# Patient Record
Sex: MALE | Race: WHITE | NOT HISPANIC OR LATINO | Employment: FULL TIME | URBAN - METROPOLITAN AREA
[De-identification: names, ages, dates, MRNs, and addresses within clinical notes are randomized per-mention and may not be internally consistent; named-entity substitution may affect disease eponyms.]

---

## 2017-04-02 ENCOUNTER — TRANSFERRED RECORDS (OUTPATIENT)
Dept: HEALTH INFORMATION MANAGEMENT | Facility: CLINIC | Age: 47
End: 2017-04-02

## 2020-11-18 ENCOUNTER — TELEPHONE (OUTPATIENT)
Dept: BEHAVIORAL HEALTH | Facility: CLINIC | Age: 50
End: 2020-11-18

## 2020-11-18 ENCOUNTER — HOSPITAL ENCOUNTER (INPATIENT)
Facility: CLINIC | Age: 50
LOS: 1 days | Discharge: HOME | End: 2020-11-19
Attending: EMERGENCY MEDICINE | Admitting: PSYCHIATRY & NEUROLOGY
Payer: COMMERCIAL

## 2020-11-18 DIAGNOSIS — R45.851 SUICIDAL IDEATION: ICD-10-CM

## 2020-11-18 DIAGNOSIS — Z20.828 EXPOSURE TO SARS-ASSOCIATED CORONAVIRUS: ICD-10-CM

## 2020-11-18 DIAGNOSIS — F32.A DEPRESSION, UNSPECIFIED DEPRESSION TYPE: ICD-10-CM

## 2020-11-18 DIAGNOSIS — G47.00 INSOMNIA, UNSPECIFIED TYPE: Primary | ICD-10-CM

## 2020-11-18 LAB
AMPHETAMINES UR QL SCN: NEGATIVE
BARBITURATES UR QL: NEGATIVE
BENZODIAZ UR QL: NEGATIVE
CANNABINOIDS UR QL SCN: NEGATIVE
COCAINE UR QL: NEGATIVE
ETHANOL UR QL SCN: NEGATIVE
OPIATES UR QL SCN: NEGATIVE
SARS-COV-2 RNA SPEC QL NAA+PROBE: NORMAL
SPECIMEN SOURCE: NORMAL

## 2020-11-18 PROCEDURE — 99284 EMERGENCY DEPT VISIT MOD MDM: CPT | Performed by: EMERGENCY MEDICINE

## 2020-11-18 PROCEDURE — 124N000002 HC R&B MH UMMC

## 2020-11-18 PROCEDURE — 99285 EMERGENCY DEPT VISIT HI MDM: CPT | Mod: 25 | Performed by: EMERGENCY MEDICINE

## 2020-11-18 PROCEDURE — U0003 INFECTIOUS AGENT DETECTION BY NUCLEIC ACID (DNA OR RNA); SEVERE ACUTE RESPIRATORY SYNDROME CORONAVIRUS 2 (SARS-COV-2) (CORONAVIRUS DISEASE [COVID-19]), AMPLIFIED PROBE TECHNIQUE, MAKING USE OF HIGH THROUGHPUT TECHNOLOGIES AS DESCRIBED BY CMS-2020-01-R: HCPCS | Performed by: EMERGENCY MEDICINE

## 2020-11-18 PROCEDURE — 80307 DRUG TEST PRSMV CHEM ANLYZR: CPT | Performed by: EMERGENCY MEDICINE

## 2020-11-18 PROCEDURE — 90791 PSYCH DIAGNOSTIC EVALUATION: CPT

## 2020-11-18 PROCEDURE — C9803 HOPD COVID-19 SPEC COLLECT: HCPCS | Performed by: EMERGENCY MEDICINE

## 2020-11-18 PROCEDURE — 250N000013 HC RX MED GY IP 250 OP 250 PS 637: Performed by: EMERGENCY MEDICINE

## 2020-11-18 PROCEDURE — 80320 DRUG SCREEN QUANTALCOHOLS: CPT | Performed by: EMERGENCY MEDICINE

## 2020-11-18 RX ORDER — ZOLPIDEM TARTRATE 12.5 MG/1
12.5 TABLET, FILM COATED, EXTENDED RELEASE ORAL
Status: DISCONTINUED | OUTPATIENT
Start: 2020-11-18 | End: 2020-11-18

## 2020-11-18 RX ORDER — ACETAMINOPHEN 325 MG/1
650 TABLET ORAL EVERY 4 HOURS PRN
Status: DISCONTINUED | OUTPATIENT
Start: 2020-11-18 | End: 2020-11-19 | Stop reason: HOSPADM

## 2020-11-18 RX ORDER — OLANZAPINE 10 MG/2ML
10 INJECTION, POWDER, FOR SOLUTION INTRAMUSCULAR 3 TIMES DAILY PRN
Status: DISCONTINUED | OUTPATIENT
Start: 2020-11-18 | End: 2020-11-19 | Stop reason: HOSPADM

## 2020-11-18 RX ORDER — VENLAFAXINE HYDROCHLORIDE 75 MG/1
75 CAPSULE, EXTENDED RELEASE ORAL DAILY
Status: DISCONTINUED | OUTPATIENT
Start: 2020-11-19 | End: 2020-11-19 | Stop reason: HOSPADM

## 2020-11-18 RX ORDER — QUETIAPINE FUMARATE 100 MG/1
100 TABLET, FILM COATED ORAL AT BEDTIME
Status: DISCONTINUED | OUTPATIENT
Start: 2020-11-18 | End: 2020-11-19

## 2020-11-18 RX ORDER — OLANZAPINE 10 MG/1
10 TABLET ORAL 3 TIMES DAILY PRN
Status: DISCONTINUED | OUTPATIENT
Start: 2020-11-18 | End: 2020-11-19 | Stop reason: HOSPADM

## 2020-11-18 RX ORDER — QUETIAPINE FUMARATE 100 MG/1
100 TABLET, FILM COATED ORAL ONCE
Status: COMPLETED | OUTPATIENT
Start: 2020-11-18 | End: 2020-11-18

## 2020-11-18 RX ORDER — ZOLPIDEM TARTRATE 12.5 MG/1
12.5 TABLET, FILM COATED, EXTENDED RELEASE ORAL
Status: DISCONTINUED | OUTPATIENT
Start: 2020-11-19 | End: 2020-11-19 | Stop reason: HOSPADM

## 2020-11-18 RX ORDER — MULTIPLE VITAMINS W/ MINERALS TAB 9MG-400MCG
1 TAB ORAL DAILY
Status: DISCONTINUED | OUTPATIENT
Start: 2020-11-19 | End: 2020-11-19 | Stop reason: HOSPADM

## 2020-11-18 RX ORDER — VENLAFAXINE HYDROCHLORIDE 75 MG/1
150 CAPSULE, EXTENDED RELEASE ORAL DAILY
COMMUNITY
End: 2022-03-11

## 2020-11-18 RX ORDER — HYDROXYZINE HYDROCHLORIDE 25 MG/1
25 TABLET, FILM COATED ORAL EVERY 4 HOURS PRN
Status: DISCONTINUED | OUTPATIENT
Start: 2020-11-18 | End: 2020-11-19 | Stop reason: HOSPADM

## 2020-11-18 RX ORDER — LANOLIN ALCOHOL/MO/W.PET/CERES
3 CREAM (GRAM) TOPICAL
Status: DISCONTINUED | OUTPATIENT
Start: 2020-11-18 | End: 2020-11-19 | Stop reason: HOSPADM

## 2020-11-18 RX ORDER — ZOLPIDEM TARTRATE 12.5 MG/1
TABLET, FILM COATED, EXTENDED RELEASE ORAL AT BEDTIME
Status: ON HOLD | COMMUNITY
End: 2020-11-19

## 2020-11-18 RX ORDER — VITAMIN B COMPLEX
50 TABLET ORAL DAILY
Status: DISCONTINUED | OUTPATIENT
Start: 2020-11-19 | End: 2020-11-19 | Stop reason: HOSPADM

## 2020-11-18 RX ADMIN — QUETIAPINE FUMARATE 100 MG: 100 TABLET ORAL at 21:57

## 2020-11-18 RX ADMIN — ZOLPIDEM TARTRATE 12.5 MG: 12.5 TABLET, EXTENDED RELEASE ORAL at 22:04

## 2020-11-18 ASSESSMENT — ENCOUNTER SYMPTOMS
DYSPHORIC MOOD: 1
SLEEP DISTURBANCE: 1

## 2020-11-18 ASSESSMENT — MIFFLIN-ST. JEOR: SCORE: 1613.8

## 2020-11-19 ENCOUNTER — APPOINTMENT (OUTPATIENT)
Dept: MRI IMAGING | Facility: CLINIC | Age: 50
End: 2020-11-19
Payer: COMMERCIAL

## 2020-11-19 VITALS
OXYGEN SATURATION: 95 % | RESPIRATION RATE: 16 BRPM | BODY MASS INDEX: 27.14 KG/M2 | TEMPERATURE: 98.1 F | DIASTOLIC BLOOD PRESSURE: 83 MMHG | SYSTOLIC BLOOD PRESSURE: 120 MMHG | HEIGHT: 67 IN | HEART RATE: 86 BPM | WEIGHT: 172.9 LBS

## 2020-11-19 LAB
ALBUMIN SERPL-MCNC: 3.8 G/DL (ref 3.4–5)
ALP SERPL-CCNC: 61 U/L (ref 40–150)
ALT SERPL W P-5'-P-CCNC: 22 U/L (ref 0–70)
ANION GAP SERPL CALCULATED.3IONS-SCNC: 2 MMOL/L (ref 3–14)
AST SERPL W P-5'-P-CCNC: 10 U/L (ref 0–45)
BILIRUB SERPL-MCNC: 1.1 MG/DL (ref 0.2–1.3)
BUN SERPL-MCNC: 18 MG/DL (ref 7–30)
CALCIUM SERPL-MCNC: 8.8 MG/DL (ref 8.5–10.1)
CHLORIDE SERPL-SCNC: 106 MMOL/L (ref 94–109)
CHOLEST SERPL-MCNC: 165 MG/DL
CO2 SERPL-SCNC: 32 MMOL/L (ref 20–32)
CREAT SERPL-MCNC: 0.91 MG/DL (ref 0.66–1.25)
DEPRECATED CALCIDIOL+CALCIFEROL SERPL-MC: 31 UG/L (ref 20–75)
ERYTHROCYTE [DISTWIDTH] IN BLOOD BY AUTOMATED COUNT: 11.9 % (ref 10–15)
GFR SERPL CREATININE-BSD FRML MDRD: >90 ML/MIN/{1.73_M2}
GLUCOSE SERPL-MCNC: 90 MG/DL (ref 70–99)
HCT VFR BLD AUTO: 45.1 % (ref 40–53)
HDLC SERPL-MCNC: 54 MG/DL
HGB BLD-MCNC: 15.5 G/DL (ref 13.3–17.7)
LABORATORY COMMENT REPORT: NORMAL
LDLC SERPL CALC-MCNC: 91 MG/DL
MCH RBC QN AUTO: 29.4 PG (ref 26.5–33)
MCHC RBC AUTO-ENTMCNC: 34.4 G/DL (ref 31.5–36.5)
MCV RBC AUTO: 85 FL (ref 78–100)
NONHDLC SERPL-MCNC: 111 MG/DL
PLATELET # BLD AUTO: 204 10E9/L (ref 150–450)
POTASSIUM SERPL-SCNC: 4.3 MMOL/L (ref 3.4–5.3)
PROT SERPL-MCNC: 7 G/DL (ref 6.8–8.8)
RBC # BLD AUTO: 5.28 10E12/L (ref 4.4–5.9)
SARS-COV-2 RNA SPEC QL NAA+PROBE: NEGATIVE
SODIUM SERPL-SCNC: 140 MMOL/L (ref 133–144)
SPECIMEN SOURCE: NORMAL
TRIGL SERPL-MCNC: 100 MG/DL
TSH SERPL DL<=0.005 MIU/L-ACNC: 1.82 MU/L (ref 0.4–4)
VIT B12 SERPL-MCNC: 549 PG/ML (ref 193–986)
WBC # BLD AUTO: 5.3 10E9/L (ref 4–11)

## 2020-11-19 PROCEDURE — 250N000013 HC RX MED GY IP 250 OP 250 PS 637: Performed by: STUDENT IN AN ORGANIZED HEALTH CARE EDUCATION/TRAINING PROGRAM

## 2020-11-19 PROCEDURE — 80061 LIPID PANEL: CPT | Performed by: STUDENT IN AN ORGANIZED HEALTH CARE EDUCATION/TRAINING PROGRAM

## 2020-11-19 PROCEDURE — 82306 VITAMIN D 25 HYDROXY: CPT | Performed by: STUDENT IN AN ORGANIZED HEALTH CARE EDUCATION/TRAINING PROGRAM

## 2020-11-19 PROCEDURE — 93010 ELECTROCARDIOGRAM REPORT: CPT | Performed by: INTERNAL MEDICINE

## 2020-11-19 PROCEDURE — 82607 VITAMIN B-12: CPT | Performed by: STUDENT IN AN ORGANIZED HEALTH CARE EDUCATION/TRAINING PROGRAM

## 2020-11-19 PROCEDURE — 80053 COMPREHEN METABOLIC PANEL: CPT | Performed by: STUDENT IN AN ORGANIZED HEALTH CARE EDUCATION/TRAINING PROGRAM

## 2020-11-19 PROCEDURE — 36415 COLL VENOUS BLD VENIPUNCTURE: CPT | Performed by: STUDENT IN AN ORGANIZED HEALTH CARE EDUCATION/TRAINING PROGRAM

## 2020-11-19 PROCEDURE — 70551 MRI BRAIN STEM W/O DYE: CPT

## 2020-11-19 PROCEDURE — 99238 HOSP IP/OBS DSCHRG MGMT 30/<: CPT | Mod: GC | Performed by: PSYCHIATRY & NEUROLOGY

## 2020-11-19 PROCEDURE — 84443 ASSAY THYROID STIM HORMONE: CPT | Performed by: STUDENT IN AN ORGANIZED HEALTH CARE EDUCATION/TRAINING PROGRAM

## 2020-11-19 PROCEDURE — 85027 COMPLETE CBC AUTOMATED: CPT | Performed by: STUDENT IN AN ORGANIZED HEALTH CARE EDUCATION/TRAINING PROGRAM

## 2020-11-19 PROCEDURE — 70551 MRI BRAIN STEM W/O DYE: CPT | Mod: 26 | Performed by: RADIOLOGY

## 2020-11-19 RX ORDER — DOXEPIN HYDROCHLORIDE 10 MG/1
10 CAPSULE ORAL AT BEDTIME
Qty: 30 CAPSULE | Refills: 0 | Status: SHIPPED | OUTPATIENT
Start: 2020-11-19 | End: 2020-12-07

## 2020-11-19 RX ORDER — QUETIAPINE FUMARATE 50 MG/1
75 TABLET, FILM COATED ORAL AT BEDTIME
Qty: 30 TABLET | Refills: 0 | Status: ON HOLD | OUTPATIENT
Start: 2020-11-19 | End: 2022-03-17

## 2020-11-19 RX ORDER — DOXEPIN HYDROCHLORIDE 10 MG/1
10 CAPSULE ORAL AT BEDTIME
Status: DISCONTINUED | OUTPATIENT
Start: 2020-11-19 | End: 2020-11-19 | Stop reason: HOSPADM

## 2020-11-19 RX ORDER — ZOLPIDEM TARTRATE 12.5 MG/1
12.5 TABLET, FILM COATED, EXTENDED RELEASE ORAL
Qty: 30 TABLET | Refills: 0 | Status: ON HOLD | OUTPATIENT
Start: 2020-11-19 | End: 2022-03-17

## 2020-11-19 RX ADMIN — MULTIPLE VITAMINS W/ MINERALS TAB 1 TABLET: TAB at 08:46

## 2020-11-19 RX ADMIN — Medication 50 MCG: at 08:46

## 2020-11-19 RX ADMIN — VENLAFAXINE HYDROCHLORIDE 75 MG: 75 CAPSULE, EXTENDED RELEASE ORAL at 08:46

## 2020-11-19 ASSESSMENT — MIFFLIN-ST. JEOR: SCORE: 1607.9

## 2020-11-19 NOTE — PLAN OF CARE
Initial Psychosocial Assessment    I have reviewed the chart, met with the patient, and developed Care Plan.  Information for assessment was obtained from: Patient and Medical chart    Presenting Problem:  Admitted voluntarily to [facility] on 11/18/20 due to worsening depression and SI in the context of Pandemic/stress.  His wife removed the guns from the home.      History of Mental Health and Chemical Dependency:  Dx hx includes depression+anxiety (for about 20 years).  Treated well until about 4 mos ago. Takes mental health medications.  One IP hospitalization 2 weeks ago at [place] in [facility].  Has an OP therapist he sees weekly.  No past SA     Family Description (Constellation, Family Psychiatric History):   with four children.  One of his daughters has depression.     Significant Life Events (Illness, Abuse, Trauma, Death):  Notable stressors include COVID.      Living Situation:  Lives in [facility]) with wife and children    Educational Background:  Graduated from high school.  College    Occupational History:  Employed as a [employer] manager    Financial Status:  Income : Employment   Insurance: [employer]/[place]    Legal Issues:  None-admitted voluntarily    Ethnic/Cultural Issues:  None reported    Spiritual Orientation:  Believes in God     Service History:  None reported    Social Functioning (organization, interests):  Loves his family, ling    Current Treatment Providers are:  PCP: Celine Appiah MD  [facility] [place] [address] (manages meds)  [phone] (Fax)    Therapy: Richi Chowdary [phone]    Social Service Assessment/Plan:  Patient will have psychiatric assessment and medication management by the psychiatrist. Medications will be reviewed and adjusted per MD as indicated. The treatment team will continue to assess and stabilize the patient's mental health symptoms with the use of medications and therapeutic programming. Hospital staff will provide a safe  environment and a therapeutic milieu. Staff will continue to assess patient as needed. Patient will participate in unit groups and activities. Patient will receive individual and group support on the unit.     CTC will do individual inpatient treatment planning and after care planning. CTC will discuss options for increasing community supports with the patient. CTC will coordinate with outpatient providers and will place referrals to ensure appropriate follow up care is in place.

## 2020-11-19 NOTE — H&P
"    ----------------------------------------------------------------------------------------------------------  [facility]  History and Physical  Kuldeep Sequeira MRN# [id number]   Age: 50 year old YOB: 1970   Date of Admission:  11/18/2020  (information obtained from patient interview and chart review)    Contacts:   Primary Outpatient Psychiatrist: None   Primary Physician: Celine Appiah  Therapist: Richi Chowdary  : [place]  Probation/: None  Family Members: Marialuisa [phone]     HPI:    Chief Complaint: \"I feel depressed because I don't feel like I sleep.\"     History of Present Illness:  Kuldeep Sequeira is a 50 year old male previously diagnosed with depression and anxiety who presented on 11/19/2020 with concerns of SI in the context of worsening depression and inability to sleep .     Per ED Notes:  Kuldeep was admitted after his wife dropped him off at the [place] ED for concerns of SI and worsening depression. He has a  20 year history of depression.  Approx 4 months ago he started having problems sleeping which increased his depression.  His PCP took him off his effexor and tried lexapro and now is back on Effexor this morning.  He still is having difficulty sleeping. Feels totally \"broken.\"  He has passive suicidal thoughts but has a wife and 4 kids.  He also believes in god.  His wife states he has had passive thoughts of suicide and is worried that the patient will reach desperation.  He takes 100 mg of seroquel for sleep and 12.5mg of ambien.  He was started on Zyprexa but stopped because it causes dry mouth.  He is currently on weekly therapy but his symptoms are not improving.  He is agreeable for admission.  No substance abuse.     Per DEC Report:   Pt states that his wife brought him to the ED because his depression has increased. He has been having trouble sleeping for 4 months. He states that he feels like a zombie. He feels he is " "always on high alert. \"I feel totally broken.\" He is having racing thoughts, trouble concentrating at work, low energy, poor motivation.     He states that he has had depression for about 20 years that has been managed well for years until 4 months ago. He reports increased depression and anxiety for the last 4 months. He does not identify any participators other than the stressors of the covid pandemic.     He has passive suicidal thoughts but denies plan or intentions. States that he feels like his is at the end of the rope. He has never attempted suicide. He states that he would never kill himself because he has a wife and 4 children. That he believes in God. He says it is a selfish act. He is worried no one will be able to help him and then he will not know what to do. His wife has removed all guns from the home. He later in the interview states that yesterday he did consider buying a lot of alcohol to consume to see if that would hurt him.     Phone call with pts wife Marialuisa [phone]. She states that he can't function, that his medications are not right. He has not made direct threats to end his life but has made comments suggestive of suicide. He is afraid no one can help him get better. He has been preparing for \"what if something happens. That he will be fine. Just talk to my bosses and you will be fine.\" He has told her that he does not know what else to do. She states that his current symptoms and depression are not like him, he is usually engaged with family and happy, not withdrawn and irritable.     He was hospitalized at [facility] in [place] via [facility] ED 2 weeks ago for 3 days. He believes he was started on Zyprexa for sleep but says this gave him bad side effects of dry mouth so he stopped taking it at discontinue. This was his first admission.     He is in weekly therapy.     Per Patient Interview:   Patient begins interview by saying he is \"confused\" because he thought he was " "admitted on a voluntary basis but now feels \"locked up.\" He notes feeling \"depressed\", he feels like he doesn't sleep and this is \"absolutely\" what influences the depression.  Patient notes feeling some stress about getting back to work promptly. He believe that if he gets sleep under control he \"would be so much better\".     Kuldeep notes this started 4 months ago when he experienced 2 consecutive nights of no sleep, never had anything  even close  to this prior. Does not remember the next few days after this event, but notes trying to make himself vomit the second night in an attempt to induce sleepiness. Notes some racing thoughts during this time, denies pressured speech, elated self-esteem and inspiration for new projects/tasks. Depression at that time was starting to get worse, but the anxiety was not as prominent. The depression and sleep quality gradually declined from that point on. Patient describes his depression as lack of enjoyment in spending time with his children and loss of interest in many things in everyday life. Normally patient says  I don t have ambition but I have energy , describes feeling \"like a zombie.\" During this time his self esteem has decreased he says  it was very high before and now I think it is very low . Kuldeep reports changes to appetite, saying  I didn t eat anything for about a month and probably lost about 20-25 pounds . He has not gained that weight back, food is only moderately enjoyable and even his favorites do not peak his interest. Denies thoughts of SI bringing up his children and wife when asked. Patient denies periods of increased energy levels, instead noting a desire to sleep without the ability to do so. He says  not sleeping all of the time controls a lot of my life  when asked about his attention but notes  I can pay attention when I have to,  though   I feel so exhausted I probably get bored really easy .     Currently can fall asleep \"fine\" with ambien and " "seroquel, though wakes up ~4 hours later. He has attempted to read, walking around, going to a different bed but  once my body gets up it s up. Almost like my body thinks it needs 4 hours of sleep a night which I know is not true . Kuldeep admits to being a  snorer  but thinks this has improved recently .Patient denies nightmares prior to sleep issues and currently, he notes he doesn t dream anymore. He denies sleep walking and acting out dreams.  Prior to sleep issues patient would go to bed at 9, fall asleep at 9:30 and wake up at 7  like clockwork . Associates anxiety to sleep problems impacting his job, notes having panic attacks since the onset of sleep problems. Notes feeling like his heart is beating too fast sometimes but does not feel surges of adrenaline. He has subjective restlessness in his legs, unclear if this started prior to or after medication adjustments, though \"doesn't have to, like, get up and walk around or anything.\"     Kuldeep was on Effexor for 8 years prior to trying a new medication recently for these concerns, he switched back to the Effexor today, 11/19. Has tried melatonin, diphenhydramine, trazadone, olanzapine, mirtazapine and lorazepam but these were ineffective for increasing sleep duration (he notes that most had some initial benefit that was short-lived). Patient notes Olanzapine  maybe helped a little bit with sleep  but stopped taking it due to xerostomia. Patient thinks Seroquel makes his legs stiff, numb and in pain. He tried taking more than 100mg but did not like the side effects, it made him feel  weird  and he felt like his mind was racing on it.  Took otc diphenhydramine for some time with limited benefit. Does state using marijuana was helpful though \"didn't want to be on that stuff.\"     Patient would like to try outpatient care. The team proposed treatment suggestions including getting further outpatient workup with sleep study and MRI, prescription for Doxepin 10mg, " continue Ambien PRN when doxepin ineffective, and go decrease quetiapine to 75mg and look into ordering a sleep study. Patient agrees with this plan. The risks, benefits, alternatives, and side effects of treatments including no treatment have been discussed and are understood by the patient and other caregivers.    Psychiatric ROS:  Depression: insomnia, appetite changes and weight changes  Sirisha/Hypomania:  racing thoughts  Anxiety: panic attacks [since onset of sleep issues ]  Panic Attack:  did not get  Psychosis: none  Trauma Related: none  Personality Symptoms: low self esteem  Obsessive Compulsive Disorder: negative    DMDD: None  Eating Disorders: negative  Oppositional Defiant Disorder/ conduct: none  ADHD: No symptoms  LD: No previously diagnosed or signs of symptoms of learning disorder reported   ASD: none  RAD: none  Suicidal Ideation: denies SI/SIB  Homicidal Ideation: None    Medical ROS: A complete medical review of systems was preformed and is negative other than noted in the HPI     Psychiatric History:   Prior diagnoses: Depression  Prior Hospitalizations: 2 weeks ago, 3-day admission at [facility] for same complaint   Suicide attempts: None reported  Self-injurious behavior: None reported  Violence: None reported  ECT/TMS: None reported  Past medications: Trintillix     Substance Use History:   Nicotine: Denies nicotine use  Alcohol: Denies alcohol use  Cannabis: Has tried in the past, does not use routinely.  Denies current or past addiction to stimulants, opiates, benzodiazepines, hallucinogens, or other elicit substances.     Other social history: Lives with wife and 4 children. Works remotely from home as a .     Prior CD treatments: none        Medical History:   History reviewed. No pertinent past medical history.   Surgical History:   No past surgical history on file.  No History of seizures or head trauma.   Family History:   States he has several family members that abuse  "alcohol.  No family members with Bipolar, Schizophrenia. No psychiatric hospitalization that he is aware of. No early or unexpected deaths in the family.      Allergies:   No Known Allergies   Medications:     Prior to Admission Medications   Prescriptions Last Dose Informant Patient Reported? Taking?   Multiple Vitamin (ONE-A-DAY MENS PO) 11/18/2020 at Unknown time  Yes Yes   Sig: Chew 1 gummy by mouth daily   QUEtiapine Fumarate (SEROQUEL PO) 11/17/2020 at Unknown time  Yes Yes   Sig: Take 100 mg by mouth At Bedtime   VITAMIN D PO 11/18/2020 at Unknown time  Yes Yes   Sig: Take 1 pill by mouth daily   venlafaxine (EFFEXOR-XR) 75 MG 24 hr capsule 11/18/2020 at Unknown time  Yes Yes   Sig: Take 75 mg by mouth daily   zolpidem ER (AMBIEN CR) 12.5 MG CR tablet 11/17/2020 at Unknown time  Yes Yes   Sig: Take by mouth At Bedtime      Facility-Administered Medications: None      No current outpatient medications on file.        Data (Labs/Imaging):   Data   Recent Results (from the past 24 hour(s))   Asymptomatic COVID-19 Virus (Coronavirus) by PCR    Collection Time: 11/18/20  7:16 PM    Specimen: Nasopharyngeal   Result Value Ref Range    COVID-19 Virus PCR to [facility] - Source Nasopharyngeal     COVID-19 Virus PCR to [facility] - Result       Test received-See reflex to [facility] test SARS CoV2 (COVID-19) Virus RT-PCR   Drug abuse screen 6 urine (tox)    Collection Time: 11/18/20  7:17 PM   Result Value Ref Range    Amphetamine Qual Urine Negative NEG^Negative    Barbiturates Qual Urine Negative NEG^Negative    Benzodiazepine Qual Urine Negative NEG^Negative    Cannabinoids Qual Urine Negative NEG^Negative    Cocaine Qual Urine Negative NEG^Negative    Ethanol Qual Urine Negative NEG^Negative    Opiates Qualitative Urine Negative NEG^Negative     Pending Results      Physical & Psychiatric Examinations:   /76   Pulse 92   Temp 98.7  F (37.1  C) (Oral)   Resp 16   Ht 1.702 m (5' 7\")   Wt 79 kg (174 lb 3.2 oz)   SpO2 " 97%   BMI 27.28 kg/m    See ED assessment note by ED physician on 11/19/2020  Appearance:  adequately groomed, dressed in hospital scrubs, appeared as age stated and cooperative  Muscle Strength and Tone: normal  Gait and Station: Normal  Behavior (Psychomotor):  no evidence of tardive dyskinesia, dystonia, or tics  Eye Contact:  good  Speech:  clear, coherent  Mood:  anxious and good  Affect:  appropriate and in normal range and mood congruent  Attitude:  cooperative  Thought Process:  logical, linear and goal oriented  Thought Content:  no evidence of suicidal ideation or homicidal ideation and no evidence of psychotic thought  Associations:  no loose associations  Insight:  good  Judgment:  intact  Oriented to:  time, person, and place  Attention Span and Concentration:  intact  Recent and Remote Memory:  intact  Language: Fluent in English with appropriate syntax and vocabulary.  Fund of Knowledge: appropriate     Assessment & Plan                 Kuldeep Sequeira is a 50 year old  White male previously diagnosed with depression who presents with concerns for SI in the context of worsening depresson. Substances are not a contributing factor to their presentation.  No biological contributions to mental health presentation. No psychosocial factors contributing to current presentation noted.                The patient's presentation is consistent with depression and comorbid insomnia.  The plan is to get an MRI w/o contrast,place  prescription for Doxepin 10mg, continue Ambien PRN, decrease quetiapine to 75mg, and place referral for a sleep study. Patient agrees with this plan.     Psychiatric diagnoses:   # Major Depression recurrent moderate without psychotic features  # insomnia  - Admit to [facility] with Attending Physician Sara Puga MD and will be treated in the therapeutic milieu with appropriate individual and group therapies.  - Medications:   -- Continue pta effexor, ambien  -- Hold pta none  -Prn  medications: acetaminophen, hydrOXYzine, melatonin, OLANZapine **OR** OLANZapine, zolpidem ER    Medical diagnoses:    # None  - Consult: None  - Labs: CMP, CBC, TSH, B12.     Legal Status: Voluntary  Disposition: Discharge to home 11/19/20  Safety Assessment:   Behavioral Orders   Procedures     Code 1 - Restrict to Unit     Routine Programming     As clinically indicated     Status 15     Every 15 minutes.     Suicide precautions     Patients on Suicide Precautions should have a Combination Diet ordered that includes a Diet selection(s) AND a Behavioral Tray selection for Safe Tray - with utensils, or Safe Tray - NO utensils        Brandy Mcclain, MS3    Patient seen and discussed with my attending physician, Dr. Sara Puga MD who is in agreement with my assessment and plan.    ---------------------------------------------------------------------------------------------------------------------  I was present with the medical student who participated in the service and in the documentation of the note. I have verified the history and personally performed the physical exam and medical decision making. I agree with the assessment and plan of care as documented in the note.    Jaycee Goins MD  PGY-1 Psychiatry Resident     Attestation:  I, Sara Puga MD, have personally performed an examination of this patient and I have reviewed the resident's documentation.  I have edited the note to reflect all relevant changes.  I have discussed this patient with the house staff on 11/19/2020.  I agree with resident findings and plan in today's  resident H&P.  I have reviewed all vitals and laboratory findings.      I certifiy that the inpatient services were ordered in accordance with the Medicare regulations governing the order. This includes certification that hospital inpatient services are reasonable and necessary and in the case of services not specified as inpatient-only under 42 .22(n), that they are  appropriately provided as inpatient services in accordance with the 2-midnight benchmark under 42 .3(e).     The reason for inpatient status is Major Depression.    Sara Puga M.D.,Ph.D.

## 2020-11-19 NOTE — PHARMACY-ADMISSION MEDICATION HISTORY
Admission Medication History Completed by Pharmacy    See [facility] Admission Navigator for allergy information, preferred outpatient pharmacy, prior to admission medications and immunization status.     Medication History Sources:     The patient and [facility] store [id number]    Changes made to [facility] medication list (reason):    Added:   o Multiple Vitamin (per patient)  o Vitamin D PO (per patient)    Deleted:   o None    Changed:   o None    Additional Information:    The patient could recall what medications he currently takes along with their strengths and last dose taken. However, [facility] fill history indicates that the patient is taking olanzapine 5 mg - one tab by mouth twice a day as needed, olanzapine 10 mg - one tab by mouth at bedtime as needed, Lexapro 20 mg - one tablet by mouth daily, trintellix 20 mg - one tab by mouth daily. The patient stated that he does not take any of these medications.    The patient stated that he takes Seroquel 50 mg - two tablet by mouth at bedtime. However, [facility] fill history indicates that he should be taking Seroquel 25 mg - one tab by mouth twice a day as needed and 1-2 tabs at bedtime.    [facility] fill history indicates that the patient's last fill of Effexor XR was in January 2020, but the patient stated that he is still taking it.    The patient's preferred pharmacy is [facility] store #[id number].    Prior to Admission medications    Medication Sig Last Dose Taking? Auth Provider   Multiple Vitamin (ONE-A-DAY MENS PO) Chew 1 gummy by mouth daily 11/18/2020 at Unknown time Yes Unknown, Entered By History   QUEtiapine Fumarate (SEROQUEL PO) Take 100 mg by mouth At Bedtime 11/17/2020 at Unknown time Yes Reported, Patient   venlafaxine (EFFEXOR-XR) 75 MG 24 hr capsule Take 75 mg by mouth daily  Yes Unknown, Entered By History   VITAMIN D PO Take 1 pill by mouth daily 11/18/2020 at Unknown time Yes Unknown, Entered By History   zolpidem ER (AMBIEN CR) 12.5 MG CR  tablet Take by mouth At Bedtime 11/17/2020 at Unknown time Yes Reported, Patient       Date completed: 11/18/20    Medication history completed by: Karen Wang

## 2020-11-19 NOTE — ED NOTES
"ED to Behavioral Floor Handoff    SITUATION  Kuldeep Sequeira is a 50 year old male who speaks Data Unavailable and lives in a home with a spouse The patient arrived in the ED by private car from home with a complaint of Depression (depression for a while; geeting worse; feeling SI; no plan; can't fxn; \"not safe at home\" [place] [phone])  .The patient's current symptoms started/worsened \"unsure\" ago and during this time the symptoms have increased.   In the ED, pt was diagnosed with   Final diagnoses:   Depression, unspecified depression type   Suicidal ideation        Initial vitals were: BP: 104/69  Pulse: 134  Temp: 95.7  F (35.4  C)  Resp: 16  SpO2: 96 %   --------  Is the patient diabetic? No   If yes, last blood glucose? --     If yes, was this treated in the ED? --  --------  Is the patient inebriated (ETOH) No or Impaired on other substances? No  MSSA done? N/A  Last MSSA score: --    Were withdrawal symptoms treated? N/A  Does the patient have a seizure history? No. If yes, date of most recent seizure--  --------  Is the patient patient experiencing suicidal ideation? denies current or recent suicidal ideation     Homicidal ideation? denies current or recent homicidal ideation or behaviors.    Self-injurious behavior/urges? denies current or recent self injurious behavior or ideation.  ------  Was pt aggressive in the ED No  Was a code called No  Is the pt now cooperative? Yes  -------  Meds given in ED: Medications - No data to display   Family present during ED course? No  Family currently present? No    BACKGROUND  Does the patient have a cognitive impairment or developmental disability? No  Allergies: No Known Allergies.   Social demographics are   Social History     Socioeconomic History     Marital status: None     Spouse name: None     Number of children: None     Years of education: None     Highest education level: None   Occupational History     None   Social Needs     Financial resource strain: " None     Food insecurity     Worry: None     Inability: None     Transportation needs     Medical: None     Non-medical: None   Tobacco Use     Smoking status: None   Substance and Sexual Activity     Alcohol use: None     Drug use: None     Sexual activity: None   Lifestyle     Physical activity     Days per week: None     Minutes per session: None     Stress: None   Relationships     Social connections     Talks on phone: None     Gets together: None     Attends Tenriism service: None     Active member of club or organization: None     Attends meetings of clubs or organizations: None     Relationship status: None     Intimate partner violence     Fear of current or ex partner: None     Emotionally abused: None     Physically abused: None     Forced sexual activity: None   Other Topics Concern     None   Social History Narrative     None        ASSESSMENT  Labs results Labs Ordered and Resulted from Time of ED Arrival Up to the Time of Departure from the ED - No data to display   Imaging Studies: No results found for this or any previous visit (from the past 24 hour(s)).   Most recent vital signs /69   Pulse 134   Temp 95.7  F (35.4  C) (Tympanic)   Resp 16   SpO2 96%    Abnormal labs/tests/findings requiring intervention:---   Pain control: pt had none  Nausea control: pt had none    RECOMMENDATION  Are any infection precautions needed (MRSA, VRE, etc.)? No If yes, what infection? --  ---  Does the patient have mobility issues? independently. If yes, what device does the pt use? ---  ---  Is patient on 72 hour hold or commitment? No If on 72 hour hold, have hold and rights been given to patient? N/A   Are admitting orders written if after 10 p.m. ?N/A  Tasks needing to be completed:---     Linda Boone, RN    [phone] [facility]

## 2020-11-19 NOTE — TELEPHONE ENCOUNTER
S: [facility] [facility]/49 yo M/ passive SI    B: Dropped off by wife  Pt reports having depression for the past 20 years been managed until 4mo ago. Pt reports increase of depression and anxiety in the last 4 months unable to be managed by meds. Pt reports he has had 4 med changes without success.  Pt reporting difficulty sleeping, been on high alert, overwhelmed and leaving early from work. Pt reports hopelessness and having SI, no plan or intent. Wife reports pt wanting to end all problems. Pt reports not going through with it because of his family and is Judaism.  Wife reports pt made comments about if something happens you all will be fine.  Last IP at [place] for 3 days.  No HI, aggression or psychosis.    Medically cleared. Eating, drinking, and ambulating indep.  No COVID concerns, Test processing    Patient cleared and ready for behavioral bed placement: Yes      A:Voluntary    R: Briana/Vivien  7:30pm paged on-call resident  7:37pm On-call resident accepted  7:54 pt placed in queue, unit charge notified, unit will call ED if able to take admit on this shift.  8:00pm notified ED charge

## 2020-11-19 NOTE — PLAN OF CARE
BEHAVIORAL TEAM DISCUSSION    Participants: Sara Puga MD, Zoraida PRUETT, Resident Vandana Goins  Progress: New admission  Anticipated length of stay: 7 days  Continued Stay Criteria/Rationale: Evaluation, assessment and stabilization  Medical/Physical: Will evaluate and assess  Precautions:   Behavioral Orders   Procedures    Code 1 - Restrict to Unit    Routine Programming     As clinically indicated    Status 15     Every 15 minutes.    Suicide precautions     Patients on Suicide Precautions should have a Combination Diet ordered that includes a Diet selection(s) AND a Behavioral Tray selection for Safe Tray - with utensils, or Safe Tray - NO utensils       Plan: Evaluate, assess, stabilize, develop safe discharge plan  Rationale for change in precautions or plan: N/A new admit

## 2020-11-19 NOTE — PLAN OF CARE
Admission plan of care   Problem: Feelings of Worthlessness, Hopelessness or Excessive Guilt (Depressive Signs/Symptoms)  Goal: Enhanced Self-Esteem and Confidence (Depressive Signs/Symptoms)  Outcome: No Change   Pt is a 50 yrs old male admitted to station 22 from [facility] ED at 2239. Pt was calm and cooperative with search. Requested to complete admission tomorrow C/O tiredness and need to sleep. Pt took HS medication (Seraquel and Ambien) in the ED before transfer to the unit. Pt admitted on voluntary and sign consent form for treatment. Alert and oriented X 4; denied wishing to be dead, SIB, AVH and HI. Contracted for safety on the unit.     Per chart review. Pt has hx of depression and anxiety. Pt has been dealing with depression for the past 20 year, and had 4 med changes without success. No HI, aggression or psychosis.   Reason for this admission,  increase of depression and anxiety in the last 4 months which is unable to be managed by med's; difficulty sleeping, been on high alert, overwhelmed and leaving early from work. Pt reports hopelessness and having SI, no plan or intent. Wife reports pt wanting to end all problems. Pt reports not going through with it because of his family and is Taoism. Wife reports pt made comments about if something happens you all will be fine. Last IP at [place] for 3 days.  Medically cleared. Eating, drinking, and ambulating indep. Pt is asymptomatic for COVID and COVID result is pending. Utox is negative for drugs.

## 2020-11-19 NOTE — SAFE
Kuldeep Sequeira  November 18, 2020    Pt presents with increased depression and anxiety.    Passive suicidal thoughts and hopelessness.   Failure to stabilize symptoms as an outpatient.       Current Suicidal Ideation/Self-Injurious Concerns/Methods: None - N/A    Inappropriate Sexual Behavior: No    Aggression/Homicidal Ideation: None - N/A      For additional details see full DEC assessment.       Pebbles Ríos, LICSW

## 2020-11-19 NOTE — ED PROVIDER NOTES
"ED Provider Note  [facility]      History     Chief Complaint   Patient presents with     Depression     depression for a while; geeting worse; feeling SI; no plan; can't fxn; \"not safe at home\" [place] [phone]     JAE Sequeira is a 50 year old male who brought in by wife due to concern for SI and worsening depression.  He has a  20 year history of depression.  Approx 4 months ago he started having problems sleeping which increased his depression.  His PCP took him off his effexor and tried lexapro and now is back on Effexor this morning.  He still is having difficulty sleeping. Feels totally \"broken.\"  He has passive suicidal thoughts but has a wife and 4 kids.  He also believes in god.  His wife states he has had passive thoughts of suicide and is worried that the patient will reach desperation.  He takes 100 mg of seroquel for sleep and 12.5mg of ambien.  He was started on Zyprexa but stopped because it causes dry mouth.  He is currently on weekly therapy but his symptoms are not improving.  He is agreeable for admission.  No substance abuse.     Past Medical History  History reviewed. No pertinent past medical history.  No past surgical history on file.       Multiple Vitamin (ONE-A-DAY MENS PO)       QUEtiapine Fumarate (SEROQUEL PO)       venlafaxine (EFFEXOR-XR) 75 MG 24 hr capsule       VITAMIN D PO       zolpidem ER (AMBIEN CR) 12.5 MG CR tablet      No Known Allergies  Family History  No family history on file.  Social History   Social History     Tobacco Use     Smoking status: None   Substance Use Topics     Alcohol use: None     Drug use: None      Past medical history, past surgical history, medications, allergies, family history, and social history were reviewed with the patient. No additional pertinent items.       Review of Systems   Psychiatric/Behavioral: Positive for dysphoric mood, sleep disturbance and suicidal ideas.   All other systems reviewed and are " negative.    A complete review of systems was performed with pertinent positives and negatives noted in the HPI, and all other systems negative.    Physical Exam   BP: 104/69  Pulse: 134  Temp: 95.7  F (35.4  C)  Resp: 16  SpO2: 96 %  Physical Exam  Constitutional:       Appearance: He is well-developed.   HENT:      Head: Normocephalic and atraumatic.   Neck:      Musculoskeletal: Normal range of motion and neck supple.   Cardiovascular:      Rate and Rhythm: Normal rate and regular rhythm.      Heart sounds: Normal heart sounds.   Pulmonary:      Effort: Pulmonary effort is normal. No respiratory distress.      Breath sounds: Normal breath sounds. No wheezing.   Skin:     General: Skin is warm.   Neurological:      General: No focal deficit present.      Mental Status: He is alert and oriented to person, place, and time.   Psychiatric:         Mood and Affect: Mood normal.         Behavior: Behavior normal.         Thought Content: Thought content normal.         ED Course      Procedures                       Results for orders placed or performed during the hospital encounter of 11/18/20   Drug abuse screen 6 urine (tox)     Status: None   Result Value Ref Range    Amphetamine Qual Urine Negative NEG^Negative    Barbiturates Qual Urine Negative NEG^Negative    Benzodiazepine Qual Urine Negative NEG^Negative    Cannabinoids Qual Urine Negative NEG^Negative    Cocaine Qual Urine Negative NEG^Negative    Ethanol Qual Urine Negative NEG^Negative    Opiates Qualitative Urine Negative NEG^Negative   Asymptomatic COVID-19 Virus (Coronavirus) by PCR     Status: None    Specimen: Nasopharyngeal   Result Value Ref Range    COVID-19 Virus PCR to [facility] - Source Nasopharyngeal     COVID-19 Virus PCR to [facility] - Result       Test received-See reflex to IDDL test SARS CoV2 (COVID-19) Virus RT-PCR     Medications   QUEtiapine (SEROquel) tablet 100 mg (100 mg Oral Incomplete 11/18/20 0198)   zolpidem ER (AMBIEN CR) CR tablet  12.5 mg (has no administration in time range)          No results found for any visits on 11/18/20.  Medications - No data to display     Assessments & Plan (with Medical Decision Making)   Is a very nice 50-year-old male who was brought to the ER by his wife due to concern for increased suicidal thoughts in the setting of worsening depression.  Patient has been trying to receive outpatient treatment and has been failing.  Please see the behavioral health  note for full details.  We will plan on admitting the patient for further care.  Patient is a voluntary admission.  Patient has no acute medical concerns at this time.    I have reviewed the nursing notes. I have reviewed the findings, diagnosis, plan and need for follow up with the patient.    New Prescriptions    No medications on file       Final diagnoses:   Depression, unspecified depression type   Suicidal ideation       --  Rachel Gaspar  [facility] EMERGENCY DEPARTMENT  11/18/2020     Rachel Gaspar MD  11/18/20 3169

## 2020-11-19 NOTE — DISCHARGE INSTRUCTIONS
Behavioral Discharge Planning and Instructions      Summary:  You were admitted on 11/18/2020  due to Depression and Suicidal Ideations.  You were treated by Dr. Sara Puga MD and discharged on 11/19/20 from Station 22 to Home      Principal Diagnosis:   Major Depressive Disorder    Health Care Follow-up Appointments:   Medication Management  Date/Time: Thursday December 3rd at 11:00am  Provider: Celine Appiah MD    [facility]  [address], [place] [address] [phone] (Fax)      Therapy:   Date/Time: Wedesnday December 2nd at 11:00am  Provider: Yenny Hannon  @ [facility]  Phone: [phone]   This is scheduled as a tele-health appointment.  If you wish to change this appointment please call the clinic directly.    Your former provider, Richi Chowdary is no longer with this clinic.  You have been seeing this provider weekly.  You have been scheduled with another provider at this clinic.  They may have information on your former provider.      Sleep Study Assessment  Date/Time:  Thursday December 17th at 2:30pm  Provider:  Dr Harp @ [address] [address]  [phone]    You will be receiving some intake paperwork in the mail.  Please fill out and bring to your appointment.  This is currently scheduled as an in person appointment.  If you would like to arrange for telehealth, please call the clinic directly.    Attend all scheduled appointments with your outpatient providers. Call at least 24 hours in advance if you need to reschedule an appointment to ensure continued access to your outpatient providers.   Lifestyle Adjustment:   1. Adjust your lifestyle to get enough sleep, relaxation, exercise and good nutrition.  Continue to develop healthy coping skills to decrease stress and promote a healthy  lifestyle.  2. Abstain from all substances of abuse.  3. Take medications as prescribed.  Please work  "with your doctor to discuss any concerns you have with your medications or side effects you may be experiencing.  4. Follow up with appointments as scheduled.      General Medication Instructions:   1. See your medication sheet(s) for instructions.   2. Take all medicines as directed.  Make no changes unless your doctor suggests them.   3. Go to all your doctor visits.  4. Be sure to have all your required lab tests. This way, your medicines can be refilled on time.  5. Do not use any drugs not prescribed by your doctor.  Major Treatments, Procedures and Findings:  You were provided with: a psychiatric assessment, assessed for medical stability, medication evaluation and/or management, group therapy and milieu management    Symptoms to Report: feeling more aggressive, increased confusion, losing more sleep, mood getting worse or thoughts of suicide    Early warning signs can include: increased depression or anxiety sleep disturbances increased thoughts or behaviors of suicide or self-harm  increased unusual thinking, such as paranoia or hearing voices    Safety and Wellness:  Take all medicines as directed.  Make no changes unless your doctor suggests them.      Follow treatment recommendations.  Refrain from alcohol and non-prescribed drugs.  Ask your support system to help you reduce your access to items that could harm yourself or others. If there is a concern for safety, call 911.    Resources:   Crisis Intervention: 160.755.4032 or 176-478-9712 (TTY: 636.781.6567).  Call anytime for help.  [employer] [place] on Mental Illness (www.[place].org): 163.432.8762 or 773-794-0536.  Mental Health Consumer/Survivor Network of [place] (www.mhcsn.net): 689.238.8704 or 952-624-7500  Mental Health Association of [place] (www.mentalhealth.org): 936.946.2719 or 844-552-0015  [facility] Crisis (COPE) Response - Adult 034 320-0120  Crisis text line: Text \"[place]\" to 105466. Free, confidential, 24/7.  Crisis Intervention: 682.367.3849 or " 114.779.4507. Call anytime for help.       The treatment team has appreciated the opportunity to work with you.     Kuldeep,  please take care and make your recovery a daily recovery. If you would like to obtain any specific documentation regarding your hospitalization after your discharge, contact [facility] of Information/Medical Records at:   [phone].

## 2020-11-19 NOTE — PROGRESS NOTES
Pt had a good shift; pt slept through the night. No sign of SI/SIB noted; pt appeared slept for 7.5 hrs this shift.

## 2020-11-19 NOTE — PROGRESS NOTES
11/18/20 2255   Valuables   Patient Belongings remains with patient;locker   Patient Belongings Remaining with Patient clothing   Patient Belongings Put in Hospital Secure Location (Security or Locker, etc.) cash/credit card;cell phone/electronics;clothing;money (see comment);shoes;wallet   Did you bring any home meds/supplements to the hospital?  No       Items brought in on admission (11/18/20):  With pt: Underwear  In locker: Coat, zip-up, socks, belt, shirt, pants, shoes w/laces, phone, wallet w/ID, and chewing tobacco.  Sent to security: $90.00 and 6 debit/credit cards sent in envelope #[id number]        A               Admission:  I am responsible for any personal items that are not sent to the safe or pharmacy.  [facility] is not responsible for loss, theft or damage of any property in my possession.    Signature:  _________________________________ Date: _______  Time: _____                                              Staff Signature:  ____________________________ Date: ________  Time: _____      2nd Staff person, if patient is unable/unwilling to sign:    Signature: ________________________________ Date: ________  Time: _____     Discharge:  [facility] has returned all of my personal belongings:    Signature: _________________________________ Date: ________  Time: _____                                          Staff Signature:  ____________________________ Date: ________  Time: _____

## 2020-11-19 NOTE — PROGRESS NOTES
SPIRITUAL HEALTH SERVICES  SPIRITUAL ASSESSMENT Progress Note  [facility] ([facility] Station 22     REFERRAL SOURCE: I did visit patient Kuldeep this morning twice per [facility] hospital  referral. However, pt was busy with the unit staff and with his own Doctor. I informed to the unit staff about my visit attempts and to let the pt know that I am willing to come back for another visit if he is interested the  spiritual support.    PLAN: I will remain open to provide spiritual care for the pt as needed.    Lucie Cuadra M.Div. ([place]), M.Th., D.Min., [facility]  Staff   Pager [phone]

## 2020-11-19 NOTE — PLAN OF CARE
Work Completed: Participated in Team meeting  Chart Review  Completed Initial psychosocial assessment  Completed Team note  AVS Completed  Made the following appointments for follow up:  Date/Time: Thursday December 3rd at 11:00am  Provider: Celine Appiah MD    [facility]  [address] [place] [address]  [phone] (Fax)       Therapy:   Date/Time: Wedesnday December 2nd at 11:00am  Provider: Yenny Hannon  @ [facility]  Phone: [phone]   This is scheduled as a tele-health appointment.  If you wish to change this appointment please call the clinic directly.    Your former provider, Richi Chowdary is no longer with this clinic.  You have been seeing this provider weekly.  You have been scheduled with another provider at this clinic.  They may have information on your former provider.       Sleep Study Assessment  Date/Time:  Thursday December 17th at 2:30pm  Provider:  Dr Harp @ [address] [address] [phone]     You will be receiving some intake paperwork in the mail.  Please fill out and bring to your appointment.  This is currently scheduled as an in person appointment.  If you would like to arrange for telehealth, please call the clinic directly.    Discharge plan or goal: Tonight, home with follow up with therapy, medication management and Sleep Study referral                Barriers to discharge: Sx stabilization, medication management, safe discharge planning

## 2020-11-20 LAB — INTERPRETATION ECG - MUSE: NORMAL

## 2020-11-20 NOTE — DISCHARGE SUMMARY
"    ----------------------------------------------------------------------------------------------------------  [facility], [place]   Discharge Summary  Hospital Day #1  Kuldeep Sequeira MRN# [id number]   Age: 50 year old YOB: 1970   Date of Admission:  11/18/2020  Date of Discharge:  11/19/2020  Admitting Physician:  Sara Puga MD  Discharge Physician:  Sara Puga MD     Event Leading to Hospitalization:   History of Present Illness:  Kuldeep Sequeira is a 50 year old male previously diagnosed with depression and anxiety who presented on 11/19/2020 with concerns of SI in the context of worsening depression and inability to sleep .      Per ED Notes:  Kuldeep was admitted after his wife dropped him off at the [place] ED for concerns of SI and worsening depression. He has a  20 year history of depression.  Approx 4 months ago he started having problems sleeping which increased his depression.  His PCP took him off his effexor and tried lexapro and now is back on Effexor this morning.  He still is having difficulty sleeping. Feels totally \"broken.\"  He has passive suicidal thoughts but has a wife and 4 kids.  He also believes in god.  His wife states he has had passive thoughts of suicide and is worried that the patient will reach desperation.  He takes 100 mg of seroquel for sleep and 12.5mg of ambien.  He was started on Zyprexa but stopped because it causes dry mouth.  He is currently on weekly therapy but his symptoms are not improving.  He is agreeable for admission.  No substance abuse.     Per Patient Interview:  On interview with  team, patient emphasizes that his main concern is his sleep, not his mood.     Patient begins interview by saying he is \"confused\" because he thought he was admitted on a voluntary basis but now feels \"locked up.\" He notes feeling \"depressed\", he feels like he doesn't sleep and this is \"absolutely\" what influences the depression.  Patient notes " "feeling some stress about getting back to work promptly. He believe that if he gets sleep under control he \"would be so much better\".      Kuldeep notes this started 4 months ago when he experienced 2 consecutive nights of no sleep, never had anything  even close  to this prior. Notes some racing thoughts during this time, denies pressured speech, elated self-esteem and inspiration for new projects/tasks. Depression at that time was starting to get worse, but the anxiety was not as prominent. The depression and sleep quality gradually declined from that point on. Patient describes his depression as lack of enjoyment in spending time with his children and loss of interest in many things in everyday life. Normally patient says  I don t have ambition but I have energy , describes feeling \"like a zombie.\" During this time his self esteem has decreased he says  it was very high before and now I think it is very low . Kuldeep reports changes to appetite, saying  I didn t eat anything for about a month and probably lost about 20-25 pounds . He has not gained that weight back, food is only moderately enjoyable and even his favorites do not peak his interest. Denies thoughts of SI bringing up his children and wife when asked. Patient denies periods of increased energy levels, instead noting a desire to sleep without the ability to do so. He says  not sleeping all of the time controls a lot of my life  when asked about his attention but notes  I can pay attention when I have to,  though   I feel so exhausted I probably get bored really easy .      Currently can fall asleep \"fine\" with ambien and seroquel, though wakes up ~4 hours later. He has attempted to read, walking around, going to a different bed but  once my body gets up it s up. Almost like my body thinks it needs 4 hours of sleep a night which I know is not true . Kuldeep admits to being a  snorer  but thinks this has improved recently .Patient denies nightmares prior to " "sleep issues and currently, he notes he doesn t dream anymore. He denies sleep walking and acting out dreams.  Prior to sleep issues patient would go to bed at 9, fall asleep at 9:30 and wake up at 7  like clockwork . Associates anxiety to sleep problems impacting his job, notes having panic attacks since the onset of sleep problems. Notes feeling like his heart is beating too fast sometimes but does not feel surges of adrenaline. He has subjective restlessness in his legs, unclear if this started prior to or after medication adjustments, though \"doesn't have to, like, get up and walk around or anything.\"      Kuldeep was on Effexor for 8 years prior to trying a new medication recently for these concerns, he switched back to the Effexor today, 11/19. Has tried melatonin, diphenhydramine, trazadone, olanzapine, mirtazapine and lorazepam but these were ineffective for increasing sleep duration (he notes that most had some initial benefit that was short-lived). Patient notes Olanzapine  maybe helped a little bit with sleep  but stopped taking it due to xerostomia. Patient thinks Seroquel makes his legs stiff, numb and in pain. He tried taking more than 100mg but did not like the side effects, it made him feel  weird  and he felt like his mind was racing on it.  Took otc diphenhydramine for some time with limited benefit. Does state using marijuana was helpful though \"didn't want to be on that stuff.\"     See Admission note by Sara Puga MD on 11/18/2020 for additional details.      Objective:   B/P: 120/83, T: 98.1, P: 86, R: 16  Psychiatric Examination:  Appearance:  adequately groomed, dressed in hospital scrubs, appeared as age stated and cooperative. In no acute distress.   Muscle Strength and Tone: normal  Gait and Station: not assessed  Behavior (Psychomotor):  no evidence of tardive dyskinesia, dystonia, or tics  Eye Contact:  good  Speech:  clear, coherent  Mood:  \"pretty good\"  Affect:  appropriate and in " normal range, reactive, mood congruent  Attitude:  cooperative  Thought Process:  logical, linear and goal oriented  Thought Content:  no evidence of suicidal ideation or homicidal ideation and no evidence of psychotic thought  Associations:  no loose associations  Insight:  good  Judgment:  intact  Oriented to:  time, person, and place  Attention Span and Concentration:  intact  Recent and Remote Memory:  intact  Language: Fluent in English with appropriate syntax and vocabulary.  Fund of Knowledge: appropriate    Neuro: No abnormal movements, tics, tremors observed. CN II-XII grossly intact without facial droop. Moves all extremities spontaneously.      Hospital Course:   Diagnostic Impression:                 Kuldeep Sequeira is a 50 year old  White male previously diagnosed with MDD who presents with worsening sleep and passive SI in the context of pandemic circumstances without other obvious precipitating factors. Substances are not a contributing factor to their presentation.  Biological contributions to mental health presentation include recent medication adjustments. Psychosocial factors contributing to current presentation include now working from home, history of mood disorder.                The patient's presentation is most consistent with primary insomnia, though other etiologies of his sleep disturbance are under active consideration. By history and initial workup, it is unlikely that the patient is experiencing a situational/acute insomnia, delayed sleep phase, restless leg syndrome, obstructive sleep apnea, lindsey, hypomania, hyperthyroidism, substance-induced sleep disorder, or early parkinsonism.     Psychiatric Course:  Kuldeep Sequeira was admitted to station 22  with attending Sara Puga MD as a voluntary patient. PTA medication were quetiapine 100 mg at bedtime, venlafaxine 75 mg qday, zolpidem 12.5mg at bedtime, mulitvitamin, vitamin D3. Venlafaxine was continued and sleep medications were  discussed with patient and adjusted as follows: doxepin 10mg at bedtime added, zolpidem changed to as-needed (second-line after doxepin), quetiapine decreased to 75 mg.     As the patient reported minimal benefit from quetipine, had subjective soreness/restlessness in his legs since beginning the medication, and was concerned about longterm metablic side effects, goal would be to eventually taper-off or discontinue this medication, though this can be done as an outpatient.     Further workup was offered including MRI brain and sleep study; MRI was able to be scheduled during brief admission and order placed for sleep study as an outpatient, plan to further classify his sleep disorder and better tailor its treatment.    Medical Course:  Patient was medically cleared for admission to the unit. No medical issues arose during this hospitalization.     Consults:   None    Risk Assessment:   Kuldeep Sequeira has notable risk factors for self-harm, including male gender, ongoing mood disorder. However, risk is mitigated by commitment to family, Mu-ism beliefs, absence of past attempts and history of seeking help when needed. Additional steps taken to minimize risk include: medication optimization, safe discharge destination, and follow-up arrangements. Therefore, based on all available evidence including the factors cited above, Kuldeep Sequeira does not appear to be an imminent danger to self or others and is appropriate for outpatient level of care. However, if patient uses substances or is non-adherent with medication, their risk of decompensation and SI/HI will be elevated. This was discussed with the patient.    This document serves as a transfer of care to Kuldeep Sequeira's outpatient providers.     Diagnoses:   # primary insomnia  # Major Depressive Disorder, recurrent episode (moderate, without psychotic features)     Discharge Plan:   Patient is being discharged to home with the following medications and appointments as  detailed below:    Medications:  Added/Changed:  - ADD doxepin 10 mg at bedtime  - CHANGE zolpidem 12.5 mg frequency to at bedtime PRN  - DECREASE quetiepine dose to 75 mg at bedtime    Continued:  - venlafaxine 75 mg qday    Discontinued:  - none    Psychiatric Appointments & Other Referrals:   Date/Time:  at 11:00am  Provider: Celine Appiah MD    [facility]  [address] [place] [address]  [phone] (Fax)       Therapy:   Date/Time:  at 11:00am  Provider: Yenny Hannon  @ [facility]  Phone: [phone]   This is scheduled as a tele-health appointment.  If you wish to change this appointment please call the clinic directly.    Your former provider, Richi Chowdary is no longer with this clinic.  You have been seeing this provider weekly.  You have been scheduled with another provider at this clinic.  They may have information on your former provider.       Sleep Study Assessment  Date/Time:   at 2:30pm  Provider:  Dr Harp @ [address] [address]  [phone]      Pt seen and discussed with my attending, MD Jaycee Guthrie MD  PGY-1 Psychiatry Resident    Attestation:   The patient has been seen and evaluated by me,  Sara Puga MD. I have examined the patient today and reviewed the discharge plan with the resident and medical student. I agree with the final assessment and plan, as noted in the discharge summary. I have reviewed today's vital signs, medications, labs and imaging.  Total time discharge plannin minutes    Although the presentation/diagnosis at the time of admission led to an expectation that hospitalization would span >2 midnights, discharge is reasonable at this time given the following factors: pt had concerns about being on a locked unit and did not require a prolonged stay.  After he received imaging and  medication he was discharged with a reasonable plan to continue outpatient workup.    Sara Puga MD ,Ph.D.         Appendix A: All Labs This Admission:     Results for orders placed or performed during the hospital encounter of 11/18/20   MRI Brain w/o contrast     Status: None    Narrative    EXAM: MR BRAIN W/O CONTRAST  11/19/2020 4:45 PM     HISTORY:  Sleep disorder       COMPARISON:  None    TECHNIQUE: Multiplanar, multisequence MRI images of the brain were  obtained without intravenous contrast.    CONTRAST: None    Findings:   There is no mass effect, midline shift, or evidence of intracranial  hemorrhage.  The ventricles are not enlarged out of proportion to the  cerebral sulci. The gray-white matter differentiation of the cerebral  hemispheres is preserved. Axial diffusion and susceptibility weighted  images demonstrate no abnormal signal.     The major vascular flow-voids appear patent. Bilateral maxillary sinus  mucosal thickening, otherwise the visualized portions of paranasal  sinuses, and mastoid air cells are clear. Skull marrow signal is  within normal limits. The globes and intraocular structures are within  normal limits.      Impression    Impression: No acute intracranial pathology.     I have personally reviewed the examination and initial interpretation  and I agree with the findings.    KIM WHITE MD   Drug abuse screen 6 urine (tox)     Status: None   Result Value Ref Range    Amphetamine Qual Urine Negative NEG^Negative    Barbiturates Qual Urine Negative NEG^Negative    Benzodiazepine Qual Urine Negative NEG^Negative    Cannabinoids Qual Urine Negative NEG^Negative    Cocaine Qual Urine Negative NEG^Negative    Ethanol Qual Urine Negative NEG^Negative    Opiates Qualitative Urine Negative NEG^Negative   Asymptomatic COVID-19 Virus (Coronavirus) by PCR     Status: None    Specimen: Nasopharyngeal   Result Value Ref Range    COVID-19 Virus PCR to [facility] - Source Nasopharyngeal      COVID-19 Virus PCR to [facility] - Result       Test received-See reflex to IDDL test SARS CoV2 (COVID-19) Virus RT-PCR   SARS-CoV-2 COVID-19 Virus (Coronavirus) RT-PCR Nasopharyngeal     Status: None    Specimen: Nasopharyngeal   Result Value Ref Range    SARS-CoV-2 Virus Specimen Source Nasopharyngeal     SARS-CoV-2 PCR Result NEGATIVE     SARS-CoV-2 PCR Comment       Testing was performed using the [employer]ptima SARS-CoV-2 Assay on the [employer] Instrument System.   Additional information about this Emergency Use Authorization (EUA) assay can be found via   the Lab Guide.     CBC with platelets     Status: None   Result Value Ref Range    WBC 5.3 4.0 - 11.0 10e9/L    RBC Count 5.28 4.4 - 5.9 10e12/L    Hemoglobin 15.5 13.3 - 17.7 g/dL    Hematocrit 45.1 40.0 - 53.0 %    MCV 85 78 - 100 fl    MCH 29.4 26.5 - 33.0 pg    MCHC 34.4 31.5 - 36.5 g/dL    RDW 11.9 10.0 - 15.0 %    Platelet Count 204 150 - 450 10e9/L   Comprehensive metabolic panel     Status: Abnormal   Result Value Ref Range    Sodium 140 133 - 144 mmol/L    Potassium 4.3 3.4 - 5.3 mmol/L    Chloride 106 94 - 109 mmol/L    Carbon Dioxide 32 20 - 32 mmol/L    Anion Gap 2 (L) 3 - 14 mmol/L    Glucose 90 70 - 99 mg/dL    Urea Nitrogen 18 7 - 30 mg/dL    Creatinine 0.91 0.66 - 1.25 mg/dL    GFR Estimate >90 >60 mL/min/[1.73_m2]    GFR Estimate If Black >90 >60 mL/min/[1.73_m2]    Calcium 8.8 8.5 - 10.1 mg/dL    Bilirubin Total 1.1 0.2 - 1.3 mg/dL    Albumin 3.8 3.4 - 5.0 g/dL    Protein Total 7.0 6.8 - 8.8 g/dL    Alkaline Phosphatase 61 40 - 150 U/L    ALT 22 0 - 70 U/L    AST 10 0 - 45 U/L   Lipid panel     Status: None   Result Value Ref Range    Cholesterol 165 <200 mg/dL    Triglycerides 100 <150 mg/dL    HDL Cholesterol 54 >39 mg/dL    LDL Cholesterol Calculated 91 <100 mg/dL    Non HDL Cholesterol 111 <130 mg/dL   TSH with free T4 reflex and/or T3 as indicated     Status: None   Result Value Ref Range    TSH 1.82 0.40 - 4.00 mU/L   Vitamin B12     Status: None    Result Value Ref Range    Vitamin B12 549 193 - 986 pg/mL   Vitamin D     Status: None   Result Value Ref Range    Vitamin D Deficiency screening 31 20 - 75 ug/L   EKG 12-lead, tracing only     Status: None (Preliminary result)   Result Value Ref Range    Interpretation ECG Click View Image link to view waveform and result

## 2020-11-20 NOTE — DISCHARGE SUMMARY
Pt discharged to home at 1827 via family transport and picked up by his wife. Education was provided on medication and follow up appointment and pt verbalized understanding. Pt discharge with no med from the unit, and was ordered to  form [employer], where the order was sent. Pt took his belongings on discharge. Pt denied SI/SIB and HI. Pt contracted for safety going home. Pt stated he has good support system for wife and his grown kids.was encouraged to seek help if he seen worsening symptoms of depression, anxiety and SI.

## 2020-12-02 ENCOUNTER — MEDICAL CORRESPONDENCE (OUTPATIENT)
Dept: HEALTH INFORMATION MANAGEMENT | Facility: CLINIC | Age: 50
End: 2020-12-02

## 2020-12-02 ENCOUNTER — TELEPHONE (OUTPATIENT)
Dept: PSYCHIATRY | Facility: CLINIC | Age: 50
End: 2020-12-02

## 2020-12-03 ENCOUNTER — TRANSFERRED RECORDS (OUTPATIENT)
Dept: HEALTH INFORMATION MANAGEMENT | Facility: CLINIC | Age: 50
End: 2020-12-03

## 2020-12-03 LAB
CREAT SERPL-MCNC: 0.8 MG/DL (ref 0.5–1.2)
GFR SERPL CREATININE-BSD FRML MDRD: 104 ML/MIN/1.73M2
GLUCOSE SERPL-MCNC: 97 MG/DL (ref 65–99)
POTASSIUM SERPL-SCNC: 4.6 MMOL/L (ref 3.5–5)

## 2020-12-04 DIAGNOSIS — Z11.59 ENCOUNTER FOR SCREENING FOR OTHER VIRAL DISEASES: Primary | ICD-10-CM

## 2020-12-04 DIAGNOSIS — Z53.9 ERRONEOUS ENCOUNTER--DISREGARD: Primary | ICD-10-CM
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
## 2020-12-07 ENCOUNTER — ANESTHESIA EVENT (OUTPATIENT)
Dept: BEHAVIORAL HEALTH | Facility: CLINIC | Age: 50
End: 2020-12-07

## 2020-12-07 ENCOUNTER — HOSPITAL ENCOUNTER (OUTPATIENT)
Dept: BEHAVIORAL HEALTH | Facility: CLINIC | Age: 50
Discharge: HOME | End: 2020-12-07
Attending: PSYCHIATRY & NEUROLOGY | Admitting: PSYCHIATRY & NEUROLOGY
Payer: COMMERCIAL

## 2020-12-07 ENCOUNTER — ANESTHESIA (OUTPATIENT)
Dept: BEHAVIORAL HEALTH | Facility: CLINIC | Age: 50
End: 2020-12-07

## 2020-12-07 VITALS
HEART RATE: 112 BPM | DIASTOLIC BLOOD PRESSURE: 81 MMHG | SYSTOLIC BLOOD PRESSURE: 131 MMHG | OXYGEN SATURATION: 95 % | BODY MASS INDEX: 27.08 KG/M2 | TEMPERATURE: 97.9 F | HEIGHT: 67 IN | RESPIRATION RATE: 16 BRPM

## 2020-12-07 DIAGNOSIS — F33.2 SEVERE RECURRENT MAJOR DEPRESSION WITHOUT PSYCHOTIC FEATURES (H): Primary | ICD-10-CM

## 2020-12-07 DIAGNOSIS — F32.A DEPRESSION, UNSPECIFIED DEPRESSION TYPE: ICD-10-CM

## 2020-12-07 LAB
SARS-COV-2 RNA SPEC QL NAA+PROBE: NOT DETECTED
SPECIMEN SOURCE: NORMAL

## 2020-12-07 PROCEDURE — 250N000009 HC RX 250: Performed by: ANESTHESIOLOGY

## 2020-12-07 PROCEDURE — 370N000001 HC ANESTHESIA TECHNICAL FEE, 1ST 30 MIN

## 2020-12-07 PROCEDURE — 90870 ELECTROCONVULSIVE THERAPY: CPT

## 2020-12-07 PROCEDURE — 96372 THER/PROPH/DIAG INJ SC/IM: CPT | Performed by: PSYCHIATRY & NEUROLOGY

## 2020-12-07 PROCEDURE — 250N000011 HC RX IP 250 OP 636: Performed by: PSYCHIATRY & NEUROLOGY

## 2020-12-07 PROCEDURE — 250N000011 HC RX IP 250 OP 636: Performed by: ANESTHESIOLOGY

## 2020-12-07 PROCEDURE — U0003 INFECTIOUS AGENT DETECTION BY NUCLEIC ACID (DNA OR RNA); SEVERE ACUTE RESPIRATORY SYNDROME CORONAVIRUS 2 (SARS-COV-2) (CORONAVIRUS DISEASE [COVID-19]), AMPLIFIED PROBE TECHNIQUE, MAKING USE OF HIGH THROUGHPUT TECHNOLOGIES AS DESCRIBED BY CMS-2020-01-R: HCPCS | Performed by: PSYCHIATRY & NEUROLOGY

## 2020-12-07 RX ORDER — ONDANSETRON 4 MG/1
4 TABLET, ORALLY DISINTEGRATING ORAL EVERY 30 MIN PRN
Status: DISCONTINUED | OUTPATIENT
Start: 2020-12-07 | End: 2020-12-08 | Stop reason: HOSPADM

## 2020-12-07 RX ORDER — HALOPERIDOL 5 MG/ML
5 INJECTION INTRAMUSCULAR
Status: COMPLETED | OUTPATIENT
Start: 2020-12-07 | End: 2020-12-07

## 2020-12-07 RX ORDER — NALOXONE HYDROCHLORIDE 0.4 MG/ML
0.4 INJECTION, SOLUTION INTRAMUSCULAR; INTRAVENOUS; SUBCUTANEOUS
Status: DISCONTINUED | OUTPATIENT
Start: 2020-12-07 | End: 2020-12-08 | Stop reason: HOSPADM

## 2020-12-07 RX ORDER — LORAZEPAM 0.5 MG/1
0.5 TABLET ORAL EVERY 6 HOURS PRN
COMMUNITY
End: 2021-01-22

## 2020-12-07 RX ORDER — NALOXONE HYDROCHLORIDE 0.4 MG/ML
0.2 INJECTION, SOLUTION INTRAMUSCULAR; INTRAVENOUS; SUBCUTANEOUS
Status: DISCONTINUED | OUTPATIENT
Start: 2020-12-07 | End: 2020-12-08 | Stop reason: HOSPADM

## 2020-12-07 RX ORDER — SODIUM CHLORIDE, SODIUM LACTATE, POTASSIUM CHLORIDE, CALCIUM CHLORIDE 600; 310; 30; 20 MG/100ML; MG/100ML; MG/100ML; MG/100ML
INJECTION, SOLUTION INTRAVENOUS CONTINUOUS
Status: DISCONTINUED | OUTPATIENT
Start: 2020-12-07 | End: 2020-12-08 | Stop reason: HOSPADM

## 2020-12-07 RX ORDER — OLANZAPINE 10 MG/1
TABLET ORAL
Qty: 30 TABLET | Refills: 0 | Status: SHIPPED | OUTPATIENT
Start: 2020-12-07 | End: 2022-03-11

## 2020-12-07 RX ORDER — FENTANYL CITRATE 50 UG/ML
25-50 INJECTION, SOLUTION INTRAMUSCULAR; INTRAVENOUS
Status: DISCONTINUED | OUTPATIENT
Start: 2020-12-07 | End: 2020-12-08 | Stop reason: HOSPADM

## 2020-12-07 RX ORDER — ONDANSETRON 2 MG/ML
4 INJECTION INTRAMUSCULAR; INTRAVENOUS EVERY 30 MIN PRN
Status: DISCONTINUED | OUTPATIENT
Start: 2020-12-07 | End: 2020-12-08 | Stop reason: HOSPADM

## 2020-12-07 RX ORDER — BENZTROPINE MESYLATE 1 MG/ML
2 INJECTION, SOLUTION INTRAMUSCULAR; INTRAVENOUS ONCE
Status: COMPLETED | OUTPATIENT
Start: 2020-12-07 | End: 2020-12-07

## 2020-12-07 RX ORDER — LABETALOL 20 MG/4 ML (5 MG/ML) INTRAVENOUS SYRINGE
10
Status: DISCONTINUED | OUTPATIENT
Start: 2020-12-07 | End: 2020-12-08 | Stop reason: HOSPADM

## 2020-12-07 RX ADMIN — MIDAZOLAM 2 MG: 1 INJECTION INTRAMUSCULAR; INTRAVENOUS at 10:59

## 2020-12-07 RX ADMIN — HALOPERIDOL LACTATE 5 MG: 5 INJECTION, SOLUTION INTRAMUSCULAR at 11:00

## 2020-12-07 RX ADMIN — METHOHEXITAL SODIUM 80 MG: 500 INJECTION, POWDER, LYOPHILIZED, FOR SOLUTION INTRAMUSCULAR; INTRAVENOUS; RECTAL at 10:21

## 2020-12-07 RX ADMIN — Medication 80 MG: at 10:24

## 2020-12-07 RX ADMIN — BENZTROPINE MESYLATE 2 MG: 1 INJECTION INTRAMUSCULAR; INTRAVENOUS at 11:01

## 2020-12-07 RX ADMIN — MIDAZOLAM 2 MG: 1 INJECTION INTRAMUSCULAR; INTRAVENOUS at 10:42

## 2020-12-07 NOTE — ANESTHESIA POSTPROCEDURE EVALUATION
Anesthesia POST Procedure Evaluation    Patient: Kuldepe Sequeira   MRN:     [id number] Gender:   male   Age:    50 year old :      1970        Preoperative Diagnosis: * No pre-op diagnosis entered *   * No procedures listed *   Postop Comments: No value filed.     Anesthesia Type: General          Postop Pain Control: Uneventful            Sign Out: Well controlled pain   PONV: No   Neuro/Psych: Uneventful            Sign Out: Acceptable/Baseline neuro status   Airway/Respiratory: Uneventful            Sign Out: Acceptable/Baseline resp. status   CV/Hemodynamics: Uneventful            Sign Out: Acceptable CV status   Other NRE: NONE   DID A NON-ROUTINE EVENT OCCUR? No         Last Anesthesia Record Vitals:  CRNA VITALS  2020 1004 - 2020 1039      2020             Resp Rate (observed):  (!) 1          Last PACU Vitals:  Vitals Value Taken Time   BP     Temp     Pulse     Resp     SpO2     Temp src     NIBP 146/99 20 1027   Pulse 85 20 1033   SpO2 100 % 20 1033   Resp     Temp     Ht Rate 83 20 1033   Temp 2           Electronically Signed By: Chauncey Hill DO, 2020, 10:39 AM

## 2020-12-07 NOTE — ANESTHESIA PREPROCEDURE EVALUATION
"Anesthesia Pre-Procedure Evaluation    Patient: Kuldeep Sequeira   MRN:     [id number] Gender:   male   Age:    50 year old :      1970        Preoperative Diagnosis: * No pre-op diagnosis entered *   * No procedures listed *     LABS:  CBC:   Lab Results   Component Value Date    WBC 5.3 2020    HGB 15.5 2020    HCT 45.1 2020     2020     BMP:   Lab Results   Component Value Date     2020    POTASSIUM 4.3 2020    CHLORIDE 106 2020    CO2 32 2020    BUN 18 2020    CR 0.91 2020    GLC 90 2020     COAGS: No results found for: PTT, INR, FIBR  POC: No results found for: BGM, HCG, HCGS  OTHER:   Lab Results   Component Value Date    RANJITH 8.8 2020    ALBUMIN 3.8 2020    PROTTOTAL 7.0 2020    ALT 22 2020    AST 10 2020    ALKPHOS 61 2020    BILITOTAL 1.1 2020    TSH 1.82 2020        Preop Vitals    BP Readings from Last 3 Encounters:   20 136/82   20 120/83    Pulse Readings from Last 3 Encounters:   20 83   20 86      Resp Readings from Last 3 Encounters:   20 16   20 16    SpO2 Readings from Last 3 Encounters:   20 98%   20 95%      Temp Readings from Last 1 Encounters:   20 37.2  C (98.9  F) (Temporal)    Ht Readings from Last 1 Encounters:   20 1.702 m (5' 7\")      Wt Readings from Last 1 Encounters:   20 78.4 kg (172 lb 14.4 oz)    Estimated body mass index is 27.08 kg/m  as calculated from the following:    Height as of this encounter: 1.702 m (5' 7\").    Weight as of 20: 78.4 kg (172 lb 14.4 oz).     LDA:  Peripheral IV 20 Left;Posterior Lower forearm (Active)   Site Assessment WDL 20 1014   Line Status Saline locked 20 1014   Dressing Intervention New dressing  20 1014   Number of days: 0        No past medical history on file.   No past surgical history on file.   No Known Allergies            "   PHYSICAL EXAM:   Mental Status/Neuro: A/A/O   Airway: Facies: Feasible  Mallampati: II  Mouth/Opening: Full  TM distance: > 6 cm  Neck ROM: Full   Respiratory: Auscultation: CTAB     Resp. Rate: Normal     Resp. Effort: Normal      CV: Rhythm: Regular  Rate: Age appropriate  Heart: Normal Sounds  Edema: None   Comments:      Dental: Normal Dentition                Assessment:   ASA SCORE: 2            Plan:   Anes. Type:  General   Pre-Medication: None   Induction:  IV (Standard)   Airway: Mask   Access/Monitoring: PIV   Maintenance: Balanced     Postop Plan:   Postop Pain: Opioids  Postop Sedation/Airway: Not planned     PONV Management:   Adult Risk Factors:, Postop Opioids                   Chauncey Hill DO

## 2020-12-07 NOTE — PROCEDURES
"Procedure/Surgery Information   [facility]     Bedside Procedure Note  Date of Service (when I performed the procedure): 12/07/2020    Kuldeep Sequeira is a 50 year old male patient.  1. Severe recurrent major depression without psychotic features (H)      No past medical history on file.  Temp: 98.9  F (37.2  C) Temp src: Temporal BP: 136/82 Pulse: 83   Resp: 16 SpO2: 98 % O2 Device: None (Room air)      Procedures     Wm Markham     [facility] [place]   ECT Procedure Note   12/07/2020    Kuldeep Sequeira [id number]   50 year old 1970     Patient Status: Outpatient    Is this the first in a series of 12 treatments?  Yes    History and Physical: Reviewed in medical record    Consent: Informed consent was signed by: patient    Date Consent Signed: 12/7/20      No Known Allergies    Weight:  0 lbs 0 oz    Patient Preparations: (none)    Wt Readings from Last 5 Encounters:   11/19/20 78.4 kg (172 lb 14.4 oz)       /82   Pulse 83   Temp 98.9  F (37.2  C) (Temporal)   Resp 16   Ht 1.702 m (5' 7\")   SpO2 98%   BMI 27.08 kg/m           Indications for ECT:   Medications ineffective         Clinical Narrative:   Patient has a long history of depression and is starting ECT for treatment-resistant depression.  NPO after 2400.  Alert and Oriented x 3.  Mood remains depressed.  He's extremely anxious today with his first treatment.           Diagnosis:   Major depression         Pause for the Cause:     Right patient Yes   Right procedure/laterality settings: Yes          Intra-Procedure Documentation:     ECT #: 1   Treatment number this series: 1   Total treatment number: 1     Type of ECT:  Right, unilateral standard    ECT Medications:    Brevital: 80mg  Etomidate: 80mg  Versed: 2mg + 2mg post-ECT for agitation  Haldol: 5mg IM  Cogentin: 2mg      ECT Strip Summary:   Energy Level: 40 percent  Motor Seizure Duration:  69 seconds  EEG " Seizure Duration:   187 seconds    Complications: No    Plan:   COVID test 12/7/20  Next ECT 12/9/20    Wm Markham MD

## 2020-12-07 NOTE — IP AVS SNAPSHOT
[facility]  [address] [address]  Phone: [phone]                                    After Visit Summary   12/7/2020    Kuldeep Sequeira    MRN: [id number]           After Visit Summary Signature Page    I have received my discharge instructions, and my questions have been answered. I have discussed any challenges I see with this plan with the nurse or doctor.    ..........................................................................................................................................  Patient/Patient Representative Signature      ..........................................................................................................................................  Patient Representative Print Name and Relationship to Patient    ..................................................               ................................................  Date                                   Time    ..........................................................................................................................................  Reviewed by Signature/Title    ...................................................              ..............................................  Date                                               Time          22EPIC Rev 08/18

## 2020-12-07 NOTE — DISCHARGE INSTRUCTIONS
ECT Discharge Instructions      During your ECT series:      Do not drive or work heavy equipment until 7 days after your last treatment.    Do not drink alcohol or use street drugs (illicit drugs) while you are having treatments.    Do not make important decisions, including legal decisions.    After your maintenance ECT treatment:      Do not drive for 24 hours after treatment.  If you will be having more than one treatment witihin a week, no driving until 7 days after your last ECT treatment.       After each treatment:      Get plenty of rest. A responsible adult must stay with your for at least 6 hours.    Avoid heavy or risky activities for 24 hours.    If you feel light-headed, sit for a few minutes before standing. Have someone help you get up.    If you have nausea (feel sick to your stomach): Drink only clear liquids such as apple juice, ginger ale, broth or 7UP, Be sure to drink plenty of liquids. Move to a normal diet as you feel able.     If you received Toradol, wait 6 hours before taking ibuprofen.    Call your doctor if:     You have a fever over 100F (37.7 C) (taken under the tongue), or a fever that last more than 24 hours.    Your IV site is red, swollen, very painful or is getting more tender.    You have nausea that gets very bad or does not improve.      If you have any symptoms after ECT, tell our staff before your next treatment.    The ECT Department can be reached at [phone].  The ECT Department is open Mondays, Wednesdays and Fridays from 7:00 AM to 2:00 PM.    To speak to a doctor, call: Dr. Markham's office # [phone] Fax # [phone]    Today we gave you IV Versed 4mg, IM Haldol 5mg, IM Cogentin in your left glut.      REMINDERS FOR YOUR NEXT TREATMENT:    The plan for your next treatment is to take Zyprexa 10mg at home before coming to your ECT treatment. Dr. Markham has sent this prescription to your local pharmacy.      Transported by: Evon, wife    Today we completed your  Covid test for your next appointment.  You do not need to do anything further. Your covid test results will be available by your next ECT treatment.    Instructions for your next appointment:    Please take your Ambien by 8p nights before your ECT treatment.    Please come to the [facility] and check-in with Security before your ECT appointment.  The [facility] is located right next to the Adult Emergency Room.  The address is [address] [place] [address].    After your appointment, you may be picked up at the [facility] entrance, which is located on the West side of our campus.  The address is [address] [place] [address].  [facility].

## 2020-12-09 ENCOUNTER — ANESTHESIA EVENT (OUTPATIENT)
Dept: BEHAVIORAL HEALTH | Facility: CLINIC | Age: 50
End: 2020-12-09

## 2020-12-09 ENCOUNTER — ANESTHESIA (OUTPATIENT)
Dept: BEHAVIORAL HEALTH | Facility: CLINIC | Age: 50
End: 2020-12-09

## 2020-12-09 ENCOUNTER — HOSPITAL ENCOUNTER (OUTPATIENT)
Dept: BEHAVIORAL HEALTH | Facility: CLINIC | Age: 50
Discharge: HOME | End: 2020-12-09
Attending: PSYCHIATRY & NEUROLOGY | Admitting: PSYCHIATRY & NEUROLOGY
Payer: COMMERCIAL

## 2020-12-09 VITALS
HEART RATE: 95 BPM | DIASTOLIC BLOOD PRESSURE: 84 MMHG | OXYGEN SATURATION: 96 % | TEMPERATURE: 98.4 F | SYSTOLIC BLOOD PRESSURE: 129 MMHG | RESPIRATION RATE: 16 BRPM

## 2020-12-09 DIAGNOSIS — F33.2 SEVERE RECURRENT MAJOR DEPRESSION WITHOUT PSYCHOTIC FEATURES (H): Primary | ICD-10-CM

## 2020-12-09 PROCEDURE — 250N000011 HC RX IP 250 OP 636: Performed by: PSYCHIATRY & NEUROLOGY

## 2020-12-09 PROCEDURE — 370N000001 HC ANESTHESIA TECHNICAL FEE, 1ST 30 MIN

## 2020-12-09 PROCEDURE — 250N000011 HC RX IP 250 OP 636: Performed by: ANESTHESIOLOGY

## 2020-12-09 PROCEDURE — 250N000009 HC RX 250: Performed by: ANESTHESIOLOGY

## 2020-12-09 PROCEDURE — 258N000003 HC RX IP 258 OP 636: Performed by: PSYCHIATRY & NEUROLOGY

## 2020-12-09 PROCEDURE — U0003 INFECTIOUS AGENT DETECTION BY NUCLEIC ACID (DNA OR RNA); SEVERE ACUTE RESPIRATORY SYNDROME CORONAVIRUS 2 (SARS-COV-2) (CORONAVIRUS DISEASE [COVID-19]), AMPLIFIED PROBE TECHNIQUE, MAKING USE OF HIGH THROUGHPUT TECHNOLOGIES AS DESCRIBED BY CMS-2020-01-R: HCPCS | Performed by: PSYCHIATRY & NEUROLOGY

## 2020-12-09 PROCEDURE — 90870 ELECTROCONVULSIVE THERAPY: CPT

## 2020-12-09 PROCEDURE — 250N000013 HC RX MED GY IP 250 OP 250 PS 637: Performed by: PSYCHIATRY & NEUROLOGY

## 2020-12-09 RX ORDER — KETOROLAC TROMETHAMINE 30 MG/ML
30 INJECTION, SOLUTION INTRAMUSCULAR; INTRAVENOUS
Status: CANCELLED
Start: 2020-12-09 | End: 2020-12-09

## 2020-12-09 RX ORDER — ACETAMINOPHEN 500 MG
1000 TABLET ORAL
Status: CANCELLED
Start: 2020-12-09 | End: 2020-12-09

## 2020-12-09 RX ORDER — ONDANSETRON 2 MG/ML
4 INJECTION INTRAMUSCULAR; INTRAVENOUS
Status: COMPLETED | OUTPATIENT
Start: 2020-12-09 | End: 2020-12-09

## 2020-12-09 RX ORDER — ONDANSETRON 2 MG/ML
4 INJECTION INTRAMUSCULAR; INTRAVENOUS
Status: CANCELLED
Start: 2020-12-09 | End: 2020-12-09

## 2020-12-09 RX ORDER — HALOPERIDOL 5 MG/ML
5 INJECTION INTRAMUSCULAR
Status: COMPLETED | OUTPATIENT
Start: 2020-12-09 | End: 2020-12-09

## 2020-12-09 RX ORDER — KETOROLAC TROMETHAMINE 30 MG/ML
30 INJECTION, SOLUTION INTRAMUSCULAR; INTRAVENOUS
Status: COMPLETED | OUTPATIENT
Start: 2020-12-09 | End: 2020-12-09

## 2020-12-09 RX ORDER — ETOMIDATE 2 MG/ML
INJECTION INTRAVENOUS PRN
Status: DISCONTINUED | OUTPATIENT
Start: 2020-12-09 | End: 2020-12-09

## 2020-12-09 RX ORDER — ACETAMINOPHEN 500 MG
1000 TABLET ORAL
Status: COMPLETED | OUTPATIENT
Start: 2020-12-09 | End: 2020-12-09

## 2020-12-09 RX ORDER — HALOPERIDOL 5 MG/ML
5 INJECTION INTRAMUSCULAR
Status: CANCELLED
Start: 2020-12-09 | End: 2020-12-09

## 2020-12-09 RX ORDER — ONDANSETRON 2 MG/ML
4 INJECTION INTRAMUSCULAR; INTRAVENOUS
Status: ACTIVE | OUTPATIENT
Start: 2020-12-09 | End: 2020-12-09

## 2020-12-09 RX ADMIN — KETOROLAC TROMETHAMINE 30 MG: 30 INJECTION, SOLUTION INTRAMUSCULAR at 09:28

## 2020-12-09 RX ADMIN — SODIUM CHLORIDE, POTASSIUM CHLORIDE, SODIUM LACTATE AND CALCIUM CHLORIDE 500 ML: 600; 310; 30; 20 INJECTION, SOLUTION INTRAVENOUS at 09:49

## 2020-12-09 RX ADMIN — MIDAZOLAM 2 MG: 1 INJECTION INTRAMUSCULAR; INTRAVENOUS at 09:42

## 2020-12-09 RX ADMIN — HALOPERIDOL LACTATE 5 MG: 5 INJECTION, SOLUTION INTRAMUSCULAR at 09:40

## 2020-12-09 RX ADMIN — ACETAMINOPHEN 1000 MG: 500 TABLET ORAL at 08:37

## 2020-12-09 RX ADMIN — ONDANSETRON 4 MG: 2 INJECTION INTRAMUSCULAR; INTRAVENOUS at 09:20

## 2020-12-09 RX ADMIN — ETOMIDATE 80 MG: 40 INJECTION, SOLUTION INTRAVENOUS at 09:36

## 2020-12-09 RX ADMIN — Medication 80 MG: at 09:35

## 2020-12-09 NOTE — ANESTHESIA PREPROCEDURE EVALUATION
"Anesthesia Pre-Procedure Evaluation    Patient: Kuldeep Sequeira   MRN:     [id number] Gender:   male   Age:    50 year old :      1970        Preoperative Diagnosis: * No pre-op diagnosis entered *   * No procedures listed *     LABS:  CBC:   Lab Results   Component Value Date    WBC 5.3 2020    HGB 15.5 2020    HCT 45.1 2020     2020     BMP:   Lab Results   Component Value Date     2020    POTASSIUM 4.3 2020    CHLORIDE 106 2020    CO2 32 2020    BUN 18 2020    CR 0.91 2020    GLC 90 2020     COAGS: No results found for: PTT, INR, FIBR  POC: No results found for: BGM, HCG, HCGS  OTHER:   Lab Results   Component Value Date    RANJITH 8.8 2020    ALBUMIN 3.8 2020    PROTTOTAL 7.0 2020    ALT 22 2020    AST 10 2020    ALKPHOS 61 2020    BILITOTAL 1.1 2020    TSH 1.82 2020        Preop Vitals    BP Readings from Last 3 Encounters:   20 121/83   20 131/81   20 120/83    Pulse Readings from Last 3 Encounters:   20 95   20 112   20 86      Resp Readings from Last 3 Encounters:   20 16   20 16   20 16    SpO2 Readings from Last 3 Encounters:   20 98%   20 95%   20 95%      Temp Readings from Last 1 Encounters:   20 37  C (98.6  F) (Temporal)    Ht Readings from Last 1 Encounters:   20 1.702 m (5' 7\")      Wt Readings from Last 1 Encounters:   20 78.4 kg (172 lb 14.4 oz)    Estimated body mass index is 27.08 kg/m  as calculated from the following:    Height as of 20: 1.702 m (5' 7\").    Weight as of 20: 78.4 kg (172 lb 14.4 oz).     LDA:        No past medical history on file.   No past surgical history on file.   No Known Allergies                PHYSICAL EXAM:   Mental Status/Neuro: A/A/O   Airway: Facies: Feasible  Mallampati: II  Mouth/Opening: Full  TM distance: > 6 cm  Neck ROM: Full "   Respiratory: Auscultation: CTAB     Resp. Rate: Normal     Resp. Effort: Normal      CV: Rhythm: Regular  Rate: Age appropriate  Heart: Normal Sounds  Edema: None   Comments:      Dental: Normal Dentition                Assessment:   ASA SCORE: 2            Plan:   Anes. Type:  General   Pre-Medication: None   Induction:  IV (Standard)   Airway: Mask   Access/Monitoring: PIV   Maintenance: Balanced     Postop Plan:   Postop Pain: Opioids  Postop Sedation/Airway: Not planned     PONV Management:   Adult Risk Factors:, Postop Opioids           Anesthesia Pre-Procedure Evaluation    Patient: Kuldeep Sequeira   MRN:     [id number] Gender:   male   Age:    50 year old :      1970        Preoperative Diagnosis: * No pre-op diagnosis entered *   * No procedures listed *     LABS:  CBC:   Lab Results   Component Value Date    WBC 5.3 2020    HGB 15.5 2020    HCT 45.1 2020     2020     BMP:   Lab Results   Component Value Date     2020    POTASSIUM 4.3 2020    CHLORIDE 106 2020    CO2 32 2020    BUN 18 2020    CR 0.91 2020    GLC 90 2020     COAGS: No results found for: PTT, INR, FIBR  POC: No results found for: BGM, HCG, HCGS  OTHER:   Lab Results   Component Value Date    RANJITH 8.8 2020    ALBUMIN 3.8 2020    PROTTOTAL 7.0 2020    ALT 22 2020    AST 10 2020    ALKPHOS 61 2020    BILITOTAL 1.1 2020    TSH 1.82 2020        Preop Vitals    BP Readings from Last 3 Encounters:   20 121/83   20 131/81   20 120/83    Pulse Readings from Last 3 Encounters:   20 95   20 112   20 86      Resp Readings from Last 3 Encounters:   20 16   20 16   20 16    SpO2 Readings from Last 3 Encounters:   20 98%   20 95%   20 95%      Temp Readings from Last 1 Encounters:   20 37  C (98.6  F) (Temporal)    Ht Readings from Last 1 Encounters:  "  12/07/20 1.702 m (5' 7\")      Wt Readings from Last 1 Encounters:   11/19/20 78.4 kg (172 lb 14.4 oz)    Estimated body mass index is 27.08 kg/m  as calculated from the following:    Height as of 12/7/20: 1.702 m (5' 7\").    Weight as of 11/19/20: 78.4 kg (172 lb 14.4 oz).     LDA:        No past medical history on file.   No past surgical history on file.   No Known Allergies              PHYSICAL EXAM:   Mental Status/Neuro: A/A/O   Airway: Facies: Feasible  Mallampati: II  Mouth/Opening: Full  TM distance: > 6 cm  Neck ROM: Full   Respiratory: Auscultation: CTAB     Resp. Rate: Normal     Resp. Effort: Normal      CV: Rhythm: Regular  Rate: Age appropriate  Heart: Normal Sounds  Edema: None   Comments:      Dental: Normal Dentition                Assessment:   ASA SCORE: 2            Plan:   Anes. Type:  General   Pre-Medication: None   Induction:  IV (Standard)   Airway: Mask   Access/Monitoring: PIV   Maintenance: Balanced     Postop Plan:   Postop Pain: Opioids  Postop Sedation/Airway: Not planned     PONV Management:   Adult Risk Factors:, Postop Opioids                   MD Angella Canales MD  "

## 2020-12-09 NOTE — IP AVS SNAPSHOT
[facility] & [facility]  [address] [address]  Phone: [phone]                                    After Visit Summary   12/9/2020    Kuldeep Sequeira    MRN: [id number]           After Visit Summary Signature Page    I have received my discharge instructions, and my questions have been answered. I have discussed any challenges I see with this plan with the nurse or doctor.    ..........................................................................................................................................  Patient/Patient Representative Signature      ..........................................................................................................................................  Patient Representative Print Name and Relationship to Patient    ..................................................               ................................................  Date                                   Time    ..........................................................................................................................................  Reviewed by Signature/Title    ...................................................              ..............................................  Date                                               Time          22EPIC Rev 08/18

## 2020-12-09 NOTE — PROCEDURES
Procedure/Surgery Information   [facility]     Bedside Procedure Note  Date of Service (when I performed the procedure): 12/09/2020    Kuldeep Sequeira is a 50 year old male patient.  1. Severe recurrent major depression without psychotic features (H)      No past medical history on file.  Temp: 98.6  F (37  C) Temp src: Temporal BP: 121/83 Pulse: 95   Resp: 16 SpO2: 98 % O2 Device: None (Room air)      Procedures     Wm Markham     [facility] [place]   ECT Procedure Note   12/09/2020    Kuldeep Sequeira [id number]   50 year old 1970     Patient Status: Outpatient    Is this the first in a series of 12 treatments?  No    History and Physical: Reviewed in medical record    Consent: Informed consent was signed by: patient    Date Consent Signed: 12/7/20      No Known Allergies    Weight:  0 lbs 0 oz    Patient Preparations: (none)    Wt Readings from Last 5 Encounters:   11/19/20 78.4 kg (172 lb 14.4 oz)       /83   Pulse 95   Temp 98.6  F (37  C) (Temporal)   Resp 16   SpO2 98%          Indications for ECT:   Medications ineffective         Clinical Narrative:   Patient has a long history of depression and is starting ECT for treatment-resistant depression.  NPO after 2400.  Alert and Oriented x 3.           Diagnosis:   Major depression         Pause for the Cause:     Right patient Yes   Right procedure/laterality settings: Yes          Intra-Procedure Documentation:     ECT #: 2   Treatment number this series: 2   Total treatment number: 2     Type of ECT:  Right, unilateral standard    ECT Medications:    Tylenol: 1000mg  Toradol: 30mg  Zofran: 4mg  Haldol: 5mg  Lactated Ringers: 1/2 liter  Brevital: 80mg  Etomidate: 80mg  Versed: 2mg  for agitation      ECT Strip Summary:   Energy Level: 35 percent  Motor Seizure Duration:   38 seconds  EEG Seizure Duration:    69 seconds    Complications: No    Plan:   COVID test 12/9/20  Next  ECT 12/11/20    Wm Markham MD

## 2020-12-09 NOTE — ANESTHESIA POSTPROCEDURE EVALUATION
Anesthesia POST Procedure Evaluation    Patient: Kuldeep Sequeira   MRN:     [id number] Gender:   male   Age:    50 year old :      1970        Preoperative Diagnosis: * No pre-op diagnosis entered *   * No procedures listed *   Postop Comments: No value filed.     Anesthesia Type: General          Postop Pain Control: Uneventful            Sign Out: Well controlled pain   PONV: No   Neuro/Psych: Uneventful            Sign Out: Acceptable/Baseline neuro status   Airway/Respiratory: Uneventful            Sign Out: Acceptable/Baseline resp. status   CV/Hemodynamics: Uneventful            Sign Out: Acceptable CV status   Other NRE: NONE   DID A NON-ROUTINE EVENT OCCUR? No         Last Anesthesia Record Vitals:  CRNA VITALS  2020 0914 - 2020 1014      2020             Pulse:  92    SpO2:  100 %    Resp Rate (observed):  (!) 3          Last PACU Vitals:  Vitals Value Taken Time   /84 20 1015   Temp 36.9  C (98.4  F) 20 1015   Pulse 95 20 1015   Resp 16 20 1015   SpO2 96 % 20 1015   Temp src     NIBP     Pulse     SpO2     Resp     Temp     Ht Rate     Temp 2           Electronically Signed By: Angella Howard MD, 2020, 10:25 AM

## 2020-12-09 NOTE — DISCHARGE INSTRUCTIONS
ECT Discharge Instructions      During your ECT series:      Do not drive or work heavy equipment until 7 days after your last treatment.    Do not drink alcohol or use street drugs (illicit drugs) while you are having treatments.    Do not make important decisions, including legal decisions.    After your maintenance ECT treatment:      Do not drive for 24 hours after treatment.  If you will be having more than one treatment witihin a week, no driving until 7 days after your last ECT treatment.       After each treatment:      Get plenty of rest. A responsible adult must stay with your for at least 6 hours.    Avoid heavy or risky activities for 24 hours.    If you feel light-headed, sit for a few minutes before standing. Have someone help you get up.    If you have nausea (feel sick to your stomach): Drink only clear liquids such as apple juice, ginger ale, broth or 7UP, Be sure to drink plenty of liquids. Move to a normal diet as you feel able.     If you received Toradol, wait 6 hours before taking ibuprofen.    Call your doctor if:     You have a fever over 100F (37.7 C) (taken under the tongue), or a fever that last more than 24 hours.    Your IV site is red, swollen, very painful or is getting more tender.    You have nausea that gets very bad or does not improve.      If you have any symptoms after ECT, tell our staff before your next treatment.    The ECT Department can be reached at [phone].  The ECT Department is open Mondays, Wednesdays and Fridays from 7:00 AM to 2:00 PM.    To speak to a doctor, call: Dr. Markham's office # [phone] Fax # [phone]    Today you received the following:    -Tylenol 1000mg at 830am. You can take an additional dose at 230p if you need for headache or body aches.  - Toradol 30mg today at 830am.  Toradol is an NSAID.  Do not take any NSAID's including: Ibuprofen, Naproxen, Aspirin, Advil, Motrin, Aleve, Rodrick, etc., until 6 hours after the above time (230p).  If you  have any questions, contact your physician or pharmacist.    -500mL of fluids (lactated ringers)  -IV Zofran 4mg at 920am for nausea    Transported by: Evon, wife    Today we completed your Covid test for your next appointment.  You do not need to do anything further. Your covid test results will be available by your next ECT treatment.    Instructions for your next appointment:    Please come to the [facility] entrance and check-in with Security before your ECT appointment.  The [facility] entrance is located right next to the Adult Emergency Room.  The address is [address] [place] [address].    After your appointment, you may be picked up at the [facility] entrance, which is located on the West side of our campus.  The address is [address] [place] [address].  [facility].

## 2020-12-10 LAB
SARS-COV-2 RNA SPEC QL NAA+PROBE: NOT DETECTED
SPECIMEN SOURCE: NORMAL

## 2020-12-11 ENCOUNTER — ANESTHESIA (OUTPATIENT)
Dept: BEHAVIORAL HEALTH | Facility: CLINIC | Age: 50
End: 2020-12-11

## 2020-12-11 ENCOUNTER — ANESTHESIA EVENT (OUTPATIENT)
Dept: BEHAVIORAL HEALTH | Facility: CLINIC | Age: 50
End: 2020-12-11

## 2020-12-11 ENCOUNTER — HOSPITAL ENCOUNTER (OUTPATIENT)
Dept: BEHAVIORAL HEALTH | Facility: CLINIC | Age: 50
Discharge: HOME | End: 2020-12-11
Attending: PSYCHIATRY & NEUROLOGY | Admitting: PSYCHIATRY & NEUROLOGY
Payer: COMMERCIAL

## 2020-12-11 VITALS
DIASTOLIC BLOOD PRESSURE: 87 MMHG | HEART RATE: 85 BPM | RESPIRATION RATE: 16 BRPM | SYSTOLIC BLOOD PRESSURE: 135 MMHG | OXYGEN SATURATION: 95 % | TEMPERATURE: 97.5 F

## 2020-12-11 DIAGNOSIS — F33.2 SEVERE RECURRENT MAJOR DEPRESSION WITHOUT PSYCHOTIC FEATURES (H): Primary | ICD-10-CM

## 2020-12-11 PROCEDURE — 90870 ELECTROCONVULSIVE THERAPY: CPT

## 2020-12-11 PROCEDURE — 250N000011 HC RX IP 250 OP 636: Performed by: ANESTHESIOLOGY

## 2020-12-11 PROCEDURE — U0003 INFECTIOUS AGENT DETECTION BY NUCLEIC ACID (DNA OR RNA); SEVERE ACUTE RESPIRATORY SYNDROME CORONAVIRUS 2 (SARS-COV-2) (CORONAVIRUS DISEASE [COVID-19]), AMPLIFIED PROBE TECHNIQUE, MAKING USE OF HIGH THROUGHPUT TECHNOLOGIES AS DESCRIBED BY CMS-2020-01-R: HCPCS | Performed by: PSYCHIATRY & NEUROLOGY

## 2020-12-11 PROCEDURE — 258N000003 HC RX IP 258 OP 636: Performed by: PSYCHIATRY & NEUROLOGY

## 2020-12-11 PROCEDURE — 250N000013 HC RX MED GY IP 250 OP 250 PS 637: Performed by: PSYCHIATRY & NEUROLOGY

## 2020-12-11 PROCEDURE — 370N000001 HC ANESTHESIA TECHNICAL FEE, 1ST 30 MIN

## 2020-12-11 PROCEDURE — 250N000011 HC RX IP 250 OP 636: Performed by: PSYCHIATRY & NEUROLOGY

## 2020-12-11 PROCEDURE — 250N000009 HC RX 250: Performed by: ANESTHESIOLOGY

## 2020-12-11 RX ORDER — KETOROLAC TROMETHAMINE 30 MG/ML
30 INJECTION, SOLUTION INTRAMUSCULAR; INTRAVENOUS
Status: COMPLETED | OUTPATIENT
Start: 2020-12-11 | End: 2020-12-11

## 2020-12-11 RX ORDER — ONDANSETRON 2 MG/ML
4 INJECTION INTRAMUSCULAR; INTRAVENOUS
Status: CANCELLED
Start: 2020-12-11 | End: 2020-12-11

## 2020-12-11 RX ORDER — ONDANSETRON 2 MG/ML
4 INJECTION INTRAMUSCULAR; INTRAVENOUS
Status: COMPLETED | OUTPATIENT
Start: 2020-12-11 | End: 2020-12-11

## 2020-12-11 RX ORDER — ONDANSETRON 4 MG/1
4 TABLET, ORALLY DISINTEGRATING ORAL EVERY 30 MIN PRN
Status: DISCONTINUED | OUTPATIENT
Start: 2020-12-11 | End: 2020-12-12 | Stop reason: HOSPADM

## 2020-12-11 RX ORDER — KETOROLAC TROMETHAMINE 30 MG/ML
30 INJECTION, SOLUTION INTRAMUSCULAR; INTRAVENOUS
Status: CANCELLED
Start: 2020-12-11 | End: 2020-12-11

## 2020-12-11 RX ORDER — NALOXONE HYDROCHLORIDE 0.4 MG/ML
0.2 INJECTION, SOLUTION INTRAMUSCULAR; INTRAVENOUS; SUBCUTANEOUS
Status: DISCONTINUED | OUTPATIENT
Start: 2020-12-11 | End: 2020-12-12 | Stop reason: HOSPADM

## 2020-12-11 RX ORDER — ACETAMINOPHEN 500 MG
1000 TABLET ORAL
Status: COMPLETED | OUTPATIENT
Start: 2020-12-11 | End: 2020-12-11

## 2020-12-11 RX ORDER — HALOPERIDOL 5 MG/ML
5 INJECTION INTRAMUSCULAR
Status: CANCELLED
Start: 2020-12-11 | End: 2020-12-11

## 2020-12-11 RX ORDER — ONDANSETRON 2 MG/ML
4 INJECTION INTRAMUSCULAR; INTRAVENOUS EVERY 30 MIN PRN
Status: DISCONTINUED | OUTPATIENT
Start: 2020-12-11 | End: 2020-12-12 | Stop reason: HOSPADM

## 2020-12-11 RX ORDER — LABETALOL 20 MG/4 ML (5 MG/ML) INTRAVENOUS SYRINGE
10
Status: DISCONTINUED | OUTPATIENT
Start: 2020-12-11 | End: 2020-12-12 | Stop reason: HOSPADM

## 2020-12-11 RX ORDER — ACETAMINOPHEN 500 MG
1000 TABLET ORAL
Status: CANCELLED
Start: 2020-12-11 | End: 2020-12-11

## 2020-12-11 RX ORDER — NALOXONE HYDROCHLORIDE 0.4 MG/ML
0.4 INJECTION, SOLUTION INTRAMUSCULAR; INTRAVENOUS; SUBCUTANEOUS
Status: DISCONTINUED | OUTPATIENT
Start: 2020-12-11 | End: 2020-12-12 | Stop reason: HOSPADM

## 2020-12-11 RX ORDER — SODIUM CHLORIDE, SODIUM LACTATE, POTASSIUM CHLORIDE, CALCIUM CHLORIDE 600; 310; 30; 20 MG/100ML; MG/100ML; MG/100ML; MG/100ML
INJECTION, SOLUTION INTRAVENOUS CONTINUOUS
Status: DISCONTINUED | OUTPATIENT
Start: 2020-12-11 | End: 2020-12-12 | Stop reason: HOSPADM

## 2020-12-11 RX ORDER — HALOPERIDOL 5 MG/ML
5 INJECTION INTRAMUSCULAR
Status: COMPLETED | OUTPATIENT
Start: 2020-12-11 | End: 2020-12-11

## 2020-12-11 RX ADMIN — METHOHEXITAL SODIUM 80 MG: 500 INJECTION, POWDER, LYOPHILIZED, FOR SOLUTION INTRAMUSCULAR; INTRAVENOUS; RECTAL at 10:05

## 2020-12-11 RX ADMIN — Medication 80 MG: at 10:07

## 2020-12-11 RX ADMIN — HALOPERIDOL LACTATE 5 MG: 5 INJECTION, SOLUTION INTRAMUSCULAR at 09:58

## 2020-12-11 RX ADMIN — ONDANSETRON 4 MG: 2 INJECTION INTRAMUSCULAR; INTRAVENOUS at 09:56

## 2020-12-11 RX ADMIN — ACETAMINOPHEN 1000 MG: 500 TABLET ORAL at 10:02

## 2020-12-11 RX ADMIN — KETOROLAC TROMETHAMINE 30 MG: 30 INJECTION, SOLUTION INTRAMUSCULAR at 09:58

## 2020-12-11 RX ADMIN — SODIUM CHLORIDE, POTASSIUM CHLORIDE, SODIUM LACTATE AND CALCIUM CHLORIDE 500 ML: 600; 310; 30; 20 INJECTION, SOLUTION INTRAVENOUS at 09:58

## 2020-12-11 RX ADMIN — MIDAZOLAM 2 MG: 1 INJECTION INTRAMUSCULAR; INTRAVENOUS at 10:13

## 2020-12-11 NOTE — PROCEDURES
Procedure/Surgery Information   [facility]     Bedside Procedure Note  Date of Service (when I performed the procedure): 12/11/2020    Kuldeep Sequeira is a 50 year old male patient.  No diagnosis found.  No past medical history on file.                       Procedures     Wm Markham     [facility], [place]   ECT Procedure Note   12/11/2020    Kuldeep Sequeira [id number]   50 year old 1970     Patient Status: Outpatient    Is this the first in a series of 12 treatments?  No    History and Physical: Reviewed in medical record    Consent: Informed consent was signed by: patient    Date Consent Signed: 12/7/20      No Known Allergies    Weight:  0 lbs 0 oz    Patient Preparations: (none)    Wt Readings from Last 5 Encounters:   11/19/20 78.4 kg (172 lb 14.4 oz)       There were no vitals taken for this visit.         Indications for ECT:   Medications ineffective         Clinical Narrative:   Patient has a long history of depression and is starting ECT for treatment-resistant depression.  NPO after 2400.  Alert and Oriented x 3.  No problems with the last ECT.  Mood is not better yet, although Wed. He did feel better after his ECT for awhile.            Diagnosis:   Major depression         Pause for the Cause:     Right patient Yes   Right procedure/laterality settings: Yes          Intra-Procedure Documentation:     ECT #: 3   Treatment number this series: 3   Total treatment number: 3     Type of ECT:  Right, unilateral standard    ECT Medications:    Zyprexa: 10mg  Tylenol: 1000mg  Toradol: 30mg  Zofran: 4mg  Haldol: 5mg  Lactated Ringers: 1/2 liter  Brevital: 80mg  Etomidate: 80mg  Versed: 2mg  for agitation      ECT Strip Summary:   Energy Level: 35 percent  Motor Seizure Duration:   35 seconds  EEG Seizure Duration:    74 seconds    Complications: No    Plan:   COVID test 12/11/20  Next ECT 12/14/20    Wm Markham MD

## 2020-12-11 NOTE — IP AVS SNAPSHOT
[facility] & Addiction Services  [address] [address]  Phone: [phone]                                    After Visit Summary   12/11/2020    Kuldeep Sequeira    MRN: [id number]           After Visit Summary Signature Page    I have received my discharge instructions, and my questions have been answered. I have discussed any challenges I see with this plan with the nurse or doctor.    ..........................................................................................................................................  Patient/Patient Representative Signature      ..........................................................................................................................................  Patient Representative Print Name and Relationship to Patient    ..................................................               ................................................  Date                                   Time    ..........................................................................................................................................  Reviewed by Signature/Title    ...................................................              ..............................................  Date                                               Time          22EPIC Rev 08/18

## 2020-12-11 NOTE — DISCHARGE INSTRUCTIONS
ECT Discharge Instructions      During your ECT series:      Do not drive or work heavy equipment until 7 days after your last treatment.    Do not drink alcohol or use street drugs (illicit drugs) while you are having treatments.    Do not make important decisions, including legal decisions.    After your maintenance ECT treatment:      Do not drive for 24 hours after treatment.  If you will be having more than one treatment witihin a week, no driving until 7 days after your last ECT treatment.       After each treatment:      Get plenty of rest. A responsible adult must stay with your for at least 6 hours.    Avoid heavy or risky activities for 24 hours.    If you feel light-headed, sit for a few minutes before standing. Have someone help you get up.    If you have nausea (feel sick to your stomach): Drink only clear liquids such as apple juice, ginger ale, broth or 7UP, Be sure to drink plenty of liquids. Move to a normal diet as you feel able.     If you received Toradol, wait 6 hours before taking ibuprofen.    Call your doctor if:     You have a fever over 100F (37.7 C) (taken under the tongue), or a fever that last more than 24 hours.    Your IV site is red, swollen, very painful or is getting more tender.    You have nausea that gets very bad or does not improve.      If you have any symptoms after ECT, tell our staff before your next treatment.    The ECT Department can be reached at [phone].  The ECT Department is open Mondays, Wednesdays and Fridays from 7:00 AM to 2:00 PM.    To speak to a doctor, call: Dr. Markham's office # [phone] Fax # [phone]    Today you received the following:   -Tylenol 1000mg at 10am. You may take Tylenol if needed again at 4p  - Toradol 30mg today at 10am.  Toradol is an NSAID.  Do not take any NSAID's including: Ibuprofen, Naproxen, Aspirin, Advil, Motrin, Aleve, Rodrick, etc., until 6 hours after the above time (4p).  If you have any questions, contact your  physician or pharmacist.    -500mL of fluids (lactated ringers)  -IV Zofran 4mg at 10am for nausea.     Transported by: Evon, wife    Today we completed your Covid test for your next appointment.  You do not need to do anything further. Your covid test results will be available by your next ECT treatment.    Instructions for your next appointment:    Please come to the [facility] entrance and check-in with Security before your ECT appointment.  The [facility] entrance is located right next to the Adult Emergency Room.  The address is [address] [place] [address].    After your appointment, you may be picked up at the [facility] entrance, which is located on the West side of our campus.  The address is [address] [place] [address].  [facility].

## 2020-12-11 NOTE — ANESTHESIA PREPROCEDURE EVALUATION
"Anesthesia Pre-Procedure Evaluation    Patient: Kuldeep Sequeira   MRN:     [id number] Gender:   male   Age:    50 year old :      1970        Preoperative Diagnosis: * No surgery found *        LABS:  CBC:   Lab Results   Component Value Date    WBC 5.3 2020    HGB 15.5 2020    HCT 45.1 2020     2020     BMP:   Lab Results   Component Value Date     2020    POTASSIUM 4.3 2020    CHLORIDE 106 2020    CO2 32 2020    BUN 18 2020    CR 0.91 2020    GLC 90 2020     COAGS: No results found for: PTT, INR, FIBR  POC: No results found for: BGM, HCG, HCGS  OTHER:   Lab Results   Component Value Date    RANJITH 8.8 2020    ALBUMIN 3.8 2020    PROTTOTAL 7.0 2020    ALT 22 2020    AST 10 2020    ALKPHOS 61 2020    BILITOTAL 1.1 2020    TSH 1.82 2020        Preop Vitals    BP Readings from Last 3 Encounters:   20 120/81   20 129/84   20 131/81    Pulse Readings from Last 3 Encounters:   20 74   20 95   20 112      Resp Readings from Last 3 Encounters:   20 18   20 16   20 16    SpO2 Readings from Last 3 Encounters:   20 98%   20 96%   20 95%      Temp Readings from Last 1 Encounters:   20 36.5  C (97.7  F) (Temporal)    Ht Readings from Last 1 Encounters:   20 1.702 m (5' 7\")      Wt Readings from Last 1 Encounters:   20 78.4 kg (172 lb 14.4 oz)    Estimated body mass index is 27.08 kg/m  as calculated from the following:    Height as of 20: 1.702 m (5' 7\").    Weight as of 20: 78.4 kg (172 lb 14.4 oz).     LDA:        No past medical history on file.   No past surgical history on file.   No Known Allergies              PHYSICAL EXAM:   Mental Status/Neuro: A/A/O   Airway: Facies: Feasible  Mallampati: II  Mouth/Opening: Full  TM distance: > 6 cm  Neck ROM: Full   Respiratory: Auscultation: CTAB     Resp. " Rate: Normal     Resp. Effort: Normal      CV: Rhythm: Regular  Rate: Age appropriate  Heart: Normal Sounds  Edema: None   Comments:      Dental: Normal Dentition                Assessment:   ASA SCORE: 2            Plan:   Anes. Type:  General   Pre-Medication: None   Induction:  IV (Standard)   Airway: Mask   Access/Monitoring: PIV   Maintenance: Balanced     Postop Plan:   Postop Pain: Opioids  Postop Sedation/Airway: Not planned     PONV Management:   Adult Risk Factors:, Postop Opioids                   Chauncey Hill DO

## 2020-12-11 NOTE — ANESTHESIA POSTPROCEDURE EVALUATION
Anesthesia POST Procedure Evaluation    Patient: Kuldeep Sequeira   MRN:     [id number] Gender:   male   Age:    50 year old :      1970        Preoperative Diagnosis: * No pre-op diagnosis entered *   * No procedures listed *   Postop Comments: No value filed.     Anesthesia Type: General          Postop Pain Control: Uneventful            Sign Out: Well controlled pain   PONV: No   Neuro/Psych: Uneventful            Sign Out: Acceptable/Baseline neuro status   Airway/Respiratory: Uneventful            Sign Out: Acceptable/Baseline resp. status   CV/Hemodynamics: Uneventful            Sign Out: Acceptable CV status   Other NRE: NONE   DID A NON-ROUTINE EVENT OCCUR? No         Last Anesthesia Record Vitals:  CRNA VITALS  2020 0946 - 2020 1017      2020             Pulse:  85    Ht Rate:  84    SpO2:  100 %    Resp Rate (observed):  (!) 1          Last PACU Vitals:  Vitals Value Taken Time   BP     Temp     Pulse     Resp     SpO2     Temp src     NIBP 130/87 20 1014   Pulse 85 20 1016   SpO2 100 % 20 1016   Resp     Temp     Ht Rate 84 20 1016   Temp 2           Electronically Signed By: Chauncey Hill DO, 2020, 10:17 AM

## 2020-12-12 LAB
SARS-COV-2 RNA SPEC QL NAA+PROBE: NOT DETECTED
SPECIMEN SOURCE: NORMAL

## 2020-12-14 ENCOUNTER — HOSPITAL ENCOUNTER (OUTPATIENT)
Dept: BEHAVIORAL HEALTH | Facility: CLINIC | Age: 50
Discharge: HOME | End: 2020-12-14
Attending: PSYCHIATRY & NEUROLOGY | Admitting: PSYCHIATRY & NEUROLOGY
Payer: COMMERCIAL

## 2020-12-14 ENCOUNTER — ANESTHESIA EVENT (OUTPATIENT)
Dept: BEHAVIORAL HEALTH | Facility: CLINIC | Age: 50
End: 2020-12-14

## 2020-12-14 ENCOUNTER — ANESTHESIA (OUTPATIENT)
Dept: BEHAVIORAL HEALTH | Facility: CLINIC | Age: 50
End: 2020-12-14

## 2020-12-14 VITALS
HEART RATE: 76 BPM | DIASTOLIC BLOOD PRESSURE: 79 MMHG | SYSTOLIC BLOOD PRESSURE: 116 MMHG | OXYGEN SATURATION: 96 % | TEMPERATURE: 98.7 F | RESPIRATION RATE: 20 BRPM

## 2020-12-14 DIAGNOSIS — F33.2 SEVERE RECURRENT MAJOR DEPRESSION WITHOUT PSYCHOTIC FEATURES (H): Primary | ICD-10-CM

## 2020-12-14 PROCEDURE — 250N000011 HC RX IP 250 OP 636: Performed by: ANESTHESIOLOGY

## 2020-12-14 PROCEDURE — 90870 ELECTROCONVULSIVE THERAPY: CPT

## 2020-12-14 PROCEDURE — 250N000011 HC RX IP 250 OP 636: Performed by: PSYCHIATRY & NEUROLOGY

## 2020-12-14 PROCEDURE — 258N000003 HC RX IP 258 OP 636: Performed by: PSYCHIATRY & NEUROLOGY

## 2020-12-14 PROCEDURE — 370N000001 HC ANESTHESIA TECHNICAL FEE, 1ST 30 MIN

## 2020-12-14 PROCEDURE — U0003 INFECTIOUS AGENT DETECTION BY NUCLEIC ACID (DNA OR RNA); SEVERE ACUTE RESPIRATORY SYNDROME CORONAVIRUS 2 (SARS-COV-2) (CORONAVIRUS DISEASE [COVID-19]), AMPLIFIED PROBE TECHNIQUE, MAKING USE OF HIGH THROUGHPUT TECHNOLOGIES AS DESCRIBED BY CMS-2020-01-R: HCPCS | Performed by: PSYCHIATRY & NEUROLOGY

## 2020-12-14 PROCEDURE — 250N000013 HC RX MED GY IP 250 OP 250 PS 637: Performed by: PSYCHIATRY & NEUROLOGY

## 2020-12-14 PROCEDURE — 250N000009 HC RX 250: Performed by: ANESTHESIOLOGY

## 2020-12-14 RX ORDER — ACETAMINOPHEN 500 MG
1000 TABLET ORAL
Status: CANCELLED
Start: 2020-12-14 | End: 2020-12-14

## 2020-12-14 RX ORDER — ONDANSETRON 2 MG/ML
4 INJECTION INTRAMUSCULAR; INTRAVENOUS
Status: COMPLETED | OUTPATIENT
Start: 2020-12-14 | End: 2020-12-14

## 2020-12-14 RX ORDER — KETOROLAC TROMETHAMINE 30 MG/ML
30 INJECTION, SOLUTION INTRAMUSCULAR; INTRAVENOUS
Status: CANCELLED
Start: 2020-12-14 | End: 2020-12-14

## 2020-12-14 RX ORDER — ONDANSETRON 2 MG/ML
4 INJECTION INTRAMUSCULAR; INTRAVENOUS
Status: CANCELLED
Start: 2020-12-14 | End: 2020-12-14

## 2020-12-14 RX ORDER — NALOXONE HYDROCHLORIDE 0.4 MG/ML
0.2 INJECTION, SOLUTION INTRAMUSCULAR; INTRAVENOUS; SUBCUTANEOUS
Status: DISCONTINUED | OUTPATIENT
Start: 2020-12-14 | End: 2020-12-15 | Stop reason: HOSPADM

## 2020-12-14 RX ORDER — HALOPERIDOL 5 MG/ML
5 INJECTION INTRAMUSCULAR
Status: CANCELLED
Start: 2020-12-14 | End: 2020-12-14

## 2020-12-14 RX ORDER — SODIUM CHLORIDE, SODIUM LACTATE, POTASSIUM CHLORIDE, CALCIUM CHLORIDE 600; 310; 30; 20 MG/100ML; MG/100ML; MG/100ML; MG/100ML
INJECTION, SOLUTION INTRAVENOUS CONTINUOUS
Status: DISCONTINUED | OUTPATIENT
Start: 2020-12-14 | End: 2020-12-15 | Stop reason: HOSPADM

## 2020-12-14 RX ORDER — MEPERIDINE HYDROCHLORIDE 25 MG/ML
12.5 INJECTION INTRAMUSCULAR; INTRAVENOUS; SUBCUTANEOUS
Status: DISCONTINUED | OUTPATIENT
Start: 2020-12-14 | End: 2020-12-15 | Stop reason: HOSPADM

## 2020-12-14 RX ORDER — ONDANSETRON 2 MG/ML
4 INJECTION INTRAMUSCULAR; INTRAVENOUS EVERY 30 MIN PRN
Status: DISCONTINUED | OUTPATIENT
Start: 2020-12-14 | End: 2020-12-15 | Stop reason: HOSPADM

## 2020-12-14 RX ORDER — NALOXONE HYDROCHLORIDE 0.4 MG/ML
0.4 INJECTION, SOLUTION INTRAMUSCULAR; INTRAVENOUS; SUBCUTANEOUS
Status: DISCONTINUED | OUTPATIENT
Start: 2020-12-14 | End: 2020-12-15 | Stop reason: HOSPADM

## 2020-12-14 RX ORDER — KETOROLAC TROMETHAMINE 30 MG/ML
30 INJECTION, SOLUTION INTRAMUSCULAR; INTRAVENOUS
Status: COMPLETED | OUTPATIENT
Start: 2020-12-14 | End: 2020-12-14

## 2020-12-14 RX ORDER — ACETAMINOPHEN 500 MG
1000 TABLET ORAL
Status: COMPLETED | OUTPATIENT
Start: 2020-12-14 | End: 2020-12-14

## 2020-12-14 RX ORDER — ONDANSETRON 4 MG/1
4 TABLET, ORALLY DISINTEGRATING ORAL EVERY 30 MIN PRN
Status: DISCONTINUED | OUTPATIENT
Start: 2020-12-14 | End: 2020-12-15 | Stop reason: HOSPADM

## 2020-12-14 RX ORDER — HALOPERIDOL 5 MG/ML
5 INJECTION INTRAMUSCULAR
Status: COMPLETED | OUTPATIENT
Start: 2020-12-14 | End: 2020-12-14

## 2020-12-14 RX ADMIN — MIDAZOLAM 2 MG: 1 INJECTION INTRAMUSCULAR; INTRAVENOUS at 11:16

## 2020-12-14 RX ADMIN — ONDANSETRON 4 MG: 2 INJECTION INTRAMUSCULAR; INTRAVENOUS at 11:05

## 2020-12-14 RX ADMIN — HALOPERIDOL LACTATE 5 MG: 5 INJECTION, SOLUTION INTRAMUSCULAR at 11:04

## 2020-12-14 RX ADMIN — METHOHEXITAL SODIUM 80 MG: 500 INJECTION, POWDER, LYOPHILIZED, FOR SOLUTION INTRAMUSCULAR; INTRAVENOUS; RECTAL at 11:12

## 2020-12-14 RX ADMIN — ACETAMINOPHEN 1000 MG: 500 TABLET ORAL at 10:54

## 2020-12-14 RX ADMIN — Medication 80 MG: at 11:12

## 2020-12-14 RX ADMIN — SODIUM CHLORIDE, POTASSIUM CHLORIDE, SODIUM LACTATE AND CALCIUM CHLORIDE 500 ML: 600; 310; 30; 20 INJECTION, SOLUTION INTRAVENOUS at 11:05

## 2020-12-14 RX ADMIN — KETOROLAC TROMETHAMINE 30 MG: 30 INJECTION, SOLUTION INTRAMUSCULAR at 11:05

## 2020-12-14 NOTE — ANESTHESIA PREPROCEDURE EVALUATION
"Anesthesia Pre-Procedure Evaluation    Patient: Kuldeep Sequeira   MRN:     [id number] Gender:   male   Age:    50 year old :      1970        Preoperative Diagnosis: * No surgery found *        LABS:  CBC:   Lab Results   Component Value Date    WBC 5.3 2020    HGB 15.5 2020    HCT 45.1 2020     2020     BMP:   Lab Results   Component Value Date     2020    POTASSIUM 4.3 2020    CHLORIDE 106 2020    CO2 32 2020    BUN 18 2020    CR 0.91 2020    GLC 90 2020     COAGS: No results found for: PTT, INR, FIBR  POC: No results found for: BGM, HCG, HCGS  OTHER:   Lab Results   Component Value Date    RANJITH 8.8 2020    ALBUMIN 3.8 2020    PROTTOTAL 7.0 2020    ALT 22 2020    AST 10 2020    ALKPHOS 61 2020    BILITOTAL 1.1 2020    TSH 1.82 2020        Preop Vitals    BP Readings from Last 3 Encounters:   20 135/87   20 129/84   20 131/81    Pulse Readings from Last 3 Encounters:   20 85   20 95   20 112      Resp Readings from Last 3 Encounters:   20 16   20 16   20 16    SpO2 Readings from Last 3 Encounters:   20 95%   20 96%   20 95%      Temp Readings from Last 1 Encounters:   20 36.4  C (97.5  F) (Temporal)    Ht Readings from Last 1 Encounters:   20 1.702 m (5' 7\")      Wt Readings from Last 1 Encounters:   20 78.4 kg (172 lb 14.4 oz)    Estimated body mass index is 27.08 kg/m  as calculated from the following:    Height as of 20: 1.702 m (5' 7\").    Weight as of 20: 78.4 kg (172 lb 14.4 oz).     LDA:        No past medical history on file.   No past surgical history on file.   No Known Allergies                PHYSICAL EXAM:   Mental Status/Neuro: A/A/O   Airway: Facies: Feasible  Mallampati: II  Mouth/Opening: Full  TM distance: > 6 cm  Neck ROM: Full   Respiratory: Auscultation: CTAB   "   Resp. Rate: Normal     Resp. Effort: Normal      CV: Rhythm: Regular  Rate: Age appropriate  Heart: Normal Sounds  Edema: None   Comments:      Dental: Normal Dentition                Assessment:   ASA SCORE: 2            Plan:   Anes. Type:  General   Pre-Medication: None   Induction:  IV (Standard)   Airway: Mask   Access/Monitoring: PIV   Maintenance: Balanced     Postop Plan:   Postop Pain: Opioids  Postop Sedation/Airway: Not planned     PONV Management:   Adult Risk Factors:, Postop Opioids                       Angella Howard MD

## 2020-12-14 NOTE — DISCHARGE INSTRUCTIONS
ECT Discharge Instructions      During your ECT series:      Do not drive for 24 hours after treatment.  If you will have more than one treatment within a week, no driving until 7 days after your last ECT treatment.    Do not drink alcohol or use street drugs (illicit drugs) while you are having treatments.    Do not make important decisions, including legal decisions.    After each treatment:      Get plenty of rest. A responsible adult must stay with your for at least 6 hours.    Avoid heavy or risky activities for 24 hours.    If you feel light-headed, sit for a few minutes before standing. Have someone help you get up.    If you have nausea (feel sick to your stomach): Drink only clear liquids such as apple juice, ginger ale, broth or 7UP, Be sure to drink plenty of liquids. Move to a normal diet as you feel able.     If you received Toradol, wait 6 hours before taking ibuprofen.    Call your doctor if:     You have a fever over 100F (37.7 C) (taken under the tongue), or a fever that last more than 24 hours.    Your IV site is red, swollen, very painful or is getting more tender.    You have nausea that gets very bad or does not improve.      If you have any symptoms after ECT, tell our staff before your next treatment.    The ECT Department can be reached at [phone].  The ECT Department is open Mondays, Wednesdays and Fridays from 7:00 AM to 2:00 PM.    Toradol:     You had Toradol today at 11am  which is a NSAID medication.  Do not take any Ibuprofen, Motrin, Aspirin, Advil, Aleve, Excedrin, or any other NSAID medication until after 5pm.  Questions,  check with your physician or pharmacist.      To speak to Dr. Markham  Office #  [phone] and Fax # [phone]    Covid-19 Testing: You had a covid-19 test done today in the ECT department.  We should have the results by your next ECT appointment and you DO NOT need to get another COVID -19 test before your next ECT treatment.       [facility] DROP OFF:  Please come to the [facility] entrance and check-in with Security before your ECT appointment.  The [facility] entrance is located right next to the Adult Emergency Room.  The address is [address] [place] [address].    [facility] : After your appointment, you may be picked up at the [facility] entrance, which is located at [address] [place] [address].  [facility], about 1 block North of the [facility]

## 2020-12-14 NOTE — ANESTHESIA POSTPROCEDURE EVALUATION
Anesthesia POST Procedure Evaluation    Patient: Kuldeep Sequeira   MRN:     [id number] Gender:   male   Age:    50 year old :      1970        Preoperative Diagnosis: * No pre-op diagnosis entered *   * No procedures listed *   Postop Comments: No value filed.     Anesthesia Type: General          Postop Pain Control: Uneventful            Sign Out: Well controlled pain   PONV: No   Neuro/Psych: Uneventful            Sign Out: Acceptable/Baseline neuro status   Airway/Respiratory: Uneventful            Sign Out: Acceptable/Baseline resp. status   CV/Hemodynamics: Uneventful            Sign Out: Acceptable CV status   Other NRE: NONE   DID A NON-ROUTINE EVENT OCCUR? No         Last Anesthesia Record Vitals:  CRNA VITALS  2020 1101 - 2020 1141      2020             Pulse:  96    SpO2:  94 %          Last PACU Vitals:  Vitals Value Taken Time   /81 20 1132   Temp 37.2  C (99  F) 20 1132   Pulse 89 20 1132   Resp 20 20 1132   SpO2 96 % 20 1132   Temp src     NIBP     Pulse 96 20 1131   SpO2 94 % 20 1131   Resp     Temp     Ht Rate     Temp 2           Electronically Signed By: Angella Howard MD, 2020, 11:41 AM

## 2020-12-14 NOTE — PROCEDURES
Procedure/Surgery Information   [facility]     Bedside Procedure Note  Date of Service (when I performed the procedure): 12/14/2020    Kuldeep Sequeira is a 50 year old male patient.  1. Severe recurrent major depression without psychotic features (H)      No past medical history on file.  Temp: 98.3  F (36.8  C) Temp src: Temporal BP: 127/83 Pulse: 89   Resp: 16 SpO2: 97 % O2 Device: None (Room air)      Procedures     Wm Markham     [facility], [place]   ECT Procedure Note   12/14/2020    Kuldeep Sequeira [id number]   50 year old 1970     Patient Status: Outpatient    Is this the first in a series of 12 treatments?  No    History and Physical: Reviewed in medical record    Consent: Informed consent was signed by: patient    Date Consent Signed: 12/7/20      No Known Allergies    Weight:  0 lbs 0 oz    Patient Preparations: (none)    Wt Readings from Last 5 Encounters:   11/19/20 78.4 kg (172 lb 14.4 oz)       /83   Pulse 89   Temp 98.3  F (36.8  C) (Temporal)   Resp 16   SpO2 97%          Indications for ECT:   Medications ineffective         Clinical Narrative:   Patient has a long history of depression and is starting ECT for treatment-resistant depression.  NPO after 2400.  Alert and Oriented x 3.  No problems with the last ECT.  Mood is not better yet, although Wed. He did feel better after his ECT for awhile.            Diagnosis:   Major depression         Pause for the Cause:     Right patient Yes   Right procedure/laterality settings: Yes          Intra-Procedure Documentation:     ECT #: 4   Treatment number this series: 4   Total treatment number: 4     Type of ECT:  Right, unilateral standard    ECT Medications:    Seroquel: 100mg (at 4 AM as he wasn't sleeping)  Zyprexa: 10mg  Tylenol: 1000mg  Toradol: 30mg  Zofran: 4mg  Haldol: 5mg  Lactated Ringers: 1/2 liter  Brevital: 80mg  Etomidate: 80mg  Versed: 2mg  For h/o  agitation      ECT Strip Summary:   Energy Level: 35 percent  Motor Seizure Duration:   33 seconds  EEG Seizure Duration:    47 seconds    Complications: No    Plan:   COVID test 12/14/20  Next ECT 12/16/20    Wm Markham MD

## 2020-12-14 NOTE — IP AVS SNAPSHOT
[facility]  [address] [address]  Phone: [phone]                                    After Visit Summary   12/14/2020    Kuldeep Sequeira    MRN: [id number]           After Visit Summary Signature Page    I have received my discharge instructions, and my questions have been answered. I have discussed any challenges I see with this plan with the nurse or doctor.    ..........................................................................................................................................  Patient/Patient Representative Signature      ..........................................................................................................................................  Patient Representative Print Name and Relationship to Patient    ..................................................               ................................................  Date                                   Time    ..........................................................................................................................................  Reviewed by Signature/Title    ...................................................              ..............................................  Date                                               Time          22EPIC Rev 08/18

## 2020-12-15 LAB
LABORATORY COMMENT REPORT: NORMAL
SARS-COV-2 RNA SPEC QL NAA+PROBE: NEGATIVE
SARS-COV-2 RNA SPEC QL NAA+PROBE: NORMAL
SPECIMEN SOURCE: NORMAL
SPECIMEN SOURCE: NORMAL

## 2020-12-16 ENCOUNTER — ANESTHESIA (OUTPATIENT)
Dept: BEHAVIORAL HEALTH | Facility: CLINIC | Age: 50
End: 2020-12-16
Payer: COMMERCIAL

## 2020-12-16 ENCOUNTER — HOSPITAL ENCOUNTER (OUTPATIENT)
Dept: BEHAVIORAL HEALTH | Facility: CLINIC | Age: 50
Discharge: HOME | End: 2020-12-16
Attending: PSYCHIATRY & NEUROLOGY | Admitting: PSYCHIATRY & NEUROLOGY
Payer: COMMERCIAL

## 2020-12-16 ENCOUNTER — ANESTHESIA EVENT (OUTPATIENT)
Dept: BEHAVIORAL HEALTH | Facility: CLINIC | Age: 50
End: 2020-12-16

## 2020-12-16 VITALS
DIASTOLIC BLOOD PRESSURE: 80 MMHG | OXYGEN SATURATION: 99 % | HEART RATE: 85 BPM | RESPIRATION RATE: 16 BRPM | SYSTOLIC BLOOD PRESSURE: 124 MMHG | TEMPERATURE: 98.4 F

## 2020-12-16 DIAGNOSIS — F33.2 SEVERE RECURRENT MAJOR DEPRESSION WITHOUT PSYCHOTIC FEATURES (H): Primary | ICD-10-CM

## 2020-12-16 LAB
SARS-COV-2 RNA SPEC QL NAA+PROBE: NOT DETECTED
SPECIMEN SOURCE: NORMAL

## 2020-12-16 PROCEDURE — 250N000013 HC RX MED GY IP 250 OP 250 PS 637: Performed by: PSYCHIATRY & NEUROLOGY

## 2020-12-16 PROCEDURE — 250N000009 HC RX 250: Performed by: ANESTHESIOLOGY

## 2020-12-16 PROCEDURE — 250N000011 HC RX IP 250 OP 636: Performed by: PSYCHIATRY & NEUROLOGY

## 2020-12-16 PROCEDURE — 90870 ELECTROCONVULSIVE THERAPY: CPT

## 2020-12-16 PROCEDURE — 250N000011 HC RX IP 250 OP 636: Performed by: ANESTHESIOLOGY

## 2020-12-16 PROCEDURE — 258N000003 HC RX IP 258 OP 636: Performed by: PSYCHIATRY & NEUROLOGY

## 2020-12-16 PROCEDURE — U0003 INFECTIOUS AGENT DETECTION BY NUCLEIC ACID (DNA OR RNA); SEVERE ACUTE RESPIRATORY SYNDROME CORONAVIRUS 2 (SARS-COV-2) (CORONAVIRUS DISEASE [COVID-19]), AMPLIFIED PROBE TECHNIQUE, MAKING USE OF HIGH THROUGHPUT TECHNOLOGIES AS DESCRIBED BY CMS-2020-01-R: HCPCS | Performed by: PSYCHIATRY & NEUROLOGY

## 2020-12-16 PROCEDURE — 370N000001 HC ANESTHESIA TECHNICAL FEE, 1ST 30 MIN

## 2020-12-16 RX ORDER — HALOPERIDOL 5 MG/ML
5 INJECTION INTRAMUSCULAR
Status: CANCELLED
Start: 2020-12-16 | End: 2020-12-16

## 2020-12-16 RX ORDER — HALOPERIDOL 5 MG/ML
5 INJECTION INTRAMUSCULAR
Status: COMPLETED | OUTPATIENT
Start: 2020-12-16 | End: 2020-12-16

## 2020-12-16 RX ORDER — ONDANSETRON 2 MG/ML
4 INJECTION INTRAMUSCULAR; INTRAVENOUS
Status: COMPLETED | OUTPATIENT
Start: 2020-12-16 | End: 2020-12-16

## 2020-12-16 RX ORDER — ACETAMINOPHEN 500 MG
1000 TABLET ORAL
Status: COMPLETED | OUTPATIENT
Start: 2020-12-16 | End: 2020-12-16

## 2020-12-16 RX ORDER — KETOROLAC TROMETHAMINE 30 MG/ML
30 INJECTION, SOLUTION INTRAMUSCULAR; INTRAVENOUS
Status: CANCELLED
Start: 2020-12-16 | End: 2020-12-16

## 2020-12-16 RX ORDER — KETOROLAC TROMETHAMINE 30 MG/ML
30 INJECTION, SOLUTION INTRAMUSCULAR; INTRAVENOUS
Status: COMPLETED | OUTPATIENT
Start: 2020-12-16 | End: 2020-12-16

## 2020-12-16 RX ORDER — ACETAMINOPHEN 500 MG
1000 TABLET ORAL
Status: CANCELLED
Start: 2020-12-16 | End: 2020-12-16

## 2020-12-16 RX ORDER — ONDANSETRON 2 MG/ML
4 INJECTION INTRAMUSCULAR; INTRAVENOUS
Status: CANCELLED
Start: 2020-12-16 | End: 2020-12-16

## 2020-12-16 RX ADMIN — KETOROLAC TROMETHAMINE 30 MG: 30 INJECTION, SOLUTION INTRAMUSCULAR at 11:20

## 2020-12-16 RX ADMIN — HALOPERIDOL LACTATE 5 MG: 5 INJECTION, SOLUTION INTRAMUSCULAR at 11:19

## 2020-12-16 RX ADMIN — ACETAMINOPHEN 1000 MG: 500 TABLET, FILM COATED ORAL at 11:10

## 2020-12-16 RX ADMIN — Medication 80 MG: at 11:20

## 2020-12-16 RX ADMIN — ONDANSETRON 4 MG: 2 INJECTION INTRAMUSCULAR; INTRAVENOUS at 11:21

## 2020-12-16 RX ADMIN — METHOHEXITAL SODIUM 80 MG: 500 INJECTION, POWDER, LYOPHILIZED, FOR SOLUTION INTRAMUSCULAR; INTRAVENOUS; RECTAL at 11:22

## 2020-12-16 RX ADMIN — SODIUM CHLORIDE, POTASSIUM CHLORIDE, SODIUM LACTATE AND CALCIUM CHLORIDE 500 ML: 600; 310; 30; 20 INJECTION, SOLUTION INTRAVENOUS at 11:20

## 2020-12-16 RX ADMIN — MIDAZOLAM 2 MG: 1 INJECTION INTRAMUSCULAR; INTRAVENOUS at 11:31

## 2020-12-16 NOTE — ANESTHESIA POSTPROCEDURE EVALUATION
Anesthesia POST Procedure Evaluation    Patient: Kuldeep Sequeira   MRN:     [id number] Gender:   male   Age:    50 year old :      1970        Preoperative Diagnosis: * No pre-op diagnosis entered *   * No procedures listed *   Postop Comments: No value filed.     Anesthesia Type: General          Postop Pain Control: Uneventful            Sign Out: Well controlled pain   PONV: No   Neuro/Psych: Uneventful            Sign Out: Acceptable/Baseline neuro status   Airway/Respiratory: Uneventful            Sign Out: Acceptable/Baseline resp. status   CV/Hemodynamics: Uneventful            Sign Out: Acceptable CV status   Other NRE: NONE   DID A NON-ROUTINE EVENT OCCUR? No         Last Anesthesia Record Vitals:  CRNA VITALS  2020 1102 - 2020 1151      2020             Resp Rate (observed):  (!) 5          Last PACU Vitals:  Vitals Value Taken Time   BP     Temp 37.1  C (98.7  F) 20 1146   Pulse 79 20 1146   Resp 16 20 1146   SpO2 99 % 20 1146   Temp src     NIBP     Pulse     SpO2     Resp     Temp     Ht Rate     Temp 2           Electronically Signed By: Angella Howard MD, 2020, 11:51 AM

## 2020-12-16 NOTE — PROCEDURES
Procedure/Surgery Information   [facility]     Bedside Procedure Note  Date of Service (when I performed the procedure): 12/16/2020    Kuldeep Sequeira is a 50 year old male patient.  1. Severe recurrent major depression without psychotic features (H)      No past medical history on file.  Temp: 97.9  F (36.6  C) Temp src: Temporal BP: 120/81 Pulse: 84   Resp: 16 SpO2: 99 % O2 Device: None (Room air)      Procedures     Wm Markham     [facility] [place]   ECT Procedure Note   12/16/2020    Kuldeep Sequeira [id number]   50 year old 1970     Patient Status: Outpatient    Is this the first in a series of 12 treatments?  No    History and Physical: Reviewed in medical record    Consent: Informed consent was signed by: patient    Date Consent Signed: 12/7/20      No Known Allergies    Weight:  0 lbs 0 oz    Patient Preparations: (none)    Wt Readings from Last 5 Encounters:   11/19/20 78.4 kg (172 lb 14.4 oz)       /81   Pulse 84   Temp 97.9  F (36.6  C) (Temporal)   Resp 16   SpO2 99%          Indications for ECT:   Medications ineffective         Clinical Narrative:   Patient has a long history of depression and is starting ECT for treatment-resistant depression.  NPO after 2400.  Alert and Oriented x 3.  No problems with the last ECT.  Mood is not better yet, although Wed. He did feel better after his ECT for awhile.            Diagnosis:   Major depression         Pause for the Cause:     Right patient Yes   Right procedure/laterality settings: Yes          Intra-Procedure Documentation:     ECT #: 5   Treatment number this series: 5   Total treatment number: 5     Type of ECT:  Right, unilateral standard    ECT Medications:    Seroquel: 100mg (at 4 AM as he wasn't sleeping)  Zyprexa: 10mg  Tylenol: 1000mg  Toradol: 30mg  Zofran: 4mg  Haldol: 5mg  Lactated Ringers: 1/2 liter  Brevital: 80mg  Etomidate: 80mg  Versed: 2mg  For h/o  agitation      ECT Strip Summary:   Energy Level: 35 percent  Motor Seizure Duration:   35 seconds  EEG Seizure Duration:    55 seconds    Complications: No    Plan:   COVID test 12/16/20  Next ECT 12/18/20    Wm Markham MD

## 2020-12-16 NOTE — ANESTHESIA PREPROCEDURE EVALUATION
"Anesthesia Pre-Procedure Evaluation    Patient: Kuldeep Sequeira   MRN:     [id number] Gender:   male   Age:    50 year old :      1970        Preoperative Diagnosis: * No surgery found *        LABS:  CBC:   Lab Results   Component Value Date    WBC 5.3 2020    HGB 15.5 2020    HCT 45.1 2020     2020     BMP:   Lab Results   Component Value Date     2020    POTASSIUM 4.6 2020    POTASSIUM 4.3 2020    CHLORIDE 106 2020    CO2 32 2020    BUN 18 2020    CR 0.80 2020    CR 0.91 2020    GLC 97 2020    GLC 90 2020     COAGS: No results found for: PTT, INR, FIBR  POC: No results found for: BGM, HCG, HCGS  OTHER:   Lab Results   Component Value Date    RANJITH 8.8 2020    ALBUMIN 3.8 2020    PROTTOTAL 7.0 2020    ALT 22 2020    AST 10 2020    ALKPHOS 61 2020    BILITOTAL 1.1 2020    TSH 1.82 2020        Preop Vitals    BP Readings from Last 3 Encounters:   20 116/79   20 135/87   20 129/84    Pulse Readings from Last 3 Encounters:   20 76   20 85   20 95      Resp Readings from Last 3 Encounters:   20 20   20 16   20 16    SpO2 Readings from Last 3 Encounters:   20 96%   20 95%   20 96%      Temp Readings from Last 1 Encounters:   20 37.1  C (98.7  F) (Temporal)    Ht Readings from Last 1 Encounters:   20 1.702 m (5' 7\")      Wt Readings from Last 1 Encounters:   20 78.4 kg (172 lb 14.4 oz)    Estimated body mass index is 27.08 kg/m  as calculated from the following:    Height as of 20: 1.702 m (5' 7\").    Weight as of 20: 78.4 kg (172 lb 14.4 oz).     LDA:        No past medical history on file.   No past surgical history on file.   No Known Allergies                PHYSICAL EXAM:   Mental Status/Neuro: A/A/O   Airway: Facies: Feasible  Mallampati: II  Mouth/Opening: Full  TM " distance: > 6 cm  Neck ROM: Full   Respiratory: Auscultation: CTAB     Resp. Rate: Normal     Resp. Effort: Normal      CV: Rhythm: Regular  Rate: Age appropriate  Heart: Normal Sounds  Edema: None   Comments:      Dental: Normal Dentition                Assessment:   ASA SCORE: 2            Plan:   Anes. Type:  General   Pre-Medication: None   Induction:  IV (Standard)   Airway: Mask   Access/Monitoring: PIV   Maintenance: Balanced     Postop Plan:   Postop Pain: Opioids  Postop Sedation/Airway: Not planned     PONV Management:   Adult Risk Factors:, Postop Opioids                       Angella Howard MD

## 2020-12-16 NOTE — IP AVS SNAPSHOT
[facility]  [address] [address]  Phone: [phone]                                    After Visit Summary   12/16/2020    Kuldeep Sequeira    MRN: [id number]           After Visit Summary Signature Page    I have received my discharge instructions, and my questions have been answered. I have discussed any challenges I see with this plan with the nurse or doctor.    ..........................................................................................................................................  Patient/Patient Representative Signature      ..........................................................................................................................................  Patient Representative Print Name and Relationship to Patient    ..................................................               ................................................  Date                                   Time    ..........................................................................................................................................  Reviewed by Signature/Title    ...................................................              ..............................................  Date                                               Time          22EPIC Rev 08/18

## 2020-12-16 NOTE — DISCHARGE INSTRUCTIONS
ECT Discharge Instructions      During your ECT series:      Do not drive or work heavy equipment until 7 days after your last treatment.    Do not drink alcohol or use street drugs (illicit drugs) while you are having treatments.    Do not make important decisions, including legal decisions.    After each treatment:      Get plenty of rest. A responsible adult must stay with your for at least 6 hours.    Avoid heavy or risky activities for 24 hours.    If you feel light-headed, sit for a few minutes before standing. Have someone help you get up.    If you have nausea (feel sick to your stomach): Drink only clear liquids such as apple juice, ginger ale, broth or 7UP, Be sure to drink plenty of liquids. Move to a normal diet as you feel able.     If you received Toradol, wait 6 hours before taking ibuprofen.    Call your doctor if:     You have a fever over 100F (37.7 C) (taken under the tongue), or a fever that last more than 24 hours.    Your IV site is red, swollen, very painful or is getting more tender.    You have nausea that gets very bad or does not improve.      If you have any symptoms after ECT, tell our staff before your next treatment.    The ECT Department can be reached at [phone].  The ECT Department is open Mondays, Wednesdays and Fridays from 7:00 AM to 2:00 PM.      To speak to a doctor, call: Dr. Markham's office # [phone] Fax # [phone]    Today you got: - Toradol 30mg today at 11:20 am.  Toradol is an NSAID.  Do not take any NSAID's including: Ibuprofen, Naproxen, Aspirin, Advil, Motrin, Aleve, Rodrick, etc., until 6 hours after the above time (5:20pm).  If you have any questions, contact your physician or pharmacist.  Tylenol 1,000 mg at 11:20. Haldol 5 mg at 11:20 am.  Zofran 4 mg at 11:20.     Transported by: Wife     Today we completed your Covid test for your next appointment.  You do not need to do anything further. Your covid test results will be available by your next ECT  treatment.    Instructions for your next appointment:    Please come to the [facility] entrance and check-in with Security before your ECT appointment.  The [facility] entrance is located right next to the Adult Emergency Room.  The address is [address] [place] [address].    After your appointment, you may be picked up at the [facility] entrance, which is located on the West side of our campus.  The address is [address] [place] [address].  [facility].

## 2020-12-18 ENCOUNTER — ANESTHESIA (OUTPATIENT)
Dept: BEHAVIORAL HEALTH | Facility: CLINIC | Age: 50
End: 2020-12-18

## 2020-12-18 ENCOUNTER — HOSPITAL ENCOUNTER (OUTPATIENT)
Dept: BEHAVIORAL HEALTH | Facility: CLINIC | Age: 50
Discharge: HOME | End: 2020-12-18
Attending: PSYCHIATRY & NEUROLOGY | Admitting: PSYCHIATRY & NEUROLOGY
Payer: COMMERCIAL

## 2020-12-18 ENCOUNTER — ANESTHESIA EVENT (OUTPATIENT)
Dept: BEHAVIORAL HEALTH | Facility: CLINIC | Age: 50
End: 2020-12-18

## 2020-12-18 VITALS
OXYGEN SATURATION: 96 % | HEART RATE: 85 BPM | DIASTOLIC BLOOD PRESSURE: 72 MMHG | RESPIRATION RATE: 16 BRPM | TEMPERATURE: 98.1 F | SYSTOLIC BLOOD PRESSURE: 119 MMHG

## 2020-12-18 DIAGNOSIS — F33.2 SEVERE RECURRENT MAJOR DEPRESSION WITHOUT PSYCHOTIC FEATURES (H): Primary | ICD-10-CM

## 2020-12-18 LAB
SARS-COV-2 RNA SPEC QL NAA+PROBE: NORMAL
SPECIMEN SOURCE: NORMAL

## 2020-12-18 PROCEDURE — 370N000001 HC ANESTHESIA TECHNICAL FEE, 1ST 30 MIN

## 2020-12-18 PROCEDURE — 250N000013 HC RX MED GY IP 250 OP 250 PS 637: Performed by: PSYCHIATRY & NEUROLOGY

## 2020-12-18 PROCEDURE — 258N000003 HC RX IP 258 OP 636: Performed by: PSYCHIATRY & NEUROLOGY

## 2020-12-18 PROCEDURE — U0003 INFECTIOUS AGENT DETECTION BY NUCLEIC ACID (DNA OR RNA); SEVERE ACUTE RESPIRATORY SYNDROME CORONAVIRUS 2 (SARS-COV-2) (CORONAVIRUS DISEASE [COVID-19]), AMPLIFIED PROBE TECHNIQUE, MAKING USE OF HIGH THROUGHPUT TECHNOLOGIES AS DESCRIBED BY CMS-2020-01-R: HCPCS | Performed by: PSYCHIATRY & NEUROLOGY

## 2020-12-18 PROCEDURE — 250N000011 HC RX IP 250 OP 636: Performed by: PSYCHIATRY & NEUROLOGY

## 2020-12-18 PROCEDURE — 90870 ELECTROCONVULSIVE THERAPY: CPT

## 2020-12-18 PROCEDURE — 250N000009 HC RX 250: Performed by: ANESTHESIOLOGY

## 2020-12-18 PROCEDURE — 250N000011 HC RX IP 250 OP 636: Performed by: ANESTHESIOLOGY

## 2020-12-18 RX ORDER — HALOPERIDOL 5 MG/ML
5 INJECTION INTRAMUSCULAR
Status: COMPLETED | OUTPATIENT
Start: 2020-12-18 | End: 2020-12-18

## 2020-12-18 RX ORDER — ONDANSETRON 2 MG/ML
4 INJECTION INTRAMUSCULAR; INTRAVENOUS
Status: CANCELLED
Start: 2020-12-18 | End: 2020-12-18

## 2020-12-18 RX ORDER — HALOPERIDOL 5 MG/ML
5 INJECTION INTRAMUSCULAR
Status: CANCELLED
Start: 2020-12-18 | End: 2020-12-18

## 2020-12-18 RX ORDER — KETOROLAC TROMETHAMINE 30 MG/ML
30 INJECTION, SOLUTION INTRAMUSCULAR; INTRAVENOUS
Status: CANCELLED
Start: 2020-12-18 | End: 2020-12-18

## 2020-12-18 RX ORDER — ONDANSETRON 2 MG/ML
4 INJECTION INTRAMUSCULAR; INTRAVENOUS
Status: COMPLETED | OUTPATIENT
Start: 2020-12-18 | End: 2020-12-18

## 2020-12-18 RX ORDER — ACETAMINOPHEN 500 MG
1000 TABLET ORAL
Status: COMPLETED | OUTPATIENT
Start: 2020-12-18 | End: 2020-12-18

## 2020-12-18 RX ORDER — ACETAMINOPHEN 500 MG
1000 TABLET ORAL
Status: CANCELLED
Start: 2020-12-18 | End: 2020-12-18

## 2020-12-18 RX ORDER — KETOROLAC TROMETHAMINE 30 MG/ML
30 INJECTION, SOLUTION INTRAMUSCULAR; INTRAVENOUS
Status: COMPLETED | OUTPATIENT
Start: 2020-12-18 | End: 2020-12-18

## 2020-12-18 RX ADMIN — METHOHEXITAL SODIUM 80 MG: 500 INJECTION, POWDER, LYOPHILIZED, FOR SOLUTION INTRAMUSCULAR; INTRAVENOUS; RECTAL at 09:15

## 2020-12-18 RX ADMIN — HALOPERIDOL LACTATE 5 MG: 5 INJECTION, SOLUTION INTRAMUSCULAR at 08:59

## 2020-12-18 RX ADMIN — ACETAMINOPHEN 1000 MG: 500 TABLET ORAL at 08:52

## 2020-12-18 RX ADMIN — KETOROLAC TROMETHAMINE 30 MG: 30 INJECTION, SOLUTION INTRAMUSCULAR at 09:01

## 2020-12-18 RX ADMIN — SODIUM CHLORIDE, POTASSIUM CHLORIDE, SODIUM LACTATE AND CALCIUM CHLORIDE 500 ML: 600; 310; 30; 20 INJECTION, SOLUTION INTRAVENOUS at 09:01

## 2020-12-18 RX ADMIN — Medication 80 MG: at 09:15

## 2020-12-18 RX ADMIN — MIDAZOLAM 2 MG: 1 INJECTION INTRAMUSCULAR; INTRAVENOUS at 09:19

## 2020-12-18 RX ADMIN — ONDANSETRON 4 MG: 2 INJECTION INTRAMUSCULAR; INTRAVENOUS at 09:01

## 2020-12-18 NOTE — ANESTHESIA PREPROCEDURE EVALUATION
"Anesthesia Pre-Procedure Evaluation    Patient: Kuldeep Sequeira   MRN:     [id number] Gender:   male   Age:    50 year old :      1970        Preoperative Diagnosis: * No surgery found *        LABS:  CBC:   Lab Results   Component Value Date    WBC 5.3 2020    HGB 15.5 2020    HCT 45.1 2020     2020     BMP:   Lab Results   Component Value Date     2020    POTASSIUM 4.6 2020    POTASSIUM 4.3 2020    CHLORIDE 106 2020    CO2 32 2020    BUN 18 2020    CR 0.80 2020    CR 0.91 2020    GLC 97 2020    GLC 90 2020     COAGS: No results found for: PTT, INR, FIBR  POC: No results found for: BGM, HCG, HCGS  OTHER:   Lab Results   Component Value Date    RANJITH 8.8 2020    ALBUMIN 3.8 2020    PROTTOTAL 7.0 2020    ALT 22 2020    AST 10 2020    ALKPHOS 61 2020    BILITOTAL 1.1 2020    TSH 1.82 2020        Preop Vitals    BP Readings from Last 3 Encounters:   20 110/76   20 124/80   20 116/79    Pulse Readings from Last 3 Encounters:   20 87   20 85   20 76      Resp Readings from Last 3 Encounters:   20 18   20 16   20 20    SpO2 Readings from Last 3 Encounters:   20 98%   20 99%   20 96%      Temp Readings from Last 1 Encounters:   20 36.3  C (97.4  F) (Temporal)    Ht Readings from Last 1 Encounters:   20 1.702 m (5' 7\")      Wt Readings from Last 1 Encounters:   20 78.4 kg (172 lb 14.4 oz)    Estimated body mass index is 27.08 kg/m  as calculated from the following:    Height as of 20: 1.702 m (5' 7\").    Weight as of 20: 78.4 kg (172 lb 14.4 oz).     LDA:  Peripheral IV 20 Right Upper arm (Active)   Number of days: 2        No past medical history on file.   No past surgical history on file.   No Known Allergies     Anesthesia Evaluation     .             ROS/MED " HX    ENT/Pulmonary:  - neg pulmonary ROS     Neurologic:  - neg neurologic ROS     Cardiovascular:  - neg cardiovascular ROS       METS/Exercise Tolerance:     Hematologic:  - neg hematologic  ROS       Musculoskeletal:  - neg musculoskeletal ROS       GI/Hepatic:  - neg GI/hepatic ROS       Renal/Genitourinary:  - ROS Renal section negative       Endo:  - neg endo ROS       Psychiatric:     (+) psychiatric history depression      Infectious Disease:  - neg infectious disease ROS       Malignancy:      - no malignancy   Other:                         PHYSICAL EXAM:   Mental Status/Neuro: A/A/O   Airway: Facies: Feasible  Mallampati: II  Mouth/Opening: Full  TM distance: > 6 cm  Neck ROM: Full   Respiratory: Auscultation: CTAB     Resp. Rate: Normal     Resp. Effort: Normal      CV: Rhythm: Regular  Rate: Age appropriate  Heart: Normal Sounds  Edema: None   Comments:      Dental: Normal Dentition                Assessment:   ASA SCORE: 2    H&P: History and physical reviewed and following examination; no interval change.   Smoking Status:  Non-Smoker/Unknown   NPO Status: NPO Appropriate     Plan:   Anes. Type:  General   Pre-Medication: None   Induction:  IV (Standard)   Airway: Mask   Access/Monitoring: PIV   Maintenance: N/a     Postop Plan:   Postop Pain: None  Postop Sedation/Airway: Not planned     PONV Management:   Adult Risk Factors:, Non-Smoker     CONSENT: Direct conversation   Plan and risks discussed with: Patient   Blood Products: N/a           Anesthesia Pre-Procedure Evaluation    Patient: Kuldeep Sequeira   MRN:     [id number] Gender:   male   Age:    50 year old :      1970        Preoperative Diagnosis: * No surgery found *        LABS:  CBC:   Lab Results   Component Value Date    WBC 5.3 2020    HGB 15.5 2020    HCT 45.1 2020     2020     BMP:   Lab Results   Component Value Date     2020    POTASSIUM 4.6 2020    POTASSIUM 4.3 2020     "CHLORIDE 106 11/19/2020    CO2 32 11/19/2020    BUN 18 11/19/2020    CR 0.80 12/03/2020    CR 0.91 11/19/2020    GLC 97 12/03/2020    GLC 90 11/19/2020     COAGS: No results found for: PTT, INR, FIBR  POC: No results found for: BGM, HCG, HCGS  OTHER:   Lab Results   Component Value Date    RANJITH 8.8 11/19/2020    ALBUMIN 3.8 11/19/2020    PROTTOTAL 7.0 11/19/2020    ALT 22 11/19/2020    AST 10 11/19/2020    ALKPHOS 61 11/19/2020    BILITOTAL 1.1 11/19/2020    TSH 1.82 11/19/2020        Preop Vitals    BP Readings from Last 3 Encounters:   12/18/20 110/76   12/16/20 124/80   12/14/20 116/79    Pulse Readings from Last 3 Encounters:   12/18/20 87   12/16/20 85   12/14/20 76      Resp Readings from Last 3 Encounters:   12/18/20 18   12/16/20 16   12/14/20 20    SpO2 Readings from Last 3 Encounters:   12/18/20 98%   12/16/20 99%   12/14/20 96%      Temp Readings from Last 1 Encounters:   12/18/20 36.3  C (97.4  F) (Temporal)    Ht Readings from Last 1 Encounters:   12/07/20 1.702 m (5' 7\")      Wt Readings from Last 1 Encounters:   11/19/20 78.4 kg (172 lb 14.4 oz)    Estimated body mass index is 27.08 kg/m  as calculated from the following:    Height as of 12/7/20: 1.702 m (5' 7\").    Weight as of 11/19/20: 78.4 kg (172 lb 14.4 oz).     LDA:  Peripheral IV 12/16/20 Right Upper arm (Active)   Number of days: 2        No past medical history on file.   No past surgical history on file.   No Known Allergies                PHYSICAL EXAM:   Mental Status/Neuro: A/A/O   Airway: Facies: Feasible  Mallampati: II  Mouth/Opening: Full  TM distance: > 6 cm  Neck ROM: Full   Respiratory: Auscultation: CTAB     Resp. Rate: Normal     Resp. Effort: Normal      CV: Rhythm: Regular  Rate: Age appropriate  Heart: Normal Sounds  Edema: None   Comments:      Dental: Normal Dentition                Assessment:   ASA SCORE: 2            Plan:   Anes. Type:  General   Pre-Medication: None   Induction:  IV (Standard)   Airway: Mask " "  Access/Monitoring: PIV   Maintenance: Balanced     Postop Plan:   Postop Pain: Opioids  Postop Sedation/Airway: Not planned     PONV Management:   Adult Risk Factors:, Postop Opioids                       Alena Irvin MD  Anesthesia Pre-Procedure Evaluation    Patient: Kuldeep Sequeira   MRN:     [id number] Gender:   male   Age:    50 year old :      1970        Preoperative Diagnosis: * No surgery found *        LABS:  CBC:   Lab Results   Component Value Date    WBC 5.3 2020    HGB 15.5 2020    HCT 45.1 2020     2020     BMP:   Lab Results   Component Value Date     2020    POTASSIUM 4.6 2020    POTASSIUM 4.3 2020    CHLORIDE 106 2020    CO2 32 2020    BUN 18 2020    CR 0.80 2020    CR 0.91 2020    GLC 97 2020    GLC 90 2020     COAGS: No results found for: PTT, INR, FIBR  POC: No results found for: BGM, HCG, HCGS  OTHER:   Lab Results   Component Value Date    RANJITH 8.8 2020    ALBUMIN 3.8 2020    PROTTOTAL 7.0 2020    ALT 22 2020    AST 10 2020    ALKPHOS 61 2020    BILITOTAL 1.1 2020    TSH 1.82 2020        Preop Vitals    BP Readings from Last 3 Encounters:   20 110/76   20 124/80   20 116/79    Pulse Readings from Last 3 Encounters:   20 87   20 85   20 76      Resp Readings from Last 3 Encounters:   20 18   20 16   20 20    SpO2 Readings from Last 3 Encounters:   20 98%   20 99%   20 96%      Temp Readings from Last 1 Encounters:   20 36.3  C (97.4  F) (Temporal)    Ht Readings from Last 1 Encounters:   20 1.702 m (5' 7\")      Wt Readings from Last 1 Encounters:   20 78.4 kg (172 lb 14.4 oz)    Estimated body mass index is 27.08 kg/m  as calculated from the following:    Height as of 20: 1.702 m (5' 7\").    Weight as of 20: 78.4 kg (172 lb 14.4 oz). "     LDA:  Peripheral IV 12/16/20 Right Upper arm (Active)   Number of days: 2        No past medical history on file.   No past surgical history on file.   No Known Allergies                PHYSICAL EXAM:   Mental Status/Neuro: A/A/O   Airway: Facies: Feasible  Mallampati: II  Mouth/Opening: Full  TM distance: > 6 cm  Neck ROM: Full   Respiratory: Auscultation: CTAB     Resp. Rate: Normal     Resp. Effort: Normal      CV: Rhythm: Regular  Rate: Age appropriate  Heart: Normal Sounds  Edema: None   Comments:      Dental: Normal Dentition                Assessment:   ASA SCORE: 2            Plan:   Anes. Type:  General   Pre-Medication: None   Induction:  IV (Standard)   Airway: Mask   Access/Monitoring: PIV   Maintenance: Balanced     Postop Plan:   Postop Pain: Opioids  Postop Sedation/Airway: Not planned     PONV Management:   Adult Risk Factors:, Postop Opioids                       MD Alena Simons MD

## 2020-12-18 NOTE — PROCEDURES
Procedure/Surgery Information   [facility]     Bedside Procedure Note  Date of Service (when I performed the procedure): 12/18/2020    Kuldeep Sequeira is a 50 year old male patient.  No diagnosis found.  No past medical history on file.  Temp: 97.4  F (36.3  C) Temp src: Temporal BP: 110/76 Pulse: 87   Resp: 18 SpO2: 98 % O2 Device: None (Room air)      Procedures     Wm Markham     [facility] [place]   ECT Procedure Note   12/18/2020    Kuldeep Sequeira [id number]   50 year old 1970     Patient Status: Outpatient    Is this the first in a series of 12 treatments?  No    History and Physical: Reviewed in medical record    Consent: Informed consent was signed by: patient    Date Consent Signed: 12/7/20      No Known Allergies    Weight:  0 lbs 0 oz    Patient Preparations: (none)    Wt Readings from Last 5 Encounters:   11/19/20 78.4 kg (172 lb 14.4 oz)       /76   Pulse 87   Temp 97.4  F (36.3  C) (Temporal)   Resp 18   SpO2 98%          Indications for ECT:   Medications ineffective         Clinical Narrative:   Patient has a long history of depression and is starting ECT for treatment-resistant depression.  NPO after 2400.  Alert and Oriented x 3.  No problems with the last ECT.  Pt feels mood is about the same: no better.  Discussed right unilateral vs bilateral ECT, pros and cons.  He decided to go with bilateral today.           Diagnosis:   Major depression         Pause for the Cause:     Right patient Yes   Right procedure/laterality settings: Yes          Intra-Procedure Documentation:     ECT #: 6   Treatment number this series: 6   Total treatment number: 6     Type of ECT:  Today: BILATERAL (instead of Right, unilateral standard)    ECT Medications:      Zyprexa: 10mg  Tylenol: 1000mg  Toradol: 30mg  Zofran: 4mg  Haldol: 5mg  Lactated Ringers: 1/2 liter  Brevital: 80mg  Etomidate: 80mg  Versed: 2mg for h/o  agitation      ECT Strip Summary:   Energy Level: 25 percent  Motor Seizure Duration:   39 seconds  EEG Seizure Duration:    59 seconds    Complications: No    Plan:   COVID test 12/18/20  Next ECT 12/21/20    Wm Markham MD

## 2020-12-18 NOTE — ANESTHESIA POSTPROCEDURE EVALUATION
Anesthesia POST Procedure Evaluation    Patient: Kuldeep Sequeira   MRN:     [id number] Gender:   male   Age:    50 year old :      1970        Preoperative Diagnosis: * No pre-op diagnosis entered *   * No procedures listed *   Postop Comments: No value filed.     Anesthesia Type: General       Disposition: Outpatient   Postop Pain Control: Uneventful            Sign Out: Well controlled pain   PONV: No   Neuro/Psych: Uneventful            Sign Out: Acceptable/Baseline neuro status   Airway/Respiratory: Uneventful            Sign Out: Acceptable/Baseline resp. status   CV/Hemodynamics: Uneventful            Sign Out: Acceptable CV status   Other NRE: NONE   DID A NON-ROUTINE EVENT OCCUR? No         Last Anesthesia Record Vitals:  CRNA VITALS  2020 0855 - 2020 0948      2020             Pulse:  92    Ht Rate:  91    SpO2:  99 %    Resp Rate (observed):  19          Last PACU Vitals:  Vitals Value Taken Time   /75 20 0941   Temp     Pulse 93 20 0941   Resp 16 20 0941   SpO2 94 % 20 0941   Temp src     NIBP     Pulse     SpO2     Resp     Temp     Ht Rate     Temp 2           Electronically Signed By: Alena Irvin MD, 2020, 9:48 AM

## 2020-12-18 NOTE — DISCHARGE INSTRUCTIONS
ECT Discharge Instructions      During your ECT series:      Do not drive or work heavy equipment until 7 days after your last treatment.    Do not drink alcohol or use street drugs (illicit drugs) while you are having treatments.    Do not make important decisions, including legal decisions.    After your maintenance ECT treatment:      Do not drive for 24 hours after treatment.  If you will be having more than one treatment witihin a week, no driving until 7 days after your last ECT treatment.       After each treatment:      Get plenty of rest. A responsible adult must stay with your for at least 6 hours.    Avoid heavy or risky activities for 24 hours.    If you feel light-headed, sit for a few minutes before standing. Have someone help you get up.    If you have nausea (feel sick to your stomach): Drink only clear liquids such as apple juice, ginger ale, broth or 7UP, Be sure to drink plenty of liquids. Move to a normal diet as you feel able.     If you received Toradol, wait 6 hours before taking ibuprofen.    Call your doctor if:     You have a fever over 100F (37.7 C) (taken under the tongue), or a fever that last more than 24 hours.    Your IV site is red, swollen, very painful or is getting more tender.    You have nausea that gets very bad or does not improve.      If you have any symptoms after ECT, tell our staff before your next treatment.    The ECT Department can be reached at [phone].  The ECT Department is open Mondays, Wednesdays and Fridays from 7:00 AM to 2:00 PM.    To speak to a doctor, call: Dr. Markham's office # [phone] Fax # [phone]    Today you received the following:   -Tylenol 1000mg at 845am, you may take another dose of tylenol at 245p if needed for headache/body aches/pain.   - Toradol 30mg today at 9am.  Toradol is an NSAID.  Do not take any NSAID's including: Ibuprofen, Naproxen, Aspirin, Advil, Motrin, Aleve, Rodrick, etc., until 6 hours after the above time (3p).  If  you have any questions, contact your physician or pharmacist.    -500mL of fluids (lactated ringers)  - IV Zofran 4mg at 9am for nausea.     Transported by: Bonilla father in law, [phone]    Today we completed your Covid test for your next appointment.  You do not need to do anything further. Your covid test results will be available by your next ECT treatment.    Instructions for your next appointment:    *Covid-19 Testing:  Our central scheduling department will be calling you to schedule a Covid-19 test.  You will be scheduled to have this test before your next ECT treatment. If you do not receive a phone call, please call [phone] to schedule your Covid test to be performed 2 days before your scheduled ECT appointment.     Please come to the [facility] entrance and check-in with Security before your ECT appointment.  The [facility] entrance is located right next to the Adult Emergency Room.  The address is [address] [place] [address].    After your appointment, you may be picked up at the [facility] entrance, which is located on the West side of our campus.  The address is [address] [place] [address].

## 2020-12-19 LAB
LABORATORY COMMENT REPORT: NORMAL
SARS-COV-2 RNA SPEC QL NAA+PROBE: NEGATIVE
SPECIMEN SOURCE: NORMAL

## 2020-12-21 ENCOUNTER — HOSPITAL ENCOUNTER (OUTPATIENT)
Dept: BEHAVIORAL HEALTH | Facility: CLINIC | Age: 50
Discharge: HOME | End: 2020-12-21
Attending: PSYCHIATRY & NEUROLOGY | Admitting: PSYCHIATRY & NEUROLOGY
Payer: COMMERCIAL

## 2020-12-21 ENCOUNTER — ANESTHESIA (OUTPATIENT)
Dept: BEHAVIORAL HEALTH | Facility: CLINIC | Age: 50
End: 2020-12-21

## 2020-12-21 ENCOUNTER — ANESTHESIA EVENT (OUTPATIENT)
Dept: BEHAVIORAL HEALTH | Facility: CLINIC | Age: 50
End: 2020-12-21

## 2020-12-21 VITALS
TEMPERATURE: 98.2 F | HEART RATE: 82 BPM | OXYGEN SATURATION: 96 % | DIASTOLIC BLOOD PRESSURE: 76 MMHG | RESPIRATION RATE: 14 BRPM | SYSTOLIC BLOOD PRESSURE: 113 MMHG

## 2020-12-21 DIAGNOSIS — F33.2 SEVERE RECURRENT MAJOR DEPRESSION WITHOUT PSYCHOTIC FEATURES (H): Primary | ICD-10-CM

## 2020-12-21 PROCEDURE — 258N000003 HC RX IP 258 OP 636: Performed by: PSYCHIATRY & NEUROLOGY

## 2020-12-21 PROCEDURE — 250N000009 HC RX 250: Performed by: ANESTHESIOLOGY

## 2020-12-21 PROCEDURE — 250N000013 HC RX MED GY IP 250 OP 250 PS 637: Performed by: PSYCHIATRY & NEUROLOGY

## 2020-12-21 PROCEDURE — 250N000011 HC RX IP 250 OP 636: Performed by: PSYCHIATRY & NEUROLOGY

## 2020-12-21 PROCEDURE — 250N000011 HC RX IP 250 OP 636: Performed by: ANESTHESIOLOGY

## 2020-12-21 PROCEDURE — 370N000001 HC ANESTHESIA TECHNICAL FEE, 1ST 30 MIN

## 2020-12-21 PROCEDURE — U0003 INFECTIOUS AGENT DETECTION BY NUCLEIC ACID (DNA OR RNA); SEVERE ACUTE RESPIRATORY SYNDROME CORONAVIRUS 2 (SARS-COV-2) (CORONAVIRUS DISEASE [COVID-19]), AMPLIFIED PROBE TECHNIQUE, MAKING USE OF HIGH THROUGHPUT TECHNOLOGIES AS DESCRIBED BY CMS-2020-01-R: HCPCS | Performed by: PSYCHIATRY & NEUROLOGY

## 2020-12-21 PROCEDURE — 90870 ELECTROCONVULSIVE THERAPY: CPT

## 2020-12-21 RX ORDER — ONDANSETRON 2 MG/ML
4 INJECTION INTRAMUSCULAR; INTRAVENOUS
Status: COMPLETED | OUTPATIENT
Start: 2020-12-21 | End: 2020-12-21

## 2020-12-21 RX ORDER — ACETAMINOPHEN 500 MG
1000 TABLET ORAL
Status: CANCELLED
Start: 2020-12-21 | End: 2020-12-21

## 2020-12-21 RX ORDER — KETOROLAC TROMETHAMINE 30 MG/ML
30 INJECTION, SOLUTION INTRAMUSCULAR; INTRAVENOUS
Status: COMPLETED | OUTPATIENT
Start: 2020-12-21 | End: 2020-12-21

## 2020-12-21 RX ORDER — KETOROLAC TROMETHAMINE 30 MG/ML
30 INJECTION, SOLUTION INTRAMUSCULAR; INTRAVENOUS
Status: CANCELLED
Start: 2020-12-21 | End: 2020-12-21

## 2020-12-21 RX ORDER — ONDANSETRON 2 MG/ML
4 INJECTION INTRAMUSCULAR; INTRAVENOUS
Status: CANCELLED
Start: 2020-12-21 | End: 2020-12-21

## 2020-12-21 RX ORDER — HALOPERIDOL 5 MG/ML
5 INJECTION INTRAMUSCULAR
Status: COMPLETED | OUTPATIENT
Start: 2020-12-21 | End: 2020-12-21

## 2020-12-21 RX ORDER — ACETAMINOPHEN 500 MG
1000 TABLET ORAL
Status: COMPLETED | OUTPATIENT
Start: 2020-12-21 | End: 2020-12-21

## 2020-12-21 RX ORDER — HALOPERIDOL 5 MG/ML
5 INJECTION INTRAMUSCULAR
Status: CANCELLED
Start: 2020-12-21 | End: 2020-12-21

## 2020-12-21 RX ADMIN — ACETAMINOPHEN 1000 MG: 500 TABLET ORAL at 08:53

## 2020-12-21 RX ADMIN — Medication 80 MG: at 09:27

## 2020-12-21 RX ADMIN — METHOHEXITAL SODIUM 80 MG: 500 INJECTION, POWDER, LYOPHILIZED, FOR SOLUTION INTRAMUSCULAR; INTRAVENOUS; RECTAL at 09:27

## 2020-12-21 RX ADMIN — ONDANSETRON 4 MG: 2 INJECTION INTRAMUSCULAR; INTRAVENOUS at 09:19

## 2020-12-21 RX ADMIN — HALOPERIDOL LACTATE 5 MG: 5 INJECTION, SOLUTION INTRAMUSCULAR at 09:16

## 2020-12-21 RX ADMIN — SODIUM CHLORIDE, POTASSIUM CHLORIDE, SODIUM LACTATE AND CALCIUM CHLORIDE 500 ML: 600; 310; 30; 20 INJECTION, SOLUTION INTRAVENOUS at 09:18

## 2020-12-21 RX ADMIN — MIDAZOLAM 2 MG: 1 INJECTION INTRAMUSCULAR; INTRAVENOUS at 09:34

## 2020-12-21 RX ADMIN — KETOROLAC TROMETHAMINE 30 MG: 30 INJECTION, SOLUTION INTRAMUSCULAR; INTRAVENOUS at 09:19

## 2020-12-21 NOTE — PROCEDURES
Procedure/Surgery Information   [facility]     Bedside Procedure Note  Date of Service (when I performed the procedure): 12/21/2020    Kuldeep Sequeira is a 50 year old male patient.  1. Severe recurrent major depression without psychotic features (H)      No past medical history on file.  Temp: 97.1  F (36.2  C) Temp src: Temporal BP: 121/67 Pulse: 81   Resp: 16 SpO2: 97 % O2 Device: None (Room air)      Procedures     Wm Markham     [facility], [place]   ECT Procedure Note   12/21/2020    Kuldeep Sequeira [id number]   50 year old 1970     Patient Status: Outpatient    Is this the first in a series of 12 treatments?  No    History and Physical: Reviewed in medical record    Consent: Informed consent was signed by: patient    Date Consent Signed: 12/7/20      No Known Allergies    Weight:  0 lbs 0 oz    Patient Preparations: (none)    Wt Readings from Last 5 Encounters:   11/19/20 78.4 kg (172 lb 14.4 oz)       /67   Pulse 81   Temp 97.1  F (36.2  C) (Temporal)   Resp 16   SpO2 97%          Indications for ECT:   Medications ineffective         Clinical Narrative:   Patient has a long history of depression and is starting ECT for treatment-resistant depression.  NPO after 2400.  Alert and Oriented x 3.  No problems with the last ECT.  We switched to BILATERAL ECT last treatment as the mood was not better.  Wife thinks pt is looking better.           Diagnosis:   Major depression         Pause for the Cause:     Right patient Yes   Right procedure/laterality settings: Yes          Intra-Procedure Documentation:     ECT #: 7   Treatment number this series: 7   Total treatment number: 7     Type of ECT:   BILATERAL (instead of Right, unilateral standard)    ECT Medications:      Zyprexa: 10mg  Tylenol: 1000mg  Toradol: 30mg  Zofran: 4mg  Haldol: 5mg  Lactated Ringers: 1/2 liter  Brevital: 80mg  Succinyl Choline: 80mg  Versed: 2mg for h/o  agitation      ECT Strip Summary:   Energy Level: 25 percent (increase next Tx)  Motor Seizure Duration:   20 seconds  EEG Seizure Duration:    20 seconds    Complications: No    Plan:   COVID test 12/21/20  Next ECT 12/23/20    Wm Markham MD

## 2020-12-21 NOTE — ANESTHESIA POSTPROCEDURE EVALUATION
Anesthesia POST Procedure Evaluation    Patient: Kuldeep Sequeira   MRN:     [id number] Gender:   male   Age:    50 year old :      1970        Preoperative Diagnosis: * No pre-op diagnosis entered *   * No procedures listed *   Postop Comments: No value filed.     Anesthesia Type: General          Postop Pain Control: Uneventful            Sign Out: Well controlled pain   PONV: No   Neuro/Psych: Uneventful            Sign Out: Acceptable/Baseline neuro status   Airway/Respiratory: Uneventful            Sign Out: Acceptable/Baseline resp. status   CV/Hemodynamics: Uneventful            Sign Out: Acceptable CV status   Other NRE: NONE   DID A NON-ROUTINE EVENT OCCUR? No         Last Anesthesia Record Vitals:  CRNA VITALS  2020 0906 - 2020 1006      2020             Resp Rate (observed):  (!) 2          Last PACU Vitals:  Vitals Value Taken Time   /76 20 1002   Temp 36.8  C (98.2  F) 20 1002   Pulse 82 20 1002   Resp 14 20 1002   SpO2 96 % 20 1002   Temp src     NIBP     Pulse     SpO2     Resp     Temp     Ht Rate     Temp 2           Electronically Signed By: Alena Irvin MD, 2020, 10:12 AM

## 2020-12-21 NOTE — ANESTHESIA PREPROCEDURE EVALUATION
"Anesthesia Pre-Procedure Evaluation    Patient: Kuldeep Sequeira   MRN:     [id number] Gender:   male   Age:    50 year old :      1970        Preoperative Diagnosis: * No surgery found *        LABS:  CBC:   Lab Results   Component Value Date    WBC 5.3 2020    HGB 15.5 2020    HCT 45.1 2020     2020     BMP:   Lab Results   Component Value Date     2020    POTASSIUM 4.6 2020    POTASSIUM 4.3 2020    CHLORIDE 106 2020    CO2 32 2020    BUN 18 2020    CR 0.80 2020    CR 0.91 2020    GLC 97 2020    GLC 90 2020     COAGS: No results found for: PTT, INR, FIBR  POC: No results found for: BGM, HCG, HCGS  OTHER:   Lab Results   Component Value Date    RANJITH 8.8 2020    ALBUMIN 3.8 2020    PROTTOTAL 7.0 2020    ALT 22 2020    AST 10 2020    ALKPHOS 61 2020    BILITOTAL 1.1 2020    TSH 1.82 2020        Preop Vitals    BP Readings from Last 3 Encounters:   20 119/72   20 124/80   20 116/79    Pulse Readings from Last 3 Encounters:   20 85   20 85   20 76      Resp Readings from Last 3 Encounters:   20 16   20 16   20 20    SpO2 Readings from Last 3 Encounters:   20 96%   20 99%   20 96%      Temp Readings from Last 1 Encounters:   20 36.7  C (98.1  F) (Temporal)    Ht Readings from Last 1 Encounters:   20 1.702 m (5' 7\")      Wt Readings from Last 1 Encounters:   20 78.4 kg (172 lb 14.4 oz)    Estimated body mass index is 27.08 kg/m  as calculated from the following:    Height as of 20: 1.702 m (5' 7\").    Weight as of 20: 78.4 kg (172 lb 14.4 oz).     LDA:        No past medical history on file.   No past surgical history on file.   No Known Allergies     Anesthesia Evaluation     .             ROS/MED HX    ENT/Pulmonary:  - neg pulmonary ROS     Neurologic:  - neg neurologic ROS  "    Cardiovascular:  - neg cardiovascular ROS       METS/Exercise Tolerance:     Hematologic:  - neg hematologic  ROS       Musculoskeletal:  - neg musculoskeletal ROS       GI/Hepatic:  - neg GI/hepatic ROS       Renal/Genitourinary:  - ROS Renal section negative       Endo:  - neg endo ROS       Psychiatric:     (+) psychiatric history depression      Infectious Disease:  - neg infectious disease ROS       Malignancy:      - no malignancy   Other:                         PHYSICAL EXAM:   Mental Status/Neuro: A/A/O   Airway: Facies: Feasible  Mallampati: II  Mouth/Opening: Full  TM distance: > 6 cm  Neck ROM: Full   Respiratory: Auscultation: CTAB     Resp. Rate: Normal     Resp. Effort: Normal      CV: Rhythm: Regular  Rate: Age appropriate  Heart: Normal Sounds  Edema: None   Comments:      Dental: Normal Dentition                Assessment:   ASA SCORE: 2    H&P: History and physical reviewed and following examination; no interval change.   Smoking Status:  Non-Smoker/Unknown   NPO Status: NPO Appropriate     Plan:   Anes. Type:  General   Pre-Medication: None   Induction:  IV (Standard)   Airway: Mask   Access/Monitoring: PIV   Maintenance: N/a     Postop Plan:   Postop Pain: None  Postop Sedation/Airway: Not planned     PONV Management:   Adult Risk Factors:, Non-Smoker     CONSENT: Direct conversation   Plan and risks discussed with: Patient   Blood Products: N/a           Anesthesia Pre-Procedure Evaluation    Patient: Kuldeep Sequeira   MRN:     [id number] Gender:   male   Age:    50 year old :      1970        Preoperative Diagnosis: * No surgery found *        LABS:  CBC:   Lab Results   Component Value Date    WBC 5.3 2020    HGB 15.5 2020    HCT 45.1 2020     2020     BMP:   Lab Results   Component Value Date     2020    POTASSIUM 4.6 2020    POTASSIUM 4.3 2020    CHLORIDE 106 2020    CO2 32 2020    BUN 18 2020    CR 0.80  "12/03/2020    CR 0.91 11/19/2020    GLC 97 12/03/2020    GLC 90 11/19/2020     COAGS: No results found for: PTT, INR, FIBR  POC: No results found for: BGM, HCG, HCGS  OTHER:   Lab Results   Component Value Date    RANJITH 8.8 11/19/2020    ALBUMIN 3.8 11/19/2020    PROTTOTAL 7.0 11/19/2020    ALT 22 11/19/2020    AST 10 11/19/2020    ALKPHOS 61 11/19/2020    BILITOTAL 1.1 11/19/2020    TSH 1.82 11/19/2020        Preop Vitals    BP Readings from Last 3 Encounters:   12/18/20 119/72   12/16/20 124/80   12/14/20 116/79    Pulse Readings from Last 3 Encounters:   12/18/20 85   12/16/20 85   12/14/20 76      Resp Readings from Last 3 Encounters:   12/18/20 16   12/16/20 16   12/14/20 20    SpO2 Readings from Last 3 Encounters:   12/18/20 96%   12/16/20 99%   12/14/20 96%      Temp Readings from Last 1 Encounters:   12/18/20 36.7  C (98.1  F) (Temporal)    Ht Readings from Last 1 Encounters:   12/07/20 1.702 m (5' 7\")      Wt Readings from Last 1 Encounters:   11/19/20 78.4 kg (172 lb 14.4 oz)    Estimated body mass index is 27.08 kg/m  as calculated from the following:    Height as of 12/7/20: 1.702 m (5' 7\").    Weight as of 11/19/20: 78.4 kg (172 lb 14.4 oz).     LDA:        No past medical history on file.   No past surgical history on file.   No Known Allergies                PHYSICAL EXAM:   Mental Status/Neuro: A/A/O   Airway: Facies: Feasible  Mallampati: II  Mouth/Opening: Full  TM distance: > 6 cm  Neck ROM: Full   Respiratory: Auscultation: CTAB     Resp. Rate: Normal     Resp. Effort: Normal      CV: Rhythm: Regular  Rate: Age appropriate  Heart: Normal Sounds  Edema: None   Comments:      Dental: Normal Dentition                Assessment:   ASA SCORE: 2            Plan:   Anes. Type:  General   Pre-Medication: None   Induction:  IV (Standard)   Airway: Mask   Access/Monitoring: PIV   Maintenance: Balanced     Postop Plan:   Postop Pain: Opioids  Postop Sedation/Airway: Not planned     PONV Management:   Adult " "Risk Factors:, Postop Opioids                       Alena Irvin MD  Anesthesia Pre-Procedure Evaluation    Patient: Kuldeep Sequeira   MRN:     [id number] Gender:   male   Age:    50 year old :      1970        Preoperative Diagnosis: * No surgery found *        LABS:  CBC:   Lab Results   Component Value Date    WBC 5.3 2020    HGB 15.5 2020    HCT 45.1 2020     2020     BMP:   Lab Results   Component Value Date     2020    POTASSIUM 4.6 2020    POTASSIUM 4.3 2020    CHLORIDE 106 2020    CO2 32 2020    BUN 18 2020    CR 0.80 2020    CR 0.91 2020    GLC 97 2020    GLC 90 2020     COAGS: No results found for: PTT, INR, FIBR  POC: No results found for: BGM, HCG, HCGS  OTHER:   Lab Results   Component Value Date    RANJITH 8.8 2020    ALBUMIN 3.8 2020    PROTTOTAL 7.0 2020    ALT 22 2020    AST 10 2020    ALKPHOS 61 2020    BILITOTAL 1.1 2020    TSH 1.82 2020        Preop Vitals    BP Readings from Last 3 Encounters:   20 119/72   20 124/80   20 116/79    Pulse Readings from Last 3 Encounters:   20 85   20 85   20 76      Resp Readings from Last 3 Encounters:   20 16   20 16   20 20    SpO2 Readings from Last 3 Encounters:   20 96%   20 99%   20 96%      Temp Readings from Last 1 Encounters:   20 36.7  C (98.1  F) (Temporal)    Ht Readings from Last 1 Encounters:   20 1.702 m (5' 7\")      Wt Readings from Last 1 Encounters:   20 78.4 kg (172 lb 14.4 oz)    Estimated body mass index is 27.08 kg/m  as calculated from the following:    Height as of 20: 1.702 m (5' 7\").    Weight as of 20: 78.4 kg (172 lb 14.4 oz).     LDA:        No past medical history on file.   No past surgical history on file.   No Known Allergies                PHYSICAL EXAM:   Mental Status/Neuro: A/A/O "   Airway: Facies: Feasible  Mallampati: II  Mouth/Opening: Full  TM distance: > 6 cm  Neck ROM: Full   Respiratory: Auscultation: CTAB     Resp. Rate: Normal     Resp. Effort: Normal      CV: Rhythm: Regular  Rate: Age appropriate  Heart: Normal Sounds  Edema: None   Comments:      Dental: Normal Dentition                Assessment:   ASA SCORE: 2            Plan:   Anes. Type:  General   Pre-Medication: None   Induction:  IV (Standard)   Airway: Mask   Access/Monitoring: PIV   Maintenance: Balanced     Postop Plan:   Postop Pain: Opioids  Postop Sedation/Airway: Not planned     PONV Management:   Adult Risk Factors:, Postop Opioids                       MD Alena Simons MD

## 2020-12-21 NOTE — IP AVS SNAPSHOT
[facility]  [address] [address]  Phone: [phone]                                    After Visit Summary   12/21/2020    Kuldeep Sequeira    MRN: [id number]           After Visit Summary Signature Page    I have received my discharge instructions, and my questions have been answered. I have discussed any challenges I see with this plan with the nurse or doctor.    ..........................................................................................................................................  Patient/Patient Representative Signature      ..........................................................................................................................................  Patient Representative Print Name and Relationship to Patient    ..................................................               ................................................  Date                                   Time    ..........................................................................................................................................  Reviewed by Signature/Title    ...................................................              ..............................................  Date                                               Time          22EPIC Rev 08/18

## 2020-12-21 NOTE — DISCHARGE INSTRUCTIONS
ECT Discharge Instructions      During your ECT series:      Do not drive for 24 hours after treatment.  If you will have more than one treatment within a week, no driving until 7 days after your last ECT treatment.    Do not drink alcohol or use street drugs (illicit drugs) while you are having treatments.    Do not make important decisions, including legal decisions.    After each treatment:      Get plenty of rest. A responsible adult must stay with your for at least 6 hours.    Avoid heavy or risky activities for 24 hours.    If you feel light-headed, sit for a few minutes before standing. Have someone help you get up.    If you have nausea (feel sick to your stomach): Drink only clear liquids such as apple juice, ginger ale, broth or 7UP, Be sure to drink plenty of liquids. Move to a normal diet as you feel able.     If you received Toradol, wait 6 hours before taking ibuprofen.    Call your doctor if:     You have a fever over 100F (37.7 C) (taken under the tongue), or a fever that last more than 24 hours.    Your IV site is red, swollen, very painful or is getting more tender.    You have nausea that gets very bad or does not improve.      If you have any symptoms after ECT, tell our staff before your next treatment.    The ECT Department can be reached at [phone].  The ECT Department is open Mondays, Wednesdays and Fridays from 7:00 AM to 2:00 PM.    Toradol:     You had Toradol today at 9:19 AM  which is a NSAID medication.  Do not take any Ibuprofen, Motrin, Aspirin, Advil, Aleve, Excedrin, or any other NSAID medication until after 3:19 PM.  Questions,  check with your physician or pharmacist.      COVID test 12/21/20  Next ECT 12/23/20    To speak to Dr. Markham  Office #  [phone] and Fax # [phone]    Covid-19 Testing: You had a covid-19 test done today in the ECT department.  We should have the results by your next ECT appointment and you DO NOT need to get another COVID -19 test before your  next ECT treatment.     Covid-19 Testing:  Our central scheduling department will be calling you to schedule a Covid-19 test.  You will be scheduled to have this test before your next ECT treatment. If you do not receive a phone call, please call [phone] to schedule your Covid test to be performed 48-96 hours (2-4 days) before your scheduled ECT appointment.     [facility] BUILDING DROP OFF: Please come to the [facility] entrance and check-in with Security before your ECT appointment.  The [facility] entrance is located at [address] [place] [address], which is on the East side of our campus.    [facility] DROP OFF: Please come to the [facility] entrance and check-in with Security before your ECT appointment.  The [facility] entrance is located right next to the Adult Emergency Room.  The address is [address] [place] [address].    [facility] : After your appointment, you may be picked up at the [facility] entrance, which is located at [address] [place] [address], about 1 block North of the [facility] entrance

## 2020-12-23 ENCOUNTER — ANESTHESIA EVENT (OUTPATIENT)
Dept: BEHAVIORAL HEALTH | Facility: CLINIC | Age: 50
End: 2020-12-23

## 2020-12-23 ENCOUNTER — HOSPITAL ENCOUNTER (OUTPATIENT)
Dept: BEHAVIORAL HEALTH | Facility: CLINIC | Age: 50
Discharge: HOME | End: 2020-12-23
Attending: PSYCHIATRY & NEUROLOGY | Admitting: PSYCHIATRY & NEUROLOGY
Payer: COMMERCIAL

## 2020-12-23 ENCOUNTER — ANESTHESIA (OUTPATIENT)
Dept: BEHAVIORAL HEALTH | Facility: CLINIC | Age: 50
End: 2020-12-23

## 2020-12-23 VITALS
DIASTOLIC BLOOD PRESSURE: 85 MMHG | TEMPERATURE: 98 F | OXYGEN SATURATION: 95 % | RESPIRATION RATE: 20 BRPM | HEART RATE: 87 BPM | SYSTOLIC BLOOD PRESSURE: 102 MMHG

## 2020-12-23 DIAGNOSIS — F33.2 SEVERE RECURRENT MAJOR DEPRESSION WITHOUT PSYCHOTIC FEATURES (H): Primary | ICD-10-CM

## 2020-12-23 PROCEDURE — 370N000001 HC ANESTHESIA TECHNICAL FEE, 1ST 30 MIN

## 2020-12-23 PROCEDURE — 250N000011 HC RX IP 250 OP 636: Performed by: PSYCHIATRY & NEUROLOGY

## 2020-12-23 PROCEDURE — 258N000003 HC RX IP 258 OP 636: Performed by: PSYCHIATRY & NEUROLOGY

## 2020-12-23 PROCEDURE — 90870 ELECTROCONVULSIVE THERAPY: CPT

## 2020-12-23 PROCEDURE — 250N000013 HC RX MED GY IP 250 OP 250 PS 637: Performed by: PSYCHIATRY & NEUROLOGY

## 2020-12-23 PROCEDURE — 250N000009 HC RX 250: Performed by: PSYCHIATRY & NEUROLOGY

## 2020-12-23 RX ORDER — ACETAMINOPHEN 500 MG
1000 TABLET ORAL
Status: COMPLETED | OUTPATIENT
Start: 2020-12-23 | End: 2020-12-23

## 2020-12-23 RX ORDER — ACETAMINOPHEN 500 MG
1000 TABLET ORAL
Status: CANCELLED
Start: 2020-12-23 | End: 2020-12-23

## 2020-12-23 RX ORDER — KETOROLAC TROMETHAMINE 30 MG/ML
30 INJECTION, SOLUTION INTRAMUSCULAR; INTRAVENOUS
Status: CANCELLED
Start: 2020-12-23 | End: 2020-12-23

## 2020-12-23 RX ORDER — KETOROLAC TROMETHAMINE 30 MG/ML
30 INJECTION, SOLUTION INTRAMUSCULAR; INTRAVENOUS
Status: COMPLETED | OUTPATIENT
Start: 2020-12-23 | End: 2020-12-23

## 2020-12-23 RX ORDER — HALOPERIDOL 5 MG/ML
5 INJECTION INTRAMUSCULAR
Status: CANCELLED
Start: 2020-12-23 | End: 2020-12-23

## 2020-12-23 RX ORDER — CAFFEINE AND SODIUM BENZOATE 125 MG/ML
500 INJECTION, SOLUTION INTRAMUSCULAR; INTRAVENOUS
Status: COMPLETED | OUTPATIENT
Start: 2020-12-23 | End: 2020-12-23

## 2020-12-23 RX ORDER — HALOPERIDOL 5 MG/ML
5 INJECTION INTRAMUSCULAR
Status: COMPLETED | OUTPATIENT
Start: 2020-12-23 | End: 2020-12-23

## 2020-12-23 RX ORDER — ONDANSETRON 2 MG/ML
4 INJECTION INTRAMUSCULAR; INTRAVENOUS
Status: COMPLETED | OUTPATIENT
Start: 2020-12-23 | End: 2020-12-23

## 2020-12-23 RX ORDER — CAFFEINE AND SODIUM BENZOATE 125 MG/ML
500 INJECTION, SOLUTION INTRAMUSCULAR; INTRAVENOUS
Status: CANCELLED
Start: 2020-12-23 | End: 2020-12-23

## 2020-12-23 RX ORDER — ONDANSETRON 2 MG/ML
4 INJECTION INTRAMUSCULAR; INTRAVENOUS
Status: CANCELLED
Start: 2020-12-23 | End: 2020-12-23

## 2020-12-23 RX ADMIN — ACETAMINOPHEN 1000 MG: 500 TABLET, FILM COATED ORAL at 08:30

## 2020-12-23 RX ADMIN — HALOPERIDOL LACTATE 5 MG: 5 INJECTION, SOLUTION INTRAMUSCULAR at 09:02

## 2020-12-23 RX ADMIN — SODIUM CHLORIDE, POTASSIUM CHLORIDE, SODIUM LACTATE AND CALCIUM CHLORIDE 500 ML: 600; 310; 30; 20 INJECTION, SOLUTION INTRAVENOUS at 09:04

## 2020-12-23 RX ADMIN — ONDANSETRON 4 MG: 2 INJECTION INTRAMUSCULAR; INTRAVENOUS at 09:03

## 2020-12-23 RX ADMIN — MIDAZOLAM 2 MG: 1 INJECTION INTRAMUSCULAR; INTRAVENOUS at 09:27

## 2020-12-23 RX ADMIN — CAFFEINE AND SODIUM BENZOATE 500 MG: 125 INJECTION, SOLUTION INTRAMUSCULAR; INTRAVENOUS at 09:18

## 2020-12-23 RX ADMIN — KETOROLAC TROMETHAMINE 30 MG: 30 INJECTION, SOLUTION INTRAMUSCULAR; INTRAVENOUS at 09:03

## 2020-12-23 NOTE — DISCHARGE INSTRUCTIONS
ECT Discharge Instructions      During your ECT series:      Do not drive or work heavy equipment until 7 days after your last treatment.    Do not drink alcohol or use street drugs (illicit drugs) while you are having treatments.    Do not make important decisions, including legal decisions.    After each treatment:      Get plenty of rest. A responsible adult must stay with your for at least 6 hours.    Avoid heavy or risky activities for 24 hours.    If you feel light-headed, sit for a few minutes before standing. Have someone help you get up.    If you have nausea (feel sick to your stomach): Drink only clear liquids such as apple juice, ginger ale, broth or 7UP, Be sure to drink plenty of liquids. Move to a normal diet as you feel able.     If you received Toradol, wait 6 hours before taking ibuprofen.    Call your doctor if:     You have a fever over 100F (37.7 C) (taken under the tongue), or a fever that last more than 24 hours.    Your IV site is red, swollen, very painful or is getting more tender.    You have nausea that gets very bad or does not improve.      If you have any symptoms after ECT, tell our staff before your next treatment.    The ECT Department can be reached at [phone].  The ECT Department is open Mondays, Wednesdays and Fridays from 7:00 AM to 2:00 PM.        To speak to a doctor, call: Dr. Markham Office # [phone] and Fax # [phone]    Other: You had Toradol at 9:00 am which is an NSAID medication. Do not take any ibuprofen, motrin, aspirin or any other NSAID medication until after 3:00 pm. Questions, check with your physician or pharmacist.    You had Tylenol at 8:30 am . Do not take any other acetaminophen or tylenol medications until after 2:30 pm.  Questions, check with your physician or pharmacist.     *Covid-19 Testing:  Our central scheduling department will be calling you to schedule a Covid-19 test.  You will be scheduled to have this test before your next ECT  treatment. If you do not receive a phone call, please call [phone] to schedule your Covid test to be performed 48-72 hours before your scheduled ECT appointment. Please test 12/26/20.    Transported by: [facility]

## 2020-12-23 NOTE — ANESTHESIA POSTPROCEDURE EVALUATION
Anesthesia POST Procedure Evaluation    Patient: Kuldeep Sequeira   MRN:     [id number] Gender:   male   Age:    50 year old :      1970        Preoperative Diagnosis: * No pre-op diagnosis entered *   * No procedures listed *   Postop Comments: No value filed.     Anesthesia Type: General       Disposition: Outpatient   Postop Pain Control: Uneventful            Sign Out: Well controlled pain   PONV: No   Neuro/Psych: Uneventful            Sign Out: Acceptable/Baseline neuro status   Airway/Respiratory: Uneventful            Sign Out: Acceptable/Baseline resp. status   CV/Hemodynamics: Uneventful            Sign Out: Acceptable CV status   Other NRE: NONE   DID A NON-ROUTINE EVENT OCCUR? No         Last Anesthesia Record Vitals:  CRNA VITALS  2020 0901 - 2020 1001      2020             Pulse:  103    Ht Rate:  104    SpO2:  93 %          Last PACU Vitals:  Vitals Value Taken Time   /85 20 1000   Temp 36.7  C (98  F) 20 1000   Pulse 87 20 1000   Resp 20 20 1000   SpO2 95 % 20 1000   Temp src     NIBP     Pulse     SpO2     Resp     Temp     Ht Rate     Temp 2           Electronically Signed By: Alena Irvin MD, 2020, 10:04 AM

## 2020-12-23 NOTE — IP AVS SNAPSHOT
[facility]  [address] [address]  Phone: [phone]                                    After Visit Summary   12/23/2020    Kuldeep Sequeira    MRN: [id number]           After Visit Summary Signature Page    I have received my discharge instructions, and my questions have been answered. I have discussed any challenges I see with this plan with the nurse or doctor.    ..........................................................................................................................................  Patient/Patient Representative Signature      ..........................................................................................................................................  Patient Representative Print Name and Relationship to Patient    ..................................................               ................................................  Date                                   Time    ..........................................................................................................................................  Reviewed by Signature/Title    ...................................................              ..............................................  Date                                               Time          22EPIC Rev 08/18

## 2020-12-23 NOTE — ANESTHESIA PREPROCEDURE EVALUATION
"Anesthesia Pre-Procedure Evaluation    Patient: Kuldeep Sequeira   MRN:     [id number] Gender:   male   Age:    50 year old :      1970        Preoperative Diagnosis: * No surgery found *        LABS:  CBC:   Lab Results   Component Value Date    WBC 5.3 2020    HGB 15.5 2020    HCT 45.1 2020     2020     BMP:   Lab Results   Component Value Date     2020    POTASSIUM 4.6 2020    POTASSIUM 4.3 2020    CHLORIDE 106 2020    CO2 32 2020    BUN 18 2020    CR 0.80 2020    CR 0.91 2020    GLC 97 2020    GLC 90 2020     COAGS: No results found for: PTT, INR, FIBR  POC: No results found for: BGM, HCG, HCGS  OTHER:   Lab Results   Component Value Date    RANJITH 8.8 2020    ALBUMIN 3.8 2020    PROTTOTAL 7.0 2020    ALT 22 2020    AST 10 2020    ALKPHOS 61 2020    BILITOTAL 1.1 2020    TSH 1.82 2020        Preop Vitals    BP Readings from Last 3 Encounters:   20 113/76   20 119/72   20 124/80    Pulse Readings from Last 3 Encounters:   20 82   20 85   20 85      Resp Readings from Last 3 Encounters:   20 14   20 16   20 16    SpO2 Readings from Last 3 Encounters:   20 96%   20 96%   20 99%      Temp Readings from Last 1 Encounters:   20 36.8  C (98.2  F) (Temporal)    Ht Readings from Last 1 Encounters:   20 1.702 m (5' 7\")      Wt Readings from Last 1 Encounters:   20 78.4 kg (172 lb 14.4 oz)    Estimated body mass index is 27.08 kg/m  as calculated from the following:    Height as of 20: 1.702 m (5' 7\").    Weight as of 20: 78.4 kg (172 lb 14.4 oz).     LDA:        No past medical history on file.   No past surgical history on file.   No Known Allergies     Anesthesia Evaluation     .             ROS/MED HX    ENT/Pulmonary:  - neg pulmonary ROS     Neurologic:  - neg neurologic ROS  "    Cardiovascular:  - neg cardiovascular ROS       METS/Exercise Tolerance:     Hematologic:  - neg hematologic  ROS       Musculoskeletal:  - neg musculoskeletal ROS       GI/Hepatic:  - neg GI/hepatic ROS       Renal/Genitourinary:  - ROS Renal section negative       Endo:  - neg endo ROS       Psychiatric:     (+) psychiatric history depression      Infectious Disease:  - neg infectious disease ROS       Malignancy:      - no malignancy   Other:                         PHYSICAL EXAM:   Mental Status/Neuro: A/A/O   Airway: Facies: Feasible  Mallampati: II  Mouth/Opening: Full  TM distance: > 6 cm  Neck ROM: Full   Respiratory: Auscultation: CTAB     Resp. Rate: Normal     Resp. Effort: Normal      CV: Rhythm: Regular  Rate: Age appropriate  Heart: Normal Sounds  Edema: None   Comments:      Dental: Normal Dentition                Assessment:   ASA SCORE: 2    H&P: History and physical reviewed and following examination; no interval change.   Smoking Status:  Non-Smoker/Unknown   NPO Status: NPO Appropriate     Plan:   Anes. Type:  General   Pre-Medication: None   Induction:  IV (Standard)   Airway: Mask   Access/Monitoring: PIV   Maintenance: N/a     Postop Plan:   Postop Pain: None  Postop Sedation/Airway: Not planned     PONV Management:   Adult Risk Factors:, Non-Smoker     CONSENT: Direct conversation   Plan and risks discussed with: Patient   Blood Products: N/a           Anesthesia Pre-Procedure Evaluation    Patient: Kuldeep Sequeira   MRN:     [id number] Gender:   male   Age:    50 year old :      1970        Preoperative Diagnosis: * No surgery found *        LABS:  CBC:   Lab Results   Component Value Date    WBC 5.3 2020    HGB 15.5 2020    HCT 45.1 2020     2020     BMP:   Lab Results   Component Value Date     2020    POTASSIUM 4.6 2020    POTASSIUM 4.3 2020    CHLORIDE 106 2020    CO2 32 2020    BUN 18 2020    CR 0.80  "12/03/2020    CR 0.91 11/19/2020    GLC 97 12/03/2020    GLC 90 11/19/2020     COAGS: No results found for: PTT, INR, FIBR  POC: No results found for: BGM, HCG, HCGS  OTHER:   Lab Results   Component Value Date    RANJITH 8.8 11/19/2020    ALBUMIN 3.8 11/19/2020    PROTTOTAL 7.0 11/19/2020    ALT 22 11/19/2020    AST 10 11/19/2020    ALKPHOS 61 11/19/2020    BILITOTAL 1.1 11/19/2020    TSH 1.82 11/19/2020        Preop Vitals    BP Readings from Last 3 Encounters:   12/21/20 113/76   12/18/20 119/72   12/16/20 124/80    Pulse Readings from Last 3 Encounters:   12/21/20 82   12/18/20 85   12/16/20 85      Resp Readings from Last 3 Encounters:   12/21/20 14   12/18/20 16   12/16/20 16    SpO2 Readings from Last 3 Encounters:   12/21/20 96%   12/18/20 96%   12/16/20 99%      Temp Readings from Last 1 Encounters:   12/21/20 36.8  C (98.2  F) (Temporal)    Ht Readings from Last 1 Encounters:   12/07/20 1.702 m (5' 7\")      Wt Readings from Last 1 Encounters:   11/19/20 78.4 kg (172 lb 14.4 oz)    Estimated body mass index is 27.08 kg/m  as calculated from the following:    Height as of 12/7/20: 1.702 m (5' 7\").    Weight as of 11/19/20: 78.4 kg (172 lb 14.4 oz).     LDA:        No past medical history on file.   No past surgical history on file.   No Known Allergies                PHYSICAL EXAM:   Mental Status/Neuro: A/A/O   Airway: Facies: Feasible  Mallampati: II  Mouth/Opening: Full  TM distance: > 6 cm  Neck ROM: Full   Respiratory: Auscultation: CTAB     Resp. Rate: Normal     Resp. Effort: Normal      CV: Rhythm: Regular  Rate: Age appropriate  Heart: Normal Sounds  Edema: None   Comments:      Dental: Normal Dentition                Assessment:   ASA SCORE: 2            Plan:   Anes. Type:  General   Pre-Medication: None   Induction:  IV (Standard)   Airway: Mask   Access/Monitoring: PIV   Maintenance: Balanced     Postop Plan:   Postop Pain: Opioids  Postop Sedation/Airway: Not planned     PONV Management:   Adult " "Risk Factors:, Postop Opioids                       Alena Irvin MD  Anesthesia Pre-Procedure Evaluation    Patient: Kuldeep Sequeira   MRN:     [id number] Gender:   male   Age:    50 year old :      1970        Preoperative Diagnosis: * No surgery found *        LABS:  CBC:   Lab Results   Component Value Date    WBC 5.3 2020    HGB 15.5 2020    HCT 45.1 2020     2020     BMP:   Lab Results   Component Value Date     2020    POTASSIUM 4.6 2020    POTASSIUM 4.3 2020    CHLORIDE 106 2020    CO2 32 2020    BUN 18 2020    CR 0.80 2020    CR 0.91 2020    GLC 97 2020    GLC 90 2020     COAGS: No results found for: PTT, INR, FIBR  POC: No results found for: BGM, HCG, HCGS  OTHER:   Lab Results   Component Value Date    RANJITH 8.8 2020    ALBUMIN 3.8 2020    PROTTOTAL 7.0 2020    ALT 22 2020    AST 10 2020    ALKPHOS 61 2020    BILITOTAL 1.1 2020    TSH 1.82 2020        Preop Vitals    BP Readings from Last 3 Encounters:   20 113/76   20 119/72   20 124/80    Pulse Readings from Last 3 Encounters:   20 82   20 85   20 85      Resp Readings from Last 3 Encounters:   20 14   20 16   20 16    SpO2 Readings from Last 3 Encounters:   20 96%   20 96%   20 99%      Temp Readings from Last 1 Encounters:   20 36.8  C (98.2  F) (Temporal)    Ht Readings from Last 1 Encounters:   20 1.702 m (5' 7\")      Wt Readings from Last 1 Encounters:   20 78.4 kg (172 lb 14.4 oz)    Estimated body mass index is 27.08 kg/m  as calculated from the following:    Height as of 20: 1.702 m (5' 7\").    Weight as of 20: 78.4 kg (172 lb 14.4 oz).     LDA:        No past medical history on file.   No past surgical history on file.   No Known Allergies                PHYSICAL EXAM:   Mental Status/Neuro: A/A/O "   Airway: Facies: Feasible  Mallampati: II  Mouth/Opening: Full  TM distance: > 6 cm  Neck ROM: Full   Respiratory: Auscultation: CTAB     Resp. Rate: Normal     Resp. Effort: Normal      CV: Rhythm: Regular  Rate: Age appropriate  Heart: Normal Sounds  Edema: None   Comments:      Dental: Normal Dentition                Assessment:   ASA SCORE: 2            Plan:   Anes. Type:  General   Pre-Medication: None   Induction:  IV (Standard)   Airway: Mask   Access/Monitoring: PIV   Maintenance: Balanced     Postop Plan:   Postop Pain: Opioids  Postop Sedation/Airway: Not planned     PONV Management:   Adult Risk Factors:, Postop Opioids                       MD Alena Simons MD

## 2020-12-26 DIAGNOSIS — F33.2 SEVERE RECURRENT MAJOR DEPRESSION WITHOUT PSYCHOTIC FEATURES (H): ICD-10-CM

## 2020-12-26 LAB
SARS-COV-2 RNA SPEC QL NAA+PROBE: NORMAL
SPECIMEN SOURCE: NORMAL

## 2020-12-26 PROCEDURE — U0003 INFECTIOUS AGENT DETECTION BY NUCLEIC ACID (DNA OR RNA); SEVERE ACUTE RESPIRATORY SYNDROME CORONAVIRUS 2 (SARS-COV-2) (CORONAVIRUS DISEASE [COVID-19]), AMPLIFIED PROBE TECHNIQUE, MAKING USE OF HIGH THROUGHPUT TECHNOLOGIES AS DESCRIBED BY CMS-2020-01-R: HCPCS | Performed by: PSYCHIATRY & NEUROLOGY

## 2020-12-28 ENCOUNTER — HOSPITAL ENCOUNTER (OUTPATIENT)
Dept: BEHAVIORAL HEALTH | Facility: CLINIC | Age: 50
Discharge: HOME | End: 2020-12-28
Attending: PSYCHIATRY & NEUROLOGY | Admitting: PSYCHIATRY & NEUROLOGY
Payer: COMMERCIAL

## 2020-12-28 ENCOUNTER — ANESTHESIA (OUTPATIENT)
Dept: BEHAVIORAL HEALTH | Facility: CLINIC | Age: 50
End: 2020-12-28

## 2020-12-28 ENCOUNTER — ANESTHESIA EVENT (OUTPATIENT)
Dept: BEHAVIORAL HEALTH | Facility: CLINIC | Age: 50
End: 2020-12-28

## 2020-12-28 VITALS
SYSTOLIC BLOOD PRESSURE: 123 MMHG | TEMPERATURE: 98 F | HEART RATE: 85 BPM | OXYGEN SATURATION: 97 % | RESPIRATION RATE: 18 BRPM | DIASTOLIC BLOOD PRESSURE: 87 MMHG

## 2020-12-28 DIAGNOSIS — F33.2 SEVERE RECURRENT MAJOR DEPRESSION WITHOUT PSYCHOTIC FEATURES (H): Primary | ICD-10-CM

## 2020-12-28 PROCEDURE — 250N000013 HC RX MED GY IP 250 OP 250 PS 637: Performed by: PSYCHIATRY & NEUROLOGY

## 2020-12-28 PROCEDURE — 250N000009 HC RX 250: Performed by: ANESTHESIOLOGY

## 2020-12-28 PROCEDURE — U0003 INFECTIOUS AGENT DETECTION BY NUCLEIC ACID (DNA OR RNA); SEVERE ACUTE RESPIRATORY SYNDROME CORONAVIRUS 2 (SARS-COV-2) (CORONAVIRUS DISEASE [COVID-19]), AMPLIFIED PROBE TECHNIQUE, MAKING USE OF HIGH THROUGHPUT TECHNOLOGIES AS DESCRIBED BY CMS-2020-01-R: HCPCS | Performed by: PSYCHIATRY & NEUROLOGY

## 2020-12-28 PROCEDURE — 370N000001 HC ANESTHESIA TECHNICAL FEE, 1ST 30 MIN

## 2020-12-28 PROCEDURE — 250N000011 HC RX IP 250 OP 636: Performed by: ANESTHESIOLOGY

## 2020-12-28 PROCEDURE — 90870 ELECTROCONVULSIVE THERAPY: CPT

## 2020-12-28 PROCEDURE — 258N000003 HC RX IP 258 OP 636: Performed by: PSYCHIATRY & NEUROLOGY

## 2020-12-28 PROCEDURE — 250N000011 HC RX IP 250 OP 636: Performed by: PSYCHIATRY & NEUROLOGY

## 2020-12-28 PROCEDURE — 250N000009 HC RX 250: Performed by: PSYCHIATRY & NEUROLOGY

## 2020-12-28 RX ORDER — ACETAMINOPHEN 500 MG
1000 TABLET ORAL
Status: CANCELLED
Start: 2020-12-28 | End: 2020-12-28

## 2020-12-28 RX ORDER — ONDANSETRON 2 MG/ML
4 INJECTION INTRAMUSCULAR; INTRAVENOUS
Status: CANCELLED
Start: 2020-12-28 | End: 2020-12-28

## 2020-12-28 RX ORDER — KETOROLAC TROMETHAMINE 30 MG/ML
30 INJECTION, SOLUTION INTRAMUSCULAR; INTRAVENOUS
Status: COMPLETED | OUTPATIENT
Start: 2020-12-28 | End: 2020-12-28

## 2020-12-28 RX ORDER — HALOPERIDOL 5 MG/ML
5 INJECTION INTRAMUSCULAR
Status: CANCELLED
Start: 2020-12-28 | End: 2020-12-28

## 2020-12-28 RX ORDER — KETOROLAC TROMETHAMINE 30 MG/ML
30 INJECTION, SOLUTION INTRAMUSCULAR; INTRAVENOUS
Status: CANCELLED
Start: 2020-12-28 | End: 2020-12-28

## 2020-12-28 RX ORDER — CAFFEINE AND SODIUM BENZOATE 125 MG/ML
500 INJECTION, SOLUTION INTRAMUSCULAR; INTRAVENOUS
Status: CANCELLED
Start: 2020-12-28 | End: 2020-12-28

## 2020-12-28 RX ORDER — ONDANSETRON 2 MG/ML
4 INJECTION INTRAMUSCULAR; INTRAVENOUS
Status: COMPLETED | OUTPATIENT
Start: 2020-12-28 | End: 2020-12-28

## 2020-12-28 RX ORDER — CAFFEINE AND SODIUM BENZOATE 125 MG/ML
500 INJECTION, SOLUTION INTRAMUSCULAR; INTRAVENOUS
Status: COMPLETED | OUTPATIENT
Start: 2020-12-28 | End: 2020-12-28

## 2020-12-28 RX ORDER — HALOPERIDOL 5 MG/ML
5 INJECTION INTRAMUSCULAR
Status: COMPLETED | OUTPATIENT
Start: 2020-12-28 | End: 2020-12-28

## 2020-12-28 RX ORDER — ACETAMINOPHEN 500 MG
1000 TABLET ORAL
Status: COMPLETED | OUTPATIENT
Start: 2020-12-28 | End: 2020-12-28

## 2020-12-28 RX ADMIN — METHOHEXITAL SODIUM 80 MG: 500 INJECTION, POWDER, LYOPHILIZED, FOR SOLUTION INTRAMUSCULAR; INTRAVENOUS; RECTAL at 09:30

## 2020-12-28 RX ADMIN — KETOROLAC TROMETHAMINE 30 MG: 30 INJECTION, SOLUTION INTRAMUSCULAR at 09:19

## 2020-12-28 RX ADMIN — CAFFEINE AND SODIUM BENZOATE 500 MG: 125 INJECTION, SOLUTION INTRAMUSCULAR; INTRAVENOUS at 09:37

## 2020-12-28 RX ADMIN — MIDAZOLAM 2 MG: 1 INJECTION INTRAMUSCULAR; INTRAVENOUS at 09:38

## 2020-12-28 RX ADMIN — ACETAMINOPHEN 1000 MG: 500 TABLET, FILM COATED ORAL at 08:51

## 2020-12-28 RX ADMIN — Medication 80 MG: at 09:30

## 2020-12-28 RX ADMIN — SODIUM CHLORIDE, POTASSIUM CHLORIDE, SODIUM LACTATE AND CALCIUM CHLORIDE 500 ML: 600; 310; 30; 20 INJECTION, SOLUTION INTRAVENOUS at 09:19

## 2020-12-28 RX ADMIN — HALOPERIDOL LACTATE 5 MG: 5 INJECTION, SOLUTION INTRAMUSCULAR at 09:19

## 2020-12-28 RX ADMIN — ONDANSETRON 4 MG: 2 INJECTION INTRAMUSCULAR; INTRAVENOUS at 09:19

## 2020-12-28 NOTE — PROCEDURES
Procedure/Surgery Information   [facility]     Bedside Procedure Note  Date of Service (when I performed the procedure): 12/28/2020    Kuldeep Sequeira is a 50 year old male patient.  1. Severe recurrent major depression without psychotic features (H)      No past medical history on file.  Temp: 98.2  F (36.8  C) Temp src: Temporal BP: 115/76 Pulse: 84   Resp: 18 SpO2: 98 % O2 Device: None (Room air)      Procedures     Wm Markham     [facility] [place]   ECT Procedure Note   12/28/2020    Kuldeep Sequeira [id number]   50 year old 1970     Patient Status: Outpatient    Is this the first in a series of 12 treatments?  No    History and Physical: Reviewed in medical record    Consent: Informed consent was signed by: patient    Date Consent Signed: 12/7/20      No Known Allergies    Weight:  0 lbs 0 oz    Patient Preparations: (none)    Wt Readings from Last 5 Encounters:   11/19/20 78.4 kg (172 lb 14.4 oz)       /76   Pulse 84   Temp 98.2  F (36.8  C) (Temporal)   Resp 18   SpO2 98%          Indications for ECT:   Medications ineffective         Clinical Narrative:   Patient has a long history of depression and is starting ECT for treatment-resistant depression.  NPO after 2400.  Alert and Oriented x 3.  No problems with the last ECT.  Mood is getting better.  His wife and daughters have noted this too.  He's more engaged and is doing more things.           Diagnosis:   Major depression         Pause for the Cause:     Right patient Yes   Right procedure/laterality settings: Yes          Intra-Procedure Documentation:     ECT #: 9   Treatment number this series: 9   Total treatment number: 9     Type of ECT:   BILATERAL since treatment #6 (instead of Right, unilateral standard)    ECT Medications:      Zyprexa: 10mg  Tylenol: 1000mg  Toradol: 30mg  Zofran: 4mg  Haldol: 5mg  Lactated Ringers: 1/2 liter  Brevital: 80mg  Caffeine:  500mg  Succinyl Choline: 80mg  Versed: 2mg for h/o agitation      ECT Strip Summary:   Energy Level: 25 percent   Motor Seizure Duration:   43 seconds  EEG Seizure Duration:    43 seconds    Complications: No    Plan:   COVID test 12/28/20  Next ECT 12/30/20    Wm Markham MD

## 2020-12-28 NOTE — ANESTHESIA PREPROCEDURE EVALUATION
"Anesthesia Pre-Procedure Evaluation    Patient: Kuldeep Sequeira   MRN:     [id number] Gender:   male   Age:    50 year old :      1970        Preoperative Diagnosis: * No surgery found *        LABS:  CBC:   Lab Results   Component Value Date    WBC 5.3 2020    HGB 15.5 2020    HCT 45.1 2020     2020     BMP:   Lab Results   Component Value Date     2020    POTASSIUM 4.6 2020    POTASSIUM 4.3 2020    CHLORIDE 106 2020    CO2 32 2020    BUN 18 2020    CR 0.80 2020    CR 0.91 2020    GLC 97 2020    GLC 90 2020     COAGS: No results found for: PTT, INR, FIBR  POC: No results found for: BGM, HCG, HCGS  OTHER:   Lab Results   Component Value Date    RANJITH 8.8 2020    ALBUMIN 3.8 2020    PROTTOTAL 7.0 2020    ALT 22 2020    AST 10 2020    ALKPHOS 61 2020    BILITOTAL 1.1 2020    TSH 1.82 2020        Preop Vitals    BP Readings from Last 3 Encounters:   20 102/85   20 113/76   20 119/72    Pulse Readings from Last 3 Encounters:   20 87   20 82   20 85      Resp Readings from Last 3 Encounters:   20 20   20 14   20 16    SpO2 Readings from Last 3 Encounters:   20 95%   20 96%   20 96%      Temp Readings from Last 1 Encounters:   20 36.7  C (98  F) (Temporal)    Ht Readings from Last 1 Encounters:   20 1.702 m (5' 7\")      Wt Readings from Last 1 Encounters:   20 78.4 kg (172 lb 14.4 oz)    Estimated body mass index is 27.08 kg/m  as calculated from the following:    Height as of 20: 1.702 m (5' 7\").    Weight as of 20: 78.4 kg (172 lb 14.4 oz).     LDA:        No past medical history on file.   No past surgical history on file.   No Known Allergies     Anesthesia Evaluation     .             ROS/MED HX    ENT/Pulmonary:  - neg pulmonary ROS     Neurologic:  - neg neurologic ROS   "   Cardiovascular:  - neg cardiovascular ROS       METS/Exercise Tolerance:     Hematologic:  - neg hematologic  ROS       Musculoskeletal:  - neg musculoskeletal ROS       GI/Hepatic:  - neg GI/hepatic ROS       Renal/Genitourinary:  - ROS Renal section negative       Endo:  - neg endo ROS       Psychiatric:     (+) psychiatric history depression      Infectious Disease:  - neg infectious disease ROS       Malignancy:      - no malignancy   Other:                         PHYSICAL EXAM:   Mental Status/Neuro: A/A/O   Airway: Facies: Feasible  Mallampati: II  Mouth/Opening: Full  TM distance: > 6 cm  Neck ROM: Full   Respiratory: Auscultation: CTAB     Resp. Rate: Normal     Resp. Effort: Normal      CV: Rhythm: Regular  Rate: Age appropriate  Heart: Normal Sounds  Edema: None   Comments:      Dental: Normal Dentition                Assessment:   ASA SCORE: 2    H&P: History and physical reviewed and following examination; no interval change.   Smoking Status:  Non-Smoker/Unknown   NPO Status: NPO Appropriate     Plan:   Anes. Type:  General   Pre-Medication: None   Induction:  IV (Standard)   Airway: Mask   Access/Monitoring: PIV   Maintenance: N/a     Postop Plan:   Postop Pain: None  Postop Sedation/Airway: Not planned     PONV Management:   Adult Risk Factors:, Non-Smoker     CONSENT: Direct conversation   Plan and risks discussed with: Patient   Blood Products: N/a           Anesthesia Pre-Procedure Evaluation    Patient: Kuldeep Sequeira   MRN:     [id number] Gender:   male   Age:    50 year old :      1970        Preoperative Diagnosis: * No surgery found *        LABS:  CBC:   Lab Results   Component Value Date    WBC 5.3 2020    HGB 15.5 2020    HCT 45.1 2020     2020     BMP:   Lab Results   Component Value Date     2020    POTASSIUM 4.6 2020    POTASSIUM 4.3 2020    CHLORIDE 106 2020    CO2 32 2020    BUN 18 2020    CR 0.80  "12/03/2020    CR 0.91 11/19/2020    GLC 97 12/03/2020    GLC 90 11/19/2020     COAGS: No results found for: PTT, INR, FIBR  POC: No results found for: BGM, HCG, HCGS  OTHER:   Lab Results   Component Value Date    RANJITH 8.8 11/19/2020    ALBUMIN 3.8 11/19/2020    PROTTOTAL 7.0 11/19/2020    ALT 22 11/19/2020    AST 10 11/19/2020    ALKPHOS 61 11/19/2020    BILITOTAL 1.1 11/19/2020    TSH 1.82 11/19/2020        Preop Vitals    BP Readings from Last 3 Encounters:   12/23/20 102/85   12/21/20 113/76   12/18/20 119/72    Pulse Readings from Last 3 Encounters:   12/23/20 87   12/21/20 82   12/18/20 85      Resp Readings from Last 3 Encounters:   12/23/20 20   12/21/20 14   12/18/20 16    SpO2 Readings from Last 3 Encounters:   12/23/20 95%   12/21/20 96%   12/18/20 96%      Temp Readings from Last 1 Encounters:   12/23/20 36.7  C (98  F) (Temporal)    Ht Readings from Last 1 Encounters:   12/07/20 1.702 m (5' 7\")      Wt Readings from Last 1 Encounters:   11/19/20 78.4 kg (172 lb 14.4 oz)    Estimated body mass index is 27.08 kg/m  as calculated from the following:    Height as of 12/7/20: 1.702 m (5' 7\").    Weight as of 11/19/20: 78.4 kg (172 lb 14.4 oz).     LDA:        No past medical history on file.   No past surgical history on file.   No Known Allergies                PHYSICAL EXAM:   Mental Status/Neuro: A/A/O   Airway: Facies: Feasible  Mallampati: II  Mouth/Opening: Full  TM distance: > 6 cm  Neck ROM: Full   Respiratory: Auscultation: CTAB     Resp. Rate: Normal     Resp. Effort: Normal      CV: Rhythm: Regular  Rate: Age appropriate  Heart: Normal Sounds  Edema: None   Comments:      Dental: Normal Dentition                Assessment:   ASA SCORE: 2            Plan:   Anes. Type:  General   Pre-Medication: None   Induction:  IV (Standard)   Airway: Mask   Access/Monitoring: PIV   Maintenance: Balanced     Postop Plan:   Postop Pain: Opioids  Postop Sedation/Airway: Not planned     PONV Management:   Adult Risk " "Factors:, Postop Opioids                       Angella Howard MD  Anesthesia Pre-Procedure Evaluation    Patient: Kuldeep Sequeira   MRN:     [id number] Gender:   male   Age:    50 year old :      1970        Preoperative Diagnosis: * No surgery found *        LABS:  CBC:   Lab Results   Component Value Date    WBC 5.3 2020    HGB 15.5 2020    HCT 45.1 2020     2020     BMP:   Lab Results   Component Value Date     2020    POTASSIUM 4.6 2020    POTASSIUM 4.3 2020    CHLORIDE 106 2020    CO2 32 2020    BUN 18 2020    CR 0.80 2020    CR 0.91 2020    GLC 97 2020    GLC 90 2020     COAGS: No results found for: PTT, INR, FIBR  POC: No results found for: BGM, HCG, HCGS  OTHER:   Lab Results   Component Value Date    RANJITH 8.8 2020    ALBUMIN 3.8 2020    PROTTOTAL 7.0 2020    ALT 22 2020    AST 10 2020    ALKPHOS 61 2020    BILITOTAL 1.1 2020    TSH 1.82 2020        Preop Vitals    BP Readings from Last 3 Encounters:   20 102/85   20 113/76   20 119/72    Pulse Readings from Last 3 Encounters:   20 87   20 82   20 85      Resp Readings from Last 3 Encounters:   20 20   20 14   20 16    SpO2 Readings from Last 3 Encounters:   20 95%   20 96%   20 96%      Temp Readings from Last 1 Encounters:   20 36.7  C (98  F) (Temporal)    Ht Readings from Last 1 Encounters:   20 1.702 m (5' 7\")      Wt Readings from Last 1 Encounters:   20 78.4 kg (172 lb 14.4 oz)    Estimated body mass index is 27.08 kg/m  as calculated from the following:    Height as of 20: 1.702 m (5' 7\").    Weight as of 20: 78.4 kg (172 lb 14.4 oz).     LDA:        No past medical history on file.   No past surgical history on file.   No Known Allergies                PHYSICAL EXAM:   Mental Status/Neuro: A/A/O "   Airway: Facies: Feasible  Mallampati: II  Mouth/Opening: Full  TM distance: > 6 cm  Neck ROM: Full   Respiratory: Auscultation: CTAB     Resp. Rate: Normal     Resp. Effort: Normal      CV: Rhythm: Regular  Rate: Age appropriate  Heart: Normal Sounds  Edema: None   Comments:      Dental: Normal Dentition                Assessment:   ASA SCORE: 2            Plan:   Anes. Type:  General   Pre-Medication: None   Induction:  IV (Standard)   Airway: Mask   Access/Monitoring: PIV   Maintenance: Balanced     Postop Plan:   Postop Pain: Opioids  Postop Sedation/Airway: Not planned     PONV Management:   Adult Risk Factors:, Postop Opioids                       MD Angella Canales MD

## 2020-12-28 NOTE — IP AVS SNAPSHOT
[facility]  [address] [address]  Phone: [phone]                                    After Visit Summary   12/28/2020    Kuldeep Sequeira    MRN: [id number]           After Visit Summary Signature Page    I have received my discharge instructions, and my questions have been answered. I have discussed any challenges I see with this plan with the nurse or doctor.    ..........................................................................................................................................  Patient/Patient Representative Signature      ..........................................................................................................................................  Patient Representative Print Name and Relationship to Patient    ..................................................               ................................................  Date                                   Time    ..........................................................................................................................................  Reviewed by Signature/Title    ...................................................              ..............................................  Date                                               Time          22EPIC Rev 08/18

## 2020-12-28 NOTE — DISCHARGE INSTRUCTIONS
ECT Discharge Instructions      During your ECT series:      Do not drive or work heavy equipment until 7 days after your last treatment.    Do not drink alcohol or use street drugs (illicit drugs) while you are having treatments.    Do not make important decisions, including legal decisions.    After your maintenance ECT treatment:      Do not drive for 24 hours after treatment.  If you will be having more than one treatment witihin a week, no driving until 7 days after your last ECT treatment.       After each treatment:      Get plenty of rest. A responsible adult must stay with your for at least 6 hours.    Avoid heavy or risky activities for 24 hours.    If you feel light-headed, sit for a few minutes before standing. Have someone help you get up.    If you have nausea (feel sick to your stomach): Drink only clear liquids such as apple juice, ginger ale, broth or 7UP, Be sure to drink plenty of liquids. Move to a normal diet as you feel able.     If you received Toradol, wait 6 hours before taking ibuprofen.    Call your doctor if:     You have a fever over 100F (37.7 C) (taken under the tongue), or a fever that last more than 24 hours.    Your IV site is red, swollen, very painful or is getting more tender.    You have nausea that gets very bad or does not improve.      If you have any symptoms after ECT, tell our staff before your next treatment.    The ECT Department can be reached at [phone].  The ECT Department is open Mondays, Wednesdays and Fridays from 7:00 AM to 2:00 PM.    To speak to a doctor, call: Dr. Markham's office # [phone] Fax # [phone]    Today you received the following:  -Tylenol 1000mg at 850am. You may take another dose at 250p if needed for headache/pain.  - Toradol 30mg today at 920am.  Toradol is an NSAID.  Do not take any NSAID's including: Ibuprofen, Naproxen, Aspirin, Advil, Motrin, Aleve, Rodrick, etc., until 6 hours after the above time (320p).  If you have any  questions, contact your physician or pharmacist.   -500mL of fluids (lactated ringers)  - IV Zofran 4mg at 920am for nausea.     Transported by: Evon, wife    Today we completed your Covid test for your next appointment.  You do not need to do anything further. Your covid test results will be available by your next ECT treatment.    Instructions for your next appointment:    Please come to the [facility] entrance and check-in with Security before your ECT appointment.  The [facility] entrance is located right next to the Adult Emergency Room.  The address is [address] [place] [address].    After your appointment, you may be picked up at the [facility] entrance, which is located on the West side of our campus.  The address is [address] [place] [address].  [facility].

## 2020-12-28 NOTE — ANESTHESIA POSTPROCEDURE EVALUATION
Anesthesia POST Procedure Evaluation    Patient: Kuldeep Sequeira   MRN:     [id number] Gender:   male   Age:    50 year old :      1970        Preoperative Diagnosis: * No pre-op diagnosis entered *   * No procedures listed *   Postop Comments: No value filed.     Anesthesia Type: General          Postop Pain Control: Uneventful            Sign Out: Well controlled pain   PONV: No   Neuro/Psych: Uneventful            Sign Out: Acceptable/Baseline neuro status   Airway/Respiratory: Uneventful            Sign Out: Acceptable/Baseline resp. status   CV/Hemodynamics: Uneventful            Sign Out: Acceptable CV status   Other NRE: NONE   DID A NON-ROUTINE EVENT OCCUR? No         Last Anesthesia Record Vitals:  CRNA VITALS  2020 0911 - 2020 1002      2020             Resp Rate (observed):  (!) 1          Last PACU Vitals:  Vitals Value Taken Time   /95 20 0955   Temp     Pulse 80 20 0955   Resp 18 20 0955   SpO2 96 % 20 0955   Temp src     NIBP     Pulse     SpO2     Resp     Temp     Ht Rate     Temp 2           Electronically Signed By: Angella Howard MD, 2020, 10:02 AM

## 2020-12-30 ENCOUNTER — ANESTHESIA EVENT (OUTPATIENT)
Dept: BEHAVIORAL HEALTH | Facility: CLINIC | Age: 50
End: 2020-12-30

## 2020-12-30 ENCOUNTER — HOSPITAL ENCOUNTER (OUTPATIENT)
Dept: BEHAVIORAL HEALTH | Facility: CLINIC | Age: 50
Discharge: HOME | End: 2020-12-30
Attending: PSYCHIATRY & NEUROLOGY | Admitting: PSYCHIATRY & NEUROLOGY
Payer: COMMERCIAL

## 2020-12-30 ENCOUNTER — ANESTHESIA (OUTPATIENT)
Dept: BEHAVIORAL HEALTH | Facility: CLINIC | Age: 50
End: 2020-12-30

## 2020-12-30 VITALS
SYSTOLIC BLOOD PRESSURE: 126 MMHG | RESPIRATION RATE: 18 BRPM | TEMPERATURE: 98 F | HEART RATE: 88 BPM | DIASTOLIC BLOOD PRESSURE: 88 MMHG | OXYGEN SATURATION: 100 %

## 2020-12-30 DIAGNOSIS — F33.2 SEVERE RECURRENT MAJOR DEPRESSION WITHOUT PSYCHOTIC FEATURES (H): Primary | ICD-10-CM

## 2020-12-30 DIAGNOSIS — F32.A DEPRESSION, UNSPECIFIED DEPRESSION TYPE: Primary | ICD-10-CM

## 2020-12-30 DIAGNOSIS — F32.A DEPRESSION, UNSPECIFIED DEPRESSION TYPE: ICD-10-CM

## 2020-12-30 PROCEDURE — 250N000009 HC RX 250: Performed by: ANESTHESIOLOGY

## 2020-12-30 PROCEDURE — 250N000009 HC RX 250: Performed by: PSYCHIATRY & NEUROLOGY

## 2020-12-30 PROCEDURE — 250N000011 HC RX IP 250 OP 636: Performed by: PSYCHIATRY & NEUROLOGY

## 2020-12-30 PROCEDURE — 370N000001 HC ANESTHESIA TECHNICAL FEE, 1ST 30 MIN

## 2020-12-30 PROCEDURE — 250N000013 HC RX MED GY IP 250 OP 250 PS 637: Performed by: PSYCHIATRY & NEUROLOGY

## 2020-12-30 PROCEDURE — 90870 ELECTROCONVULSIVE THERAPY: CPT

## 2020-12-30 PROCEDURE — 250N000011 HC RX IP 250 OP 636: Performed by: ANESTHESIOLOGY

## 2020-12-30 PROCEDURE — 258N000003 HC RX IP 258 OP 636: Performed by: PSYCHIATRY & NEUROLOGY

## 2020-12-30 RX ORDER — ACETAMINOPHEN 500 MG
1000 TABLET ORAL EVERY 6 HOURS PRN
COMMUNITY
End: 2022-03-11

## 2020-12-30 RX ORDER — SODIUM CHLORIDE, SODIUM LACTATE, POTASSIUM CHLORIDE, CALCIUM CHLORIDE 600; 310; 30; 20 MG/100ML; MG/100ML; MG/100ML; MG/100ML
INJECTION, SOLUTION INTRAVENOUS CONTINUOUS
Status: DISCONTINUED | OUTPATIENT
Start: 2020-12-30 | End: 2020-12-31 | Stop reason: HOSPADM

## 2020-12-30 RX ORDER — HALOPERIDOL 5 MG/ML
5 INJECTION INTRAMUSCULAR
Status: CANCELLED
Start: 2020-12-30 | End: 2020-12-30

## 2020-12-30 RX ORDER — NALOXONE HYDROCHLORIDE 0.4 MG/ML
0.2 INJECTION, SOLUTION INTRAMUSCULAR; INTRAVENOUS; SUBCUTANEOUS
Status: DISCONTINUED | OUTPATIENT
Start: 2020-12-30 | End: 2020-12-31 | Stop reason: HOSPADM

## 2020-12-30 RX ORDER — LABETALOL HYDROCHLORIDE 5 MG/ML
10 INJECTION, SOLUTION INTRAVENOUS
Status: DISCONTINUED | OUTPATIENT
Start: 2020-12-30 | End: 2020-12-31 | Stop reason: HOSPADM

## 2020-12-30 RX ORDER — HALOPERIDOL 5 MG/ML
5 INJECTION INTRAMUSCULAR
Status: COMPLETED | OUTPATIENT
Start: 2020-12-30 | End: 2020-12-30

## 2020-12-30 RX ORDER — ONDANSETRON 4 MG/1
4 TABLET, ORALLY DISINTEGRATING ORAL EVERY 30 MIN PRN
Status: DISCONTINUED | OUTPATIENT
Start: 2020-12-30 | End: 2020-12-31 | Stop reason: HOSPADM

## 2020-12-30 RX ORDER — ONDANSETRON 2 MG/ML
4 INJECTION INTRAMUSCULAR; INTRAVENOUS EVERY 30 MIN PRN
Status: DISCONTINUED | OUTPATIENT
Start: 2020-12-30 | End: 2020-12-31 | Stop reason: HOSPADM

## 2020-12-30 RX ORDER — KETOROLAC TROMETHAMINE 30 MG/ML
30 INJECTION, SOLUTION INTRAMUSCULAR; INTRAVENOUS
Status: CANCELLED
Start: 2020-12-30 | End: 2020-12-30

## 2020-12-30 RX ORDER — ACETAMINOPHEN 500 MG
1000 TABLET ORAL
Status: CANCELLED
Start: 2020-12-30 | End: 2020-12-30

## 2020-12-30 RX ORDER — ONDANSETRON 2 MG/ML
4 INJECTION INTRAMUSCULAR; INTRAVENOUS
Status: COMPLETED | OUTPATIENT
Start: 2020-12-30 | End: 2020-12-30

## 2020-12-30 RX ORDER — CAFFEINE AND SODIUM BENZOATE 125 MG/ML
500 INJECTION, SOLUTION INTRAMUSCULAR; INTRAVENOUS
Status: CANCELLED
Start: 2020-12-30 | End: 2020-12-30

## 2020-12-30 RX ORDER — NALOXONE HYDROCHLORIDE 0.4 MG/ML
0.4 INJECTION, SOLUTION INTRAMUSCULAR; INTRAVENOUS; SUBCUTANEOUS
Status: DISCONTINUED | OUTPATIENT
Start: 2020-12-30 | End: 2020-12-31 | Stop reason: HOSPADM

## 2020-12-30 RX ORDER — ACETAMINOPHEN 500 MG
1000 TABLET ORAL
Status: COMPLETED | OUTPATIENT
Start: 2020-12-30 | End: 2020-12-30

## 2020-12-30 RX ORDER — CAFFEINE AND SODIUM BENZOATE 125 MG/ML
500 INJECTION, SOLUTION INTRAMUSCULAR; INTRAVENOUS
Status: COMPLETED | OUTPATIENT
Start: 2020-12-30 | End: 2020-12-30

## 2020-12-30 RX ORDER — ONDANSETRON 2 MG/ML
4 INJECTION INTRAMUSCULAR; INTRAVENOUS
Status: CANCELLED
Start: 2020-12-30 | End: 2020-12-30

## 2020-12-30 RX ORDER — KETOROLAC TROMETHAMINE 30 MG/ML
30 INJECTION, SOLUTION INTRAMUSCULAR; INTRAVENOUS
Status: COMPLETED | OUTPATIENT
Start: 2020-12-30 | End: 2020-12-30

## 2020-12-30 RX ADMIN — ACETAMINOPHEN 1000 MG: 500 TABLET ORAL at 09:28

## 2020-12-30 RX ADMIN — CAFFEINE AND SODIUM BENZOATE 500 MG: 125 INJECTION, SOLUTION INTRAMUSCULAR; INTRAVENOUS at 10:52

## 2020-12-30 RX ADMIN — HALOPERIDOL LACTATE 5 MG: 5 INJECTION, SOLUTION INTRAMUSCULAR at 10:35

## 2020-12-30 RX ADMIN — Medication 80 MG: at 10:53

## 2020-12-30 RX ADMIN — SODIUM CHLORIDE, POTASSIUM CHLORIDE, SODIUM LACTATE AND CALCIUM CHLORIDE 500 ML: 600; 310; 30; 20 INJECTION, SOLUTION INTRAVENOUS at 10:35

## 2020-12-30 RX ADMIN — MIDAZOLAM 2 MG: 1 INJECTION INTRAMUSCULAR; INTRAVENOUS at 10:56

## 2020-12-30 RX ADMIN — KETOROLAC TROMETHAMINE 30 MG: 30 INJECTION, SOLUTION INTRAMUSCULAR at 10:33

## 2020-12-30 RX ADMIN — METHOHEXITAL SODIUM 80 MG: 500 INJECTION, POWDER, LYOPHILIZED, FOR SOLUTION INTRAMUSCULAR; INTRAVENOUS; RECTAL at 10:50

## 2020-12-30 RX ADMIN — ONDANSETRON 4 MG: 2 INJECTION INTRAMUSCULAR; INTRAVENOUS at 10:34

## 2020-12-30 NOTE — ANESTHESIA PREPROCEDURE EVALUATION
"Anesthesia Pre-Procedure Evaluation    Patient: Kuldeep Sequeira   MRN:     [id number] Gender:   male   Age:    50 year old :      1970        Preoperative Diagnosis: * No pre-op diagnosis entered *   * No procedures listed *     LABS:  CBC:   Lab Results   Component Value Date    WBC 5.3 2020    HGB 15.5 2020    HCT 45.1 2020     2020     BMP:   Lab Results   Component Value Date     2020    POTASSIUM 4.6 2020    POTASSIUM 4.3 2020    CHLORIDE 106 2020    CO2 32 2020    BUN 18 2020    CR 0.80 2020    CR 0.91 2020    GLC 97 2020    GLC 90 2020     COAGS: No results found for: PTT, INR, FIBR  POC: No results found for: BGM, HCG, HCGS  OTHER:   Lab Results   Component Value Date    RANJITH 8.8 2020    ALBUMIN 3.8 2020    PROTTOTAL 7.0 2020    ALT 22 2020    AST 10 2020    ALKPHOS 61 2020    BILITOTAL 1.1 2020    TSH 1.82 2020        Preop Vitals    BP Readings from Last 3 Encounters:   20 121/85   20 123/87   20 102/85    Pulse Readings from Last 3 Encounters:   20 86   20 85   20 87      Resp Readings from Last 3 Encounters:   20 18   20 18   20 20    SpO2 Readings from Last 3 Encounters:   20 99%   20 97%   20 95%      Temp Readings from Last 1 Encounters:   20 36.7  C (98  F) (Temporal)    Ht Readings from Last 1 Encounters:   20 1.702 m (5' 7\")      Wt Readings from Last 1 Encounters:   20 78.4 kg (172 lb 14.4 oz)    Estimated body mass index is 27.08 kg/m  as calculated from the following:    Height as of 20: 1.702 m (5' 7\").    Weight as of 20: 78.4 kg (172 lb 14.4 oz).     LDA:  Peripheral IV 20 Right Upper forearm (Active)   Site Assessment WDL 20 1033   Line Status Infusing 20 1033   Dressing Intervention New dressing  20 1033   Number of days: " 0        No past medical history on file.   No past surgical history on file.   No Known Allergies              PHYSICAL EXAM:   Mental Status/Neuro: A/A/O   Airway: Facies: Feasible  Mallampati: II  Mouth/Opening: Full  TM distance: > 6 cm  Neck ROM: Full   Respiratory: Auscultation: CTAB     Resp. Rate: Normal     Resp. Effort: Normal      CV: Rhythm: Regular  Rate: Age appropriate  Heart: Normal Sounds  Edema: None   Comments:      Dental: Normal Dentition                Assessment:   ASA SCORE: 2            Plan:   Anes. Type:  General   Pre-Medication: None   Induction:  IV (Standard)   Airway: Mask   Access/Monitoring: PIV   Maintenance: Balanced     Postop Plan:   Postop Pain: Opioids  Postop Sedation/Airway: Not planned     PONV Management:   Adult Risk Factors:, Postop Opioids                   Chauncey Hill DO

## 2020-12-30 NOTE — PROCEDURES
Procedure/Surgery Information   [facility]     Bedside Procedure Note  Date of Service (when I performed the procedure): 12/30/2020    Kuldeep Sequeira is a 50 year old male patient.  1. Severe recurrent major depression without psychotic features (H)      No past medical history on file.  Temp: 98  F (36.7  C) Temp src: Temporal BP: 121/85 Pulse: 86   Resp: 18 SpO2: 99 % O2 Device: None (Room air)      Procedures     Wm Markham     [facility] [place]   ECT Procedure Note   12/30/2020    Kuldeep Sequeira [id number]   50 year old 1970     Patient Status: Outpatient    Is this the first in a series of 12 treatments?  No    History and Physical: Reviewed in medical record    Consent: Informed consent was signed by: patient    Date Consent Signed: 12/7/20      No Known Allergies    Weight:  0 lbs 0 oz    Patient Preparations: (none)    Wt Readings from Last 5 Encounters:   11/19/20 78.4 kg (172 lb 14.4 oz)       /85   Pulse 86   Temp 98  F (36.7  C) (Temporal)   Resp 18   SpO2 99%          Indications for ECT:   Medications ineffective         Clinical Narrative:   Patient has a long history of depression and is starting ECT for treatment-resistant depression.  NPO after 2400.  Alert and Oriented x 3.  No problems with the last ECT.  Mood is pretty good.  He feels ready to go out to a maintenance schedule.            Diagnosis:   Major depression         Pause for the Cause:     Right patient Yes   Right procedure/laterality settings: Yes          Intra-Procedure Documentation:     ECT #: 10   Treatment number this series: 10   Total treatment number: 10     Type of ECT:   BILATERAL since treatment #6 (instead of Right, unilateral standard)    ECT Medications:      Zyprexa: 10mg  Tylenol: 1000mg  Toradol: 30mg  Zofran: 4mg  Haldol: 5mg  Lactated Ringers: 1/2 liter  Brevital: 80mg  Caffeine: 500mg  Succinyl Choline: 80mg  Versed: 2mg for  h/o agitation      ECT Strip Summary:   Energy Level: 25 percent   Motor Seizure Duration:   62 seconds  EEG Seizure Duration:    No paper seconds    Complications: No    Plan:   COVID test 1/6/21  Next ECT 1/8/21  Meds per SHAMAR Dodson MD

## 2020-12-30 NOTE — IP AVS SNAPSHOT
[facility]  [address] [place] [address]  Phone: [phone]                                    After Visit Summary   12/30/2020    Kuldeep Sequeira    MRN: [id number]           After Visit Summary Signature Page    I have received my discharge instructions, and my questions have been answered. I have discussed any challenges I see with this plan with the nurse or doctor.    ..........................................................................................................................................  Patient/Patient Representative Signature      ..........................................................................................................................................  Patient Representative Print Name and Relationship to Patient    ..................................................               ................................................  Date                                   Time    ..........................................................................................................................................  Reviewed by Signature/Title    ...................................................              ..............................................  Date                                               Time          22EPIC Rev 08/18

## 2020-12-30 NOTE — DISCHARGE INSTRUCTIONS
ECT Discharge Instructions      During your ECT series:      Do not drive or work heavy equipment until 7 days after your last treatment.    Do not drink alcohol or use street drugs (illicit drugs) while you are having treatments.    Do not make important decisions, including legal decisions.    After your maintenance ECT treatment:      Do not drive for 24 hours after treatment.  If you will be having more than one treatment witihin a week, no driving until 7 days after your last ECT treatment.       After each treatment:      Get plenty of rest. A responsible adult must stay with your for at least 6 hours.    Avoid heavy or risky activities for 24 hours.    If you feel light-headed, sit for a few minutes before standing. Have someone help you get up.    If you have nausea (feel sick to your stomach): Drink only clear liquids such as apple juice, ginger ale, broth or 7UP, Be sure to drink plenty of liquids. Move to a normal diet as you feel able.     If you received Toradol, wait 6 hours before taking ibuprofen.    Call your doctor if:     You have a fever over 100F (37.7 C) (taken under the tongue), or a fever that last more than 24 hours.    Your IV site is red, swollen, very painful or is getting more tender.    You have nausea that gets very bad or does not improve.      If you have any symptoms after ECT, tell our staff before your next treatment.    The ECT Department can be reached at [phone].  The ECT Department is open Mondays, Wednesdays and Fridays from 7:00 AM to 2:00 PM.    To speak to a doctor, call: Dr. Markham's office # [phone] Fax # [phone]    Today you received the following:   -Tylenol 1000mg at 930am  - Toradol 30mg today at 1030am.  Toradol is an NSAID.  Do not take any NSAID's including: Ibuprofen, Naproxen, Aspirin, Advil, Motrin, Aleve, Rodrick, etc., until 6 hours after the above time (430p).  If you have any questions, contact your physician or pharmacist.    - IV Zofran 4mg at  1030a for nausea.   -500mL of fluids (lactated ringers)    Transported by: michael Barnard    Instructions for your next appointment:    *Covid-19 Testing:  Our central scheduling department will be calling you to schedule a Covid-19 test.  You will be scheduled to have this test before your next ECT treatment. If you do not receive a phone call, please call [phone] to schedule your Covid test to be performed 2 days before your scheduled ECT appointment.     Please come to the [facility] entrance and check-in with Security before your ECT appointment.  The [facility] entrance is located right next to the Adult Emergency Room.  The address is [address] [place] [address].    After your appointment, you may be picked up at the [facility] entrance, which is located on the West side of our campus.  The address is [address] [place] [address].  [facility].

## 2020-12-30 NOTE — ANESTHESIA POSTPROCEDURE EVALUATION
Anesthesia POST Procedure Evaluation    Patient: Kuldeep Sequeira   MRN:     [id number] Gender:   male   Age:    50 year old :      1970        Preoperative Diagnosis: * No pre-op diagnosis entered *   * No procedures listed *   Postop Comments: No value filed.     Anesthesia Type: General          Postop Pain Control: Uneventful            Sign Out: Well controlled pain   PONV: No   Neuro/Psych: Uneventful            Sign Out: Acceptable/Baseline neuro status   Airway/Respiratory: Uneventful            Sign Out: Acceptable/Baseline resp. status   CV/Hemodynamics: Uneventful            Sign Out: Acceptable CV status   Other NRE: NONE   DID A NON-ROUTINE EVENT OCCUR? No         Last Anesthesia Record Vitals:  CRNA VITALS  2020 1033 - 2020 1105      2020             Resp Rate (observed):  (!) 1          Last PACU Vitals:  Vitals Value Taken Time   BP     Temp     Pulse     Resp     SpO2     Temp src     NIBP 142/87 20 1100   Pulse 79 20 1101   SpO2 100 % 20 1101   Resp     Temp     Ht Rate 90 20 1101   Temp 2           Electronically Signed By: Chauncey Hill DO, 2020, 11:05 AM

## 2021-01-05 DIAGNOSIS — F32.A DEPRESSION, UNSPECIFIED DEPRESSION TYPE: ICD-10-CM

## 2021-01-05 LAB
SARS-COV-2 RNA SPEC QL NAA+PROBE: NORMAL
SPECIMEN SOURCE: NORMAL

## 2021-01-05 PROCEDURE — 87635 SARS-COV-2 COVID-19 AMP PRB: CPT | Performed by: PSYCHIATRY & NEUROLOGY

## 2021-01-06 LAB
LABORATORY COMMENT REPORT: NORMAL
SARS-COV-2 RNA RESP QL NAA+PROBE: NEGATIVE
SPECIMEN SOURCE: NORMAL

## 2021-01-20 ENCOUNTER — OFFICE VISIT (OUTPATIENT)
Dept: LAB | Facility: CLINIC | Age: 51
End: 2021-01-20
Attending: PSYCHIATRY & NEUROLOGY
Payer: COMMERCIAL

## 2021-01-20 DIAGNOSIS — F32.A DEPRESSION, UNSPECIFIED DEPRESSION TYPE: ICD-10-CM

## 2021-01-20 LAB
SARS-COV-2 RNA RESP QL NAA+PROBE: NORMAL
SPECIMEN SOURCE: NORMAL

## 2021-01-20 PROCEDURE — 87635 SARS-COV-2 COVID-19 AMP PRB: CPT | Performed by: PSYCHIATRY & NEUROLOGY

## 2021-01-22 ENCOUNTER — HOSPITAL ENCOUNTER (OUTPATIENT)
Dept: BEHAVIORAL HEALTH | Facility: CLINIC | Age: 51
Discharge: HOME | End: 2021-01-22
Attending: PSYCHIATRY & NEUROLOGY | Admitting: PSYCHIATRY & NEUROLOGY
Payer: COMMERCIAL

## 2021-01-22 ENCOUNTER — ANESTHESIA EVENT (OUTPATIENT)
Dept: BEHAVIORAL HEALTH | Facility: CLINIC | Age: 51
End: 2021-01-22

## 2021-01-22 ENCOUNTER — ANESTHESIA (OUTPATIENT)
Dept: BEHAVIORAL HEALTH | Facility: CLINIC | Age: 51
End: 2021-01-22

## 2021-01-22 VITALS
DIASTOLIC BLOOD PRESSURE: 83 MMHG | OXYGEN SATURATION: 95 % | HEART RATE: 86 BPM | SYSTOLIC BLOOD PRESSURE: 135 MMHG | RESPIRATION RATE: 16 BRPM | TEMPERATURE: 98.2 F

## 2021-01-22 DIAGNOSIS — F33.2 SEVERE RECURRENT MAJOR DEPRESSION WITHOUT PSYCHOTIC FEATURES (H): Primary | ICD-10-CM

## 2021-01-22 DIAGNOSIS — F32.A DEPRESSION, UNSPECIFIED DEPRESSION TYPE: Primary | ICD-10-CM

## 2021-01-22 DIAGNOSIS — F32.A DEPRESSION, UNSPECIFIED DEPRESSION TYPE: ICD-10-CM

## 2021-01-22 PROCEDURE — 250N000009 HC RX 250: Performed by: STUDENT IN AN ORGANIZED HEALTH CARE EDUCATION/TRAINING PROGRAM

## 2021-01-22 PROCEDURE — 258N000003 HC RX IP 258 OP 636: Performed by: PSYCHIATRY & NEUROLOGY

## 2021-01-22 PROCEDURE — 250N000009 HC RX 250: Performed by: PSYCHIATRY & NEUROLOGY

## 2021-01-22 PROCEDURE — 370N000017 HC ANESTHESIA TECHNICAL FEE, PER MIN

## 2021-01-22 PROCEDURE — 90870 ELECTROCONVULSIVE THERAPY: CPT

## 2021-01-22 PROCEDURE — 250N000011 HC RX IP 250 OP 636: Performed by: STUDENT IN AN ORGANIZED HEALTH CARE EDUCATION/TRAINING PROGRAM

## 2021-01-22 PROCEDURE — 250N000013 HC RX MED GY IP 250 OP 250 PS 637: Performed by: PSYCHIATRY & NEUROLOGY

## 2021-01-22 PROCEDURE — 250N000011 HC RX IP 250 OP 636: Performed by: PSYCHIATRY & NEUROLOGY

## 2021-01-22 RX ORDER — ACETAMINOPHEN 500 MG
1000 TABLET ORAL
Status: COMPLETED | OUTPATIENT
Start: 2021-01-22 | End: 2021-01-22

## 2021-01-22 RX ORDER — HALOPERIDOL 5 MG/ML
5 INJECTION INTRAMUSCULAR
Status: CANCELLED
Start: 2021-01-22 | End: 2021-01-22

## 2021-01-22 RX ORDER — ACETAMINOPHEN 500 MG
1000 TABLET ORAL
Status: CANCELLED
Start: 2021-01-22 | End: 2021-01-22

## 2021-01-22 RX ORDER — CAFFEINE AND SODIUM BENZOATE 125 MG/ML
500 INJECTION, SOLUTION INTRAMUSCULAR; INTRAVENOUS
Status: COMPLETED | OUTPATIENT
Start: 2021-01-22 | End: 2021-01-22

## 2021-01-22 RX ORDER — ONDANSETRON 2 MG/ML
4 INJECTION INTRAMUSCULAR; INTRAVENOUS
Status: COMPLETED | OUTPATIENT
Start: 2021-01-22 | End: 2021-01-22

## 2021-01-22 RX ORDER — KETOROLAC TROMETHAMINE 30 MG/ML
30 INJECTION, SOLUTION INTRAMUSCULAR; INTRAVENOUS
Status: COMPLETED | OUTPATIENT
Start: 2021-01-22 | End: 2021-01-22

## 2021-01-22 RX ORDER — HALOPERIDOL 5 MG/ML
5 INJECTION INTRAMUSCULAR
Status: COMPLETED | OUTPATIENT
Start: 2021-01-22 | End: 2021-01-22

## 2021-01-22 RX ORDER — ONDANSETRON 2 MG/ML
4 INJECTION INTRAMUSCULAR; INTRAVENOUS
Status: CANCELLED
Start: 2021-01-22 | End: 2021-01-22

## 2021-01-22 RX ORDER — CAFFEINE AND SODIUM BENZOATE 125 MG/ML
500 INJECTION, SOLUTION INTRAMUSCULAR; INTRAVENOUS
Status: CANCELLED
Start: 2021-01-22 | End: 2021-01-22

## 2021-01-22 RX ORDER — KETOROLAC TROMETHAMINE 30 MG/ML
30 INJECTION, SOLUTION INTRAMUSCULAR; INTRAVENOUS
Status: CANCELLED
Start: 2021-01-22 | End: 2021-01-22

## 2021-01-22 RX ADMIN — METHOHEXITAL SODIUM 80 MG: 500 INJECTION, POWDER, LYOPHILIZED, FOR SOLUTION INTRAMUSCULAR; INTRAVENOUS; RECTAL at 09:41

## 2021-01-22 RX ADMIN — Medication 80 MG: at 09:41

## 2021-01-22 RX ADMIN — CAFFEINE AND SODIUM BENZOATE 500 MG: 125 INJECTION, SOLUTION INTRAMUSCULAR; INTRAVENOUS at 09:42

## 2021-01-22 RX ADMIN — KETOROLAC TROMETHAMINE 30 MG: 30 INJECTION, SOLUTION INTRAMUSCULAR at 09:33

## 2021-01-22 RX ADMIN — ONDANSETRON 4 MG: 2 INJECTION INTRAMUSCULAR; INTRAVENOUS at 09:33

## 2021-01-22 RX ADMIN — HALOPERIDOL LACTATE 5 MG: 5 INJECTION, SOLUTION INTRAMUSCULAR at 09:31

## 2021-01-22 RX ADMIN — MIDAZOLAM 2 MG: 1 INJECTION INTRAMUSCULAR; INTRAVENOUS at 09:47

## 2021-01-22 RX ADMIN — SODIUM CHLORIDE, POTASSIUM CHLORIDE, SODIUM LACTATE AND CALCIUM CHLORIDE 500 ML: 600; 310; 30; 20 INJECTION, SOLUTION INTRAVENOUS at 09:33

## 2021-01-22 RX ADMIN — ACETAMINOPHEN 1000 MG: 500 TABLET ORAL at 09:04

## 2021-01-22 NOTE — ANESTHESIA PREPROCEDURE EVALUATION
"Anesthesia Pre-Procedure Evaluation    Patient: Kuldeep Sequeira   MRN:     [id number] Gender:   male   Age:    50 year old :      1970        Preoperative Diagnosis: * No pre-op diagnosis entered *   * No procedures listed *     LABS:  CBC:   Lab Results   Component Value Date    WBC 5.3 2020    HGB 15.5 2020    HCT 45.1 2020     2020     BMP:   Lab Results   Component Value Date     2020    POTASSIUM 4.6 2020    POTASSIUM 4.3 2020    CHLORIDE 106 2020    CO2 32 2020    BUN 18 2020    CR 0.80 2020    CR 0.91 2020    GLC 97 2020    GLC 90 2020     COAGS: No results found for: PTT, INR, FIBR  POC: No results found for: BGM, HCG, HCGS  OTHER:   Lab Results   Component Value Date    RANJITH 8.8 2020    ALBUMIN 3.8 2020    PROTTOTAL 7.0 2020    ALT 22 2020    AST 10 2020    ALKPHOS 61 2020    BILITOTAL 1.1 2020    TSH 1.82 2020        Preop Vitals    BP Readings from Last 3 Encounters:   20 126/88   20 123/87   20 102/85    Pulse Readings from Last 3 Encounters:   20 88   20 85   20 87      Resp Readings from Last 3 Encounters:   20 18   20 18   20 20    SpO2 Readings from Last 3 Encounters:   20 100%   20 97%   20 95%      Temp Readings from Last 1 Encounters:   20 36.7  C (98  F) (Temporal)    Ht Readings from Last 1 Encounters:   20 1.702 m (5' 7\")      Wt Readings from Last 1 Encounters:   20 78.4 kg (172 lb 14.4 oz)    Estimated body mass index is 27.08 kg/m  as calculated from the following:    Height as of 20: 1.702 m (5' 7\").    Weight as of 20: 78.4 kg (172 lb 14.4 oz).     LDA:        No past medical history on file.   No past surgical history on file.   No Known Allergies              PHYSICAL EXAM:   Mental Status/Neuro: A/A/O   Airway: Facies: Feasible  Mallampati: " II  Mouth/Opening: Full  TM distance: > 6 cm  Neck ROM: Full   Respiratory: Auscultation: CTAB     Resp. Rate: Normal     Resp. Effort: Normal      CV: Rhythm: Regular  Rate: Age appropriate  Heart: Normal Sounds  Edema: None   Comments:      Dental: Normal Dentition                Assessment:   ASA SCORE: 2            Plan:   Anes. Type:  General   Pre-Medication: None   Induction:  IV (Standard)   Airway: Mask   Access/Monitoring: PIV   Maintenance: Balanced     Postop Plan:   Postop Pain: Opioids  Postop Sedation/Airway: Not planned     PONV Management:   Adult Risk Factors:, Postop Opioids                   Claudy Ibarra MD

## 2021-01-22 NOTE — DISCHARGE INSTRUCTIONS
ECT Discharge Instructions      During your ECT series:      Do not drive or work heavy equipment until 7 days after your last treatment.    Do not drink alcohol or use street drugs (illicit drugs) while you are having treatments.    Do not make important decisions, including legal decisions.    After your maintenance ECT treatment:      Do not drive for 24 hours after treatment.  If you will be having more than one treatment witihin a week, no driving until 7 days after your last ECT treatment.       After each treatment:      Get plenty of rest. A responsible adult must stay with your for at least 6 hours.    Avoid heavy or risky activities for 24 hours.    If you feel light-headed, sit for a few minutes before standing. Have someone help you get up.    If you have nausea (feel sick to your stomach): Drink only clear liquids such as apple juice, ginger ale, broth or 7UP, Be sure to drink plenty of liquids. Move to a normal diet as you feel able.     If you received Toradol, wait 6 hours before taking ibuprofen.    Call your doctor if:     You have a fever over 100F (37.7 C) (taken under the tongue), or a fever that last more than 24 hours.    Your IV site is red, swollen, very painful or is getting more tender.    You have nausea that gets very bad or does not improve.      If you have any symptoms after ECT, tell our staff before your next treatment.    The ECT Department can be reached at [phone].  The ECT Department is open Mondays, Wednesdays and Fridays from 7:00 AM to 2:00 PM.    Your next ECT treatment will be: February 3, 2021 at 9:15AM    To speak to a doctor, call: Dr. Markham [phone] or ECT dept [phone]    Today you received the following:  Toradol 30mg IV for pain at time 9:33AM. Please wait 6 hours (3:33PM) before taking ibuprofen.  Zofran 4mg IV for nausea at time 9:33AM  Tylenol 1000mg tablet for headache at time 9:04AM    Transported by: Spouse    Instructions for your next  appointment:    *Covid-19 Testing:  Our central scheduling department will be calling you to schedule a Covid-19 test.  You will be scheduled to have this test before your next ECT treatment. If you do not receive a phone call, please call [phone] to schedule your Covid test to be performed 2 days before your scheduled ECT appointment.     Per Dr Markham  Get COVID test 2/1/21  Next ECT in 2 weeks on 2/3/21  Meds per SHAMAR Dodson    Please come to the [facility] entrance and check-in with Security before your ECT appointment.  The [facility] entrance is located right next to the Adult Emergency Room.  The address is [address] [place] [address].    After your appointment, you may be picked up at the [facility] entrance, which is located on the West side of our campus.  The address is [address] [place] [address].  [facility].

## 2021-01-22 NOTE — PROCEDURES
Procedure/Surgery Information   [facility]     Bedside Procedure Note  Date of Service (when I performed the procedure): 01/22/2021    Kuldeep Sequeira is a 50 year old male patient.  1. Severe recurrent major depression without psychotic features (H)      No past medical history on file.                       Procedures     Wm Markham     [facility], [place]   ECT Procedure Note   01/22/2021    Kuldeep Sequeira [id number]   50 year old 1970     Patient Status: Outpatient    Is this the first in a series of 12 treatments?  No    History and Physical: Reviewed in medical record    Consent: Informed consent was signed by: patient    Date Consent Signed: 12/7/20      No Known Allergies    Weight:  0 lbs 0 oz    Patient Preparations: (none)    Wt Readings from Last 5 Encounters:   11/19/20 78.4 kg (172 lb 14.4 oz)       There were no vitals taken for this visit.         Indications for ECT:   Medications ineffective         Clinical Narrative:   Patient has a long history of depression and is starting ECT for treatment-resistant depression.  NPO after 2400.  Alert and Oriented x 3.  No problems with the last ECT.  Mood is pretty good.  Pt completed a series of ECT and is not doing maintenance.   Last ECT was 12/20/21.            Diagnosis:   Major depression         Pause for the Cause:     Right patient Yes   Right procedure/laterality settings: Yes          Intra-Procedure Documentation:     ECT #: 11   Treatment number this series: 11   Total treatment number: 11     Type of ECT:   BILATERAL since treatment #6 (instead of Right, unilateral standard)    ECT Medications:      Zyprexa: 10mg  Tylenol: 1000mg  Toradol: 30mg  Zofran: 4mg  Haldol: 5mg  Lactated Ringers: 1/2 liter  Brevital: 80mg  Caffeine: 500mg  Succinyl Choline: 80mg  Versed: 2mg for h/o agitation      ECT Strip Summary:   Energy Level: 25 percent (decrease next Tx)  Motor Seizure  Duration:   69 seconds  EEG Seizure Duration:    111 seconds    Complications: No    Plan:   COVID test 2/1/21  Next ECT in 2 weeks on 2/3/21  Meds per SHAMAR Dodson MD

## 2021-01-22 NOTE — ANESTHESIA POSTPROCEDURE EVALUATION
Patient: Kuldeep Sequeira    * No procedures listed *    Diagnosis:* No pre-op diagnosis entered *  Diagnosis Additional Information: No value filed.    Anesthesia Type:  General    Note:  Disposition: Outpatient   Postop Pain Control: Uneventful            Sign Out: Well controlled pain   PONV:    Neuro/Psych: Uneventful            Sign Out: Acceptable/Baseline neuro status   Airway/Respiratory: Uneventful            Sign Out: Acceptable/Baseline resp. status   CV/Hemodynamics: Uneventful            Sign Out: Acceptable CV status   Other NRE:    DID A NON-ROUTINE EVENT OCCUR?          Last vitals:  Vitals:    01/22/21 0903   BP: 129/85   Pulse: 91   Resp: 16   Temp: 36.8  C (98.2  F)   SpO2: 95%       Electronically Signed By: Claudy Ibarra MD  January 22, 2021  10:06 AM

## 2021-01-22 NOTE — IP AVS SNAPSHOT
[facility]  [address] [address]  Phone: [phone]                                    After Visit Summary   1/22/2021    Kuldeep Sequeira    MRN: [id number]           After Visit Summary Signature Page    I have received my discharge instructions, and my questions have been answered. I have discussed any challenges I see with this plan with the nurse or doctor.    ..........................................................................................................................................  Patient/Patient Representative Signature      ..........................................................................................................................................  Patient Representative Print Name and Relationship to Patient    ..................................................               ................................................  Date                                   Time    ..........................................................................................................................................  Reviewed by Signature/Title    ...................................................              ..............................................  Date                                               Time          22EPIC Rev 08/18

## 2021-01-25 ENCOUNTER — OFFICE VISIT (OUTPATIENT)
Dept: LAB | Facility: OTHER | Age: 51
End: 2021-01-25
Payer: COMMERCIAL

## 2021-01-25 DIAGNOSIS — F32.A DEPRESSION, UNSPECIFIED DEPRESSION TYPE: ICD-10-CM

## 2021-01-25 DIAGNOSIS — F33.2 SEVERE RECURRENT MAJOR DEPRESSION WITHOUT PSYCHOTIC FEATURES (H): ICD-10-CM

## 2021-01-25 LAB
SARS-COV-2 RNA RESP QL NAA+PROBE: NORMAL
SPECIMEN SOURCE: NORMAL

## 2021-01-25 PROCEDURE — U0003 INFECTIOUS AGENT DETECTION BY NUCLEIC ACID (DNA OR RNA); SEVERE ACUTE RESPIRATORY SYNDROME CORONAVIRUS 2 (SARS-COV-2) (CORONAVIRUS DISEASE [COVID-19]), AMPLIFIED PROBE TECHNIQUE, MAKING USE OF HIGH THROUGHPUT TECHNOLOGIES AS DESCRIBED BY CMS-2020-01-R: HCPCS | Performed by: PSYCHIATRY & NEUROLOGY

## 2021-01-25 PROCEDURE — U0005 INFEC AGEN DETEC AMPLI PROBE: HCPCS | Performed by: PSYCHIATRY & NEUROLOGY

## 2021-01-27 ENCOUNTER — HOSPITAL ENCOUNTER (OUTPATIENT)
Dept: BEHAVIORAL HEALTH | Facility: CLINIC | Age: 51
Discharge: HOME | End: 2021-01-27
Attending: PSYCHIATRY & NEUROLOGY | Admitting: PSYCHIATRY & NEUROLOGY
Payer: COMMERCIAL

## 2021-01-27 ENCOUNTER — ANESTHESIA EVENT (OUTPATIENT)
Dept: BEHAVIORAL HEALTH | Facility: CLINIC | Age: 51
End: 2021-01-27

## 2021-01-27 ENCOUNTER — ANESTHESIA (OUTPATIENT)
Dept: BEHAVIORAL HEALTH | Facility: CLINIC | Age: 51
End: 2021-01-27

## 2021-01-27 VITALS
RESPIRATION RATE: 14 BRPM | HEART RATE: 71 BPM | TEMPERATURE: 98.1 F | OXYGEN SATURATION: 97 % | DIASTOLIC BLOOD PRESSURE: 84 MMHG | SYSTOLIC BLOOD PRESSURE: 117 MMHG

## 2021-01-27 DIAGNOSIS — F33.2 SEVERE RECURRENT MAJOR DEPRESSION WITHOUT PSYCHOTIC FEATURES (H): Primary | ICD-10-CM

## 2021-01-27 PROCEDURE — 250N000009 HC RX 250: Performed by: STUDENT IN AN ORGANIZED HEALTH CARE EDUCATION/TRAINING PROGRAM

## 2021-01-27 PROCEDURE — 250N000011 HC RX IP 250 OP 636: Performed by: STUDENT IN AN ORGANIZED HEALTH CARE EDUCATION/TRAINING PROGRAM

## 2021-01-27 PROCEDURE — U0005 INFEC AGEN DETEC AMPLI PROBE: HCPCS | Performed by: PSYCHIATRY & NEUROLOGY

## 2021-01-27 PROCEDURE — 250N000013 HC RX MED GY IP 250 OP 250 PS 637: Performed by: PSYCHIATRY & NEUROLOGY

## 2021-01-27 PROCEDURE — 90870 ELECTROCONVULSIVE THERAPY: CPT

## 2021-01-27 PROCEDURE — 370N000017 HC ANESTHESIA TECHNICAL FEE, PER MIN

## 2021-01-27 PROCEDURE — 250N000011 HC RX IP 250 OP 636: Performed by: PSYCHIATRY & NEUROLOGY

## 2021-01-27 PROCEDURE — 250N000009 HC RX 250: Performed by: PSYCHIATRY & NEUROLOGY

## 2021-01-27 PROCEDURE — U0003 INFECTIOUS AGENT DETECTION BY NUCLEIC ACID (DNA OR RNA); SEVERE ACUTE RESPIRATORY SYNDROME CORONAVIRUS 2 (SARS-COV-2) (CORONAVIRUS DISEASE [COVID-19]), AMPLIFIED PROBE TECHNIQUE, MAKING USE OF HIGH THROUGHPUT TECHNOLOGIES AS DESCRIBED BY CMS-2020-01-R: HCPCS | Performed by: PSYCHIATRY & NEUROLOGY

## 2021-01-27 PROCEDURE — 258N000003 HC RX IP 258 OP 636: Performed by: PSYCHIATRY & NEUROLOGY

## 2021-01-27 RX ORDER — VENLAFAXINE HYDROCHLORIDE 75 MG/1
CAPSULE, EXTENDED RELEASE ORAL
Qty: 30 CAPSULE | Refills: 0 | Status: SHIPPED | OUTPATIENT
Start: 2021-01-27 | End: 2022-03-11

## 2021-01-27 RX ORDER — KETOROLAC TROMETHAMINE 30 MG/ML
30 INJECTION, SOLUTION INTRAMUSCULAR; INTRAVENOUS
Status: CANCELLED
Start: 2021-01-27 | End: 2021-01-27

## 2021-01-27 RX ORDER — CAFFEINE AND SODIUM BENZOATE 125 MG/ML
500 INJECTION, SOLUTION INTRAMUSCULAR; INTRAVENOUS
Status: COMPLETED | OUTPATIENT
Start: 2021-01-27 | End: 2021-01-27

## 2021-01-27 RX ORDER — ONDANSETRON 2 MG/ML
4 INJECTION INTRAMUSCULAR; INTRAVENOUS
Status: CANCELLED
Start: 2021-01-27 | End: 2021-01-27

## 2021-01-27 RX ORDER — HALOPERIDOL 5 MG/ML
5 INJECTION INTRAMUSCULAR
Status: CANCELLED
Start: 2021-01-27 | End: 2021-01-27

## 2021-01-27 RX ORDER — ACETAMINOPHEN 500 MG
1000 TABLET ORAL
Status: COMPLETED | OUTPATIENT
Start: 2021-01-27 | End: 2021-01-27

## 2021-01-27 RX ORDER — CAFFEINE AND SODIUM BENZOATE 125 MG/ML
500 INJECTION, SOLUTION INTRAMUSCULAR; INTRAVENOUS
Status: CANCELLED
Start: 2021-01-27 | End: 2021-01-27

## 2021-01-27 RX ORDER — ACETAMINOPHEN 500 MG
1000 TABLET ORAL
Status: CANCELLED
Start: 2021-01-27 | End: 2021-01-27

## 2021-01-27 RX ORDER — HALOPERIDOL 5 MG/ML
5 INJECTION INTRAMUSCULAR
Status: COMPLETED | OUTPATIENT
Start: 2021-01-27 | End: 2021-01-27

## 2021-01-27 RX ORDER — KETOROLAC TROMETHAMINE 30 MG/ML
30 INJECTION, SOLUTION INTRAMUSCULAR; INTRAVENOUS
Status: COMPLETED | OUTPATIENT
Start: 2021-01-27 | End: 2021-01-27

## 2021-01-27 RX ORDER — ONDANSETRON 2 MG/ML
4 INJECTION INTRAMUSCULAR; INTRAVENOUS
Status: COMPLETED | OUTPATIENT
Start: 2021-01-27 | End: 2021-01-27

## 2021-01-27 RX ADMIN — ACETAMINOPHEN 1000 MG: 500 TABLET ORAL at 09:55

## 2021-01-27 RX ADMIN — ONDANSETRON 4 MG: 2 INJECTION INTRAMUSCULAR; INTRAVENOUS at 10:14

## 2021-01-27 RX ADMIN — METHOHEXITAL SODIUM 80 MG: 500 INJECTION, POWDER, LYOPHILIZED, FOR SOLUTION INTRAMUSCULAR; INTRAVENOUS; RECTAL at 10:26

## 2021-01-27 RX ADMIN — MIDAZOLAM 2 MG: 1 INJECTION INTRAMUSCULAR; INTRAVENOUS at 10:30

## 2021-01-27 RX ADMIN — SODIUM CHLORIDE, POTASSIUM CHLORIDE, SODIUM LACTATE AND CALCIUM CHLORIDE 500 ML: 600; 310; 30; 20 INJECTION, SOLUTION INTRAVENOUS at 10:14

## 2021-01-27 RX ADMIN — CAFFEINE AND SODIUM BENZOATE 500 MG: 125 INJECTION, SOLUTION INTRAMUSCULAR; INTRAVENOUS at 10:28

## 2021-01-27 RX ADMIN — HALOPERIDOL LACTATE 5 MG: 5 INJECTION, SOLUTION INTRAMUSCULAR at 10:14

## 2021-01-27 RX ADMIN — KETOROLAC TROMETHAMINE 30 MG: 30 INJECTION, SOLUTION INTRAMUSCULAR at 10:14

## 2021-01-27 RX ADMIN — Medication 80 MG: at 10:26

## 2021-01-27 NOTE — DISCHARGE INSTRUCTIONS
ECT Discharge Instructions      During your ECT series:      Do not drive or work heavy equipment until 7 days after your last treatment.    Do not drink alcohol or use street drugs (illicit drugs) while you are having treatments.    Do not make important decisions, including legal decisions.    After your maintenance ECT treatment:      Do not drive for 24 hours after treatment.  If you will be having more than one treatment witihin a week, no driving until 7 days after your last ECT treatment.       After each treatment:      Get plenty of rest. A responsible adult must stay with your for at least 6 hours.    Avoid heavy or risky activities for 24 hours.    If you feel light-headed, sit for a few minutes before standing. Have someone help you get up.    If you have nausea (feel sick to your stomach): Drink only clear liquids such as apple juice, ginger ale, broth or 7UP, Be sure to drink plenty of liquids. Move to a normal diet as you feel able.     If you received Toradol, wait 6 hours before taking ibuprofen.    Call your doctor if:     You have a fever over 100F (37.7 C) (taken under the tongue), or a fever that last more than 24 hours.    Your IV site is red, swollen, very painful or is getting more tender.    You have nausea that gets very bad or does not improve.    If you have any symptoms after ECT, tell our staff before your next treatment.    The ECT Department can be reached at [phone].  The ECT Department is open Mondays, Wednesdays and Fridays from 7:00 AM to 2:00 PM.    Your next ECT treatment will be: 1/29/21    To speak to a doctor, call: Dr Markham/ ECT Dept [phone]    Today you received the following:   Tylenol 1000mg for headache/pain at 9:55AM  Toradol 30mg IV for pain at time 10:14am; Please wait 6 hours (4:14pm) before taking ibuprofen.  Zofran 4mg IV for Nausea at 10:14am.    Transported by: alexander Barnard, [phone]    Today we completed your Covid test for your next appointment.   You do not need to do anything further. Your covid test results will be available by your next ECT treatment.    Instructions for your next appointment:    Per Dr. Markham  Next ECT 1/29/21  Increase Effexor XR from 150mg to 225mg daily.  (Extra 75mg caps sent to his pharmacy)  Meds per SHAMAR Dodson      Please come to the [facility] entrance and check-in with Security before your ECT appointment.  The [facility] entrance is located right next to the Adult Emergency Room.  The address is [address] [place] [address].    After your appointment, you may be picked up at the [facility] entrance, which is located on the West side of our campus.  The address is [address] [place] [address].  [facility].

## 2021-01-27 NOTE — ANESTHESIA PREPROCEDURE EVALUATION
"Anesthesia Pre-Procedure Evaluation    Patient: Kuldeep Sequeira   MRN:     [id number] Gender:   male   Age:    50 year old :      1970        Preoperative Diagnosis: * No pre-op diagnosis entered *   * No procedures listed *     LABS:  CBC:   Lab Results   Component Value Date    WBC 5.3 2020    HGB 15.5 2020    HCT 45.1 2020     2020     BMP:   Lab Results   Component Value Date     2020    POTASSIUM 4.6 2020    POTASSIUM 4.3 2020    CHLORIDE 106 2020    CO2 32 2020    BUN 18 2020    CR 0.80 2020    CR 0.91 2020    GLC 97 2020    GLC 90 2020     COAGS: No results found for: PTT, INR, FIBR  POC: No results found for: BGM, HCG, HCGS  OTHER:   Lab Results   Component Value Date    RANJITH 8.8 2020    ALBUMIN 3.8 2020    PROTTOTAL 7.0 2020    ALT 22 2020    AST 10 2020    ALKPHOS 61 2020    BILITOTAL 1.1 2020    TSH 1.82 2020        Preop Vitals    BP Readings from Last 3 Encounters:   21 135/83   20 126/88   20 123/87    Pulse Readings from Last 3 Encounters:   21 86   20 88   20 85      Resp Readings from Last 3 Encounters:   21 16   20 18   20 18    SpO2 Readings from Last 3 Encounters:   21 95%   20 100%   20 97%      Temp Readings from Last 1 Encounters:   21 36.8  C (98.2  F) (Temporal)    Ht Readings from Last 1 Encounters:   20 1.702 m (5' 7\")      Wt Readings from Last 1 Encounters:   20 78.4 kg (172 lb 14.4 oz)    Estimated body mass index is 27.08 kg/m  as calculated from the following:    Height as of 20: 1.702 m (5' 7\").    Weight as of 20: 78.4 kg (172 lb 14.4 oz).     LDA:        No past medical history on file.   No past surgical history on file.   No Known Allergies              PHYSICAL EXAM:   Mental Status/Neuro: A/A/O   Airway: Facies: Feasible  Mallampati: " II  Mouth/Opening: Full  TM distance: > 6 cm  Neck ROM: Full   Respiratory: Auscultation: CTAB     Resp. Rate: Normal     Resp. Effort: Normal      CV: Rhythm: Regular  Rate: Age appropriate  Heart: Normal Sounds  Edema: None   Comments:      Dental: Normal Dentition                Assessment:   ASA SCORE: 2            Plan:   Anes. Type:  General   Pre-Medication: None   Induction:  IV (Standard)   Airway: Mask   Access/Monitoring: PIV   Maintenance: Balanced     Postop Plan:   Postop Pain: Opioids  Postop Sedation/Airway: Not planned     PONV Management:   Adult Risk Factors:, Postop Opioids                   Claudy Ibarra MD                     Anesthesia Plan     History & Physical Review      ASA Status:  2.   Plan for General            Consents        Postoperative Care

## 2021-01-27 NOTE — ANESTHESIA POSTPROCEDURE EVALUATION
Patient: Kuldeep Sequeira    * No procedures listed *    Diagnosis:* No pre-op diagnosis entered *  Diagnosis Additional Information: No value filed.    Anesthesia Type:  General    Note:  Disposition: Outpatient   Postop Pain Control: Uneventful            Sign Out: Well controlled pain   PONV:    Neuro/Psych: Uneventful            Sign Out: Acceptable/Baseline neuro status   Airway/Respiratory: Uneventful            Sign Out: Acceptable/Baseline resp. status   CV/Hemodynamics: Uneventful            Sign Out: Acceptable CV status   Other NRE:    DID A NON-ROUTINE EVENT OCCUR?          Last vitals:  Vitals:    01/27/21 1037 01/27/21 1052 01/27/21 1053   BP: 119/88 117/84    Pulse: 82 84    Resp: 18 14    Temp: 36.4  C (97.6  F)     SpO2: 97% 96% 95%       Electronically Signed By: Claudy Ibarra MD  January 27, 2021  10:55 AM

## 2021-01-27 NOTE — IP AVS SNAPSHOT
[facility]  [address] [address]  Phone: [phone]                                    After Visit Summary   1/27/2021    Kuldeep Sequeira    MRN: [id number]           After Visit Summary Signature Page    I have received my discharge instructions, and my questions have been answered. I have discussed any challenges I see with this plan with the nurse or doctor.    ..........................................................................................................................................  Patient/Patient Representative Signature      ..........................................................................................................................................  Patient Representative Print Name and Relationship to Patient    ..................................................               ................................................  Date                                   Time    ..........................................................................................................................................  Reviewed by Signature/Title    ...................................................              ..............................................  Date                                               Time          22EPIC Rev 08/18

## 2021-01-27 NOTE — PROCEDURES
Procedure/Surgery Information   [facility]     Bedside Procedure Note  Date of Service (when I performed the procedure): 01/27/2021    Kuldeep Sequeira is a 50 year old male patient.  1. Severe recurrent major depression without psychotic features (H)      No past medical history on file.                       Procedures     Wm Markham     [facility] [place]   ECT Procedure Note   01/27/2021    Kuldeep Sequeira [id number]   50 year old 1970     Patient Status: Outpatient    Is this the first in a series of 12 treatments?  No    History and Physical: Reviewed in medical record    Consent: Informed consent was signed by: patient    Date Consent Signed: 12/7/20      No Known Allergies    Weight:  0 lbs 0 oz    Patient Preparations: (none)    Wt Readings from Last 5 Encounters:   11/19/20 78.4 kg (172 lb 14.4 oz)       There were no vitals taken for this visit.         Indications for ECT:   Medications ineffective         Clinical Narrative:   Patient has a long history of depression and is continuing ECT for treatment-resistant depression.  He's doing maintenance ECT now.  NPO after 2400.  Alert and Oriented x 3.  No problems with the last ECT.  Last ECT was 1/22/21.  Mood is depressed.  He wants to do a few treatments in a row.  He's been on Effexor XR 150mg/d for some time.  It's not working.  No SE.          Diagnosis:   Major depression         Pause for the Cause:     Right patient Yes   Right procedure/laterality settings: Yes          Intra-Procedure Documentation:     ECT #: 12   Treatment number this series: 12   Total treatment number: 12     Type of ECT:   BILATERAL since treatment #6 (instead of Right, unilateral standard)    ECT Medications:      Zyprexa: 10mg  Tylenol: 1000mg  Toradol: 30mg  Zofran: 4mg  Haldol: 5mg  Lactated Ringers: 1/2 liter  Brevital: 80mg  Caffeine: 500mg  Succinyl Choline: 80mg  Versed: 2mg for h/o  agitation      ECT Strip Summary:   Energy Level: 20 percent   Motor Seizure Duration:   51 seconds  EEG Seizure Duration:    51 seconds    Complications: No    Plan:   COVID test 1/27/21  Next ECT 1/29/21  Increase Effexor XR from 150mg to 225mg daily.  (Extra 75mg caps sent to his pharmacy)  Meds per Monica Stevens, CNS    Wm Markham MD

## 2021-01-29 ENCOUNTER — ANESTHESIA (OUTPATIENT)
Dept: BEHAVIORAL HEALTH | Facility: CLINIC | Age: 51
End: 2021-01-29

## 2021-01-29 ENCOUNTER — ANESTHESIA EVENT (OUTPATIENT)
Dept: BEHAVIORAL HEALTH | Facility: CLINIC | Age: 51
End: 2021-01-29

## 2021-01-29 ENCOUNTER — HOSPITAL ENCOUNTER (OUTPATIENT)
Dept: BEHAVIORAL HEALTH | Facility: CLINIC | Age: 51
Discharge: HOME | End: 2021-01-29
Attending: PSYCHIATRY & NEUROLOGY | Admitting: PSYCHIATRY & NEUROLOGY
Payer: COMMERCIAL

## 2021-01-29 VITALS
HEART RATE: 86 BPM | TEMPERATURE: 99 F | DIASTOLIC BLOOD PRESSURE: 82 MMHG | OXYGEN SATURATION: 95 % | RESPIRATION RATE: 16 BRPM | SYSTOLIC BLOOD PRESSURE: 129 MMHG

## 2021-01-29 DIAGNOSIS — F33.2 SEVERE RECURRENT MAJOR DEPRESSION WITHOUT PSYCHOTIC FEATURES (H): Primary | ICD-10-CM

## 2021-01-29 PROCEDURE — 250N000011 HC RX IP 250 OP 636: Performed by: PSYCHIATRY & NEUROLOGY

## 2021-01-29 PROCEDURE — 250N000009 HC RX 250: Performed by: PSYCHIATRY & NEUROLOGY

## 2021-01-29 PROCEDURE — 250N000009 HC RX 250: Performed by: STUDENT IN AN ORGANIZED HEALTH CARE EDUCATION/TRAINING PROGRAM

## 2021-01-29 PROCEDURE — 250N000011 HC RX IP 250 OP 636: Performed by: STUDENT IN AN ORGANIZED HEALTH CARE EDUCATION/TRAINING PROGRAM

## 2021-01-29 PROCEDURE — 370N000017 HC ANESTHESIA TECHNICAL FEE, PER MIN

## 2021-01-29 PROCEDURE — 90870 ELECTROCONVULSIVE THERAPY: CPT

## 2021-01-29 PROCEDURE — 258N000003 HC RX IP 258 OP 636: Performed by: PSYCHIATRY & NEUROLOGY

## 2021-01-29 PROCEDURE — U0003 INFECTIOUS AGENT DETECTION BY NUCLEIC ACID (DNA OR RNA); SEVERE ACUTE RESPIRATORY SYNDROME CORONAVIRUS 2 (SARS-COV-2) (CORONAVIRUS DISEASE [COVID-19]), AMPLIFIED PROBE TECHNIQUE, MAKING USE OF HIGH THROUGHPUT TECHNOLOGIES AS DESCRIBED BY CMS-2020-01-R: HCPCS | Performed by: PSYCHIATRY & NEUROLOGY

## 2021-01-29 PROCEDURE — U0005 INFEC AGEN DETEC AMPLI PROBE: HCPCS | Performed by: PSYCHIATRY & NEUROLOGY

## 2021-01-29 RX ORDER — ONDANSETRON 2 MG/ML
4 INJECTION INTRAMUSCULAR; INTRAVENOUS
Status: COMPLETED | OUTPATIENT
Start: 2021-01-29 | End: 2021-01-29

## 2021-01-29 RX ORDER — NALOXONE HYDROCHLORIDE 0.4 MG/ML
0.4 INJECTION, SOLUTION INTRAMUSCULAR; INTRAVENOUS; SUBCUTANEOUS
Status: DISCONTINUED | OUTPATIENT
Start: 2021-01-29 | End: 2021-01-30 | Stop reason: HOSPADM

## 2021-01-29 RX ORDER — ONDANSETRON 2 MG/ML
4 INJECTION INTRAMUSCULAR; INTRAVENOUS
Status: CANCELLED
Start: 2021-01-29 | End: 2021-01-29

## 2021-01-29 RX ORDER — HALOPERIDOL 5 MG/ML
5 INJECTION INTRAMUSCULAR
Status: CANCELLED
Start: 2021-01-29 | End: 2021-01-29

## 2021-01-29 RX ORDER — ACETAMINOPHEN 500 MG
1000 TABLET ORAL
Status: ACTIVE | OUTPATIENT
Start: 2021-01-29 | End: 2021-01-29

## 2021-01-29 RX ORDER — NALOXONE HYDROCHLORIDE 0.4 MG/ML
0.2 INJECTION, SOLUTION INTRAMUSCULAR; INTRAVENOUS; SUBCUTANEOUS
Status: DISCONTINUED | OUTPATIENT
Start: 2021-01-29 | End: 2021-01-30 | Stop reason: HOSPADM

## 2021-01-29 RX ORDER — ACETAMINOPHEN 500 MG
1000 TABLET ORAL
Status: CANCELLED
Start: 2021-01-29 | End: 2021-01-29

## 2021-01-29 RX ORDER — CAFFEINE AND SODIUM BENZOATE 125 MG/ML
500 INJECTION, SOLUTION INTRAMUSCULAR; INTRAVENOUS
Status: COMPLETED | OUTPATIENT
Start: 2021-01-29 | End: 2021-01-29

## 2021-01-29 RX ORDER — HALOPERIDOL 5 MG/ML
5 INJECTION INTRAMUSCULAR
Status: CANCELLED
Start: 2021-01-29

## 2021-01-29 RX ORDER — CAFFEINE AND SODIUM BENZOATE 125 MG/ML
500 INJECTION, SOLUTION INTRAMUSCULAR; INTRAVENOUS
Status: CANCELLED
Start: 2021-01-29 | End: 2021-01-29

## 2021-01-29 RX ORDER — KETOROLAC TROMETHAMINE 30 MG/ML
30 INJECTION, SOLUTION INTRAMUSCULAR; INTRAVENOUS
Status: CANCELLED
Start: 2021-01-29 | End: 2021-01-29

## 2021-01-29 RX ORDER — HALOPERIDOL 5 MG/ML
5 INJECTION INTRAMUSCULAR
Status: ACTIVE | OUTPATIENT
Start: 2021-01-29 | End: 2021-01-29

## 2021-01-29 RX ORDER — KETOROLAC TROMETHAMINE 30 MG/ML
30 INJECTION, SOLUTION INTRAMUSCULAR; INTRAVENOUS
Status: COMPLETED | OUTPATIENT
Start: 2021-01-29 | End: 2021-01-29

## 2021-01-29 RX ADMIN — CAFFEINE AND SODIUM BENZOATE 500 MG: 125 INJECTION, SOLUTION INTRAMUSCULAR; INTRAVENOUS at 09:42

## 2021-01-29 RX ADMIN — Medication 80 MG: at 09:40

## 2021-01-29 RX ADMIN — MIDAZOLAM 2 MG: 1 INJECTION INTRAMUSCULAR; INTRAVENOUS at 09:45

## 2021-01-29 RX ADMIN — KETOROLAC TROMETHAMINE 30 MG: 30 INJECTION, SOLUTION INTRAMUSCULAR at 09:32

## 2021-01-29 RX ADMIN — METHOHEXITAL SODIUM 80 MG: 500 INJECTION, POWDER, LYOPHILIZED, FOR SOLUTION INTRAMUSCULAR; INTRAVENOUS; RECTAL at 09:40

## 2021-01-29 RX ADMIN — ONDANSETRON 4 MG: 2 INJECTION INTRAMUSCULAR; INTRAVENOUS at 09:32

## 2021-01-29 RX ADMIN — SODIUM CHLORIDE, POTASSIUM CHLORIDE, SODIUM LACTATE AND CALCIUM CHLORIDE 500 ML: 600; 310; 30; 20 INJECTION, SOLUTION INTRAVENOUS at 09:37

## 2021-01-29 NOTE — ANESTHESIA PREPROCEDURE EVALUATION
Anesthesia Pre-Procedure Evaluation    Patient: Kuldeep Sequeira   MRN: [id number] : 1970        Preoperative Diagnosis: * No surgery found *   Procedure :      No past medical history on file.   No past surgical history on file.   No Known Allergies   Social History     Tobacco Use     Smoking status: Not on file   Substance Use Topics     Alcohol use: Not on file      Wt Readings from Last 1 Encounters:   20 78.4 kg (172 lb 14.4 oz)        Anesthesia Evaluation   Pt has had prior anesthetic. Type: General.        ROS/MED HX  ENT/Pulmonary:  - neg pulmonary ROS     Neurologic:  - neg neurologic ROS     Cardiovascular:  - neg cardiovascular ROS     METS/Exercise Tolerance:     Hematologic:  - neg hematologic  ROS     Musculoskeletal:  - neg musculoskeletal ROS     GI/Hepatic:  - neg GI/hepatic ROS     Renal/Genitourinary:  - neg Renal ROS     Endo:  - neg endo ROS     Psychiatric/Substance Use:     (+) psychiatric history depression     Infectious Disease:  - neg infectious disease ROS     Malignancy:  - neg malignancy ROS     Other:  - neg other ROS          Physical Exam    Airway        Mallampati: II   TM distance: > 3 FB   Neck ROM: full   Mouth opening: > 3 cm    Respiratory Devices and Support         Dental  no notable dental history         Cardiovascular   cardiovascular exam normal          Pulmonary   pulmonary exam normal                OUTSIDE LABS:  CBC:   Lab Results   Component Value Date    WBC 5.3 2020    HGB 15.5 2020    HCT 45.1 2020     2020     BMP:   Lab Results   Component Value Date     2020    POTASSIUM 4.6 2020    POTASSIUM 4.3 2020    CHLORIDE 106 2020    CO2 32 2020    BUN 18 2020    CR 0.80 2020    CR 0.91 2020    GLC 97 2020    GLC 90 2020     COAGS: No results found for: PTT, INR, FIBR  POC: No results found for: BGM, HCG, HCGS  HEPATIC:   Lab Results   Component Value Date     ALBUMIN 3.8 11/19/2020    PROTTOTAL 7.0 11/19/2020    ALT 22 11/19/2020    AST 10 11/19/2020    ALKPHOS 61 11/19/2020    BILITOTAL 1.1 11/19/2020     OTHER:   Lab Results   Component Value Date    RANJITH 8.8 11/19/2020    TSH 1.82 11/19/2020       Anesthesia Plan     History & Physical Review      ASA Status:  2.   Plan for General with Intravenous induction. Device: Face Mask Maintenance will be N/A.            Consents  Anesthesia Plan(s) and associated risks, benefits, and realistic alternatives discussed.    Questions answered and patient/representative(s) expressed understanding.    Discussed with:  Patient.             Postoperative Care            Wan Davis MD

## 2021-01-29 NOTE — DISCHARGE INSTRUCTIONS
ECT Discharge Instructions      During your ECT series:      Do not drive for 24 hours after treatment.  If you will have more than one treatment within a week, no driving until 7 days after your last ECT treatment.    Do not drink alcohol or use street drugs (illicit drugs) while you are having treatments.    Do not make important decisions, including legal decisions.    After each treatment:      Get plenty of rest. A responsible adult must stay with your for at least 6 hours.    Avoid heavy or risky activities for 24 hours.    If you feel light-headed, sit for a few minutes before standing. Have someone help you get up.    If you have nausea (feel sick to your stomach): Drink only clear liquids such as apple juice, ginger ale, broth or 7UP, Be sure to drink plenty of liquids. Move to a normal diet as you feel able.     If you received Toradol, wait 6 hours before taking ibuprofen.    Call your doctor if:     You have a fever over 100F (37.7 C) (taken under the tongue), or a fever that last more than 24 hours.    Your IV site is red, swollen, very painful or is getting more tender.    You have nausea that gets very bad or does not improve.      If you have any symptoms after ECT, tell our staff before your next treatment.    The ECT Department can be reached at [phone].  The ECT Department is open Mondays, Wednesdays and Fridays from 7:00 AM to 2:00 PM.    Toradol:     You had Toradol today at 930am  which is a NSAID medication.  Do not take any Ibuprofen, Motrin, Aspirin, Advil, Aleve, Excedrin, or any other NSAID medication until after 330pm.  Questions,  check with your physician or pharmacist.    To speak to Dr. Markham  Office #  [phone] and Fax # [phone]    Covid-19 Testing: You had a covid-19 test done today in the ECT department.  We should have the results by your next ECT appointment and you DO NOT need to get another COVID -19 test before your next ECT treatment.     [facility] DROP OFF:  Please come to the [facility] entrance and check-in with Security before your ECT appointment.  The [facility] entrance is located right next to the Adult Emergency Room.  The address is [address] [place] [address].    [facility] : After your appointment, you may be picked up at the [facility] entrance, which is located at [address] [place] [address].  [facility], about 1 block North of the [facility]

## 2021-01-29 NOTE — ANESTHESIA POSTPROCEDURE EVALUATION
Patient: Kuldeep Sequeira    * No procedures listed *    Diagnosis:* No pre-op diagnosis entered *  Diagnosis Additional Information: No value filed.    Anesthesia Type:  General    Note:  Disposition: Outpatient   Postop Pain Control: Uneventful            Sign Out: Well controlled pain   PONV: No   Neuro/Psych: Uneventful            Sign Out: Acceptable/Baseline neuro status   Airway/Respiratory: Uneventful            Sign Out: Acceptable/Baseline resp. status   CV/Hemodynamics: Uneventful            Sign Out: Acceptable CV status   Other NRE: NONE   DID A NON-ROUTINE EVENT OCCUR? No         Last vitals:  Vitals:    01/29/21 0909 01/29/21 0949 01/29/21 1004   BP: 121/77 136/88 122/83   Pulse: 86 87 89   Resp: 16 12 19   Temp: 36.6  C (97.8  F) 37.1  C (98.8  F) 37.2  C (99  F)   SpO2: 97% 94% 94%       Electronically Signed By: Wan Davis MD  January 29, 2021  10:04 AM

## 2021-01-29 NOTE — PROCEDURES
Procedure/Surgery Information   [facility]     Bedside Procedure Note  Date of Service (when I performed the procedure): 01/29/2021    Kuldeep Sequeira is a 50 year old male patient.  1. Severe recurrent major depression without psychotic features (H)      No past medical history on file.  Temp: 97.8  F (36.6  C) Temp src: Temporal BP: 121/77 Pulse: 86   Resp: 16 SpO2: 97 % O2 Device: None (Room air)      Procedures     Wm Markham     [facility] [place]   ECT Procedure Note   01/29/2021    Kuldeep Sequeira [id number]   50 year old 1970     Patient Status: Outpatient    Is this the first in a series of 12 treatments?  No    History and Physical: Reviewed in medical record    Consent: Informed consent was signed by: patient    Date Consent Signed: 1/29/21      No Known Allergies    Weight:  0 lbs 0 oz    Patient Preparations: (none)    Wt Readings from Last 5 Encounters:   11/19/20 78.4 kg (172 lb 14.4 oz)       /77   Pulse 86   Temp 97.8  F (36.6  C) (Temporal)   Resp 16   SpO2 97%          Indications for ECT:   Medications ineffective         Clinical Narrative:   Patient has a long history of depression and is continuing ECT for treatment-resistant depression.  He's doing maintenance ECT now.  NPO after 2400.  Alert and Oriented x 3.  No problems with the last ECT.  Last ECT was 1/27/21.  Effexor dose was increased at that time from 150mg/d to 225 mg/d.           Diagnosis:   Major depression         Pause for the Cause:     Right patient Yes   Right procedure/laterality settings: Yes          Intra-Procedure Documentation:     ECT #: 1 of 12   Treatment number this series: 13   Total treatment number: 13     Type of ECT:   BILATERAL since treatment #6 (instead of Right, unilateral standard)     ECT Medications:      Zyprexa: 10mg  Tylenol: 1000mg  Toradol: 30mg  Zofran: 4mg  Lactated Ringers: 1/2 liter  Brevital: 80mg  Caffeine:  500mg  Succinyl Choline: 80mg  Versed: 2mg for h/o agitation      ECT Strip Summary:   Energy Level: 20 percent   Motor Seizure Duration:   55 seconds  EEG Seizure Duration:    55 seconds    Complications: No    Plan:   COVID test 1/29/21  Next ECT 2/1/21  Increase Effexor XR from 150mg to 225mg daily.  (Extra 75mg caps sent to his pharmacy) on 1/27/21  Meds per Monica Stevens, CNS    Wm Markham MD

## 2021-02-01 ENCOUNTER — ANESTHESIA (OUTPATIENT)
Dept: BEHAVIORAL HEALTH | Facility: CLINIC | Age: 51
End: 2021-02-01

## 2021-02-01 ENCOUNTER — HOSPITAL ENCOUNTER (OUTPATIENT)
Dept: BEHAVIORAL HEALTH | Facility: CLINIC | Age: 51
Discharge: HOME | End: 2021-02-01
Attending: PSYCHIATRY & NEUROLOGY | Admitting: PSYCHIATRY & NEUROLOGY
Payer: COMMERCIAL

## 2021-02-01 ENCOUNTER — ANESTHESIA EVENT (OUTPATIENT)
Dept: BEHAVIORAL HEALTH | Facility: CLINIC | Age: 51
End: 2021-02-01

## 2021-02-01 VITALS
SYSTOLIC BLOOD PRESSURE: 120 MMHG | TEMPERATURE: 98.7 F | HEART RATE: 85 BPM | RESPIRATION RATE: 14 BRPM | OXYGEN SATURATION: 95 % | DIASTOLIC BLOOD PRESSURE: 81 MMHG

## 2021-02-01 DIAGNOSIS — F33.2 SEVERE RECURRENT MAJOR DEPRESSION WITHOUT PSYCHOTIC FEATURES (H): Primary | ICD-10-CM

## 2021-02-01 PROCEDURE — U0003 INFECTIOUS AGENT DETECTION BY NUCLEIC ACID (DNA OR RNA); SEVERE ACUTE RESPIRATORY SYNDROME CORONAVIRUS 2 (SARS-COV-2) (CORONAVIRUS DISEASE [COVID-19]), AMPLIFIED PROBE TECHNIQUE, MAKING USE OF HIGH THROUGHPUT TECHNOLOGIES AS DESCRIBED BY CMS-2020-01-R: HCPCS | Performed by: PSYCHIATRY & NEUROLOGY

## 2021-02-01 PROCEDURE — 250N000009 HC RX 250: Performed by: ANESTHESIOLOGY

## 2021-02-01 PROCEDURE — 250N000009 HC RX 250: Performed by: PSYCHIATRY & NEUROLOGY

## 2021-02-01 PROCEDURE — 250N000011 HC RX IP 250 OP 636: Performed by: ANESTHESIOLOGY

## 2021-02-01 PROCEDURE — 250N000011 HC RX IP 250 OP 636: Performed by: PSYCHIATRY & NEUROLOGY

## 2021-02-01 PROCEDURE — U0005 INFEC AGEN DETEC AMPLI PROBE: HCPCS | Performed by: PSYCHIATRY & NEUROLOGY

## 2021-02-01 PROCEDURE — 258N000003 HC RX IP 258 OP 636: Performed by: PSYCHIATRY & NEUROLOGY

## 2021-02-01 PROCEDURE — 370N000017 HC ANESTHESIA TECHNICAL FEE, PER MIN

## 2021-02-01 PROCEDURE — 90870 ELECTROCONVULSIVE THERAPY: CPT

## 2021-02-01 RX ORDER — MEPERIDINE HYDROCHLORIDE 25 MG/ML
12.5 INJECTION INTRAMUSCULAR; INTRAVENOUS; SUBCUTANEOUS
Status: DISCONTINUED | OUTPATIENT
Start: 2021-02-01 | End: 2021-02-02 | Stop reason: HOSPADM

## 2021-02-01 RX ORDER — NALOXONE HYDROCHLORIDE 0.4 MG/ML
0.4 INJECTION, SOLUTION INTRAMUSCULAR; INTRAVENOUS; SUBCUTANEOUS
Status: DISCONTINUED | OUTPATIENT
Start: 2021-02-01 | End: 2021-02-02 | Stop reason: HOSPADM

## 2021-02-01 RX ORDER — ONDANSETRON 2 MG/ML
4 INJECTION INTRAMUSCULAR; INTRAVENOUS
Status: CANCELLED
Start: 2021-02-01 | End: 2021-02-01

## 2021-02-01 RX ORDER — CAFFEINE AND SODIUM BENZOATE 125 MG/ML
500 INJECTION, SOLUTION INTRAMUSCULAR; INTRAVENOUS
Status: COMPLETED | OUTPATIENT
Start: 2021-02-01 | End: 2021-02-01

## 2021-02-01 RX ORDER — KETOROLAC TROMETHAMINE 30 MG/ML
30 INJECTION, SOLUTION INTRAMUSCULAR; INTRAVENOUS
Status: COMPLETED | OUTPATIENT
Start: 2021-02-01 | End: 2021-02-01

## 2021-02-01 RX ORDER — NALOXONE HYDROCHLORIDE 0.4 MG/ML
0.2 INJECTION, SOLUTION INTRAMUSCULAR; INTRAVENOUS; SUBCUTANEOUS
Status: DISCONTINUED | OUTPATIENT
Start: 2021-02-01 | End: 2021-02-02 | Stop reason: HOSPADM

## 2021-02-01 RX ORDER — CAFFEINE AND SODIUM BENZOATE 125 MG/ML
500 INJECTION, SOLUTION INTRAMUSCULAR; INTRAVENOUS
Status: CANCELLED
Start: 2021-02-01 | End: 2021-02-01

## 2021-02-01 RX ORDER — ONDANSETRON 4 MG/1
4 TABLET, ORALLY DISINTEGRATING ORAL EVERY 30 MIN PRN
Status: DISCONTINUED | OUTPATIENT
Start: 2021-02-01 | End: 2021-02-02 | Stop reason: HOSPADM

## 2021-02-01 RX ORDER — KETOROLAC TROMETHAMINE 30 MG/ML
30 INJECTION, SOLUTION INTRAMUSCULAR; INTRAVENOUS
Status: CANCELLED
Start: 2021-02-01 | End: 2021-02-01

## 2021-02-01 RX ORDER — HALOPERIDOL 5 MG/ML
5 INJECTION INTRAMUSCULAR
Status: COMPLETED | OUTPATIENT
Start: 2021-02-01 | End: 2021-02-01

## 2021-02-01 RX ORDER — HALOPERIDOL 5 MG/ML
5 INJECTION INTRAMUSCULAR
Status: CANCELLED
Start: 2021-02-01

## 2021-02-01 RX ORDER — SODIUM CHLORIDE, SODIUM LACTATE, POTASSIUM CHLORIDE, CALCIUM CHLORIDE 600; 310; 30; 20 MG/100ML; MG/100ML; MG/100ML; MG/100ML
INJECTION, SOLUTION INTRAVENOUS CONTINUOUS
Status: DISCONTINUED | OUTPATIENT
Start: 2021-02-01 | End: 2021-02-02 | Stop reason: HOSPADM

## 2021-02-01 RX ORDER — ONDANSETRON 2 MG/ML
4 INJECTION INTRAMUSCULAR; INTRAVENOUS
Status: COMPLETED | OUTPATIENT
Start: 2021-02-01 | End: 2021-02-01

## 2021-02-01 RX ORDER — ACETAMINOPHEN 500 MG
1000 TABLET ORAL
Status: CANCELLED
Start: 2021-02-01 | End: 2021-02-01

## 2021-02-01 RX ORDER — ACETAMINOPHEN 500 MG
1000 TABLET ORAL
Status: ACTIVE | OUTPATIENT
Start: 2021-02-01 | End: 2021-02-01

## 2021-02-01 RX ORDER — ONDANSETRON 2 MG/ML
4 INJECTION INTRAMUSCULAR; INTRAVENOUS EVERY 30 MIN PRN
Status: DISCONTINUED | OUTPATIENT
Start: 2021-02-01 | End: 2021-02-02 | Stop reason: HOSPADM

## 2021-02-01 RX ADMIN — METHOHEXITAL SODIUM 80 MG: 500 INJECTION, POWDER, LYOPHILIZED, FOR SOLUTION INTRAMUSCULAR; INTRAVENOUS; RECTAL at 10:37

## 2021-02-01 RX ADMIN — KETOROLAC TROMETHAMINE 30 MG: 30 INJECTION, SOLUTION INTRAMUSCULAR at 10:24

## 2021-02-01 RX ADMIN — HALOPERIDOL LACTATE 5 MG: 5 INJECTION, SOLUTION INTRAMUSCULAR at 10:24

## 2021-02-01 RX ADMIN — MIDAZOLAM 2 MG: 1 INJECTION INTRAMUSCULAR; INTRAVENOUS at 10:39

## 2021-02-01 RX ADMIN — ONDANSETRON 4 MG: 2 INJECTION INTRAMUSCULAR; INTRAVENOUS at 10:24

## 2021-02-01 RX ADMIN — CAFFEINE AND SODIUM BENZOATE 500 MG: 125 INJECTION, SOLUTION INTRAMUSCULAR; INTRAVENOUS at 10:37

## 2021-02-01 RX ADMIN — Medication 80 MG: at 10:37

## 2021-02-01 RX ADMIN — SODIUM CHLORIDE, POTASSIUM CHLORIDE, SODIUM LACTATE AND CALCIUM CHLORIDE 500 ML: 600; 310; 30; 20 INJECTION, SOLUTION INTRAVENOUS at 10:24

## 2021-02-01 NOTE — ANESTHESIA PREPROCEDURE EVALUATION
Anesthesia Pre-Procedure Evaluation    Patient: Kuldeep Sequeira   MRN: [id number] : 1970        Preoperative Diagnosis: * No surgery found *   Procedure :      No past medical history on file.   No past surgical history on file.   No Known Allergies   Social History     Tobacco Use     Smoking status: Not on file   Substance Use Topics     Alcohol use: Not on file      Wt Readings from Last 1 Encounters:   20 78.4 kg (172 lb 14.4 oz)        Anesthesia Evaluation   Pt has had prior anesthetic. Type: General.        ROS/MED HX  ENT/Pulmonary:  - neg pulmonary ROS     Neurologic:  - neg neurologic ROS     Cardiovascular:  - neg cardiovascular ROS     METS/Exercise Tolerance:     Hematologic:  - neg hematologic  ROS     Musculoskeletal:  - neg musculoskeletal ROS     GI/Hepatic:  - neg GI/hepatic ROS     Renal/Genitourinary:  - neg Renal ROS     Endo:  - neg endo ROS     Psychiatric/Substance Use:     (+) psychiatric history depression     Infectious Disease:  - neg infectious disease ROS     Malignancy:  - neg malignancy ROS     Other:  - neg other ROS          Physical Exam    Airway        Mallampati: II   TM distance: > 3 FB   Neck ROM: full   Mouth opening: > 3 cm    Respiratory Devices and Support         Dental  no notable dental history         Cardiovascular   cardiovascular exam normal          Pulmonary   pulmonary exam normal                OUTSIDE LABS:  CBC:   Lab Results   Component Value Date    WBC 5.3 2020    HGB 15.5 2020    HCT 45.1 2020     2020     BMP:   Lab Results   Component Value Date     2020    POTASSIUM 4.6 2020    POTASSIUM 4.3 2020    CHLORIDE 106 2020    CO2 32 2020    BUN 18 2020    CR 0.80 2020    CR 0.91 2020    GLC 97 2020    GLC 90 2020     COAGS: No results found for: PTT, INR, FIBR  POC: No results found for: BGM, HCG, HCGS  HEPATIC:   Lab Results   Component Value Date     ALBUMIN 3.8 11/19/2020    PROTTOTAL 7.0 11/19/2020    ALT 22 11/19/2020    AST 10 11/19/2020    ALKPHOS 61 11/19/2020    BILITOTAL 1.1 11/19/2020     OTHER:   Lab Results   Component Value Date    RANJITH 8.8 11/19/2020    TSH 1.82 11/19/2020       Anesthesia Plan     History & Physical Review      ASA Status:  2.   Plan for General with Intravenous induction. Device: Face Mask Maintenance will be N/A.            Consents  Anesthesia Plan(s) and associated risks, benefits, and realistic alternatives discussed.    Questions answered and patient/representative(s) expressed understanding.    Discussed with:  Patient.             Postoperative Care                Angella Howard MD

## 2021-02-01 NOTE — DISCHARGE INSTRUCTIONS
ECT Discharge Instructions      During your ECT series:      Do not drive or work heavy equipment until 7 days after your last treatment.    Do not drink alcohol or use street drugs (illicit drugs) while you are having treatments.    Do not make important decisions, including legal decisions.    After your maintenance ECT treatment:      Do not drive for 24 hours after treatment.  If you will be having more than one treatment witihin a week, no driving until 7 days after your last ECT treatment.         After each treatment:      Get plenty of rest. A responsible adult must stay with your for at least 6 hours.    Avoid heavy or risky activities for 24 hours.    If you feel light-headed, sit for a few minutes before standing. Have someone help you get up.    If you have nausea (feel sick to your stomach): Drink only clear liquids such as apple juice, ginger ale, broth or 7UP, Be sure to drink plenty of liquids. Move to a normal diet as you feel able.     If you received Toradol, wait 6 hours before taking ibuprofen.    Call your doctor if:     You have a fever over 100F (37.7 C) (taken under the tongue), or a fever that last more than 24 hours.    Your IV site is red, swollen, very painful or is getting more tender.    You have nausea that gets very bad or does not improve.      If you have any symptoms after ECT, tell our staff before your next treatment.    The ECT Department can be reached at [phone].  The ECT Department is open Mondays, Wednesdays and Fridays from 7:00 AM to 2:00 PM.    Your next ECT treatment will be: Wednesay 2/03    To speak to a doctor, call:Dr. Markham at [phone]      New prescriptions:Increased Effexor XR from 150mg to 225mg daily.  (Extra 75mg caps sent to his pharmacy) on 1/27/21  Meds per Monica Stevens, CNS    Other: You had Toradol at 10:30am Which is an NSAID medication. Do not take any Ibuprofen, Excedrin, Motrin, Aleve, Advil, Asprin, or any other NSAID medication until after  4:30am . Questions, check with your physician or pharmacist    Transported by: Evon, wife    Today we completed your Covid test for your next appointment.  You do not need to do anything further. Your covid test results will be available by your next ECT treatment.    Instructions for your next appointment:    Please come to the [facility] entrance and check-in with Security before your ECT appointment.  The [facility] entrance is located right next to the Adult Emergency Room.  The address is [address] [place] [address].    After your appointment, you may be picked up at the [facility] entrance, which is located on the West side of our campus.  The address is [address] [place] [address].  [facility].

## 2021-02-01 NOTE — PROCEDURES
Procedure/Surgery Information   [facility]     Bedside Procedure Note  Date of Service (when I performed the procedure): 02/01/2021    Kuldeep Sequeira is a 50 year old male patient.  1. Severe recurrent major depression without psychotic features (H)      No past medical history on file.                       Procedures     Wm Markham     [facility], [place]   ECT Procedure Note   02/01/2021    Kuldeep Sequeira [id number]   50 year old 1970     Patient Status: Outpatient    Is this the first in a series of 12 treatments?  No    History and Physical: Reviewed in medical record    Consent: Informed consent was signed by: patient    Date Consent Signed: 2/1/21     No Known Allergies    Weight:  0 lbs 0 oz    Patient Preparations: (none)    Wt Readings from Last 5 Encounters:   11/19/20 78.4 kg (172 lb 14.4 oz)       There were no vitals taken for this visit.         Indications for ECT:   Medications ineffective         Clinical Narrative:   Patient has a long history of depression and is continuing ECT for treatment-resistant depression.  He's doing maintenance ECT now.  NPO after 2400.  Alert and Oriented x 3.  No problems with the last ECT.  Last ECT was 3 days ago on 1/29/21.    Effexor dose was increased recently from 150mg/d to 225 mg/d.           Diagnosis:   Major depression         Pause for the Cause:     Right patient Yes   Right procedure/laterality settings: Yes          Intra-Procedure Documentation:     ECT #: 1 of 12   Treatment number this series: 14   Total treatment number: 14     Type of ECT:   BILATERAL since treatment #6 (instead of Right, unilateral standard)     ECT Medications:      Zyprexa: 10mg  Tylenol: 1000mg  Toradol: 30mg  Zofran: 4mg  Lactated Ringers: 1/2 liter  Brevital: 80mg  Caffeine: 500mg  Succinyl Choline: 80mg  Versed: 2mg for h/o agitation      ECT Strip Summary:   Energy Level: 18 percent   Motor Seizure  Duration: 36 seconds  EEG Seizure Duration:  36 seconds    Complications: No    Plan:   COVID test 2/1/21  Next ECT 2/3/21  Increased Effexor XR from 150mg to 225mg daily.  (Extra 75mg caps sent to his pharmacy) on 1/27/21  Meds per Monica Stevens, CNS    Wm Markham MD

## 2021-02-01 NOTE — ANESTHESIA POSTPROCEDURE EVALUATION
Patient: Kuldeep Sequeira    * No procedures listed *    Diagnosis:* No pre-op diagnosis entered *  Diagnosis Additional Information: No value filed.    Anesthesia Type:  General    Note:  Disposition: Inpatient   Postop Pain Control: Uneventful   PONV:    Neuro/Psych: Uneventful            Sign Out: Acceptable/Baseline neuro status   Airway/Respiratory: Uneventful            Sign Out: Acceptable/Baseline resp. status   CV/Hemodynamics: Uneventful            Sign Out: Acceptable CV status   Other NRE:    DID A NON-ROUTINE EVENT OCCUR?          Last vitals:  Vitals:    02/01/21 0953 02/01/21 1041   BP: 120/76 131/85   Pulse: 81 86   Resp: 16 16   Temp: 36.7  C (98.1  F) 36.4  C (97.5  F)   SpO2: 96% 92%       Electronically Signed By: Angella Howard MD  February 1, 2021  10:53 AM

## 2021-02-03 ENCOUNTER — ANESTHESIA EVENT (OUTPATIENT)
Dept: BEHAVIORAL HEALTH | Facility: CLINIC | Age: 51
End: 2021-02-03

## 2021-02-03 ENCOUNTER — ANESTHESIA (OUTPATIENT)
Dept: BEHAVIORAL HEALTH | Facility: CLINIC | Age: 51
End: 2021-02-03

## 2021-02-03 ENCOUNTER — HOSPITAL ENCOUNTER (OUTPATIENT)
Dept: BEHAVIORAL HEALTH | Facility: CLINIC | Age: 51
Discharge: HOME | End: 2021-02-03
Attending: PSYCHIATRY & NEUROLOGY | Admitting: PSYCHIATRY & NEUROLOGY
Payer: COMMERCIAL

## 2021-02-03 VITALS
RESPIRATION RATE: 16 BRPM | HEART RATE: 74 BPM | OXYGEN SATURATION: 95 % | SYSTOLIC BLOOD PRESSURE: 122 MMHG | DIASTOLIC BLOOD PRESSURE: 80 MMHG | TEMPERATURE: 97.8 F

## 2021-02-03 DIAGNOSIS — F33.2 SEVERE RECURRENT MAJOR DEPRESSION WITHOUT PSYCHOTIC FEATURES (H): Primary | ICD-10-CM

## 2021-02-03 PROCEDURE — 250N000011 HC RX IP 250 OP 636: Performed by: PSYCHIATRY & NEUROLOGY

## 2021-02-03 PROCEDURE — 258N000003 HC RX IP 258 OP 636: Performed by: PSYCHIATRY & NEUROLOGY

## 2021-02-03 PROCEDURE — 87635 SARS-COV-2 COVID-19 AMP PRB: CPT | Performed by: PSYCHIATRY & NEUROLOGY

## 2021-02-03 PROCEDURE — 250N000009 HC RX 250: Performed by: PSYCHIATRY & NEUROLOGY

## 2021-02-03 PROCEDURE — 250N000011 HC RX IP 250 OP 636: Performed by: ANESTHESIOLOGY

## 2021-02-03 PROCEDURE — 370N000017 HC ANESTHESIA TECHNICAL FEE, PER MIN

## 2021-02-03 PROCEDURE — 90870 ELECTROCONVULSIVE THERAPY: CPT

## 2021-02-03 PROCEDURE — 250N000009 HC RX 250: Performed by: ANESTHESIOLOGY

## 2021-02-03 RX ORDER — NALOXONE HYDROCHLORIDE 0.4 MG/ML
0.2 INJECTION, SOLUTION INTRAMUSCULAR; INTRAVENOUS; SUBCUTANEOUS
Status: DISCONTINUED | OUTPATIENT
Start: 2021-02-03 | End: 2021-02-04 | Stop reason: HOSPADM

## 2021-02-03 RX ORDER — KETOROLAC TROMETHAMINE 30 MG/ML
30 INJECTION, SOLUTION INTRAMUSCULAR; INTRAVENOUS
Status: COMPLETED | OUTPATIENT
Start: 2021-02-03 | End: 2021-02-03

## 2021-02-03 RX ORDER — ONDANSETRON 2 MG/ML
4 INJECTION INTRAMUSCULAR; INTRAVENOUS
Status: COMPLETED | OUTPATIENT
Start: 2021-02-03 | End: 2021-02-03

## 2021-02-03 RX ORDER — ACETAMINOPHEN 500 MG
1000 TABLET ORAL
Status: CANCELLED
Start: 2021-02-03 | End: 2021-02-03

## 2021-02-03 RX ORDER — NALOXONE HYDROCHLORIDE 0.4 MG/ML
0.4 INJECTION, SOLUTION INTRAMUSCULAR; INTRAVENOUS; SUBCUTANEOUS
Status: DISCONTINUED | OUTPATIENT
Start: 2021-02-03 | End: 2021-02-04 | Stop reason: HOSPADM

## 2021-02-03 RX ORDER — ONDANSETRON 2 MG/ML
4 INJECTION INTRAMUSCULAR; INTRAVENOUS
Status: CANCELLED
Start: 2021-02-03 | End: 2021-02-03

## 2021-02-03 RX ORDER — HALOPERIDOL 5 MG/ML
5 INJECTION INTRAMUSCULAR
Status: COMPLETED | OUTPATIENT
Start: 2021-02-03 | End: 2021-02-03

## 2021-02-03 RX ORDER — KETOROLAC TROMETHAMINE 30 MG/ML
30 INJECTION, SOLUTION INTRAMUSCULAR; INTRAVENOUS
Status: CANCELLED
Start: 2021-02-03 | End: 2021-02-03

## 2021-02-03 RX ORDER — CAFFEINE AND SODIUM BENZOATE 125 MG/ML
500 INJECTION, SOLUTION INTRAMUSCULAR; INTRAVENOUS
Status: COMPLETED | OUTPATIENT
Start: 2021-02-03 | End: 2021-02-03

## 2021-02-03 RX ORDER — ACETAMINOPHEN 500 MG
1000 TABLET ORAL
Status: ACTIVE | OUTPATIENT
Start: 2021-02-03 | End: 2021-02-03

## 2021-02-03 RX ORDER — ONDANSETRON 4 MG/1
4 TABLET, ORALLY DISINTEGRATING ORAL EVERY 30 MIN PRN
Status: DISCONTINUED | OUTPATIENT
Start: 2021-02-03 | End: 2021-02-04 | Stop reason: HOSPADM

## 2021-02-03 RX ORDER — CAFFEINE AND SODIUM BENZOATE 125 MG/ML
500 INJECTION, SOLUTION INTRAMUSCULAR; INTRAVENOUS
Status: CANCELLED
Start: 2021-02-03 | End: 2021-02-03

## 2021-02-03 RX ORDER — HALOPERIDOL 5 MG/ML
5 INJECTION INTRAMUSCULAR
Status: CANCELLED
Start: 2021-02-03

## 2021-02-03 RX ORDER — ONDANSETRON 2 MG/ML
4 INJECTION INTRAMUSCULAR; INTRAVENOUS EVERY 30 MIN PRN
Status: DISCONTINUED | OUTPATIENT
Start: 2021-02-03 | End: 2021-02-04 | Stop reason: HOSPADM

## 2021-02-03 RX ORDER — SODIUM CHLORIDE, SODIUM LACTATE, POTASSIUM CHLORIDE, CALCIUM CHLORIDE 600; 310; 30; 20 MG/100ML; MG/100ML; MG/100ML; MG/100ML
INJECTION, SOLUTION INTRAVENOUS CONTINUOUS
Status: DISCONTINUED | OUTPATIENT
Start: 2021-02-03 | End: 2021-02-04 | Stop reason: HOSPADM

## 2021-02-03 RX ORDER — LABETALOL HYDROCHLORIDE 5 MG/ML
10 INJECTION, SOLUTION INTRAVENOUS
Status: DISCONTINUED | OUTPATIENT
Start: 2021-02-03 | End: 2021-02-04 | Stop reason: HOSPADM

## 2021-02-03 RX ORDER — FENTANYL CITRATE 50 UG/ML
25-50 INJECTION, SOLUTION INTRAMUSCULAR; INTRAVENOUS
Status: DISCONTINUED | OUTPATIENT
Start: 2021-02-03 | End: 2021-02-04 | Stop reason: HOSPADM

## 2021-02-03 RX ADMIN — HALOPERIDOL LACTATE 5 MG: 5 INJECTION, SOLUTION INTRAMUSCULAR at 10:26

## 2021-02-03 RX ADMIN — MIDAZOLAM 2 MG: 1 INJECTION INTRAMUSCULAR; INTRAVENOUS at 10:44

## 2021-02-03 RX ADMIN — ONDANSETRON 4 MG: 2 INJECTION INTRAMUSCULAR; INTRAVENOUS at 10:26

## 2021-02-03 RX ADMIN — KETOROLAC TROMETHAMINE 30 MG: 30 INJECTION, SOLUTION INTRAMUSCULAR at 10:25

## 2021-02-03 RX ADMIN — Medication 80 MG: at 10:40

## 2021-02-03 RX ADMIN — CAFFEINE AND SODIUM BENZOATE 500 MG: 125 INJECTION, SOLUTION INTRAMUSCULAR; INTRAVENOUS at 10:40

## 2021-02-03 RX ADMIN — SODIUM CHLORIDE, POTASSIUM CHLORIDE, SODIUM LACTATE AND CALCIUM CHLORIDE 500 ML: 600; 310; 30; 20 INJECTION, SOLUTION INTRAVENOUS at 10:28

## 2021-02-03 RX ADMIN — METHOHEXITAL SODIUM 80 MG: 500 INJECTION, POWDER, LYOPHILIZED, FOR SOLUTION INTRAMUSCULAR; INTRAVENOUS; RECTAL at 10:38

## 2021-02-03 NOTE — PROCEDURES
Procedure/Surgery Information   [facility]     Bedside Procedure Note  Date of Service (when I performed the procedure): 02/03/2021    Kuldeep Sequeira is a 50 year old male patient.  1. Severe recurrent major depression without psychotic features (H)      No past medical history on file.  Temp: 97.8  F (36.6  C) Temp src: Temporal BP: 121/75 Pulse: 82   Resp: 16 SpO2: 96 % O2 Device: None (Room air)      Procedures     Wm Markham     [facility], [place]   ECT Procedure Note   02/03/2021    Kuldeep Sequeira [id number]   50 year old 1970     Patient Status: Outpatient    Is this the first in a series of 12 treatments?  No    History and Physical: Reviewed in medical record    Consent: Informed consent was signed by: patient    Date Consent Signed: 2/1/21     No Known Allergies    Weight:  0 lbs 0 oz    Patient Preparations: (none)    Wt Readings from Last 5 Encounters:   11/19/20 78.4 kg (172 lb 14.4 oz)       /75   Pulse 82   Temp 97.8  F (36.6  C) (Temporal)   Resp 16   SpO2 96%          Indications for ECT:   Medications ineffective         Clinical Narrative:   Patient has a long history of depression and is continuing ECT for treatment-resistant depression.  He's doing maintenance ECT now.  NPO after 2400.  Alert and Oriented x 3.  No problems with the last ECT.     Effexor dose was increased recently from 150mg/d to 225 mg/d.  He's eating better.   He's still depressed.           Diagnosis:   Major depression         Pause for the Cause:     Right patient Yes   Right procedure/laterality settings: Yes          Intra-Procedure Documentation:     ECT #: 2 of 12   Treatment number this series: 15   Total treatment number: 15     Type of ECT:   BILATERAL since treatment #6 (instead of Right, unilateral standard)     ECT Medications:      Zyprexa: 10mg  Tylenol: 1000mg  Toradol: 30mg  Zofran: 4mg  Lactated Ringers: 1/2  liter  Brevital: 80mg  Caffeine: 500mg  Succinyl Choline: 80mg  Versed: 2mg for h/o agitation      ECT Strip Summary:   Energy Level: 18 percent   Motor Seizure Duration: 58 seconds  EEG Seizure Duration:   58 seconds    Complications: No    Plan:   COVID test 2/3/21  Next ECT 2/5/21  Increased Effexor XR from 150mg to 225mg daily.  (Extra 75mg caps sent to his pharmacy) on 1/27/21  Meds per Monica Stevens, SHAMAR Markham MD

## 2021-02-03 NOTE — DISCHARGE INSTRUCTIONS
ECT Discharge Instructions      During your ECT series:      Do not drive or work heavy equipment until 7 days after your last treatment.    Do not drink alcohol or use street drugs (illicit drugs) while you are having treatments.    Do not make important decisions, including legal decisions.    After your maintenance ECT treatment:      Do not drive for 24 hours after treatment.  If you will be having more than one treatment witihin a week, no driving until 7 days after your last ECT treatment.         After each treatment:      Get plenty of rest. A responsible adult must stay with your for at least 6 hours.    Avoid heavy or risky activities for 24 hours.    If you feel light-headed, sit for a few minutes before standing. Have someone help you get up.    If you have nausea (feel sick to your stomach): Drink only clear liquids such as apple juice, ginger ale, broth or 7UP, Be sure to drink plenty of liquids. Move to a normal diet as you feel able.     If you received Toradol, wait 6 hours before taking ibuprofen.    Call your doctor if:     You have a fever over 100F (37.7 C) (taken under the tongue), or a fever that last more than 24 hours.    Your IV site is red, swollen, very painful or is getting more tender.    You have nausea that gets very bad or does not improve.      If you have any symptoms after ECT, tell our staff before your next treatment.    The ECT Department can be reached at [phone].  The ECT Department is open Mondays, Wednesdays and Fridays from 7:00 AM to 2:00 PM.    Your next ECT treatment will be:     To speak to a doctor, call:Dr. Markham at [phone]    New prescriptions:You had Toradol at 10:30am. Which is an NSAID medication. Do not take any Ibuprofen, Excedrin, Motrin, Aleve, Advil, Asprin, or any other NSAID medication until after 4:30pm. Questions, check with your physician or pharmacist      Other:     Transported by:     Today we completed your Covid test for your next  appointment.  You do not need to do anything further. Your covid test results will be available by your next ECT treatment.    Instructions for your next appointment:    *Covid-19 Testing:  Our central scheduling department will be calling you to schedule a Covid-19 test.  You will be scheduled to have this test before your next ECT treatment. If you do not receive a phone call, please call [phone] to schedule your Covid test to be performed 48-96 hours (2-4 days) before your scheduled ECT appointment.     Please come to the [facility] entrance and check-in with Security before your ECT appointment.  The [facility] entrance is located right next to the Adult Emergency Room.  The address is [address] [place] [address].    After your appointment, you may be picked up at the [facility] entrance, which is located on the West side of our campus.  The address is [address] [place] [address].  [facility].

## 2021-02-03 NOTE — IP AVS SNAPSHOT
[facility]  [address] [address]  Phone: [phone]                                    After Visit Summary   2/3/2021    Kuldeep Sequeira    MRN: [id number]           After Visit Summary Signature Page    I have received my discharge instructions, and my questions have been answered. I have discussed any challenges I see with this plan with the nurse or doctor.    ..........................................................................................................................................  Patient/Patient Representative Signature      ..........................................................................................................................................  Patient Representative Print Name and Relationship to Patient    ..................................................               ................................................  Date                                   Time    ..........................................................................................................................................  Reviewed by Signature/Title    ...................................................              ..............................................  Date                                               Time          22EPIC Rev 08/18

## 2021-02-03 NOTE — ANESTHESIA PREPROCEDURE EVALUATION
Anesthesia Pre-Procedure Evaluation    Patient: Kuldeep Sequeira   MRN: [id number] : 1970        Preoperative Diagnosis: * No surgery found *   Procedure :      No past medical history on file.   No past surgical history on file.   No Known Allergies   Social History     Tobacco Use     Smoking status: Not on file   Substance Use Topics     Alcohol use: Not on file      Wt Readings from Last 1 Encounters:   20 78.4 kg (172 lb 14.4 oz)           Physical Exam    Airway        Mallampati: II   TM distance: > 3 FB   Neck ROM: full   Mouth opening: > 3 cm    Respiratory Devices and Support         Dental           Cardiovascular   cardiovascular exam normal          Pulmonary                   OUTSIDE LABS:  CBC:   Lab Results   Component Value Date    WBC 5.3 2020    HGB 15.5 2020    HCT 45.1 2020     2020     BMP:   Lab Results   Component Value Date     2020    POTASSIUM 4.6 2020    POTASSIUM 4.3 2020    CHLORIDE 106 2020    CO2 32 2020    BUN 18 2020    CR 0.80 2020    CR 0.91 2020    GLC 97 2020    GLC 90 2020     COAGS: No results found for: PTT, INR, FIBR  POC: No results found for: BGM, HCG, HCGS  HEPATIC:   Lab Results   Component Value Date    ALBUMIN 3.8 2020    PROTTOTAL 7.0 2020    ALT 22 2020    AST 10 2020    ALKPHOS 61 2020    BILITOTAL 1.1 2020     OTHER:   Lab Results   Component Value Date    RANJITH 8.8 2020    TSH 1.82 2020       Anesthesia Plan     History & Physical Review      ASA Status:  2.   Plan for General with Intravenous induction.            Consents        Postoperative Care            Chauncey Hill DO

## 2021-02-03 NOTE — ANESTHESIA POSTPROCEDURE EVALUATION
Patient: Kuldeep Sequeira    * No procedures listed *    Diagnosis:* No pre-op diagnosis entered *  Diagnosis Additional Information: No value filed.    Anesthesia Type:  General    Note:     Postop Pain Control: Uneventful            Sign Out: Well controlled pain   PONV: No   Neuro/Psych: Uneventful            Sign Out: Acceptable/Baseline neuro status   Airway/Respiratory: Uneventful            Sign Out: Acceptable/Baseline resp. status   CV/Hemodynamics: Uneventful            Sign Out: Acceptable CV status   Other NRE: NONE   DID A NON-ROUTINE EVENT OCCUR?          Last vitals:  Vitals:    02/03/21 0954 02/03/21 1050   BP: 121/75 (!) 126/95   Pulse: 82 73   Resp: 16 12   Temp: 36.6  C (97.8  F) 36.6  C (97.9  F)   SpO2: 96% 94%       Electronically Signed By: Chauncey Hill DO  February 3, 2021  10:54 AM

## 2021-02-05 ENCOUNTER — ANESTHESIA EVENT (OUTPATIENT)
Dept: BEHAVIORAL HEALTH | Facility: CLINIC | Age: 51
End: 2021-02-05

## 2021-02-05 ENCOUNTER — ANESTHESIA (OUTPATIENT)
Dept: BEHAVIORAL HEALTH | Facility: CLINIC | Age: 51
End: 2021-02-05

## 2021-02-05 ENCOUNTER — HOSPITAL ENCOUNTER (OUTPATIENT)
Dept: BEHAVIORAL HEALTH | Facility: CLINIC | Age: 51
Discharge: HOME | End: 2021-02-05
Attending: PSYCHIATRY & NEUROLOGY | Admitting: PSYCHIATRY & NEUROLOGY
Payer: COMMERCIAL

## 2021-02-05 VITALS
SYSTOLIC BLOOD PRESSURE: 134 MMHG | OXYGEN SATURATION: 97 % | TEMPERATURE: 98.4 F | RESPIRATION RATE: 18 BRPM | DIASTOLIC BLOOD PRESSURE: 97 MMHG | HEART RATE: 98 BPM

## 2021-02-05 DIAGNOSIS — F33.2 SEVERE RECURRENT MAJOR DEPRESSION WITHOUT PSYCHOTIC FEATURES (H): Primary | ICD-10-CM

## 2021-02-05 PROCEDURE — U0005 INFEC AGEN DETEC AMPLI PROBE: HCPCS | Performed by: PSYCHIATRY & NEUROLOGY

## 2021-02-05 PROCEDURE — U0003 INFECTIOUS AGENT DETECTION BY NUCLEIC ACID (DNA OR RNA); SEVERE ACUTE RESPIRATORY SYNDROME CORONAVIRUS 2 (SARS-COV-2) (CORONAVIRUS DISEASE [COVID-19]), AMPLIFIED PROBE TECHNIQUE, MAKING USE OF HIGH THROUGHPUT TECHNOLOGIES AS DESCRIBED BY CMS-2020-01-R: HCPCS | Performed by: PSYCHIATRY & NEUROLOGY

## 2021-02-05 PROCEDURE — 90870 ELECTROCONVULSIVE THERAPY: CPT

## 2021-02-05 PROCEDURE — 250N000011 HC RX IP 250 OP 636: Performed by: PSYCHIATRY & NEUROLOGY

## 2021-02-05 PROCEDURE — 370N000017 HC ANESTHESIA TECHNICAL FEE, PER MIN

## 2021-02-05 PROCEDURE — 250N000009 HC RX 250: Performed by: ANESTHESIOLOGY

## 2021-02-05 PROCEDURE — 258N000003 HC RX IP 258 OP 636: Performed by: PSYCHIATRY & NEUROLOGY

## 2021-02-05 PROCEDURE — 250N000011 HC RX IP 250 OP 636: Performed by: ANESTHESIOLOGY

## 2021-02-05 PROCEDURE — 250N000009 HC RX 250: Performed by: PSYCHIATRY & NEUROLOGY

## 2021-02-05 RX ORDER — NALOXONE HYDROCHLORIDE 0.4 MG/ML
0.4 INJECTION, SOLUTION INTRAMUSCULAR; INTRAVENOUS; SUBCUTANEOUS
Status: DISCONTINUED | OUTPATIENT
Start: 2021-02-05 | End: 2021-02-06 | Stop reason: HOSPADM

## 2021-02-05 RX ORDER — HALOPERIDOL 5 MG/ML
5 INJECTION INTRAMUSCULAR
Status: COMPLETED | OUTPATIENT
Start: 2021-02-05 | End: 2021-02-05

## 2021-02-05 RX ORDER — ONDANSETRON 2 MG/ML
4 INJECTION INTRAMUSCULAR; INTRAVENOUS
Status: COMPLETED | OUTPATIENT
Start: 2021-02-05 | End: 2021-02-05

## 2021-02-05 RX ORDER — BUSPIRONE HYDROCHLORIDE 5 MG/1
5 TABLET ORAL 2 TIMES DAILY
COMMUNITY
End: 2021-03-05

## 2021-02-05 RX ORDER — ONDANSETRON 2 MG/ML
4 INJECTION INTRAMUSCULAR; INTRAVENOUS
Status: CANCELLED
Start: 2021-02-05 | End: 2021-02-05

## 2021-02-05 RX ORDER — CAFFEINE AND SODIUM BENZOATE 125 MG/ML
500 INJECTION, SOLUTION INTRAMUSCULAR; INTRAVENOUS
Status: COMPLETED | OUTPATIENT
Start: 2021-02-05 | End: 2021-02-05

## 2021-02-05 RX ORDER — KETOROLAC TROMETHAMINE 30 MG/ML
30 INJECTION, SOLUTION INTRAMUSCULAR; INTRAVENOUS
Status: COMPLETED | OUTPATIENT
Start: 2021-02-05 | End: 2021-02-05

## 2021-02-05 RX ORDER — NALOXONE HYDROCHLORIDE 0.4 MG/ML
0.2 INJECTION, SOLUTION INTRAMUSCULAR; INTRAVENOUS; SUBCUTANEOUS
Status: DISCONTINUED | OUTPATIENT
Start: 2021-02-05 | End: 2021-02-06 | Stop reason: HOSPADM

## 2021-02-05 RX ORDER — ACETAMINOPHEN 500 MG
1000 TABLET ORAL
Status: CANCELLED
Start: 2021-02-05 | End: 2021-02-05

## 2021-02-05 RX ORDER — ACETAMINOPHEN 500 MG
1000 TABLET ORAL
Status: ACTIVE | OUTPATIENT
Start: 2021-02-05 | End: 2021-02-05

## 2021-02-05 RX ORDER — KETOROLAC TROMETHAMINE 30 MG/ML
30 INJECTION, SOLUTION INTRAMUSCULAR; INTRAVENOUS
Status: CANCELLED
Start: 2021-02-05 | End: 2021-02-05

## 2021-02-05 RX ORDER — CAFFEINE AND SODIUM BENZOATE 125 MG/ML
500 INJECTION, SOLUTION INTRAMUSCULAR; INTRAVENOUS
Status: CANCELLED
Start: 2021-02-05 | End: 2021-02-05

## 2021-02-05 RX ORDER — HALOPERIDOL 5 MG/ML
5 INJECTION INTRAMUSCULAR
Status: CANCELLED
Start: 2021-02-05

## 2021-02-05 RX ADMIN — ONDANSETRON 4 MG: 2 INJECTION INTRAMUSCULAR; INTRAVENOUS at 10:24

## 2021-02-05 RX ADMIN — HALOPERIDOL LACTATE 5 MG: 5 INJECTION, SOLUTION INTRAMUSCULAR at 10:26

## 2021-02-05 RX ADMIN — Medication 80 MG: at 10:32

## 2021-02-05 RX ADMIN — METHOHEXITAL SODIUM 80 MG: 500 INJECTION, POWDER, LYOPHILIZED, FOR SOLUTION INTRAMUSCULAR; INTRAVENOUS; RECTAL at 10:32

## 2021-02-05 RX ADMIN — KETOROLAC TROMETHAMINE 30 MG: 30 INJECTION, SOLUTION INTRAMUSCULAR; INTRAVENOUS at 10:25

## 2021-02-05 RX ADMIN — CAFFEINE AND SODIUM BENZOATE 500 MG: 125 INJECTION, SOLUTION INTRAMUSCULAR; INTRAVENOUS at 10:31

## 2021-02-05 RX ADMIN — MIDAZOLAM 2 MG: 1 INJECTION INTRAMUSCULAR; INTRAVENOUS at 10:36

## 2021-02-05 RX ADMIN — SODIUM CHLORIDE, POTASSIUM CHLORIDE, SODIUM LACTATE AND CALCIUM CHLORIDE 500 ML: 600; 310; 30; 20 INJECTION, SOLUTION INTRAVENOUS at 10:25

## 2021-02-05 NOTE — ANESTHESIA PREPROCEDURE EVALUATION
Anesthesia Pre-Procedure Evaluation    Patient: Kuldeep Sequeira   MRN: [id number] : 1970        Preoperative Diagnosis: * No surgery found *   Procedure :      No past medical history on file.   No past surgical history on file.   No Known Allergies   Social History     Tobacco Use     Smoking status: Not on file   Substance Use Topics     Alcohol use: Not on file      Wt Readings from Last 1 Encounters:   20 78.4 kg (172 lb 14.4 oz)        Anesthesia Evaluation   Pt has had prior anesthetic. Type: General.        ROS/MED HX  ENT/Pulmonary:  - neg pulmonary ROS     Neurologic:  - neg neurologic ROS     Cardiovascular:  - neg cardiovascular ROS     METS/Exercise Tolerance:     Hematologic:  - neg hematologic  ROS     Musculoskeletal:  - neg musculoskeletal ROS     GI/Hepatic:  - neg GI/hepatic ROS     Renal/Genitourinary:  - neg Renal ROS     Endo:  - neg endo ROS     Psychiatric/Substance Use:     (+) psychiatric history depression     Infectious Disease:  - neg infectious disease ROS     Malignancy:  - neg malignancy ROS     Other:  - neg other ROS          Physical Exam    Airway        Mallampati: II   TM distance: > 3 FB   Neck ROM: full   Mouth opening: > 3 cm    Respiratory Devices and Support         Dental  no notable dental history         Cardiovascular   cardiovascular exam normal          Pulmonary   pulmonary exam normal                OUTSIDE LABS:  CBC:   Lab Results   Component Value Date    WBC 5.3 2020    HGB 15.5 2020    HCT 45.1 2020     2020     BMP:   Lab Results   Component Value Date     2020    POTASSIUM 4.6 2020    POTASSIUM 4.3 2020    CHLORIDE 106 2020    CO2 32 2020    BUN 18 2020    CR 0.80 2020    CR 0.91 2020    GLC 97 2020    GLC 90 2020     COAGS: No results found for: PTT, INR, FIBR  POC: No results found for: BGM, HCG, HCGS  HEPATIC:   Lab Results   Component Value Date     ALBUMIN 3.8 11/19/2020    PROTTOTAL 7.0 11/19/2020    ALT 22 11/19/2020    AST 10 11/19/2020    ALKPHOS 61 11/19/2020    BILITOTAL 1.1 11/19/2020     OTHER:   Lab Results   Component Value Date    RANJITH 8.8 11/19/2020    TSH 1.82 11/19/2020       Anesthesia Plan    ASA Status:  2      Anesthesia Type: General     - Airway: Face Mask   Induction: Intravenous   Maintenance: N/A.        Consents    Anesthesia Plan(s) and associated risks, benefits, and realistic alternatives discussed. Questions answered and patient/representative(s) expressed understanding.     - Discussed with:  Patient         Postoperative Care            Comments:                    Eva Ibarra MD

## 2021-02-05 NOTE — PROCEDURES
Procedure/Surgery Information   [facility]     Bedside Procedure Note  Date of Service (when I performed the procedure): 02/05/2021    Kuldeep Sequeira is a 50 year old male patient.  No diagnosis found.  No past medical history on file.                       Procedures     Wm Markham     [facility], [place]   ECT Procedure Note   02/05/2021    Kuldeep Sequeira [id number]   50 year old 1970     Patient Status: Outpatient    Is this the first in a series of 12 treatments?  No    History and Physical: Reviewed in medical record    Consent: Informed consent was signed by: patient    Date Consent Signed: 2/1/21     No Known Allergies    Weight:  0 lbs 0 oz    Patient Preparations: (none)    Wt Readings from Last 5 Encounters:   11/19/20 78.4 kg (172 lb 14.4 oz)       There were no vitals taken for this visit.         Indications for ECT:   Medications ineffective         Clinical Narrative:   Patient has a long history of depression and is continuing ECT for treatment-resistant depression.  He's doing maintenance ECT now.  NPO after 2400.  Alert and Oriented x 3.  No problems with the last ECT.     Effexor dose was increased recently 1/27/21 from 150mg/d to 225 mg/d.  He's eating better.   He's still depressed.           Diagnosis:   Major depression         Pause for the Cause:     Right patient Yes   Right procedure/laterality settings: Yes          Intra-Procedure Documentation:     ECT #: 3 of 12   Treatment number this series: 16   Total treatment number: 16     Type of ECT:   BILATERAL since treatment #6 (instead of Right, unilateral standard)     ECT Medications:      Zyprexa: 10mg  Tylenol: 1000mg  Toradol: 30mg  Zofran: 4mg  Lactated Ringers: 1/2 liter  Brevital: 80mg  Caffeine: 500mg  Succinyl Choline: 80mg  Versed: 2mg for h/o agitation      ECT Strip Summary:   Energy Level: 16 percent (first Tx);  20 percent (2nd Tx)  Motor Seizure  Duration:  8 seconds (first Tx);  60 seconds (2nd Tx)  EEG Seizure Duration:   8 seconds (first Tx);  91 seconds (2nd Tx)    Complications: No    Plan:   COVID test 2/5/21  Next ECT 2/8/21  Increased Effexor XR from 150mg to 225mg daily.  (Extra 75mg caps sent to his pharmacy) on 1/27/21  Meds per Monica Stevens, SHAMAR Markham MD

## 2021-02-05 NOTE — ANESTHESIA POSTPROCEDURE EVALUATION
Patient: Kuldeep Sequeira    * No procedures listed *    Diagnosis:* No pre-op diagnosis entered *  Diagnosis Additional Information: No value filed.    Anesthesia Type:  General    Note:  Disposition: Outpatient   Postop Pain Control: Uneventful            Sign Out: Well controlled pain   PONV: No   Neuro/Psych: Uneventful            Sign Out: Acceptable/Baseline neuro status   Airway/Respiratory: Uneventful            Sign Out: Acceptable/Baseline resp. status   CV/Hemodynamics: Uneventful            Sign Out: Acceptable CV status   Other NRE: NONE   DID A NON-ROUTINE EVENT OCCUR? No         Last vitals:  Vitals:    02/05/21 1042 02/05/21 1057 02/05/21 1107   BP: 136/89 132/85    Pulse: 99 98    Resp: 16 17    Temp: 37.4  C (99.4  F) 37.2  C (98.9  F) 36.9  C (98.4  F)   SpO2: 96% 97% 97%       Last vitals prior to Anesthesia Care Transfer:  CRNA VITALS  2/5/2021 1010 - 2/5/2021 1104      2/5/2021             Resp Rate (observed):  (!) 3          Electronically Signed By: Eva Ibarra MD  February 5, 2021  11:04 AM

## 2021-02-05 NOTE — DISCHARGE INSTRUCTIONS
ECT Discharge Instructions      During your ECT series:      Do not drive or work heavy equipment until 7 days after your last treatment.    Do not drink alcohol or use street drugs (illicit drugs) while you are having treatments.    Do not make important decisions, including legal decisions.      After each treatment:      Get plenty of rest. A responsible adult must stay with your for at least 6 hours.    Avoid heavy or risky activities for 24 hours.    If you feel light-headed, sit for a few minutes before standing. Have someone help you get up.    If you have nausea (feel sick to your stomach): Drink only clear liquids such as apple juice, ginger ale, broth or 7UP, Be sure to drink plenty of liquids. Move to a normal diet as you feel able.     If you received Toradol, wait 6 hours before taking ibuprofen.    Call your doctor if:     You have a fever over 100F (37.7 C) (taken under the tongue), or a fever that last more than 24 hours.    Your IV site is red, swollen, very painful or is getting more tender.    You have nausea that gets very bad or does not improve.      If you have any symptoms after ECT, tell our staff before your next treatment.    The ECT Department can be reached at [phone].  The ECT Department is open Mondays, Wednesdays and Fridays from 7:00 AM to 2:00 PM.    To speak to a doctor, call: Dr. Markham at [phone]    Other: You had Toradol at 10:30am. Which is an NSAID medication. Do not take any Ibuprofen, Excedrin, Motrin, Aleve, Advil, Asprin, or any other NSAID medication until after 4:30pm. Questions, check with your physician or pharmacist      Today we completed your Covid test for your next appointment.  You do not need to do anything further. Your covid test results will be available by your next ECT treatment.    Instructions for your next appointment:    Please come to the [facility] entrance and check-in with Security before your ECT appointment.  The [facility] entrance  is located right next to the Adult Emergency Room.  The address is [address] [place] [address].      After your appointment, you may be picked up at the [facility] entrance, which is located on the West side of our campus.  The address is [address] [place] [address].  [facility].

## 2021-02-05 NOTE — IP AVS SNAPSHOT
[facility]  [address] [address]  Phone: [phone]                                    After Visit Summary   2/5/2021    Kuldeep Sequeira    MRN: [id number]           After Visit Summary Signature Page    I have received my discharge instructions, and my questions have been answered. I have discussed any challenges I see with this plan with the nurse or doctor.    ..........................................................................................................................................  Patient/Patient Representative Signature      ..........................................................................................................................................  Patient Representative Print Name and Relationship to Patient    ..................................................               ................................................  Date                                   Time    ..........................................................................................................................................  Reviewed by Signature/Title    ...................................................              ..............................................  Date                                               Time          22EPIC Rev 08/18

## 2021-02-08 ENCOUNTER — ANESTHESIA (OUTPATIENT)
Dept: BEHAVIORAL HEALTH | Facility: CLINIC | Age: 51
End: 2021-02-08

## 2021-02-08 ENCOUNTER — ANESTHESIA EVENT (OUTPATIENT)
Dept: BEHAVIORAL HEALTH | Facility: CLINIC | Age: 51
End: 2021-02-08

## 2021-02-08 ENCOUNTER — HOSPITAL ENCOUNTER (OUTPATIENT)
Dept: BEHAVIORAL HEALTH | Facility: CLINIC | Age: 51
Discharge: HOME | End: 2021-02-08
Attending: PSYCHIATRY & NEUROLOGY | Admitting: PSYCHIATRY & NEUROLOGY
Payer: COMMERCIAL

## 2021-02-08 VITALS
HEART RATE: 93 BPM | TEMPERATURE: 98.2 F | OXYGEN SATURATION: 95 % | RESPIRATION RATE: 15 BRPM | DIASTOLIC BLOOD PRESSURE: 67 MMHG | SYSTOLIC BLOOD PRESSURE: 115 MMHG

## 2021-02-08 DIAGNOSIS — F33.2 SEVERE RECURRENT MAJOR DEPRESSION WITHOUT PSYCHOTIC FEATURES (H): Primary | ICD-10-CM

## 2021-02-08 PROCEDURE — 250N000009 HC RX 250: Performed by: PSYCHIATRY & NEUROLOGY

## 2021-02-08 PROCEDURE — 250N000011 HC RX IP 250 OP 636: Performed by: PSYCHIATRY & NEUROLOGY

## 2021-02-08 PROCEDURE — 258N000003 HC RX IP 258 OP 636: Performed by: PSYCHIATRY & NEUROLOGY

## 2021-02-08 PROCEDURE — U0003 INFECTIOUS AGENT DETECTION BY NUCLEIC ACID (DNA OR RNA); SEVERE ACUTE RESPIRATORY SYNDROME CORONAVIRUS 2 (SARS-COV-2) (CORONAVIRUS DISEASE [COVID-19]), AMPLIFIED PROBE TECHNIQUE, MAKING USE OF HIGH THROUGHPUT TECHNOLOGIES AS DESCRIBED BY CMS-2020-01-R: HCPCS | Performed by: PSYCHIATRY & NEUROLOGY

## 2021-02-08 PROCEDURE — 370N000017 HC ANESTHESIA TECHNICAL FEE, PER MIN

## 2021-02-08 PROCEDURE — 250N000011 HC RX IP 250 OP 636: Performed by: ANESTHESIOLOGY

## 2021-02-08 PROCEDURE — 90870 ELECTROCONVULSIVE THERAPY: CPT

## 2021-02-08 PROCEDURE — 250N000009 HC RX 250: Performed by: ANESTHESIOLOGY

## 2021-02-08 PROCEDURE — U0005 INFEC AGEN DETEC AMPLI PROBE: HCPCS | Performed by: PSYCHIATRY & NEUROLOGY

## 2021-02-08 RX ORDER — NALOXONE HYDROCHLORIDE 0.4 MG/ML
0.2 INJECTION, SOLUTION INTRAMUSCULAR; INTRAVENOUS; SUBCUTANEOUS
Status: DISCONTINUED | OUTPATIENT
Start: 2021-02-08 | End: 2021-02-09 | Stop reason: HOSPADM

## 2021-02-08 RX ORDER — SODIUM CHLORIDE, SODIUM LACTATE, POTASSIUM CHLORIDE, CALCIUM CHLORIDE 600; 310; 30; 20 MG/100ML; MG/100ML; MG/100ML; MG/100ML
INJECTION, SOLUTION INTRAVENOUS CONTINUOUS
Status: DISCONTINUED | OUTPATIENT
Start: 2021-02-08 | End: 2021-02-09 | Stop reason: HOSPADM

## 2021-02-08 RX ORDER — LABETALOL HYDROCHLORIDE 5 MG/ML
10 INJECTION, SOLUTION INTRAVENOUS
Status: DISCONTINUED | OUTPATIENT
Start: 2021-02-08 | End: 2021-02-09 | Stop reason: HOSPADM

## 2021-02-08 RX ORDER — NALOXONE HYDROCHLORIDE 0.4 MG/ML
0.4 INJECTION, SOLUTION INTRAMUSCULAR; INTRAVENOUS; SUBCUTANEOUS
Status: DISCONTINUED | OUTPATIENT
Start: 2021-02-08 | End: 2021-02-09 | Stop reason: HOSPADM

## 2021-02-08 RX ORDER — ACETAMINOPHEN 500 MG
1000 TABLET ORAL
Status: ACTIVE | OUTPATIENT
Start: 2021-02-08 | End: 2021-02-08

## 2021-02-08 RX ORDER — KETOROLAC TROMETHAMINE 30 MG/ML
30 INJECTION, SOLUTION INTRAMUSCULAR; INTRAVENOUS
Status: CANCELLED
Start: 2021-02-08 | End: 2021-02-08

## 2021-02-08 RX ORDER — KETOROLAC TROMETHAMINE 30 MG/ML
30 INJECTION, SOLUTION INTRAMUSCULAR; INTRAVENOUS
Status: COMPLETED | OUTPATIENT
Start: 2021-02-08 | End: 2021-02-08

## 2021-02-08 RX ORDER — CAFFEINE AND SODIUM BENZOATE 125 MG/ML
500 INJECTION, SOLUTION INTRAMUSCULAR; INTRAVENOUS
Status: CANCELLED
Start: 2021-02-08 | End: 2021-02-08

## 2021-02-08 RX ORDER — ACETAMINOPHEN 500 MG
1000 TABLET ORAL
Status: CANCELLED
Start: 2021-02-08 | End: 2021-02-08

## 2021-02-08 RX ORDER — HALOPERIDOL 5 MG/ML
5 INJECTION INTRAMUSCULAR
Status: COMPLETED | OUTPATIENT
Start: 2021-02-08 | End: 2021-02-08

## 2021-02-08 RX ORDER — CAFFEINE AND SODIUM BENZOATE 125 MG/ML
INJECTION, SOLUTION INTRAMUSCULAR; INTRAVENOUS PRN
Status: DISCONTINUED | OUTPATIENT
Start: 2021-02-08 | End: 2021-02-08

## 2021-02-08 RX ORDER — LANOLIN ALCOHOL/MO/W.PET/CERES
400 CREAM (GRAM) TOPICAL DAILY
COMMUNITY
End: 2022-03-11

## 2021-02-08 RX ORDER — HYDROXYZINE HYDROCHLORIDE 25 MG/1
25-50 TABLET, FILM COATED ORAL 3 TIMES DAILY PRN
Qty: 90 TABLET | Refills: 1 | Status: SHIPPED | OUTPATIENT
Start: 2021-02-08 | End: 2021-03-05

## 2021-02-08 RX ORDER — CAFFEINE AND SODIUM BENZOATE 125 MG/ML
500 INJECTION, SOLUTION INTRAMUSCULAR; INTRAVENOUS
Status: COMPLETED | OUTPATIENT
Start: 2021-02-08 | End: 2021-02-08

## 2021-02-08 RX ORDER — HALOPERIDOL 5 MG/ML
5 INJECTION INTRAMUSCULAR
Status: CANCELLED
Start: 2021-02-08

## 2021-02-08 RX ORDER — ONDANSETRON 4 MG/1
4 TABLET, ORALLY DISINTEGRATING ORAL EVERY 30 MIN PRN
Status: DISCONTINUED | OUTPATIENT
Start: 2021-02-08 | End: 2021-02-09 | Stop reason: HOSPADM

## 2021-02-08 RX ORDER — ONDANSETRON 2 MG/ML
4 INJECTION INTRAMUSCULAR; INTRAVENOUS
Status: CANCELLED
Start: 2021-02-08 | End: 2021-02-08

## 2021-02-08 RX ORDER — ONDANSETRON 2 MG/ML
4 INJECTION INTRAMUSCULAR; INTRAVENOUS
Status: COMPLETED | OUTPATIENT
Start: 2021-02-08 | End: 2021-02-08

## 2021-02-08 RX ORDER — FENTANYL CITRATE 50 UG/ML
25-50 INJECTION, SOLUTION INTRAMUSCULAR; INTRAVENOUS
Status: DISCONTINUED | OUTPATIENT
Start: 2021-02-08 | End: 2021-02-09 | Stop reason: HOSPADM

## 2021-02-08 RX ORDER — ONDANSETRON 2 MG/ML
4 INJECTION INTRAMUSCULAR; INTRAVENOUS EVERY 30 MIN PRN
Status: DISCONTINUED | OUTPATIENT
Start: 2021-02-08 | End: 2021-02-09 | Stop reason: HOSPADM

## 2021-02-08 RX ADMIN — KETOROLAC TROMETHAMINE 30 MG: 30 INJECTION, SOLUTION INTRAMUSCULAR at 10:14

## 2021-02-08 RX ADMIN — Medication 80 MG: at 10:33

## 2021-02-08 RX ADMIN — METHOHEXITAL SODIUM 60 MG: 500 INJECTION, POWDER, LYOPHILIZED, FOR SOLUTION INTRAMUSCULAR; INTRAVENOUS; RECTAL at 10:33

## 2021-02-08 RX ADMIN — SODIUM CHLORIDE, POTASSIUM CHLORIDE, SODIUM LACTATE AND CALCIUM CHLORIDE 500 ML: 600; 310; 30; 20 INJECTION, SOLUTION INTRAVENOUS at 10:14

## 2021-02-08 RX ADMIN — ONDANSETRON 4 MG: 2 INJECTION INTRAMUSCULAR; INTRAVENOUS at 10:14

## 2021-02-08 RX ADMIN — CAFFEINE AND SODIUM BENZOATE 500 MG: 125 INJECTION, SOLUTION INTRAMUSCULAR; INTRAVENOUS at 10:32

## 2021-02-08 RX ADMIN — HALOPERIDOL LACTATE 5 MG: 5 INJECTION, SOLUTION INTRAMUSCULAR at 10:14

## 2021-02-08 RX ADMIN — METHOHEXITAL SODIUM 80 MG: 500 INJECTION, POWDER, LYOPHILIZED, FOR SOLUTION INTRAMUSCULAR; INTRAVENOUS; RECTAL at 10:29

## 2021-02-08 RX ADMIN — MIDAZOLAM 2 MG: 1 INJECTION INTRAMUSCULAR; INTRAVENOUS at 10:35

## 2021-02-08 RX ADMIN — CAFFEINE AND SODIUM BENZOATE 500 MG: 125 INJECTION, SOLUTION INTRAMUSCULAR; INTRAVENOUS at 10:26

## 2021-02-08 NOTE — IP AVS SNAPSHOT
[facility]  [address] [address]  Phone: [phone]                                    After Visit Summary   2/8/2021    Kuldeep Sequeira    MRN: [id number]           After Visit Summary Signature Page    I have received my discharge instructions, and my questions have been answered. I have discussed any challenges I see with this plan with the nurse or doctor.    ..........................................................................................................................................  Patient/Patient Representative Signature      ..........................................................................................................................................  Patient Representative Print Name and Relationship to Patient    ..................................................               ................................................  Date                                   Time    ..........................................................................................................................................  Reviewed by Signature/Title    ...................................................              ..............................................  Date                                               Time          22EPIC Rev 08/18

## 2021-02-08 NOTE — DISCHARGE INSTRUCTIONS
ECT Discharge Instructions      During your ECT series:      Do not drive or work heavy equipment until 7 days after your last treatment.    Do not drink alcohol or use street drugs (illicit drugs) while you are having treatments.    Do not make important decisions, including legal decisions.    After your maintenance ECT treatment:      Do not drive for 24 hours after treatment.  If you will be having more than one treatment witihin a week, no driving until 7 days after your last ECT treatment.       After each treatment:      Get plenty of rest. A responsible adult must stay with your for at least 6 hours.    Avoid heavy or risky activities for 24 hours.    If you feel light-headed, sit for a few minutes before standing. Have someone help you get up.    If you have nausea (feel sick to your stomach): Drink only clear liquids such as apple juice, ginger ale, broth or 7UP, Be sure to drink plenty of liquids. Move to a normal diet as you feel able.     If you received Toradol, wait 6 hours before taking ibuprofen.    Call your doctor if:     You have a fever over 100F (37.7 C) (taken under the tongue), or a fever that last more than 24 hours.    Your IV site is red, swollen, very painful or is getting more tender.    You have nausea that gets very bad or does not improve.      If you have any symptoms after ECT, tell our staff before your next treatment.    The ECT Department can be reached at [phone].  The ECT Department is open Mondays, Wednesdays and Fridays from 7:00 AM to 2:00 PM.    To speak to a doctor, call: Dr. Markham's office # [phone] Fax # [phone]    Today you received the following:     - IV Zofran 4mg at 10am for nausea.   - Toradol 30mg today at 10am.  Toradol is an NSAID.  Do not take any NSAID's including: Ibuprofen, Naproxen, Aspirin, Advil, Motrin, Aleve, Rodrick, etc., until 6 hours after the above time (4p).  If you have any questions, contact your physician or pharmacist.    -500mL of  fluids (lactated ringers)    Transported by: Evon, wife    Today we completed your Covid test for your next appointment.  You do not need to do anything further. Your covid test results will be available by your next ECT treatment.    Instructions for your next appointment:    *Covid-19 Testing:  Our central scheduling department will be calling you to schedule a Covid-19 test.  You will be scheduled to have this test before your next ECT treatment. If you do not receive a phone call, please call [phone] to schedule your Covid test to be performed 2 days before your scheduled ECT appointment.     Please come to the [facility] entrance and check-in with Security before your ECT appointment.  The [facility] entrance is located right next to the Adult Emergency Room.  The address is [address] [place] [address].    After your appointment, you may be picked up at the [facility] entrance, which is located on the West side of our campus.  The address is [address] [place] [address].  [facility].      Per Dr Markham: On 2/3/21 add Vistaril 25 to 50mg po tid prn anxiety. (sent to [facility] in [place])  Can work on non-ECT days if you feel up to it but you cannot drive.

## 2021-02-08 NOTE — ANESTHESIA POSTPROCEDURE EVALUATION
Patient: Kuldeep Sequeira    * No procedures listed *    Diagnosis:* No pre-op diagnosis entered *  Diagnosis Additional Information: No value filed.    Anesthesia Type:  General    Note:     Postop Pain Control: Uneventful            Sign Out: Well controlled pain   PONV: No   Neuro/Psych: Uneventful            Sign Out: Acceptable/Baseline neuro status   Airway/Respiratory: Uneventful            Sign Out: Acceptable/Baseline resp. status   CV/Hemodynamics: Uneventful            Sign Out: Acceptable CV status   Other NRE: NONE   DID A NON-ROUTINE EVENT OCCUR?          Last vitals:  Vitals:    02/08/21 0948   BP: 123/77   Pulse: 82   Resp: 16   Temp: 36.7  C (98.1  F)   SpO2: 99%       Last vitals prior to Anesthesia Care Transfer:  CRNA VITALS  2/8/2021 1010 - 2/8/2021 1041      2/8/2021             Resp Rate (observed):  16          Electronically Signed By: Chauncey Hill DO  February 8, 2021  10:41 AM

## 2021-02-08 NOTE — PROCEDURES
Procedure/Surgery Information   [facility]     Bedside Procedure Note  Date of Service (when I performed the procedure): 02/08/2021    Kuldeep Sequeira is a 50 year old male patient.  1. Severe recurrent major depression without psychotic features (H)      No past medical history on file.                       Procedures     Wm Markham     [facility], [place]   ECT Procedure Note   02/08/2021    Kuldeep Sequeira [id number]   50 year old 1970     Patient Status: Outpatient    Is this the first in a series of 12 treatments?  No    History and Physical: Reviewed in medical record    Consent: Informed consent was signed by: patient    Date Consent Signed: 2/1/21     No Known Allergies    Weight:  0 lbs 0 oz    Patient Preparations: (none)    Wt Readings from Last 5 Encounters:   11/19/20 78.4 kg (172 lb 14.4 oz)       There were no vitals taken for this visit.         Indications for ECT:   Medications ineffective         Clinical Narrative:   Patient has a long history of depression and is continuing ECT for treatment-resistant depression.  He's doing maintenance ECT now.  NPO after 2400.  Alert and Oriented x 3.  No problems with the last ECT.     Effexor dose was increased recently 1/27/21 from 150mg/d to 225 mg/d.  He's eating better.   He's still depressed.  He's anxious.           Diagnosis:   Major depression         Pause for the Cause:     Right patient Yes   Right procedure/laterality settings: Yes          Intra-Procedure Documentation:     ECT #: 4 of 12   Treatment number this series: 17   Total treatment number: 17     Type of ECT:   BILATERAL since treatment #6 (instead of Right, unilateral standard)     ECT Medications:      Zyprexa: 10mg  Tylenol: 1000mg  Toradol: 30mg  Zofran: 4mg  Lactated Ringers: 1/2 liter  Brevital: 80mg + 20mg + 40mg (80mg usually works, but it's slow)  Caffeine: 500mg  Succinyl Choline: 80mg  Versed: 2mg for  h/o agitation (give at 50 seconds)      ECT Strip Summary:   Energy Level: 25 percent   Motor Seizure Duration:  42 seconds  EEG Seizure Duration:   45 seconds     Complications: No    Plan:   COVID test 2/8/21  Next ECT 2/10/21  Increased Effexor XR from 150mg to 225mg daily.  (Extra 75mg caps sent to his pharmacy) on 1/27/21  On 2/3/21 add Vistaril 25 to 50mg po tid prn anxiety. (send to [facility] in [place])  Can work on non-ECT days if he feels up to it but cannot drive.      Meds per Monica Stevens, CNS    Wm Markham MD

## 2021-02-08 NOTE — ANESTHESIA PREPROCEDURE EVALUATION
Anesthesia Pre-Procedure Evaluation    Patient: Kuldeep Sequeira   MRN: [id number] : 1970        Preoperative Diagnosis: * No surgery found *   Procedure :      No past medical history on file.   No past surgical history on file.   No Known Allergies   Social History     Tobacco Use     Smoking status: Not on file   Substance Use Topics     Alcohol use: Not on file      Wt Readings from Last 1 Encounters:   20 78.4 kg (172 lb 14.4 oz)           Physical Exam    Airway        Mallampati: II    Neck ROM: full   Mouth opening: > 3 cm    Respiratory Devices and Support         Dental  no notable dental history         Cardiovascular   cardiovascular exam normal          Pulmonary   pulmonary exam normal                OUTSIDE LABS:  CBC:   Lab Results   Component Value Date    WBC 5.3 2020    HGB 15.5 2020    HCT 45.1 2020     2020     BMP:   Lab Results   Component Value Date     2020    POTASSIUM 4.6 2020    POTASSIUM 4.3 2020    CHLORIDE 106 2020    CO2 32 2020    BUN 18 2020    CR 0.80 2020    CR 0.91 2020    GLC 97 2020    GLC 90 2020     COAGS: No results found for: PTT, INR, FIBR  POC: No results found for: BGM, HCG, HCGS  HEPATIC:   Lab Results   Component Value Date    ALBUMIN 3.8 2020    PROTTOTAL 7.0 2020    ALT 22 2020    AST 10 2020    ALKPHOS 61 2020    BILITOTAL 1.1 2020     OTHER:   Lab Results   Component Value Date    RANJITH 8.8 2020    TSH 1.82 2020       Anesthesia Plan    ASA Status:  2      Anesthesia Type: General   Induction: Intravenous           Consents            Postoperative Care            Comments:                Chauncey Hill DO

## 2021-02-09 ENCOUNTER — ANESTHESIA EVENT (OUTPATIENT)
Dept: BEHAVIORAL HEALTH | Facility: CLINIC | Age: 51
End: 2021-02-09

## 2021-02-10 ENCOUNTER — HOSPITAL ENCOUNTER (OUTPATIENT)
Dept: BEHAVIORAL HEALTH | Facility: CLINIC | Age: 51
Discharge: HOME | End: 2021-02-10
Attending: PSYCHIATRY & NEUROLOGY | Admitting: PSYCHIATRY & NEUROLOGY
Payer: COMMERCIAL

## 2021-02-10 ENCOUNTER — ANESTHESIA (OUTPATIENT)
Dept: BEHAVIORAL HEALTH | Facility: CLINIC | Age: 51
End: 2021-02-10

## 2021-02-10 VITALS
OXYGEN SATURATION: 97 % | HEART RATE: 77 BPM | SYSTOLIC BLOOD PRESSURE: 125 MMHG | DIASTOLIC BLOOD PRESSURE: 88 MMHG | TEMPERATURE: 97.6 F | RESPIRATION RATE: 16 BRPM

## 2021-02-10 DIAGNOSIS — F33.2 SEVERE RECURRENT MAJOR DEPRESSION WITHOUT PSYCHOTIC FEATURES (H): Primary | ICD-10-CM

## 2021-02-10 PROCEDURE — 250N000009 HC RX 250: Performed by: PSYCHIATRY & NEUROLOGY

## 2021-02-10 PROCEDURE — 370N000017 HC ANESTHESIA TECHNICAL FEE, PER MIN

## 2021-02-10 PROCEDURE — 250N000011 HC RX IP 250 OP 636: Performed by: ANESTHESIOLOGY

## 2021-02-10 PROCEDURE — 250N000011 HC RX IP 250 OP 636: Performed by: PSYCHIATRY & NEUROLOGY

## 2021-02-10 PROCEDURE — 90870 ELECTROCONVULSIVE THERAPY: CPT

## 2021-02-10 PROCEDURE — 250N000009 HC RX 250: Performed by: ANESTHESIOLOGY

## 2021-02-10 PROCEDURE — 87635 SARS-COV-2 COVID-19 AMP PRB: CPT | Performed by: PSYCHIATRY & NEUROLOGY

## 2021-02-10 RX ORDER — HALOPERIDOL 5 MG/ML
5 INJECTION INTRAMUSCULAR
Status: DISCONTINUED | OUTPATIENT
Start: 2021-02-10 | End: 2021-02-11 | Stop reason: HOSPADM

## 2021-02-10 RX ORDER — CAFFEINE AND SODIUM BENZOATE 125 MG/ML
500 INJECTION, SOLUTION INTRAMUSCULAR; INTRAVENOUS
Status: CANCELLED
Start: 2021-02-10 | End: 2021-02-10

## 2021-02-10 RX ORDER — CLONAZEPAM 0.5 MG/1
0.5 TABLET ORAL 3 TIMES DAILY PRN
Qty: 90 TABLET | Refills: 0 | Status: SHIPPED | OUTPATIENT
Start: 2021-02-10 | End: 2022-03-11

## 2021-02-10 RX ORDER — ONDANSETRON 2 MG/ML
4 INJECTION INTRAMUSCULAR; INTRAVENOUS
Status: COMPLETED | OUTPATIENT
Start: 2021-02-10 | End: 2021-02-10

## 2021-02-10 RX ORDER — GLYCOPYRROLATE 0.2 MG/ML
0.2 INJECTION, SOLUTION INTRAMUSCULAR; INTRAVENOUS
Status: CANCELLED
Start: 2021-02-10 | End: 2021-02-10

## 2021-02-10 RX ORDER — GLYCOPYRROLATE 0.2 MG/ML
0.2 INJECTION, SOLUTION INTRAMUSCULAR; INTRAVENOUS
Status: ACTIVE | OUTPATIENT
Start: 2021-02-10 | End: 2021-02-10

## 2021-02-10 RX ORDER — KETOROLAC TROMETHAMINE 30 MG/ML
30 INJECTION, SOLUTION INTRAMUSCULAR; INTRAVENOUS
Status: COMPLETED | OUTPATIENT
Start: 2021-02-10 | End: 2021-02-10

## 2021-02-10 RX ORDER — NALOXONE HYDROCHLORIDE 0.4 MG/ML
0.4 INJECTION, SOLUTION INTRAMUSCULAR; INTRAVENOUS; SUBCUTANEOUS
Status: DISCONTINUED | OUTPATIENT
Start: 2021-02-10 | End: 2021-02-10

## 2021-02-10 RX ORDER — NALOXONE HYDROCHLORIDE 0.4 MG/ML
0.2 INJECTION, SOLUTION INTRAMUSCULAR; INTRAVENOUS; SUBCUTANEOUS
Status: DISCONTINUED | OUTPATIENT
Start: 2021-02-10 | End: 2021-02-10

## 2021-02-10 RX ORDER — CAFFEINE AND SODIUM BENZOATE 125 MG/ML
500 INJECTION, SOLUTION INTRAMUSCULAR; INTRAVENOUS
Status: COMPLETED | OUTPATIENT
Start: 2021-02-10 | End: 2021-02-10

## 2021-02-10 RX ORDER — ACETAMINOPHEN 500 MG
1000 TABLET ORAL
Status: ACTIVE | OUTPATIENT
Start: 2021-02-10 | End: 2021-02-10

## 2021-02-10 RX ORDER — ONDANSETRON 2 MG/ML
4 INJECTION INTRAMUSCULAR; INTRAVENOUS
Status: CANCELLED
Start: 2021-02-10 | End: 2021-02-10

## 2021-02-10 RX ORDER — KETOROLAC TROMETHAMINE 30 MG/ML
30 INJECTION, SOLUTION INTRAMUSCULAR; INTRAVENOUS
Status: CANCELLED
Start: 2021-02-10 | End: 2021-02-10

## 2021-02-10 RX ORDER — HALOPERIDOL 5 MG/ML
5 INJECTION INTRAMUSCULAR
Status: CANCELLED
Start: 2021-02-10

## 2021-02-10 RX ORDER — ACETAMINOPHEN 500 MG
1000 TABLET ORAL
Status: CANCELLED
Start: 2021-02-10 | End: 2021-02-10

## 2021-02-10 RX ADMIN — Medication 80 MG: at 10:45

## 2021-02-10 RX ADMIN — MIDAZOLAM 2 MG: 1 INJECTION INTRAMUSCULAR; INTRAVENOUS at 10:50

## 2021-02-10 RX ADMIN — ONDANSETRON 4 MG: 2 INJECTION INTRAMUSCULAR; INTRAVENOUS at 10:35

## 2021-02-10 RX ADMIN — KETOROLAC TROMETHAMINE 30 MG: 30 INJECTION, SOLUTION INTRAMUSCULAR; INTRAVENOUS at 10:35

## 2021-02-10 RX ADMIN — CAFFEINE AND SODIUM BENZOATE 500 MG: 125 INJECTION, SOLUTION INTRAMUSCULAR; INTRAVENOUS at 10:45

## 2021-02-10 RX ADMIN — METHOHEXITAL SODIUM 100 MG: 500 INJECTION, POWDER, LYOPHILIZED, FOR SOLUTION INTRAMUSCULAR; INTRAVENOUS; RECTAL at 10:45

## 2021-02-10 NOTE — ANESTHESIA PREPROCEDURE EVALUATION
Anesthesia Pre-Procedure Evaluation    Patient: Kuldeep Sequeira   MRN: [id number] : 1970        Preoperative Diagnosis: * No surgery found *   Procedure :      No past medical history on file.   No past surgical history on file.   No Known Allergies   Social History     Tobacco Use     Smoking status: Not on file   Substance Use Topics     Alcohol use: Not on file      Wt Readings from Last 1 Encounters:   20 78.4 kg (172 lb 14.4 oz)        Anesthesia Evaluation   Pt has had prior anesthetic. Type: General.        ROS/MED HX  ENT/Pulmonary:  - neg pulmonary ROS     Neurologic:  - neg neurologic ROS     Cardiovascular:  - neg cardiovascular ROS     METS/Exercise Tolerance:     Hematologic:  - neg hematologic  ROS     Musculoskeletal:  - neg musculoskeletal ROS     GI/Hepatic:  - neg GI/hepatic ROS     Renal/Genitourinary:  - neg Renal ROS     Endo:  - neg endo ROS     Psychiatric/Substance Use:     (+) psychiatric history depression     Infectious Disease:  - neg infectious disease ROS     Malignancy:  - neg malignancy ROS     Other:  - neg other ROS          Physical Exam    Airway        Mallampati: II   TM distance: > 3 FB   Neck ROM: full   Mouth opening: > 3 cm    Respiratory Devices and Support         Dental  no notable dental history         Cardiovascular   cardiovascular exam normal          Pulmonary   pulmonary exam normal                OUTSIDE LABS:  CBC:   Lab Results   Component Value Date    WBC 5.3 2020    HGB 15.5 2020    HCT 45.1 2020     2020     BMP:   Lab Results   Component Value Date     2020    POTASSIUM 4.6 2020    POTASSIUM 4.3 2020    CHLORIDE 106 2020    CO2 32 2020    BUN 18 2020    CR 0.80 2020    CR 0.91 2020    GLC 97 2020    GLC 90 2020     COAGS: No results found for: PTT, INR, FIBR  POC: No results found for: BGM, HCG, HCGS  HEPATIC:   Lab Results   Component Value Date     ALBUMIN 3.8 11/19/2020    PROTTOTAL 7.0 11/19/2020    ALT 22 11/19/2020    AST 10 11/19/2020    ALKPHOS 61 11/19/2020    BILITOTAL 1.1 11/19/2020     OTHER:   Lab Results   Component Value Date    RANJITH 8.8 11/19/2020    TSH 1.82 11/19/2020       Anesthesia Plan    ASA Status:  2      Anesthesia Type: General.     - Airway: Face Mask   Induction: Intravenous.   Maintenance: N/A.        Consents    Anesthesia Plan(s) and associated risks, benefits, and realistic alternatives discussed. Questions answered and patient/representative(s) expressed understanding.     - Discussed with:  Patient         Postoperative Care            Comments:                    Eva Ibarra MD

## 2021-02-10 NOTE — IP AVS SNAPSHOT
[facility]  [address] [address]  Phone: [phone]                                    After Visit Summary   2/10/2021    Kuldeep Sequeira    MRN: [id number]           After Visit Summary Signature Page    I have received my discharge instructions, and my questions have been answered. I have discussed any challenges I see with this plan with the nurse or doctor.    ..........................................................................................................................................  Patient/Patient Representative Signature      ..........................................................................................................................................  Patient Representative Print Name and Relationship to Patient    ..................................................               ................................................  Date                                   Time    ..........................................................................................................................................  Reviewed by Signature/Title    ...................................................              ..............................................  Date                                               Time          22EPIC Rev 08/18

## 2021-02-10 NOTE — DISCHARGE INSTRUCTIONS
ECT Discharge Instructions      During your ECT series:      Do not drive or work heavy equipment until 7 days after your last treatment.    Do not drink alcohol or use street drugs (illicit drugs) while you are having treatments.    Do not make important decisions, including legal decisions.    After each treatment:      Get plenty of rest. A responsible adult must stay with your for at least 6 hours.    Avoid heavy or risky activities for 24 hours.     Do not drive for at least 24 hours after your treatment.     If you have more than one treatment within one week, do not drive for 7 days after your last treatment.     If you feel light-headed, sit for a few minutes before standing. Have someone help you get up.    If you have nausea (feel sick to your stomach): Drink only clear liquids such as apple juice, ginger ale, broth or 7UP, Be sure to drink plenty of liquids. Move to a normal diet as you feel able.     If you received Toradol, wait 6 hours before taking ibuprofen.    Call your doctor if:     You have a fever over 100F (37.7 C) (taken under the tongue), or a fever that last more than 24 hours.    Your IV site is red, swollen, very painful or is getting more tender.    You have nausea that gets very bad or does not improve.      If you have any symptoms after ECT, tell our staff before your next treatment.    The ECT Department can be reached at [phone].  The ECT Department is open Mondays, Wednesdays and Fridays from 7:00 AM to 2:00 PM.    To speak to a doctor, call:  Your primary care provider or Dr. Markham at [phone]      Other: Covid-19 Testing: You had a covid-19 test done today in the ECT department.  We should have the results by your next ECT appointment.      Transported by: Evon

## 2021-02-10 NOTE — PROCEDURES
Procedure/Surgery Information   [facility]     Bedside Procedure Note  Date of Service (when I performed the procedure): 02/10/2021    Kuldeep Sequeira is a 50 year old male patient.  1. Severe recurrent major depression without psychotic features (H)      No past medical history on file.  Temp: 97.6  F (36.4  C) Temp src: Temporal BP: 134/75 Pulse: 75   Resp: 16 SpO2: 99 % O2 Device: None (Room air)      Procedures     Wm Markham     [facility], [place]   ECT Procedure Note   02/10/2021    Kuldeep Sequeira [id number]   50 year old 1970     Patient Status: Outpatient    Is this the first in a series of 12 treatments?  No    History and Physical: Reviewed in medical record    Consent: Informed consent was signed by: patient    Date Consent Signed: 2/1/21     No Known Allergies    Weight:  0 lbs 0 oz    Patient Preparations: (none)    Wt Readings from Last 5 Encounters:   11/19/20 78.4 kg (172 lb 14.4 oz)       /75   Pulse 75   Temp 97.6  F (36.4  C) (Temporal)   Resp 16   SpO2 99%          Indications for ECT:   Medications ineffective         Clinical Narrative:   Patient has a long history of depression and is continuing ECT for treatment-resistant depression.  He's doing a 2nd series of ECT now.  NPO after 2400.  Alert and Oriented x 3.  No problems with the last ECT.     Effexor dose was increased recently 1/27/21 from 150mg/d to 225 mg/d.  He's eating better. Doing some cooking.   Too anxious to work.    He's still depressed.          Diagnosis:   Major depression         Pause for the Cause:     Right patient Yes   Right procedure/laterality settings: Yes          Intra-Procedure Documentation:     ECT #: 5 of 12   Treatment number this series: 18   Total treatment number: 18     Type of ECT:   BILATERAL since treatment #6 (instead of Right, unilateral standard)     ECT Medications:      Next Tx add:  Robinul: 0.2mg  Zyprexa:  10mg  Tylenol: 1000mg  Toradol: 30mg  Zofran: 4mg  Lactated Ringers: 1/2 liter  Brevital: 100mg  Caffeine: 500mg  Succinyl Choline: 100mg  Versed: 2mg for h/o agitation (give at 50 seconds)      ECT Strip Summary:   Energy Level: 25 percent  (increase to 30 percent next Tx)  Motor Seizure Duration:  29 seconds  EEG Seizure Duration:   38 seconds     Complications: brief asystole 2/10/21 which resolved without intervention.   Will add Robinul next Tx.     Plan:   COVID test 2/10/21  Next ECT 2/12/21  Increased Effexor XR from 150mg to 225mg daily.  (Extra 75mg caps sent to his pharmacy) on 1/27/21  On 2/3/21 added Vistaril 25 to 50mg po tid prn anxiety. (send to [facility] in [place])  On 2/10/21 added Klonopin 0.5mg po tid prn anxiety.    Can work on non-ECT days if he feels up to it but cannot drive.      Meds per Monica Stevens, SHAMAR Markham MD

## 2021-02-10 NOTE — ANESTHESIA POSTPROCEDURE EVALUATION
Patient: Kuldeep Sequeira    * No procedures listed *    Diagnosis:* No pre-op diagnosis entered *  Diagnosis Additional Information: No value filed.    Anesthesia Type:  General    Note:  Disposition: Outpatient   Postop Pain Control: Uneventful            Sign Out: Well controlled pain   PONV: No   Neuro/Psych: Uneventful            Sign Out: Acceptable/Baseline neuro status   Airway/Respiratory: Uneventful            Sign Out: Acceptable/Baseline resp. status   CV/Hemodynamics: Uneventful            Sign Out: Acceptable CV status   Other NRE: NONE   DID A NON-ROUTINE EVENT OCCUR? No         Last vitals:  Vitals:    02/10/21 1004 02/10/21 1059   BP: 134/75 124/87   Pulse: 75 79   Resp: 16 16   Temp: 36.4  C (97.6  F) 36.5  C (97.7  F)   SpO2: 99% 99%       Last vitals prior to Anesthesia Care Transfer:  CRNA VITALS  2/10/2021 1031 - 2/10/2021 1113      2/10/2021             Resp Rate (observed):  (!) 1          Electronically Signed By: Eva Ibarra MD  February 10, 2021  11:13 AM

## 2021-02-12 ENCOUNTER — HOSPITAL ENCOUNTER (OUTPATIENT)
Dept: BEHAVIORAL HEALTH | Facility: CLINIC | Age: 51
Discharge: HOME | End: 2021-02-12
Attending: PSYCHIATRY & NEUROLOGY | Admitting: PSYCHIATRY & NEUROLOGY
Payer: COMMERCIAL

## 2021-02-12 ENCOUNTER — ANESTHESIA EVENT (OUTPATIENT)
Dept: BEHAVIORAL HEALTH | Facility: CLINIC | Age: 51
End: 2021-02-12

## 2021-02-12 ENCOUNTER — ANESTHESIA (OUTPATIENT)
Dept: BEHAVIORAL HEALTH | Facility: CLINIC | Age: 51
End: 2021-02-12

## 2021-02-12 VITALS
DIASTOLIC BLOOD PRESSURE: 90 MMHG | TEMPERATURE: 98.3 F | RESPIRATION RATE: 16 BRPM | HEART RATE: 87 BPM | OXYGEN SATURATION: 100 % | SYSTOLIC BLOOD PRESSURE: 134 MMHG

## 2021-02-12 DIAGNOSIS — F33.2 SEVERE RECURRENT MAJOR DEPRESSION WITHOUT PSYCHOTIC FEATURES (H): Primary | ICD-10-CM

## 2021-02-12 PROCEDURE — U0003 INFECTIOUS AGENT DETECTION BY NUCLEIC ACID (DNA OR RNA); SEVERE ACUTE RESPIRATORY SYNDROME CORONAVIRUS 2 (SARS-COV-2) (CORONAVIRUS DISEASE [COVID-19]), AMPLIFIED PROBE TECHNIQUE, MAKING USE OF HIGH THROUGHPUT TECHNOLOGIES AS DESCRIBED BY CMS-2020-01-R: HCPCS | Performed by: PSYCHIATRY & NEUROLOGY

## 2021-02-12 PROCEDURE — 250N000009 HC RX 250: Performed by: ANESTHESIOLOGY

## 2021-02-12 PROCEDURE — 258N000003 HC RX IP 258 OP 636: Performed by: PSYCHIATRY & NEUROLOGY

## 2021-02-12 PROCEDURE — U0005 INFEC AGEN DETEC AMPLI PROBE: HCPCS | Performed by: PSYCHIATRY & NEUROLOGY

## 2021-02-12 PROCEDURE — 90870 ELECTROCONVULSIVE THERAPY: CPT

## 2021-02-12 PROCEDURE — 250N000011 HC RX IP 250 OP 636: Performed by: PSYCHIATRY & NEUROLOGY

## 2021-02-12 PROCEDURE — 250N000009 HC RX 250: Performed by: PSYCHIATRY & NEUROLOGY

## 2021-02-12 PROCEDURE — 370N000017 HC ANESTHESIA TECHNICAL FEE, PER MIN

## 2021-02-12 PROCEDURE — 250N000011 HC RX IP 250 OP 636: Performed by: ANESTHESIOLOGY

## 2021-02-12 RX ORDER — CAFFEINE AND SODIUM BENZOATE 125 MG/ML
500 INJECTION, SOLUTION INTRAMUSCULAR; INTRAVENOUS
Status: CANCELLED
Start: 2021-02-12 | End: 2021-02-12

## 2021-02-12 RX ORDER — ONDANSETRON 2 MG/ML
4 INJECTION INTRAMUSCULAR; INTRAVENOUS EVERY 30 MIN PRN
Status: DISCONTINUED | OUTPATIENT
Start: 2021-02-12 | End: 2021-02-13 | Stop reason: HOSPADM

## 2021-02-12 RX ORDER — KETOROLAC TROMETHAMINE 30 MG/ML
30 INJECTION, SOLUTION INTRAMUSCULAR; INTRAVENOUS
Status: CANCELLED
Start: 2021-02-12 | End: 2021-02-12

## 2021-02-12 RX ORDER — GLYCOPYRROLATE 0.2 MG/ML
0.2 INJECTION, SOLUTION INTRAMUSCULAR; INTRAVENOUS
Status: CANCELLED
Start: 2021-02-12 | End: 2021-02-12

## 2021-02-12 RX ORDER — NALOXONE HYDROCHLORIDE 0.4 MG/ML
0.2 INJECTION, SOLUTION INTRAMUSCULAR; INTRAVENOUS; SUBCUTANEOUS
Status: DISCONTINUED | OUTPATIENT
Start: 2021-02-12 | End: 2021-02-13 | Stop reason: HOSPADM

## 2021-02-12 RX ORDER — GLYCOPYRROLATE 0.2 MG/ML
0.2 INJECTION, SOLUTION INTRAMUSCULAR; INTRAVENOUS
Status: COMPLETED | OUTPATIENT
Start: 2021-02-12 | End: 2021-02-12

## 2021-02-12 RX ORDER — NALOXONE HYDROCHLORIDE 0.4 MG/ML
0.4 INJECTION, SOLUTION INTRAMUSCULAR; INTRAVENOUS; SUBCUTANEOUS
Status: DISCONTINUED | OUTPATIENT
Start: 2021-02-12 | End: 2021-02-13 | Stop reason: HOSPADM

## 2021-02-12 RX ORDER — ONDANSETRON 2 MG/ML
4 INJECTION INTRAMUSCULAR; INTRAVENOUS
Status: CANCELLED
Start: 2021-02-12 | End: 2021-02-12

## 2021-02-12 RX ORDER — KETOROLAC TROMETHAMINE 30 MG/ML
30 INJECTION, SOLUTION INTRAMUSCULAR; INTRAVENOUS
Status: COMPLETED | OUTPATIENT
Start: 2021-02-12 | End: 2021-02-12

## 2021-02-12 RX ORDER — ONDANSETRON 2 MG/ML
4 INJECTION INTRAMUSCULAR; INTRAVENOUS
Status: COMPLETED | OUTPATIENT
Start: 2021-02-12 | End: 2021-02-12

## 2021-02-12 RX ORDER — SODIUM CHLORIDE, SODIUM LACTATE, POTASSIUM CHLORIDE, CALCIUM CHLORIDE 600; 310; 30; 20 MG/100ML; MG/100ML; MG/100ML; MG/100ML
INJECTION, SOLUTION INTRAVENOUS CONTINUOUS
Status: DISCONTINUED | OUTPATIENT
Start: 2021-02-12 | End: 2021-02-13 | Stop reason: HOSPADM

## 2021-02-12 RX ORDER — LABETALOL HYDROCHLORIDE 5 MG/ML
10 INJECTION, SOLUTION INTRAVENOUS
Status: DISCONTINUED | OUTPATIENT
Start: 2021-02-12 | End: 2021-02-13 | Stop reason: HOSPADM

## 2021-02-12 RX ORDER — ACETAMINOPHEN 500 MG
1000 TABLET ORAL
Status: CANCELLED
Start: 2021-02-12 | End: 2021-02-12

## 2021-02-12 RX ORDER — CAFFEINE AND SODIUM BENZOATE 125 MG/ML
500 INJECTION, SOLUTION INTRAMUSCULAR; INTRAVENOUS
Status: COMPLETED | OUTPATIENT
Start: 2021-02-12 | End: 2021-02-12

## 2021-02-12 RX ORDER — ACETAMINOPHEN 500 MG
1000 TABLET ORAL
Status: ACTIVE | OUTPATIENT
Start: 2021-02-12 | End: 2021-02-12

## 2021-02-12 RX ORDER — ONDANSETRON 4 MG/1
4 TABLET, ORALLY DISINTEGRATING ORAL EVERY 30 MIN PRN
Status: DISCONTINUED | OUTPATIENT
Start: 2021-02-12 | End: 2021-02-13 | Stop reason: HOSPADM

## 2021-02-12 RX ADMIN — CAFFEINE AND SODIUM BENZOATE 500 MG: 125 INJECTION, SOLUTION INTRAMUSCULAR; INTRAVENOUS at 10:49

## 2021-02-12 RX ADMIN — Medication 100 MG: at 10:46

## 2021-02-12 RX ADMIN — KETOROLAC TROMETHAMINE 30 MG: 30 INJECTION, SOLUTION INTRAMUSCULAR at 10:29

## 2021-02-12 RX ADMIN — METHOHEXITAL SODIUM 100 MG: 500 INJECTION, POWDER, LYOPHILIZED, FOR SOLUTION INTRAMUSCULAR; INTRAVENOUS; RECTAL at 10:46

## 2021-02-12 RX ADMIN — SODIUM CHLORIDE, POTASSIUM CHLORIDE, SODIUM LACTATE AND CALCIUM CHLORIDE 500 ML: 600; 310; 30; 20 INJECTION, SOLUTION INTRAVENOUS at 10:28

## 2021-02-12 RX ADMIN — ONDANSETRON 4 MG: 2 INJECTION INTRAMUSCULAR; INTRAVENOUS at 10:28

## 2021-02-12 RX ADMIN — GLYCOPYRROLATE 0.2 MG: 0.2 INJECTION, SOLUTION INTRAMUSCULAR; INTRAVENOUS at 10:34

## 2021-02-12 RX ADMIN — MIDAZOLAM 2 MG: 1 INJECTION INTRAMUSCULAR; INTRAVENOUS at 10:49

## 2021-02-12 NOTE — IP AVS SNAPSHOT
[facility]  [address] [address]  Phone: [phone]                                    After Visit Summary   2/12/2021    Kuldeep Sequeira    MRN: [id number]           After Visit Summary Signature Page    I have received my discharge instructions, and my questions have been answered. I have discussed any challenges I see with this plan with the nurse or doctor.    ..........................................................................................................................................  Patient/Patient Representative Signature      ..........................................................................................................................................  Patient Representative Print Name and Relationship to Patient    ..................................................               ................................................  Date                                   Time    ..........................................................................................................................................  Reviewed by Signature/Title    ...................................................              ..............................................  Date                                               Time          22EPIC Rev 08/18

## 2021-02-12 NOTE — ANESTHESIA POSTPROCEDURE EVALUATION
Patient: Kuldeep Sequeira    * No procedures listed *    Diagnosis:* No pre-op diagnosis entered *  Diagnosis Additional Information: No value filed.    Anesthesia Type:  General    Note:     Postop Pain Control: Uneventful            Sign Out: Well controlled pain   PONV: No   Neuro/Psych: Uneventful            Sign Out: Acceptable/Baseline neuro status   Airway/Respiratory: Uneventful            Sign Out: Acceptable/Baseline resp. status   CV/Hemodynamics: Uneventful            Sign Out: Acceptable CV status   Other NRE: NONE   DID A NON-ROUTINE EVENT OCCUR?          Last vitals:  Vitals:    02/12/21 0956   BP: 110/71   Pulse: 71   Resp: 16   Temp: 36.7  C (98.1  F)   SpO2: 98%       Last vitals prior to Anesthesia Care Transfer:  CRNA VITALS  2/12/2021 1026 - 2/12/2021 1101      2/12/2021             Resp Rate (observed):  14          Electronically Signed By: Chauncey Hill DO  February 12, 2021  11:01 AM

## 2021-02-12 NOTE — ANESTHESIA PREPROCEDURE EVALUATION
Anesthesia Pre-Procedure Evaluation    Patient: Kuldeep Sequeira   MRN: [id number] : 1970        Preoperative Diagnosis: * No pre-op diagnosis entered *   Procedure : * No procedures listed *     No past medical history on file.   No past surgical history on file.   No Known Allergies   Social History     Tobacco Use     Smoking status: Not on file   Substance Use Topics     Alcohol use: Not on file      Wt Readings from Last 1 Encounters:   20 78.4 kg (172 lb 14.4 oz)           Physical Exam    Airway        Mallampati: II   TM distance: > 3 FB   Neck ROM: full   Mouth opening: > 3 cm    Respiratory Devices and Support         Dental  no notable dental history         Cardiovascular   cardiovascular exam normal          Pulmonary   pulmonary exam normal                OUTSIDE LABS:  CBC:   Lab Results   Component Value Date    WBC 5.3 2020    HGB 15.5 2020    HCT 45.1 2020     2020     BMP:   Lab Results   Component Value Date     2020    POTASSIUM 4.6 2020    POTASSIUM 4.3 2020    CHLORIDE 106 2020    CO2 32 2020    BUN 18 2020    CR 0.80 2020    CR 0.91 2020    GLC 97 2020    GLC 90 2020     COAGS: No results found for: PTT, INR, FIBR  POC: No results found for: BGM, HCG, HCGS  HEPATIC:   Lab Results   Component Value Date    ALBUMIN 3.8 2020    PROTTOTAL 7.0 2020    ALT 22 2020    AST 10 2020    ALKPHOS 61 2020    BILITOTAL 1.1 2020     OTHER:   Lab Results   Component Value Date    RANJITH 8.8 2020    TSH 1.82 2020       Anesthesia Plan    ASA Status:  2      Anesthesia Type: General.   Induction: Intravenous.           Consents            Postoperative Care            Comments:                Chauncey Hill DO

## 2021-02-12 NOTE — DISCHARGE INSTRUCTIONS
ECT Discharge Instructions      During your ECT series:      Do not drive or work heavy equipment until 7 days after your last treatment.    Do not drink alcohol or use street drugs (illicit drugs) while you are having treatments.    Do not make important decisions, including legal decisions.    After each treatment:      Get plenty of rest. A responsible adult must stay with your for at least 6 hours.    Avoid heavy or risky activities for 24 hours.     Do not drive for at least 24 hours after your treatment.     If you have more than one treatment within one week, do not drive for 7 days after your last treatment.     If you feel light-headed, sit for a few minutes before standing. Have someone help you get up.    If you have nausea (feel sick to your stomach): Drink only clear liquids such as apple juice, ginger ale, broth or 7UP, Be sure to drink plenty of liquids. Move to a normal diet as you feel able.     If you received Toradol, wait 6 hours before taking ibuprofen.    Call your doctor if:     You have a fever over 100F (37.7 C) (taken under the tongue), or a fever that last more than 24 hours.    Your IV site is red, swollen, very painful or is getting more tender.    You have nausea that gets very bad or does not improve.      If you have any symptoms after ECT, tell our staff before your next treatment.    The ECT Department can be reached at [phone].  The ECT Department is open Mondays, Wednesdays and Fridays from 7:00 AM to 2:00 PM.    To speak to a doctor, call:  Your primary care provider or Dr. Markham [phone]      Other:   You have received Toradol today at 10:30. Toradol is an NSAID.  Do not take any NSAID's including: Ibuprofen, Naproxen Sodium, Asprin, Advil, Motrin, Aleve, Rodrick, etc., until six hours after the above time which would be 4:30pm. If you have any questions check back with your Physician, or pharmacist.   Can work on non-ECT days if he feels up to it but cannot drive.         Transported by: Wife

## 2021-02-15 ENCOUNTER — ANESTHESIA EVENT (OUTPATIENT)
Dept: BEHAVIORAL HEALTH | Facility: CLINIC | Age: 51
End: 2021-02-15

## 2021-02-15 ENCOUNTER — ANESTHESIA (OUTPATIENT)
Dept: BEHAVIORAL HEALTH | Facility: CLINIC | Age: 51
End: 2021-02-15

## 2021-02-15 ENCOUNTER — HOSPITAL ENCOUNTER (OUTPATIENT)
Dept: BEHAVIORAL HEALTH | Facility: CLINIC | Age: 51
Discharge: HOME | End: 2021-02-15
Attending: PSYCHIATRY & NEUROLOGY | Admitting: PSYCHIATRY & NEUROLOGY
Payer: COMMERCIAL

## 2021-02-15 VITALS
RESPIRATION RATE: 16 BRPM | HEART RATE: 110 BPM | TEMPERATURE: 98.5 F | DIASTOLIC BLOOD PRESSURE: 99 MMHG | OXYGEN SATURATION: 100 % | SYSTOLIC BLOOD PRESSURE: 123 MMHG

## 2021-02-15 DIAGNOSIS — F33.2 SEVERE RECURRENT MAJOR DEPRESSION WITHOUT PSYCHOTIC FEATURES (H): Primary | ICD-10-CM

## 2021-02-15 PROCEDURE — 250N000011 HC RX IP 250 OP 636: Performed by: PSYCHIATRY & NEUROLOGY

## 2021-02-15 PROCEDURE — 250N000011 HC RX IP 250 OP 636: Performed by: ANESTHESIOLOGY

## 2021-02-15 PROCEDURE — 250N000009 HC RX 250: Performed by: ANESTHESIOLOGY

## 2021-02-15 PROCEDURE — 90870 ELECTROCONVULSIVE THERAPY: CPT

## 2021-02-15 PROCEDURE — 370N000017 HC ANESTHESIA TECHNICAL FEE, PER MIN

## 2021-02-15 PROCEDURE — 250N000009 HC RX 250: Performed by: PSYCHIATRY & NEUROLOGY

## 2021-02-15 PROCEDURE — U0003 INFECTIOUS AGENT DETECTION BY NUCLEIC ACID (DNA OR RNA); SEVERE ACUTE RESPIRATORY SYNDROME CORONAVIRUS 2 (SARS-COV-2) (CORONAVIRUS DISEASE [COVID-19]), AMPLIFIED PROBE TECHNIQUE, MAKING USE OF HIGH THROUGHPUT TECHNOLOGIES AS DESCRIBED BY CMS-2020-01-R: HCPCS | Performed by: PSYCHIATRY & NEUROLOGY

## 2021-02-15 PROCEDURE — U0005 INFEC AGEN DETEC AMPLI PROBE: HCPCS | Performed by: PSYCHIATRY & NEUROLOGY

## 2021-02-15 PROCEDURE — 258N000003 HC RX IP 258 OP 636: Performed by: PSYCHIATRY & NEUROLOGY

## 2021-02-15 RX ORDER — NALOXONE HYDROCHLORIDE 0.4 MG/ML
0.4 INJECTION, SOLUTION INTRAMUSCULAR; INTRAVENOUS; SUBCUTANEOUS
Status: DISCONTINUED | OUTPATIENT
Start: 2021-02-15 | End: 2021-02-16 | Stop reason: HOSPADM

## 2021-02-15 RX ORDER — GLYCOPYRROLATE 0.2 MG/ML
0.2 INJECTION, SOLUTION INTRAMUSCULAR; INTRAVENOUS
Status: COMPLETED | OUTPATIENT
Start: 2021-02-15 | End: 2021-02-15

## 2021-02-15 RX ORDER — ONDANSETRON 4 MG/1
4 TABLET, ORALLY DISINTEGRATING ORAL EVERY 30 MIN PRN
Status: DISCONTINUED | OUTPATIENT
Start: 2021-02-15 | End: 2021-02-16 | Stop reason: HOSPADM

## 2021-02-15 RX ORDER — KETOROLAC TROMETHAMINE 30 MG/ML
30 INJECTION, SOLUTION INTRAMUSCULAR; INTRAVENOUS
Status: CANCELLED
Start: 2021-02-15 | End: 2021-02-15

## 2021-02-15 RX ORDER — GLYCOPYRROLATE 0.2 MG/ML
0.2 INJECTION, SOLUTION INTRAMUSCULAR; INTRAVENOUS
Status: CANCELLED
Start: 2021-02-15 | End: 2021-02-15

## 2021-02-15 RX ORDER — NALOXONE HYDROCHLORIDE 0.4 MG/ML
0.2 INJECTION, SOLUTION INTRAMUSCULAR; INTRAVENOUS; SUBCUTANEOUS
Status: DISCONTINUED | OUTPATIENT
Start: 2021-02-15 | End: 2021-02-16 | Stop reason: HOSPADM

## 2021-02-15 RX ORDER — ONDANSETRON 2 MG/ML
4 INJECTION INTRAMUSCULAR; INTRAVENOUS
Status: CANCELLED
Start: 2021-02-15 | End: 2021-02-15

## 2021-02-15 RX ORDER — CAFFEINE AND SODIUM BENZOATE 125 MG/ML
500 INJECTION, SOLUTION INTRAMUSCULAR; INTRAVENOUS
Status: CANCELLED
Start: 2021-02-15 | End: 2021-02-15

## 2021-02-15 RX ORDER — KETOROLAC TROMETHAMINE 30 MG/ML
30 INJECTION, SOLUTION INTRAMUSCULAR; INTRAVENOUS
Status: COMPLETED | OUTPATIENT
Start: 2021-02-15 | End: 2021-02-15

## 2021-02-15 RX ORDER — CAFFEINE AND SODIUM BENZOATE 125 MG/ML
500 INJECTION, SOLUTION INTRAMUSCULAR; INTRAVENOUS
Status: ACTIVE | OUTPATIENT
Start: 2021-02-15 | End: 2021-02-15

## 2021-02-15 RX ORDER — SODIUM CHLORIDE, SODIUM LACTATE, POTASSIUM CHLORIDE, CALCIUM CHLORIDE 600; 310; 30; 20 MG/100ML; MG/100ML; MG/100ML; MG/100ML
INJECTION, SOLUTION INTRAVENOUS CONTINUOUS
Status: DISCONTINUED | OUTPATIENT
Start: 2021-02-15 | End: 2021-02-16 | Stop reason: HOSPADM

## 2021-02-15 RX ORDER — ACETAMINOPHEN 500 MG
1000 TABLET ORAL
Status: ACTIVE | OUTPATIENT
Start: 2021-02-15 | End: 2021-02-15

## 2021-02-15 RX ORDER — ONDANSETRON 2 MG/ML
4 INJECTION INTRAMUSCULAR; INTRAVENOUS EVERY 30 MIN PRN
Status: DISCONTINUED | OUTPATIENT
Start: 2021-02-15 | End: 2021-02-16 | Stop reason: HOSPADM

## 2021-02-15 RX ORDER — ACETAMINOPHEN 500 MG
1000 TABLET ORAL
Status: CANCELLED
Start: 2021-02-15 | End: 2021-02-15

## 2021-02-15 RX ORDER — MEPERIDINE HYDROCHLORIDE 25 MG/ML
12.5 INJECTION INTRAMUSCULAR; INTRAVENOUS; SUBCUTANEOUS
Status: DISCONTINUED | OUTPATIENT
Start: 2021-02-15 | End: 2021-02-16 | Stop reason: HOSPADM

## 2021-02-15 RX ORDER — ONDANSETRON 2 MG/ML
4 INJECTION INTRAMUSCULAR; INTRAVENOUS
Status: COMPLETED | OUTPATIENT
Start: 2021-02-15 | End: 2021-02-15

## 2021-02-15 RX ADMIN — KETOROLAC TROMETHAMINE 30 MG: 30 INJECTION, SOLUTION INTRAMUSCULAR; INTRAVENOUS at 09:53

## 2021-02-15 RX ADMIN — SODIUM CHLORIDE, POTASSIUM CHLORIDE, SODIUM LACTATE AND CALCIUM CHLORIDE 500 ML: 600; 310; 30; 20 INJECTION, SOLUTION INTRAVENOUS at 09:52

## 2021-02-15 RX ADMIN — METHOHEXITAL SODIUM 100 MG: 500 INJECTION, POWDER, LYOPHILIZED, FOR SOLUTION INTRAMUSCULAR; INTRAVENOUS; RECTAL at 09:56

## 2021-02-15 RX ADMIN — GLYCOPYRROLATE 0.2 MG: 0.2 INJECTION, SOLUTION INTRAMUSCULAR; INTRAVENOUS at 09:51

## 2021-02-15 RX ADMIN — ONDANSETRON 4 MG: 2 INJECTION INTRAMUSCULAR; INTRAVENOUS at 09:53

## 2021-02-15 RX ADMIN — Medication 100 MG: at 09:56

## 2021-02-15 RX ADMIN — MIDAZOLAM 2 MG: 1 INJECTION INTRAMUSCULAR; INTRAVENOUS at 10:02

## 2021-02-15 NOTE — IP AVS SNAPSHOT
[facility]  [address] [address]  Phone: [phone]                                    After Visit Summary   2/15/2021    Kuldeep Sequeira    MRN: [id number]           After Visit Summary Signature Page    I have received my discharge instructions, and my questions have been answered. I have discussed any challenges I see with this plan with the nurse or doctor.    ..........................................................................................................................................  Patient/Patient Representative Signature      ..........................................................................................................................................  Patient Representative Print Name and Relationship to Patient    ..................................................               ................................................  Date                                   Time    ..........................................................................................................................................  Reviewed by Signature/Title    ...................................................              ..............................................  Date                                               Time          22EPIC Rev 08/18

## 2021-02-15 NOTE — PROCEDURES
Procedure/Surgery Information   [facility]     Bedside Procedure Note  Date of Service (when I performed the procedure): 02/15/2021    Kuldeep Sequeira is a 50 year old male patient.  1. Severe recurrent major depression without psychotic features (H)      No past medical history on file.  Temp: 98.3  F (36.8  C) Temp src: Temporal BP: 112/82 Pulse: 88   Resp: 16 SpO2: 100 % O2 Device: None (Room air)      Procedures     Wm Markham     [facility] [place]   ECT Procedure Note   02/15/2021    Kuldeep Sequeira [id number]   50 year old 1970     Patient Status: Outpatient    Is this the first in a series of 12 treatments?  No    History and Physical: Reviewed in medical record    Consent: Informed consent was signed by: patient    Date Consent Signed: 2/1/21     No Known Allergies    Weight:  0 lbs 0 oz    Patient Preparations: (none)    Wt Readings from Last 5 Encounters:   11/19/20 78.4 kg (172 lb 14.4 oz)       /82   Pulse 88   Temp 98.3  F (36.8  C) (Temporal)   Resp 16   SpO2 100%          Indications for ECT:   Medications ineffective         Clinical Narrative:   Patient has a long history of depression and is continuing ECT for treatment-resistant depression.  He's doing a 2nd series of ECT now.  NPO after 2400.  Alert and Oriented x 3.  No problems with the last ECT.     Effexor dose was increased recently 1/27/21 from 150mg/d to 225 mg/d.  Pt was better on Friday and a little more down/anxious over the weekend, not too bad.  He decided to come to today's optional ECT instead of waiting til Friday.           Diagnosis:   Major depression         Pause for the Cause:     Right patient Yes   Right procedure/laterality settings: Yes          Intra-Procedure Documentation:     ECT #: 7 of 12   Treatment number this series: 20   Total treatment number: 20     Type of ECT:   BILATERAL since treatment #6 (instead of Right, unilateral  standard)     ECT Medications:      Zyprexa: 10mg at home  Tylenol: 1000mg at home  Robinul: 0.2mg  Toradol: 30mg  Zofran: 4mg  Lactated Ringers: 1/2 liter  Brevital: 100mg  Caffeine: 500mg  Succinyl Choline: 100mg  Versed: 2mg for h/o agitation (give at 50 seconds)      ECT Strip Summary:   Energy Level: 30 percent   Motor Seizure Duration:  37 seconds  EEG Seizure Duration:   101 seconds     Complications: none (but had brief asystole 2/10/21 which resolved without intervention)    Plan:   COVID test 2/15/21.    Next ECT  2/19/21.   Increased Effexor XR from 150mg to 225mg daily.  (Extra 75mg caps sent to his pharmacy) on 1/27/21  On 2/3/21 added Vistaril 25 to 50mg po tid prn anxiety. (send to [facility] in [place])  On 2/10/21 added Klonopin 0.5mg po tid prn anxiety.    Can work on non-ECT days if he feels up to it but cannot drive.      Meds per Monica Stevens, CNS    Wm Markham MD

## 2021-02-15 NOTE — ANESTHESIA POSTPROCEDURE EVALUATION
Patient: Kuldeep Sequeira    * No procedures listed *    Diagnosis:* No pre-op diagnosis entered *  Diagnosis Additional Information: No value filed.    Anesthesia Type:  General    Note:  Disposition: Outpatient   Postop Pain Control:    PONV:    Neuro/Psych: Uneventful            Sign Out: Acceptable/Baseline neuro status   Airway/Respiratory: Uneventful            Sign Out: Acceptable/Baseline resp. status   CV/Hemodynamics: Uneventful            Sign Out: Acceptable CV status   Other NRE:    DID A NON-ROUTINE EVENT OCCUR?          Last vitals:  Vitals:    02/15/21 1020 02/15/21 1021 02/15/21 1037   BP:  (!) 128/98 (!) 123/99   Pulse:  116 110   Resp:  16 16   Temp: 36.9  C (98.5  F) 37.1  C (98.7  F) 36.9  C (98.5  F)   SpO2:  100% 100%       Last vitals prior to Anesthesia Care Transfer:  CRNA VITALS  2/15/2021 0937 - 2/15/2021 1036      2/15/2021             Resp Rate (observed):  (!) 7          Electronically Signed By: Angella Howard MD  February 15, 2021  10:36 AM

## 2021-02-15 NOTE — IP AVS SNAPSHOT
MRN:[id number]                      After Visit Summary   2/15/2021    Kuldeep Sequeira    MRN: [id number]           Visit Information        Provider Department      2/15/2021  9:30 AM Wm Markham MD [facility]           Review of your medicines      CONTINUE these medicines which have NOT CHANGED       Dose / Directions   acetaminophen 500 MG tablet  Commonly known as: TYLENOL      Dose: 1,000 mg  Take 1,000 mg by mouth every 6 hours as needed for mild pain (on mornings prior to ECT)  Refills: 0     busPIRone 5 MG tablet  Commonly known as: BUSPAR      Dose: 5 mg  Take 5 mg by mouth 2 times daily  Refills: 0     clonazePAM 0.5 MG tablet  Commonly known as: klonoPIN  Used for: Severe recurrent major depression without psychotic features (H)      Dose: 0.5 mg  Take 1 tablet (0.5 mg) by mouth 3 times daily as needed for anxiety  Quantity: 90 tablet  Refills: 0     folic acid 400 MCG tablet  Commonly known as: FOLVITE      Dose: 400 mcg  Take 400 mcg by mouth daily  Refills: 0     hydrOXYzine 25 MG tablet  Commonly known as: ATARAX  Used for: Severe recurrent major depression without psychotic features (H)      Dose: 25-50 mg  Take 1-2 tablets (25-50 mg) by mouth 3 times daily as needed for anxiety  Quantity: 90 tablet  Refills: 1     OLANZapine 10 MG tablet  Commonly known as: zyPREXA  Used for: Depression, unspecified depression type      Take 1 po with a sip of water before coming to ECT  Quantity: 30 tablet  Refills: 0     ONE-A-DAY MENS PO      Chew 1 gummy by mouth daily  Refills: 0     QUEtiapine 50 MG tablet  Commonly known as: SEROquel  Used for: Insomnia, unspecified type      Dose: 75 mg  Take 1.5 tablets (75 mg) by mouth At Bedtime  Quantity: 30 tablet  Refills: 0     * venlafaxine 75 MG 24 hr capsule  Commonly known as: EFFEXOR-XR      Dose: 150 mg  Take 150 mg by mouth daily  Refills: 0     * venlafaxine 75 MG 24 hr capsule  Commonly known as:  EFFEXOR-XR  Used for: Severe recurrent major depression without psychotic features (H)      Take 1 po daily along with a 150mg cap (to total 225 mg/d)  Quantity: 30 capsule  Refills: 0     VITAMIN D PO      Take 1 pill by mouth daily  Refills: 0     zolpidem ER 12.5 MG CR tablet  Commonly known as: AMBIEN CR  Used for: Insomnia, unspecified type      Dose: 12.5 mg  Take 1 tablet (12.5 mg) by mouth nightly as needed for sleep (Doxipen first as scheduled, if ineffective after 45 minutes take ambien as needed.)  Quantity: 30 tablet  Refills: 0         * This list has 2 medication(s) that are the same as other medications prescribed for you. Read the directions carefully, and ask your doctor or other care provider to review them with you.                  Protect others around you: Learn how to safely use, store and throw away your medicines at www.disposemymeds.org.       Follow-ups after your visit       Your next 10 appointments already scheduled    Feb 17, 2021 10:30 AM  Pre-procedure Covid with [facility]  [facility] ([facility] ) [address], [address]  [place] [address]  [phone]      Feb 19, 2021  9:15 AM  Electroconvulsive Therapy with Wm Markham MD  [facility] ([facility] ) [address]  [facility] [address] [phone]         Care Instructions       Further instructions from your care team       ECT Discharge Instructions      During your ECT series:      Do not drive for 24 hours after treatment. If you will have more than one treatment within a week, no driving until 7 days after your last ECT treatment.     Do not drink alcohol or use street drugs (illicit drugs) while you are having treatments.    Do not make important decisions, including legal decisions.    After each treatment:      Get plenty of rest. A  responsible adult must stay with your for at least 6 hours.    Avoid heavy or risky activities for 24 hours.    If you feel light-headed, sit for a few minutes before standing. Have someone help you get up.    If you have nausea (feel sick to your stomach): Drink only clear liquids such as apple juice, ginger ale, broth or 7UP, Be sure to drink plenty of liquids. Move to a normal diet as you feel able.     If you received Toradol, wait 6 hours before taking ibuprofen.    Call your doctor if:     You have a fever over 100F (37.7 C) (taken under the tongue), or a fever that last more than 24 hours.    Your IV site is red, swollen, very painful or is getting more tender.    You have nausea that gets very bad or does not improve.      If you have any symptoms after ECT, tell our staff before your next treatment.    The ECT Department can be reached at [phone].  The ECT Department is open Mondays, Wednesdays and Fridays from 7:00 AM to 2:00 PM.      To speak to a doctor, call To reach Dr. Markham please call [phone].  Other: You had Toradol at 10AM Which is an NSAID medication. Do not take any Ibuprofen, Excedrin, Motrin, Aleve, Advil, Asprin, or any other NSAID medication until after 4:00pm. Questions, check with your physician or pharmacist  .COVID test 2/15/21.    Next ECT  2/19/21.   Can work on non-ECT days if he feels up to it but cannot drive.      Transported by: wife Evon             Additional Information About Your Visit       Care EveryWhere ID    This is your Care EveryWhere ID. This could be used by other organizations to access your [facility] medical records  [id number]       Your Vitals Were  Most recent update: 2/15/2021  9:30 AM    Blood Pressure   112/82    Pulse   88    Temperature   98.3  F (36.8  C) (Temporal)    Respirations   16    Pulse Oximetry   100%          Primary Care Provider    Celine Appiah      Equal Access to Services    JAM GODINEZ AH: omega Jones  mikaylakarla jayy lucia. So [facility] [phone].    ATENCIÓN: Si sanjuanita haro, tiene a diaz disposición servicios gratuitos de asistencia lingüística. Llame al [phone].    We comply with applicable federal and state civil rights laws, including the [place] Human Rights Act. We do not discriminate on the basis of race, color, creed, Spiritism, national origin, marital status, age, disability, sex, sexual orientation, or gender identity.    If you would like an itemization of your charges they will now be available in [employer] 30 days after discharge. To access the itemized statements in [employer] go to billing/billing summary. From there select view account. There will be multiple tabs showing an overview of your account, detail, payments, and communications. From the communications tab you can see your monthly statements, your itemized statements, and any billing letters generated for your account. If you do not have a [employer] account and need help getting access please contact [employer] support at [phone].  If you would prefer to have your itemized statements mailed please contact our automated itemized bill request line at [phone] option  2.       Thank you!    Thank you for choosing [facility] for your care. Our goal is always to provide you with excellent care. Hearing back from our patients is one way we can continue to improve our services. Please take a few minutes to complete the written survey that you may receive in the mail after you visit with us. Thank you!            Medication List      Medications       Morning Afternoon Evening Bedtime As Needed   acetaminophen 500 MG tablet  Also known as: TYLENOL  INSTRUCTIONS: Take 1,000 mg by mouth every 6 hours as needed for mild pain (on mornings prior to ECT)                    busPIRone 5 MG tablet  Also known as: BUSPAR  INSTRUCTIONS: Take 5 mg by mouth 2 times daily                    clonazePAM  0.5 MG tablet  Also known as: klonoPIN  INSTRUCTIONS: Take 1 tablet (0.5 mg) by mouth 3 times daily as needed for anxiety                    folic acid 400 MCG tablet  Also known as: FOLVITE  INSTRUCTIONS: Take 400 mcg by mouth daily                    hydrOXYzine 25 MG tablet  Also known as: ATARAX  INSTRUCTIONS: Take 1-2 tablets (25-50 mg) by mouth 3 times daily as needed for anxiety                    OLANZapine 10 MG tablet  Also known as: zyPREXA  INSTRUCTIONS: Take 1 po with a sip of water before coming to ECT                    ONE-A-DAY MENS PO  INSTRUCTIONS: Chew 1 gummy by mouth daily                    QUEtiapine 50 MG tablet  Also known as: SEROquel  INSTRUCTIONS: Take 1.5 tablets (75 mg) by mouth At Bedtime                    * venlafaxine 75 MG 24 hr capsule  Also known as: EFFEXOR-XR  INSTRUCTIONS: Take 150 mg by mouth daily                    * venlafaxine 75 MG 24 hr capsule  Also known as: EFFEXOR-XR  INSTRUCTIONS: Take 1 po daily along with a 150mg cap (to total 225 mg/d)                    VITAMIN D PO  INSTRUCTIONS: Take 1 pill by mouth daily                    zolpidem ER 12.5 MG CR tablet  Also known as: AMBIEN CR  INSTRUCTIONS: Take 1 tablet (12.5 mg) by mouth nightly as needed for sleep (Doxipen first as scheduled, if ineffective after 45 minutes take ambien as needed.)                        * This list has 2 medication(s) that are the same as other medications prescribed for you. Read the directions carefully, and ask your doctor or other care provider to review them with you.

## 2021-02-15 NOTE — ANESTHESIA PREPROCEDURE EVALUATION
Anesthesia Pre-Procedure Evaluation    Patient: Kuldeep Sequeira   MRN: [id number] : 1970        Preoperative Diagnosis: * No pre-op diagnosis entered *   Procedure : * No procedures listed *     No past medical history on file.   No past surgical history on file.   No Known Allergies   Social History     Tobacco Use     Smoking status: Not on file   Substance Use Topics     Alcohol use: Not on file      Wt Readings from Last 1 Encounters:   20 78.4 kg (172 lb 14.4 oz)             Physical Exam    Airway        Mallampati: II   TM distance: > 3 FB   Neck ROM: full   Mouth opening: > 3 cm    Respiratory Devices and Support         Dental  no notable dental history         Cardiovascular   cardiovascular exam normal          Pulmonary   pulmonary exam normal                OUTSIDE LABS:  CBC:   Lab Results   Component Value Date    WBC 5.3 2020    HGB 15.5 2020    HCT 45.1 2020     2020     BMP:   Lab Results   Component Value Date     2020    POTASSIUM 4.6 2020    POTASSIUM 4.3 2020    CHLORIDE 106 2020    CO2 32 2020    BUN 18 2020    CR 0.80 2020    CR 0.91 2020    GLC 97 2020    GLC 90 2020     COAGS: No results found for: PTT, INR, FIBR  POC: No results found for: BGM, HCG, HCGS  HEPATIC:   Lab Results   Component Value Date    ALBUMIN 3.8 2020    PROTTOTAL 7.0 2020    ALT 22 2020    AST 10 2020    ALKPHOS 61 2020    BILITOTAL 1.1 2020     OTHER:   Lab Results   Component Value Date    RANJITH 8.8 2020    TSH 1.82 2020       Anesthesia Plan    ASA Status:  2      Anesthesia Type: General.   Induction: Intravenous.           Consents            Postoperative Care            Comments:                    Angella Howard MD

## 2021-02-15 NOTE — PROGRESS NOTES
Discharge instructions reviewed with Evon/ wife  AVS given to .  Patient safely left ECt department with .

## 2021-02-15 NOTE — DISCHARGE INSTRUCTIONS
ECT Discharge Instructions      During your ECT series:      Do not drive for 24 hours after treatment. If you will have more than one treatment within a week, no driving until 7 days after your last ECT treatment.     Do not drink alcohol or use street drugs (illicit drugs) while you are having treatments.    Do not make important decisions, including legal decisions.    After each treatment:      Get plenty of rest. A responsible adult must stay with your for at least 6 hours.    Avoid heavy or risky activities for 24 hours.    If you feel light-headed, sit for a few minutes before standing. Have someone help you get up.    If you have nausea (feel sick to your stomach): Drink only clear liquids such as apple juice, ginger ale, broth or 7UP, Be sure to drink plenty of liquids. Move to a normal diet as you feel able.     If you received Toradol, wait 6 hours before taking ibuprofen.    Call your doctor if:     You have a fever over 100F (37.7 C) (taken under the tongue), or a fever that last more than 24 hours.    Your IV site is red, swollen, very painful or is getting more tender.    You have nausea that gets very bad or does not improve.      If you have any symptoms after ECT, tell our staff before your next treatment.    The ECT Department can be reached at [phone].  The ECT Department is open Mondays, Wednesdays and Fridays from 7:00 AM to 2:00 PM.      To speak to a doctor, call To reach Dr. Markham please call [phone].  Other: You had Toradol at 10AM Which is an NSAID medication. Do not take any Ibuprofen, Excedrin, Motrin, Aleve, Advil, Asprin, or any other NSAID medication until after 4:00pm. Questions, check with your physician or pharmacist  .COVID test 2/15/21.    Next ECT  2/19/21.   Can work on non-ECT days if he feels up to it but cannot drive.      Transported by: wife Evon

## 2021-02-19 ENCOUNTER — ANESTHESIA EVENT (OUTPATIENT)
Dept: BEHAVIORAL HEALTH | Facility: CLINIC | Age: 51
End: 2021-02-19

## 2021-02-19 ENCOUNTER — HOSPITAL ENCOUNTER (OUTPATIENT)
Dept: BEHAVIORAL HEALTH | Facility: CLINIC | Age: 51
Discharge: HOME | End: 2021-02-19
Attending: PSYCHIATRY & NEUROLOGY | Admitting: PSYCHIATRY & NEUROLOGY
Payer: COMMERCIAL

## 2021-02-19 ENCOUNTER — ANESTHESIA (OUTPATIENT)
Dept: BEHAVIORAL HEALTH | Facility: CLINIC | Age: 51
End: 2021-02-19

## 2021-02-19 VITALS
HEART RATE: 111 BPM | SYSTOLIC BLOOD PRESSURE: 107 MMHG | DIASTOLIC BLOOD PRESSURE: 85 MMHG | TEMPERATURE: 99 F | OXYGEN SATURATION: 98 % | RESPIRATION RATE: 17 BRPM

## 2021-02-19 DIAGNOSIS — F33.2 SEVERE RECURRENT MAJOR DEPRESSION WITHOUT PSYCHOTIC FEATURES (H): Primary | ICD-10-CM

## 2021-02-19 DIAGNOSIS — F32.A DEPRESSION, UNSPECIFIED DEPRESSION TYPE: Primary | ICD-10-CM

## 2021-02-19 PROCEDURE — 250N000009 HC RX 250: Performed by: PSYCHIATRY & NEUROLOGY

## 2021-02-19 PROCEDURE — 250N000011 HC RX IP 250 OP 636: Performed by: PSYCHIATRY & NEUROLOGY

## 2021-02-19 PROCEDURE — 90870 ELECTROCONVULSIVE THERAPY: CPT

## 2021-02-19 PROCEDURE — 258N000003 HC RX IP 258 OP 636: Performed by: PSYCHIATRY & NEUROLOGY

## 2021-02-19 PROCEDURE — 250N000009 HC RX 250: Performed by: ANESTHESIOLOGY

## 2021-02-19 PROCEDURE — 370N000017 HC ANESTHESIA TECHNICAL FEE, PER MIN

## 2021-02-19 PROCEDURE — 250N000011 HC RX IP 250 OP 636: Performed by: ANESTHESIOLOGY

## 2021-02-19 RX ORDER — CAFFEINE AND SODIUM BENZOATE 125 MG/ML
500 INJECTION, SOLUTION INTRAMUSCULAR; INTRAVENOUS
Status: CANCELLED
Start: 2021-02-19 | End: 2021-02-19

## 2021-02-19 RX ORDER — ONDANSETRON 2 MG/ML
4 INJECTION INTRAMUSCULAR; INTRAVENOUS
Status: CANCELLED
Start: 2021-02-19 | End: 2021-02-19

## 2021-02-19 RX ORDER — KETOROLAC TROMETHAMINE 30 MG/ML
30 INJECTION, SOLUTION INTRAMUSCULAR; INTRAVENOUS
Status: CANCELLED
Start: 2021-02-19 | End: 2021-02-19

## 2021-02-19 RX ORDER — CAFFEINE AND SODIUM BENZOATE 125 MG/ML
500 INJECTION, SOLUTION INTRAMUSCULAR; INTRAVENOUS
Status: COMPLETED | OUTPATIENT
Start: 2021-02-19 | End: 2021-02-19

## 2021-02-19 RX ORDER — ACETAMINOPHEN 500 MG
1000 TABLET ORAL
Status: CANCELLED
Start: 2021-02-19 | End: 2021-02-19

## 2021-02-19 RX ORDER — GLYCOPYRROLATE 0.2 MG/ML
0.2 INJECTION, SOLUTION INTRAMUSCULAR; INTRAVENOUS
Status: COMPLETED | OUTPATIENT
Start: 2021-02-19 | End: 2021-02-19

## 2021-02-19 RX ORDER — ACETAMINOPHEN 500 MG
1000 TABLET ORAL
Status: ACTIVE | OUTPATIENT
Start: 2021-02-19 | End: 2021-02-19

## 2021-02-19 RX ORDER — KETOROLAC TROMETHAMINE 30 MG/ML
30 INJECTION, SOLUTION INTRAMUSCULAR; INTRAVENOUS
Status: COMPLETED | OUTPATIENT
Start: 2021-02-19 | End: 2021-02-19

## 2021-02-19 RX ORDER — GLYCOPYRROLATE 0.2 MG/ML
0.2 INJECTION, SOLUTION INTRAMUSCULAR; INTRAVENOUS
Status: CANCELLED
Start: 2021-02-19 | End: 2021-02-19

## 2021-02-19 RX ORDER — ONDANSETRON 2 MG/ML
4 INJECTION INTRAMUSCULAR; INTRAVENOUS
Status: COMPLETED | OUTPATIENT
Start: 2021-02-19 | End: 2021-02-19

## 2021-02-19 RX ADMIN — SODIUM CHLORIDE, POTASSIUM CHLORIDE, SODIUM LACTATE AND CALCIUM CHLORIDE 500 ML: 600; 310; 30; 20 INJECTION, SOLUTION INTRAVENOUS at 10:39

## 2021-02-19 RX ADMIN — METHOHEXITAL SODIUM 100 MG: 500 INJECTION, POWDER, LYOPHILIZED, FOR SOLUTION INTRAMUSCULAR; INTRAVENOUS; RECTAL at 10:48

## 2021-02-19 RX ADMIN — MIDAZOLAM 2 MG: 1 INJECTION INTRAMUSCULAR; INTRAVENOUS at 10:47

## 2021-02-19 RX ADMIN — ONDANSETRON 4 MG: 2 INJECTION INTRAMUSCULAR; INTRAVENOUS at 10:39

## 2021-02-19 RX ADMIN — Medication 100 MG: at 10:48

## 2021-02-19 RX ADMIN — GLYCOPYRROLATE 0.2 MG: 0.2 INJECTION, SOLUTION INTRAMUSCULAR; INTRAVENOUS at 10:36

## 2021-02-19 RX ADMIN — KETOROLAC TROMETHAMINE 30 MG: 30 INJECTION, SOLUTION INTRAMUSCULAR; INTRAVENOUS at 10:39

## 2021-02-19 RX ADMIN — CAFFEINE AND SODIUM BENZOATE 500 MG: 125 INJECTION, SOLUTION INTRAMUSCULAR; INTRAVENOUS at 10:48

## 2021-02-19 NOTE — IP AVS SNAPSHOT
MRN:[id number]                      After Visit Summary   2/19/2021    Kuldeep Sequeira    MRN: [id number]           Visit Information        Provider Department      2/19/2021  9:15 AM Wm Markham MD [facility]           Review of your medicines      CONTINUE these medicines which have NOT CHANGED       Dose / Directions   acetaminophen 500 MG tablet  Commonly known as: TYLENOL      Dose: 1,000 mg  Take 1,000 mg by mouth every 6 hours as needed for mild pain (on mornings prior to ECT)  Refills: 0     busPIRone 5 MG tablet  Commonly known as: BUSPAR      Dose: 5 mg  Take 5 mg by mouth 2 times daily  Refills: 0     clonazePAM 0.5 MG tablet  Commonly known as: klonoPIN  Used for: Severe recurrent major depression without psychotic features (H)      Dose: 0.5 mg  Take 1 tablet (0.5 mg) by mouth 3 times daily as needed for anxiety  Quantity: 90 tablet  Refills: 0     folic acid 400 MCG tablet  Commonly known as: FOLVITE      Dose: 400 mcg  Take 400 mcg by mouth daily  Refills: 0     hydrOXYzine 25 MG tablet  Commonly known as: ATARAX  Used for: Severe recurrent major depression without psychotic features (H)      Dose: 25-50 mg  Take 1-2 tablets (25-50 mg) by mouth 3 times daily as needed for anxiety  Quantity: 90 tablet  Refills: 1     OLANZapine 10 MG tablet  Commonly known as: zyPREXA  Used for: Depression, unspecified depression type      Take 1 po with a sip of water before coming to ECT  Quantity: 30 tablet  Refills: 0     ONE-A-DAY MENS PO      Chew 1 gummy by mouth daily  Refills: 0     QUEtiapine 50 MG tablet  Commonly known as: SEROquel  Used for: Insomnia, unspecified type      Dose: 75 mg  Take 1.5 tablets (75 mg) by mouth At Bedtime  Quantity: 30 tablet  Refills: 0     * venlafaxine 75 MG 24 hr capsule  Commonly known as: EFFEXOR-XR      Dose: 150 mg  Take 150 mg by mouth daily  Refills: 0     * venlafaxine 75 MG 24 hr capsule  Commonly known as:  EFFEXOR-XR  Used for: Severe recurrent major depression without psychotic features (H)      Take 1 po daily along with a 150mg cap (to total 225 mg/d)  Quantity: 30 capsule  Refills: 0     VITAMIN D PO      Take 1 pill by mouth daily  Refills: 0     zolpidem ER 12.5 MG CR tablet  Commonly known as: AMBIEN CR  Used for: Insomnia, unspecified type      Dose: 12.5 mg  Take 1 tablet (12.5 mg) by mouth nightly as needed for sleep (Doxipen first as scheduled, if ineffective after 45 minutes take ambien as needed.)  Quantity: 30 tablet  Refills: 0         * This list has 2 medication(s) that are the same as other medications prescribed for you. Read the directions carefully, and ask your doctor or other care provider to review them with you.                  Protect others around you: Learn how to safely use, store and throw away your medicines at www.disposemymeds.org.       Follow-ups after your visit       Your next 10 appointments already scheduled    Feb 26, 2021 10:15 AM  Electroconvulsive Therapy with Wm Markham MD  [facility] ([facility] ) [address]  [facility] [address]  [phone]         Care Instructions       Further instructions from your care team       Today we completed your Covid test for your next appointment.  You do not need to do anything further. Your covid test results will be available by your next ECT treatment.    Instructions for your next appointment:  Next ECT 2/26/21 at 10:15     Plan:   COVID test 2/24/21.    Next ECT  2/26/21.   Increased Effexor XR from 150mg to 225mg daily.  (Extra 75mg caps sent to his pharmacy) on 1/27/21  On 2/3/21 added Vistaril 25 to 50mg po tid prn anxiety. (sent to [facility] in [place])  On 2/10/21 added Klonopin 0.5mg po tid prn anxiety.    Have patient set up a med appointment with Dr. Markham (Dr. Markham will have his nurse call them).       *Covid-19 Testing:   Our central scheduling department will be calling you to schedule a Covid-19 test.  You will be scheduled to have this test before your next ECT treatment. If you do not receive a phone call, please call [phone] to schedule your Covid test to be performed 2 days before your scheduled ECT appointment.     Please come to the [facility] entrance and check-in with Security before your ECT appointment.  The [facility] entrance is located right next to the Adult Emergency Room.  The address is [address] [place] [address].    After your appointment, you may be picked up at the [facility] entrance, which is located on the West side of our campus.  The address is [address] [place] [address].  [facility].   ECT Discharge Instructions      During your ECT series:      Do not drive for 24 hours after treatment. If you will have more than one treatment within a week, no driving until 7 days after your last ECT treatment.     Do not drink alcohol or use street drugs (illicit drugs) while you are having treatments.    Do not make important decisions, including legal decisions.    After each treatment:      Get plenty of rest. A responsible adult must stay with your for at least 6 hours.    Avoid heavy or risky activities for 24 hours.    If you feel light-headed, sit for a few minutes before standing. Have someone help you get up.    If you have nausea (feel sick to your stomach): Drink only clear liquids such as apple juice, ginger ale, broth or 7UP, Be sure to drink plenty of liquids. Move to a normal diet as you feel able.     If you received Toradol, wait 6 hours before taking ibuprofen.    Call your doctor if:     You have a fever over 100F (37.7 C) (taken under the tongue), or a fever that last more than 24 hours.    Your IV site is red, swollen, very painful or is getting more tender.    You have nausea that gets very bad or does not improve.      If you have any symptoms after ECT, tell  our staff before your next treatment.    The ECT Department can be reached at [phone].  The ECT Department is open Mondays, Wednesdays and Fridays from 7:00 AM to 2:00 PM.      To speak to a doctor, call: To reach Dr. Markham please call [phone].  Other: You had Toradol at 1040 Which is an NSAID medication. Do not take any Ibuprofen, Excedrin, Motrin, Aleve, Advil, Asprin, or any other NSAID medication until after 4:40. Questions, check with your physician or pharmacist.  Transported by wife / jagdish          Additional Information About Your Visit       Care EveryWhere ID    This is your Care EveryWhere ID. This could be used by other organizations to access your [facility] medical records  [id number]       Your Vitals Were  Most recent update: 2/19/2021  9:58 AM    Blood Pressure   113/80    Pulse   80    Temperature   97.9  F (36.6  C) (Temporal)    Respirations   16    Pulse Oximetry   100%          Primary Care Provider    Celine Appiah      Equal Access to Services    [facility] AH: Hadii aad ku hadasho Soomaali, waaxda luqadaha, qaybta kaalmada adeegyada, waxay idiin haysannan daren larson . So [facility] [phone].    ATENCIÓN: Si habla español, tiene a diaz disposición servicios gratuitos de asistencia lingüística. Llame al [phone].    We comply with applicable federal and state civil rights laws, including the [place] Human Rights Act. We do not discriminate on the basis of race, color, creed, Taoist, national origin, marital status, age, disability, sex, sexual orientation, or gender identity.    If you would like an itemization of your charges they will now be available in [employer] 30 days after discharge. To access the itemized statements in [employer] go to billing/billing summary. From there select view account. There will be multiple tabs showing an overview of your account, detail, payments, and communications. From the communications tab you can see your monthly statements, your  itemized statements, and any billing letters generated for your account. If you do not have a [employer] account and need help getting access please contact [employer] support at [phone].  If you would prefer to have your itemized statements mailed please contact our automated itemized bill request line at [phone] option  2.       Thank you!    Thank you for choosing [facility] for your care. Our goal is always to provide you with excellent care. Hearing back from our patients is one way we can continue to improve our services. Please take a few minutes to complete the written survey that you may receive in the mail after you visit with us. Thank you!            Medication List      Medications       Morning Afternoon Evening Bedtime As Needed   acetaminophen 500 MG tablet  Also known as: TYLENOL  INSTRUCTIONS: Take 1,000 mg by mouth every 6 hours as needed for mild pain (on mornings prior to ECT)                    busPIRone 5 MG tablet  Also known as: BUSPAR  INSTRUCTIONS: Take 5 mg by mouth 2 times daily                    clonazePAM 0.5 MG tablet  Also known as: klonoPIN  INSTRUCTIONS: Take 1 tablet (0.5 mg) by mouth 3 times daily as needed for anxiety                    folic acid 400 MCG tablet  Also known as: FOLVITE  INSTRUCTIONS: Take 400 mcg by mouth daily                    hydrOXYzine 25 MG tablet  Also known as: ATARAX  INSTRUCTIONS: Take 1-2 tablets (25-50 mg) by mouth 3 times daily as needed for anxiety                    OLANZapine 10 MG tablet  Also known as: zyPREXA  INSTRUCTIONS: Take 1 po with a sip of water before coming to ECT                    ONE-A-DAY MENS PO  INSTRUCTIONS: Chew 1 gummy by mouth daily                    QUEtiapine 50 MG tablet  Also known as: SEROquel  INSTRUCTIONS: Take 1.5 tablets (75 mg) by mouth At Bedtime                    * venlafaxine 75 MG 24 hr capsule  Also known as: EFFEXOR-XR  INSTRUCTIONS: Take 150 mg by mouth daily                    * venlafaxine 75 MG 24  hr capsule  Also known as: EFFEXOR-XR  INSTRUCTIONS: Take 1 po daily along with a 150mg cap (to total 225 mg/d)                    VITAMIN D PO  INSTRUCTIONS: Take 1 pill by mouth daily                    zolpidem ER 12.5 MG CR tablet  Also known as: AMBIEN CR  INSTRUCTIONS: Take 1 tablet (12.5 mg) by mouth nightly as needed for sleep (Doxipen first as scheduled, if ineffective after 45 minutes take ambien as needed.)                        * This list has 2 medication(s) that are the same as other medications prescribed for you. Read the directions carefully, and ask your doctor or other care provider to review them with you.

## 2021-02-19 NOTE — IP AVS SNAPSHOT
[facility]  [address] [address]  Phone: [phone]                                    After Visit Summary   2/19/2021    Kuldeep Sequeira    MRN: [id number]           After Visit Summary Signature Page    I have received my discharge instructions, and my questions have been answered. I have discussed any challenges I see with this plan with the nurse or doctor.    ..........................................................................................................................................  Patient/Patient Representative Signature      ..........................................................................................................................................  Patient Representative Print Name and Relationship to Patient    ..................................................               ................................................  Date                                   Time    ..........................................................................................................................................  Reviewed by Signature/Title    ...................................................              ..............................................  Date                                               Time          22EPIC Rev 08/18

## 2021-02-19 NOTE — ANESTHESIA PREPROCEDURE EVALUATION
Anesthesia Pre-Procedure Evaluation    Patient: Kuldeep Sequeira   MRN: [id number] : 1970        Preoperative Diagnosis: * No pre-op diagnosis entered *   Procedure : * No procedures listed *     No past medical history on file.   No past surgical history on file.   No Known Allergies   Social History     Tobacco Use     Smoking status: Not on file   Substance Use Topics     Alcohol use: Not on file      Wt Readings from Last 1 Encounters:   20 78.4 kg (172 lb 14.4 oz)             Physical Exam    Airway        Mallampati: II   TM distance: > 3 FB   Neck ROM: full   Mouth opening: > 3 cm    Respiratory Devices and Support         Dental  no notable dental history         Cardiovascular   cardiovascular exam normal          Pulmonary   pulmonary exam normal                OUTSIDE LABS:  CBC:   Lab Results   Component Value Date    WBC 5.3 2020    HGB 15.5 2020    HCT 45.1 2020     2020     BMP:   Lab Results   Component Value Date     2020    POTASSIUM 4.6 2020    POTASSIUM 4.3 2020    CHLORIDE 106 2020    CO2 32 2020    BUN 18 2020    CR 0.80 2020    CR 0.91 2020    GLC 97 2020    GLC 90 2020     COAGS: No results found for: PTT, INR, FIBR  POC: No results found for: BGM, HCG, HCGS  HEPATIC:   Lab Results   Component Value Date    ALBUMIN 3.8 2020    PROTTOTAL 7.0 2020    ALT 22 2020    AST 10 2020    ALKPHOS 61 2020    BILITOTAL 1.1 2020     OTHER:   Lab Results   Component Value Date    RANJITH 8.8 2020    TSH 1.82 2020       Anesthesia Plan    ASA Status:  2      Anesthesia Type: General.   Induction: Intravenous.           Consents            Postoperative Care            Comments:                    Kyle Rivero MD

## 2021-02-19 NOTE — DISCHARGE INSTRUCTIONS
Today we completed your Covid test for your next appointment.  You do not need to do anything further. Your covid test results will be available by your next ECT treatment.    Instructions for your next appointment:  Next ECT 2/26/21 at 10:15     Plan:   COVID test 2/24/21.    Next ECT  2/26/21.   Increased Effexor XR from 150mg to 225mg daily.  (Extra 75mg caps sent to his pharmacy) on 1/27/21  On 2/3/21 added Vistaril 25 to 50mg po tid prn anxiety. (sent to [facility] in [place])  On 2/10/21 added Klonopin 0.5mg po tid prn anxiety.    Have patient set up a med appointment with Dr. Markham (Dr. Markham will have his nurse call them).       *Covid-19 Testing:  Our central scheduling department will be calling you to schedule a Covid-19 test.  You will be scheduled to have this test before your next ECT treatment. If you do not receive a phone call, please call [phone] to schedule your Covid test to be performed 2 days before your scheduled ECT appointment.     Please come to the [facility] entrance and check-in with Security before your ECT appointment.  The [facility] entrance is located right next to the Adult Emergency Room.  The address is [address] [place] [address].    After your appointment, you may be picked up at the [facility] entrance, which is located on the West side of our campus.  The address is [address] [place] [address].  [facility].   ECT Discharge Instructions      During your ECT series:      Do not drive for 24 hours after treatment. If you will have more than one treatment within a week, no driving until 7 days after your last ECT treatment.     Do not drink alcohol or use street drugs (illicit drugs) while you are having treatments.    Do not make important decisions, including legal decisions.    After each treatment:      Get plenty of rest. A responsible adult must stay with your for at least 6 hours.    Avoid heavy or risky activities for 24 hours.    If you feel  light-headed, sit for a few minutes before standing. Have someone help you get up.    If you have nausea (feel sick to your stomach): Drink only clear liquids such as apple juice, ginger ale, broth or 7UP, Be sure to drink plenty of liquids. Move to a normal diet as you feel able.     If you received Toradol, wait 6 hours before taking ibuprofen.    Call your doctor if:     You have a fever over 100F (37.7 C) (taken under the tongue), or a fever that last more than 24 hours.    Your IV site is red, swollen, very painful or is getting more tender.    You have nausea that gets very bad or does not improve.      If you have any symptoms after ECT, tell our staff before your next treatment.    The ECT Department can be reached at [phone].  The ECT Department is open Mondays, Wednesdays and Fridays from 7:00 AM to 2:00 PM.      To speak to a doctor, call: To reach Dr. Markham please call [phone].  Other: You had Toradol at 1040 Which is an NSAID medication. Do not take any Ibuprofen, Excedrin, Motrin, Aleve, Advil, Asprin, or any other NSAID medication until after 4:40. Questions, check with your physician or pharmacist.  Transported by wife / jagdish

## 2021-02-19 NOTE — PROCEDURES
Procedure/Surgery Information   [facility]     Bedside Procedure Note  Date of Service (when I performed the procedure): 02/19/2021    Kuldeep Sequeira is a 50 year old male patient.  No diagnosis found.  No past medical history on file.                       Procedures     Wm Markham     [facility], Oral   ECT Procedure Note   02/19/2021    Kuldeep Sequeira [id number]   50 year old 1970     Patient Status: Outpatient    Is this the first in a series of 12 treatments?  No    History and Physical: Reviewed in medical record    Consent: Informed consent was signed by: patient    Date Consent Signed: 2/1/21     No Known Allergies    Weight:  0 lbs 0 oz    Patient Preparations: (none)    Wt Readings from Last 5 Encounters:   11/19/20 78.4 kg (172 lb 14.4 oz)       There were no vitals taken for this visit.         Indications for ECT:   Medications ineffective         Clinical Narrative:   Patient has a long history of depression and is continuing ECT for treatment-resistant depression.  He's ending a 2nd series of ECT now.  NPO after 2400.  Alert and Oriented x 3.  No problems with the last ECT.     Effexor dose was increased recently 1/27/21 from 150mg/d to 225 mg/d.  Last ECT was 4 days ago.  Trying to stretch out treatments.   Mood is not as good today as it was a week ago.  He wants to look at meds.           Diagnosis:   Major depression         Pause for the Cause:     Right patient Yes   Right procedure/laterality settings: Yes          Intra-Procedure Documentation:     ECT #: 8 of 12   Treatment number this series: 21   Total treatment number: 21     Type of ECT:   BILATERAL since treatment #6 (instead of Right, unilateral standard)     ECT Medications:      Zyprexa: 10mg at home  Tylenol: 1000mg at home  Robinul: 0.2mg  Toradol: 30mg  Zofran: 4mg  Lactated Ringers: 1/2 liter  Brevital: 100mg  Caffeine: 500mg  Succinyl Choline:  100mg  Versed: 2mg for h/o agitation (give at 50 seconds)      ECT Strip Summary:   Energy Level: 30 percent   Motor Seizure Duration:  32 seconds  EEG Seizure Duration:   53 seconds     Complications: none (but had brief asystole 2/10/21 which resolved without intervention)    Plan:   COVID test 2/24/21.    Next ECT  2/26/21.   Increased Effexor XR from 150mg to 225mg daily.  (Extra 75mg caps sent to his pharmacy) on 1/27/21  On 2/3/21 added Vistaril 25 to 50mg po tid prn anxiety. (sent to [facility] in [place])  On 2/10/21 added Klonopin 0.5mg po tid prn anxiety.    Have patient set up a med appointment with Dr. Markham (Dr. Markham will have his nurse call them).       Wm Markham MD

## 2021-02-19 NOTE — ANESTHESIA POSTPROCEDURE EVALUATION
Patient: Kuldeep Sequeira    * No procedures listed *    Diagnosis:* No pre-op diagnosis entered *  Diagnosis Additional Information: No value filed.    Anesthesia Type:  General    Note:  Anesthesia Post Evaluation    Last vitals:  Vitals:    02/19/21 0957 02/19/21 1100   BP: 113/80    Pulse: 80 112   Resp: 16 22   Temp: 36.6  C (97.9  F) 36.9  C (98.5  F)   SpO2: 100% 99%       Last vitals prior to Anesthesia Care Transfer:  CRNA VITALS  2/19/2021 1027 - 2/19/2021 1113      2/19/2021             Resp Rate (observed):  (!) 1          Electronically Signed By: Kyle Rivero MD  February 19, 2021  11:13 AM

## 2021-02-19 NOTE — PROGRESS NOTES
Discharge instructions reviewed with jagdish / wife  AVS given to .  Patient safely left ECt department with .

## 2021-02-19 NOTE — ANESTHESIA POSTPROCEDURE EVALUATION
Patient: Kuldeep Sequeira    * No procedures listed *    Diagnosis:* No pre-op diagnosis entered *  Diagnosis Additional Information: No value filed.    Anesthesia Type:  General    Note:  Disposition: Outpatient   Postop Pain Control: Uneventful            Sign Out: Well controlled pain   PONV: No   Neuro/Psych: Uneventful            Sign Out: Acceptable/Baseline neuro status   Airway/Respiratory: Uneventful            Sign Out: Acceptable/Baseline resp. status   CV/Hemodynamics: Uneventful            Sign Out: Acceptable CV status   Other NRE: NONE   DID A NON-ROUTINE EVENT OCCUR? No         Last vitals:  Vitals:    02/19/21 0957 02/19/21 1100   BP: 113/80    Pulse: 80 112   Resp: 16 22   Temp: 36.6  C (97.9  F) 36.9  C (98.5  F)   SpO2: 100% 99%       Last vitals prior to Anesthesia Care Transfer:  CRNA VITALS  2/19/2021 1027 - 2/19/2021 1113      2/19/2021             Resp Rate (observed):  (!) 1          Electronically Signed By: Kyle Rivero MD  February 19, 2021  11:13 AM

## 2021-02-23 DIAGNOSIS — F32.A DEPRESSION, UNSPECIFIED DEPRESSION TYPE: ICD-10-CM

## 2021-02-23 LAB
SARS-COV-2 RNA RESP QL NAA+PROBE: NORMAL
SPECIMEN SOURCE: NORMAL

## 2021-02-23 PROCEDURE — U0003 INFECTIOUS AGENT DETECTION BY NUCLEIC ACID (DNA OR RNA); SEVERE ACUTE RESPIRATORY SYNDROME CORONAVIRUS 2 (SARS-COV-2) (CORONAVIRUS DISEASE [COVID-19]), AMPLIFIED PROBE TECHNIQUE, MAKING USE OF HIGH THROUGHPUT TECHNOLOGIES AS DESCRIBED BY CMS-2020-01-R: HCPCS | Performed by: PSYCHIATRY & NEUROLOGY

## 2021-02-23 PROCEDURE — U0005 INFEC AGEN DETEC AMPLI PROBE: HCPCS | Performed by: PSYCHIATRY & NEUROLOGY

## 2021-02-26 ENCOUNTER — ANESTHESIA EVENT (OUTPATIENT)
Dept: BEHAVIORAL HEALTH | Facility: CLINIC | Age: 51
End: 2021-02-26

## 2021-02-26 ENCOUNTER — ANESTHESIA (OUTPATIENT)
Dept: BEHAVIORAL HEALTH | Facility: CLINIC | Age: 51
End: 2021-02-26

## 2021-02-26 ENCOUNTER — HOSPITAL ENCOUNTER (OUTPATIENT)
Dept: BEHAVIORAL HEALTH | Facility: CLINIC | Age: 51
Discharge: HOME | End: 2021-02-26
Attending: PSYCHIATRY & NEUROLOGY | Admitting: PSYCHIATRY & NEUROLOGY
Payer: COMMERCIAL

## 2021-02-26 VITALS
TEMPERATURE: 98.2 F | RESPIRATION RATE: 16 BRPM | HEART RATE: 110 BPM | SYSTOLIC BLOOD PRESSURE: 127 MMHG | OXYGEN SATURATION: 97 % | DIASTOLIC BLOOD PRESSURE: 86 MMHG

## 2021-02-26 DIAGNOSIS — F33.2 SEVERE RECURRENT MAJOR DEPRESSION WITHOUT PSYCHOTIC FEATURES (H): Primary | ICD-10-CM

## 2021-02-26 PROCEDURE — 250N000011 HC RX IP 250 OP 636: Performed by: ANESTHESIOLOGY

## 2021-02-26 PROCEDURE — 250N000009 HC RX 250: Performed by: PSYCHIATRY & NEUROLOGY

## 2021-02-26 PROCEDURE — 370N000017 HC ANESTHESIA TECHNICAL FEE, PER MIN

## 2021-02-26 PROCEDURE — 250N000011 HC RX IP 250 OP 636: Performed by: PSYCHIATRY & NEUROLOGY

## 2021-02-26 PROCEDURE — 90870 ELECTROCONVULSIVE THERAPY: CPT

## 2021-02-26 PROCEDURE — 250N000009 HC RX 250: Performed by: ANESTHESIOLOGY

## 2021-02-26 RX ORDER — ONDANSETRON 2 MG/ML
4 INJECTION INTRAMUSCULAR; INTRAVENOUS
Status: CANCELLED
Start: 2021-02-26 | End: 2021-02-26

## 2021-02-26 RX ORDER — KETOROLAC TROMETHAMINE 30 MG/ML
30 INJECTION, SOLUTION INTRAMUSCULAR; INTRAVENOUS
Status: CANCELLED
Start: 2021-02-26 | End: 2021-02-26

## 2021-02-26 RX ORDER — ONDANSETRON 2 MG/ML
4 INJECTION INTRAMUSCULAR; INTRAVENOUS
Status: COMPLETED | OUTPATIENT
Start: 2021-02-26 | End: 2021-02-26

## 2021-02-26 RX ORDER — CAFFEINE AND SODIUM BENZOATE 125 MG/ML
INJECTION, SOLUTION INTRAMUSCULAR; INTRAVENOUS PRN
Status: DISCONTINUED | OUTPATIENT
Start: 2021-02-26 | End: 2021-02-26

## 2021-02-26 RX ORDER — KETOROLAC TROMETHAMINE 30 MG/ML
30 INJECTION, SOLUTION INTRAMUSCULAR; INTRAVENOUS
Status: COMPLETED | OUTPATIENT
Start: 2021-02-26 | End: 2021-02-26

## 2021-02-26 RX ORDER — ACETAMINOPHEN 500 MG
1000 TABLET ORAL
Status: ACTIVE | OUTPATIENT
Start: 2021-02-26 | End: 2021-02-26

## 2021-02-26 RX ORDER — ARIPIPRAZOLE 2 MG/1
2 TABLET ORAL DAILY
COMMUNITY
End: 2022-03-11

## 2021-02-26 RX ORDER — GLYCOPYRROLATE 0.2 MG/ML
0.2 INJECTION, SOLUTION INTRAMUSCULAR; INTRAVENOUS
Status: CANCELLED
Start: 2021-02-26 | End: 2021-02-26

## 2021-02-26 RX ORDER — CAFFEINE AND SODIUM BENZOATE 125 MG/ML
500 INJECTION, SOLUTION INTRAMUSCULAR; INTRAVENOUS
Status: ACTIVE | OUTPATIENT
Start: 2021-02-26 | End: 2021-02-26

## 2021-02-26 RX ORDER — GLYCOPYRROLATE 0.2 MG/ML
0.2 INJECTION, SOLUTION INTRAMUSCULAR; INTRAVENOUS
Status: COMPLETED | OUTPATIENT
Start: 2021-02-26 | End: 2021-02-26

## 2021-02-26 RX ORDER — CAFFEINE AND SODIUM BENZOATE 125 MG/ML
500 INJECTION, SOLUTION INTRAMUSCULAR; INTRAVENOUS
Status: CANCELLED
Start: 2021-02-26 | End: 2021-02-26

## 2021-02-26 RX ORDER — ACETAMINOPHEN 500 MG
1000 TABLET ORAL
Status: CANCELLED
Start: 2021-02-26 | End: 2021-02-26

## 2021-02-26 RX ADMIN — CAFFEINE AND SODIUM BENZOATE 500 MG: 125 INJECTION, SOLUTION INTRAMUSCULAR; INTRAVENOUS at 11:30

## 2021-02-26 RX ADMIN — GLYCOPYRROLATE 0.2 MG: 0.2 INJECTION, SOLUTION INTRAMUSCULAR; INTRAVENOUS at 11:25

## 2021-02-26 RX ADMIN — ONDANSETRON 4 MG: 2 INJECTION INTRAMUSCULAR; INTRAVENOUS at 11:24

## 2021-02-26 RX ADMIN — KETOROLAC TROMETHAMINE 30 MG: 30 INJECTION, SOLUTION INTRAMUSCULAR; INTRAVENOUS at 11:20

## 2021-02-26 RX ADMIN — METHOHEXITAL SODIUM 100 MG: 500 INJECTION, POWDER, LYOPHILIZED, FOR SOLUTION INTRAMUSCULAR; INTRAVENOUS; RECTAL at 11:29

## 2021-02-26 RX ADMIN — Medication 100 MG: at 11:30

## 2021-02-26 RX ADMIN — MIDAZOLAM 2 MG: 1 INJECTION INTRAMUSCULAR; INTRAVENOUS at 11:30

## 2021-02-26 NOTE — IP AVS SNAPSHOT
[facility]  [address] [address]  Phone: [phone]                                    After Visit Summary   2/26/2021    Kuldeep Sequeira    MRN: [id number]           After Visit Summary Signature Page    I have received my discharge instructions, and my questions have been answered. I have discussed any challenges I see with this plan with the nurse or doctor.    ..........................................................................................................................................  Patient/Patient Representative Signature      ..........................................................................................................................................  Patient Representative Print Name and Relationship to Patient    ..................................................               ................................................  Date                                   Time    ..........................................................................................................................................  Reviewed by Signature/Title    ...................................................              ..............................................  Date                                               Time          22EPIC Rev 08/18

## 2021-02-26 NOTE — ANESTHESIA PREPROCEDURE EVALUATION
Anesthesia Pre-Procedure Evaluation    Patient: Kuldeep Sequeira   MRN: [id number] : 1970        Preoperative Diagnosis: * No pre-op diagnosis entered *   Procedure : * No procedures listed *     No past medical history on file.   No past surgical history on file.   No Known Allergies   Social History     Tobacco Use     Smoking status: Not on file   Substance Use Topics     Alcohol use: Not on file      Wt Readings from Last 1 Encounters:   20 78.4 kg (172 lb 14.4 oz)             Physical Exam    Airway        Mallampati: II   TM distance: > 3 FB   Neck ROM: full   Mouth opening: > 3 cm    Respiratory Devices and Support         Dental  no notable dental history         Cardiovascular   cardiovascular exam normal          Pulmonary   pulmonary exam normal                OUTSIDE LABS:  CBC:   Lab Results   Component Value Date    WBC 5.3 2020    HGB 15.5 2020    HCT 45.1 2020     2020     BMP:   Lab Results   Component Value Date     2020    POTASSIUM 4.6 2020    POTASSIUM 4.3 2020    CHLORIDE 106 2020    CO2 32 2020    BUN 18 2020    CR 0.80 2020    CR 0.91 2020    GLC 97 2020    GLC 90 2020     COAGS: No results found for: PTT, INR, FIBR  POC: No results found for: BGM, HCG, HCGS  HEPATIC:   Lab Results   Component Value Date    ALBUMIN 3.8 2020    PROTTOTAL 7.0 2020    ALT 22 2020    AST 10 2020    ALKPHOS 61 2020    BILITOTAL 1.1 2020     OTHER:   Lab Results   Component Value Date    RANJITH 8.8 2020    TSH 1.82 2020       Anesthesia Plan    ASA Status:  2      Anesthesia Type: General.   Induction: Intravenous.           Consents            Postoperative Care            Comments:                    Angella Howard MD

## 2021-02-26 NOTE — ANESTHESIA POSTPROCEDURE EVALUATION
Patient: Kuldeep Sequeira    * No procedures listed *    Diagnosis:* No pre-op diagnosis entered *  Diagnosis Additional Information: No value filed.    Anesthesia Type:  General    Note:  Disposition: Outpatient   Postop Pain Control: Uneventful            Sign Out: Well controlled pain   PONV:    Neuro/Psych: Uneventful            Sign Out: Acceptable/Baseline neuro status   Airway/Respiratory: Uneventful            Sign Out: Acceptable/Baseline resp. status   CV/Hemodynamics: Uneventful            Sign Out: Acceptable CV status   Other NRE:    DID A NON-ROUTINE EVENT OCCUR?          Last vitals:  Vitals:    02/26/21 1137 02/26/21 1155 02/26/21 1205   BP: (!) 151/96 (!) 144/82 127/86   Pulse: 118 115 110   Resp: 16 16 16   Temp: 36.7  C (98.1  F) 36.7  C (98  F) 36.8  C (98.2  F)   SpO2: 95% 96% 97%       Last vitals prior to Anesthesia Care Transfer:  CRNA VITALS  2/26/2021 1106 - 2/26/2021 1206      2/26/2021             Resp Rate (observed):  (!) 1          Electronically Signed By: Angella Howard MD  February 26, 2021  12:29 PM

## 2021-02-26 NOTE — PROGRESS NOTES
Discharge instructions have been reviewed with the patients responsible adult verbally over the phone. A print out has been given to the patient with discharge instructions to review along with instructions on their next appointment.

## 2021-02-26 NOTE — PROCEDURES
Procedure/Surgery Information   [facility]     Bedside Procedure Note  Date of Service (when I performed the procedure): 02/26/2021    Kuldeep Sequeira is a 50 year old male patient.  No diagnosis found.  No past medical history on file.                       Procedures     Wm Markham     [facility], [place]   ECT Procedure Note   02/26/2021    Kuldeep Sequeira [id number]   50 year old 1970     Patient Status: Outpatient    Is this the first in a series of 12 treatments?  No    History and Physical: Reviewed in medical record    Consent: Informed consent was signed by: patient    Date Consent Signed: 2/1/21     No Known Allergies    Weight:  0 lbs 0 oz    Patient Preparations: (none)    Wt Readings from Last 5 Encounters:   11/19/20 78.4 kg (172 lb 14.4 oz)       There were no vitals taken for this visit.         Indications for ECT:   Medications ineffective         Clinical Narrative:   Patient has a long history of depression and is continuing ECT for treatment-resistant depression.  He's ending a 2nd series of ECT now.  NPO after 2400.  Alert and Oriented x 3.  No problems with the last ECT.     Effexor dose was increased recently 1/27/21 from 150mg/d to 225 mg/d.  Last ECT was 1 week ago.  Trying to stretch out treatments.   Abilify was added for antidepressant augmentation this week.  Mood is depressed.  He's been working, which has been going well.           Diagnosis:   Major depression         Pause for the Cause:     Right patient Yes   Right procedure/laterality settings: Yes          Intra-Procedure Documentation:     ECT #: 9 of 12   Treatment number this series: 22   Total treatment number: 22     Type of ECT:   BILATERAL since treatment #6 (instead of Right, unilateral standard)     ECT Medications:      Zyprexa: 10mg at home  Tylenol: 1000mg at home  Robinul: 0.2mg  Toradol: 30mg  Zofran: 4mg  Lactated Ringers: 1/2  liter  Brevital: 100mg  Caffeine: 500mg  Succinyl Choline: 100mg  Versed: 2mg for h/o agitation (give at 50 seconds)      ECT Strip Summary:   Energy Level: 30 percent   Motor Seizure Duration:  38 seconds  EEG Seizure Duration:   84 seconds     Complications: none (but in the past he's had brief asystole and bradycardia which resolved without intervention)    Plan:   COVID test 3/3/21   Next ECT  3/5/21   Increased Effexor XR from 150mg to 225mg daily.  (Extra 75mg caps sent to his pharmacy) on 1/27/21  On 2/3/21 added Vistaril 25 to 50mg po tid prn anxiety. (sent to [facility] in [place])  On 2/10/21 added Klonopin 0.5mg po tid prn anxiety.    On 2/22/21 added Abilify for augmentation.   Have patient set up a med appointment with Dr. Markham (Dr. Markham will have his nurse call them).       Wm Markham MD

## 2021-02-26 NOTE — DISCHARGE INSTRUCTIONS
ECT Discharge Instructions      During your ECT series:      Do not drive or work heavy equipment until 7 days after your last treatment.    Do not drink alcohol or use street drugs (illicit drugs) while you are having treatments.    Do not make important decisions, including legal decisions.    After your maintenance ECT treatment:      Do not drive for 24 hours after treatment.  If you will be having more than one treatment witihin a week, no driving until 7 days after your last ECT treatment.         After each treatment:      Get plenty of rest. A responsible adult must stay with your for at least 6 hours.    Avoid heavy or risky activities for 24 hours.    If you feel light-headed, sit for a few minutes before standing. Have someone help you get up.    If you have nausea (feel sick to your stomach): Drink only clear liquids such as apple juice, ginger ale, broth or 7UP, Be sure to drink plenty of liquids. Move to a normal diet as you feel able.     If you received Toradol, wait 6 hours before taking ibuprofen.    Call your doctor if:     You have a fever over 100F (37.7 C) (taken under the tongue), or a fever that last more than 24 hours.    Your IV site is red, swollen, very painful or is getting more tender.    You have nausea that gets very bad or does not improve.      If you have any symptoms after ECT, tell our staff before your next treatment.    The ECT Department can be reached at [phone].  The ECT Department is open Mondays, Wednesdays and Fridays from 7:00 AM to 2:00 PM.    To speak to a doctor, call: Dr. Markham [phone]    New instructions: Increased Effexor XR from 150mg to 225mg daily.  (Extra 75mg caps sent to his pharmacy) on 1/27/21  On 2/3/21 added Vistaril 25 to 50mg po tid prn anxiety. (sent to [facility] in [place])  On 2/10/21 added Klonopin 0.5mg po tid prn anxiety.    On 2/22/21 added Abilify for augmentation. (to help the effects of the other medications)  Have patient set up a med  appointment with Dr. Markham (Dr. Markham will have his nurse call them).       Other: You have received Toradol today at 11:20 AM Toradol is an NSAID.  Do not take any NSAID's including: Ibuprofen, Naproxen Sodium, Asprin , Advil, Motrin, Aleve, Rodrick, etc., until six hours after the above time which would be 5:20 PM. If you have any questions check back with your Physician, or pharmacist. You were given zofran 4 mg to prevent nausea. You were given versed 2 mg to help with post ECT restlessness.    Transported by: Evon    Instructions for your next appointment:    *Covid-19 Testing:  Our central scheduling department will be calling you to schedule a Covid-19 test.  You will be scheduled to have this test before your next ECT treatment. If you do not receive a phone call, please call [phone] to schedule your Covid test to be performed 48-96 hours (2-4 days) before your scheduled ECT appointment. Get covid test 3/3/21    Please come to the [facility] and check-in with Security before your ECT appointment.  The [facility] entrance is located right next to the Adult Emergency Room.  The address is [address] [place] [address].    After your appointment, you may be picked up at the [facility] entrance, which is located on the West side of our campus.  The address is [address] [place] [address].  [facility].

## 2021-03-02 DIAGNOSIS — F33.2 SEVERE RECURRENT MAJOR DEPRESSION WITHOUT PSYCHOTIC FEATURES (H): ICD-10-CM

## 2021-03-02 LAB
SARS-COV-2 RNA RESP QL NAA+PROBE: NORMAL
SPECIMEN SOURCE: NORMAL

## 2021-03-02 PROCEDURE — U0005 INFEC AGEN DETEC AMPLI PROBE: HCPCS | Performed by: PSYCHIATRY & NEUROLOGY

## 2021-03-02 PROCEDURE — U0003 INFECTIOUS AGENT DETECTION BY NUCLEIC ACID (DNA OR RNA); SEVERE ACUTE RESPIRATORY SYNDROME CORONAVIRUS 2 (SARS-COV-2) (CORONAVIRUS DISEASE [COVID-19]), AMPLIFIED PROBE TECHNIQUE, MAKING USE OF HIGH THROUGHPUT TECHNOLOGIES AS DESCRIBED BY CMS-2020-01-R: HCPCS | Performed by: PSYCHIATRY & NEUROLOGY

## 2021-03-05 ENCOUNTER — ANESTHESIA (OUTPATIENT)
Dept: BEHAVIORAL HEALTH | Facility: CLINIC | Age: 51
End: 2021-03-05

## 2021-03-05 ENCOUNTER — ANESTHESIA EVENT (OUTPATIENT)
Dept: BEHAVIORAL HEALTH | Facility: CLINIC | Age: 51
End: 2021-03-05

## 2021-03-05 ENCOUNTER — HOSPITAL ENCOUNTER (OUTPATIENT)
Dept: BEHAVIORAL HEALTH | Facility: CLINIC | Age: 51
Discharge: HOME | End: 2021-03-05
Attending: PSYCHIATRY & NEUROLOGY | Admitting: PSYCHIATRY & NEUROLOGY
Payer: COMMERCIAL

## 2021-03-05 VITALS
OXYGEN SATURATION: 98 % | HEART RATE: 60 BPM | RESPIRATION RATE: 16 BRPM | SYSTOLIC BLOOD PRESSURE: 132 MMHG | DIASTOLIC BLOOD PRESSURE: 97 MMHG | TEMPERATURE: 98 F

## 2021-03-05 DIAGNOSIS — F32.A DEPRESSION, UNSPECIFIED DEPRESSION TYPE: Primary | ICD-10-CM

## 2021-03-05 DIAGNOSIS — F33.2 SEVERE RECURRENT MAJOR DEPRESSION WITHOUT PSYCHOTIC FEATURES (H): Primary | ICD-10-CM

## 2021-03-05 DIAGNOSIS — F32.A DEPRESSION, UNSPECIFIED DEPRESSION TYPE: ICD-10-CM

## 2021-03-05 PROCEDURE — 250N000009 HC RX 250: Performed by: ANESTHESIOLOGY

## 2021-03-05 PROCEDURE — 258N000003 HC RX IP 258 OP 636: Performed by: PSYCHIATRY & NEUROLOGY

## 2021-03-05 PROCEDURE — 370N000017 HC ANESTHESIA TECHNICAL FEE, PER MIN

## 2021-03-05 PROCEDURE — 90870 ELECTROCONVULSIVE THERAPY: CPT

## 2021-03-05 PROCEDURE — 250N000009 HC RX 250: Performed by: PSYCHIATRY & NEUROLOGY

## 2021-03-05 PROCEDURE — 250N000011 HC RX IP 250 OP 636: Performed by: PSYCHIATRY & NEUROLOGY

## 2021-03-05 PROCEDURE — 250N000011 HC RX IP 250 OP 636: Performed by: ANESTHESIOLOGY

## 2021-03-05 RX ORDER — GLYCOPYRROLATE 0.2 MG/ML
0.2 INJECTION, SOLUTION INTRAMUSCULAR; INTRAVENOUS
Status: CANCELLED
Start: 2021-03-05 | End: 2021-03-05

## 2021-03-05 RX ORDER — ONDANSETRON 2 MG/ML
4 INJECTION INTRAMUSCULAR; INTRAVENOUS
Status: COMPLETED | OUTPATIENT
Start: 2021-03-05 | End: 2021-03-05

## 2021-03-05 RX ORDER — SODIUM CHLORIDE, SODIUM LACTATE, POTASSIUM CHLORIDE, CALCIUM CHLORIDE 600; 310; 30; 20 MG/100ML; MG/100ML; MG/100ML; MG/100ML
INJECTION, SOLUTION INTRAVENOUS CONTINUOUS
Status: DISCONTINUED | OUTPATIENT
Start: 2021-03-05 | End: 2021-03-06 | Stop reason: HOSPADM

## 2021-03-05 RX ORDER — NALOXONE HYDROCHLORIDE 0.4 MG/ML
0.4 INJECTION, SOLUTION INTRAMUSCULAR; INTRAVENOUS; SUBCUTANEOUS
Status: DISCONTINUED | OUTPATIENT
Start: 2021-03-05 | End: 2021-03-06 | Stop reason: HOSPADM

## 2021-03-05 RX ORDER — ONDANSETRON 4 MG/1
4 TABLET, ORALLY DISINTEGRATING ORAL EVERY 30 MIN PRN
Status: DISCONTINUED | OUTPATIENT
Start: 2021-03-05 | End: 2021-03-06 | Stop reason: HOSPADM

## 2021-03-05 RX ORDER — GLYCOPYRROLATE 0.2 MG/ML
0.2 INJECTION, SOLUTION INTRAMUSCULAR; INTRAVENOUS
Status: COMPLETED | OUTPATIENT
Start: 2021-03-05 | End: 2021-03-05

## 2021-03-05 RX ORDER — CHLORAL HYDRATE 500 MG
2 CAPSULE ORAL DAILY
COMMUNITY

## 2021-03-05 RX ORDER — ACETAMINOPHEN 500 MG
1000 TABLET ORAL
Status: ACTIVE | OUTPATIENT
Start: 2021-03-05 | End: 2021-03-05

## 2021-03-05 RX ORDER — NALOXONE HYDROCHLORIDE 0.4 MG/ML
0.2 INJECTION, SOLUTION INTRAMUSCULAR; INTRAVENOUS; SUBCUTANEOUS
Status: DISCONTINUED | OUTPATIENT
Start: 2021-03-05 | End: 2021-03-06 | Stop reason: HOSPADM

## 2021-03-05 RX ORDER — ACETAMINOPHEN 500 MG
1000 TABLET ORAL
Status: CANCELLED
Start: 2021-03-05 | End: 2021-03-05

## 2021-03-05 RX ORDER — ONDANSETRON 2 MG/ML
4 INJECTION INTRAMUSCULAR; INTRAVENOUS EVERY 30 MIN PRN
Status: DISCONTINUED | OUTPATIENT
Start: 2021-03-05 | End: 2021-03-06 | Stop reason: HOSPADM

## 2021-03-05 RX ORDER — CAFFEINE AND SODIUM BENZOATE 125 MG/ML
500 INJECTION, SOLUTION INTRAMUSCULAR; INTRAVENOUS
Status: CANCELLED
Start: 2021-03-05 | End: 2021-03-05

## 2021-03-05 RX ORDER — CAFFEINE AND SODIUM BENZOATE 125 MG/ML
500 INJECTION, SOLUTION INTRAMUSCULAR; INTRAVENOUS
Status: COMPLETED | OUTPATIENT
Start: 2021-03-05 | End: 2021-03-05

## 2021-03-05 RX ORDER — ONDANSETRON 2 MG/ML
4 INJECTION INTRAMUSCULAR; INTRAVENOUS
Status: CANCELLED
Start: 2021-03-05 | End: 2021-03-05

## 2021-03-05 RX ORDER — KETOROLAC TROMETHAMINE 30 MG/ML
30 INJECTION, SOLUTION INTRAMUSCULAR; INTRAVENOUS
Status: CANCELLED
Start: 2021-03-05 | End: 2021-03-05

## 2021-03-05 RX ORDER — MEPERIDINE HYDROCHLORIDE 25 MG/ML
12.5 INJECTION INTRAMUSCULAR; INTRAVENOUS; SUBCUTANEOUS
Status: DISCONTINUED | OUTPATIENT
Start: 2021-03-05 | End: 2021-03-06 | Stop reason: HOSPADM

## 2021-03-05 RX ORDER — ARIPIPRAZOLE 5 MG/1
5 TABLET ORAL DAILY
Qty: 30 TABLET | Refills: 0 | Status: SHIPPED | OUTPATIENT
Start: 2021-03-05 | End: 2022-03-11

## 2021-03-05 RX ORDER — KETOROLAC TROMETHAMINE 30 MG/ML
30 INJECTION, SOLUTION INTRAMUSCULAR; INTRAVENOUS
Status: COMPLETED | OUTPATIENT
Start: 2021-03-05 | End: 2021-03-05

## 2021-03-05 RX ADMIN — Medication 100 MG: at 11:38

## 2021-03-05 RX ADMIN — CAFFEINE AND SODIUM BENZOATE 500 MG: 125 INJECTION, SOLUTION INTRAMUSCULAR; INTRAVENOUS at 11:40

## 2021-03-05 RX ADMIN — METHOHEXITAL SODIUM 100 MG: 500 INJECTION, POWDER, LYOPHILIZED, FOR SOLUTION INTRAMUSCULAR; INTRAVENOUS; RECTAL at 11:37

## 2021-03-05 RX ADMIN — ONDANSETRON 4 MG: 2 INJECTION INTRAMUSCULAR; INTRAVENOUS at 11:29

## 2021-03-05 RX ADMIN — GLYCOPYRROLATE 0.2 MG: 0.2 INJECTION, SOLUTION INTRAMUSCULAR; INTRAVENOUS at 11:28

## 2021-03-05 RX ADMIN — SODIUM CHLORIDE, POTASSIUM CHLORIDE, SODIUM LACTATE AND CALCIUM CHLORIDE 500 ML: 600; 310; 30; 20 INJECTION, SOLUTION INTRAVENOUS at 11:27

## 2021-03-05 RX ADMIN — KETOROLAC TROMETHAMINE 30 MG: 30 INJECTION, SOLUTION INTRAMUSCULAR; INTRAVENOUS at 11:28

## 2021-03-05 RX ADMIN — MIDAZOLAM 2 MG: 1 INJECTION INTRAMUSCULAR; INTRAVENOUS at 11:37

## 2021-03-05 NOTE — IP AVS SNAPSHOT
[facility]  [address] [address]  Phone: [phone]                                    After Visit Summary   3/5/2021    Kuldeep Sequeira    MRN: [id number]           After Visit Summary Signature Page    I have received my discharge instructions, and my questions have been answered. I have discussed any challenges I see with this plan with the nurse or doctor.    ..........................................................................................................................................  Patient/Patient Representative Signature      ..........................................................................................................................................  Patient Representative Print Name and Relationship to Patient    ..................................................               ................................................  Date                                   Time    ..........................................................................................................................................  Reviewed by Signature/Title    ...................................................              ..............................................  Date                                               Time          22EPIC Rev 08/18

## 2021-03-05 NOTE — ANESTHESIA PREPROCEDURE EVALUATION
Anesthesia Pre-Procedure Evaluation    Patient: Kuldeep Sequeira   MRN: [id number] : 1970        Preoperative Diagnosis: * No pre-op diagnosis entered *   Procedure : * No procedures listed *     No past medical history on file.   No past surgical history on file.   No Known Allergies   Social History     Tobacco Use     Smoking status: Not on file   Substance Use Topics     Alcohol use: Not on file      Wt Readings from Last 1 Encounters:   20 78.4 kg (172 lb 14.4 oz)             Physical Exam    Airway        Mallampati: II   TM distance: > 3 FB   Neck ROM: full   Mouth opening: > 3 cm    Respiratory Devices and Support         Dental  no notable dental history         Cardiovascular   cardiovascular exam normal          Pulmonary   pulmonary exam normal                OUTSIDE LABS:  CBC:   Lab Results   Component Value Date    WBC 5.3 2020    HGB 15.5 2020    HCT 45.1 2020     2020     BMP:   Lab Results   Component Value Date     2020    POTASSIUM 4.6 2020    POTASSIUM 4.3 2020    CHLORIDE 106 2020    CO2 32 2020    BUN 18 2020    CR 0.80 2020    CR 0.91 2020    GLC 97 2020    GLC 90 2020     COAGS: No results found for: PTT, INR, FIBR  POC: No results found for: BGM, HCG, HCGS  HEPATIC:   Lab Results   Component Value Date    ALBUMIN 3.8 2020    PROTTOTAL 7.0 2020    ALT 22 2020    AST 10 2020    ALKPHOS 61 2020    BILITOTAL 1.1 2020     OTHER:   Lab Results   Component Value Date    RANJITH 8.8 2020    TSH 1.82 2020       Anesthesia Plan    ASA Status:  2      Anesthesia Type: General.   Induction: Intravenous.           Consents            Postoperative Care            Comments:                    Angella Howard MD

## 2021-03-05 NOTE — ANESTHESIA POSTPROCEDURE EVALUATION
Patient: Kuldeep Sequeira    * No procedures listed *    Diagnosis:* No pre-op diagnosis entered *  Diagnosis Additional Information: No value filed.    Anesthesia Type:  General    Note:  Disposition: Outpatient   Postop Pain Control: Uneventful            Sign Out: Well controlled pain   PONV: No   Neuro/Psych: Uneventful            Sign Out: Acceptable/Baseline neuro status   Airway/Respiratory: Uneventful            Sign Out: Acceptable/Baseline resp. status   CV/Hemodynamics: Uneventful            Sign Out: Acceptable CV status   Other NRE:    DID A NON-ROUTINE EVENT OCCUR?          Last vitals:  Vitals:    03/05/21 1107   BP: 116/73   Pulse: 83   Resp: 14   Temp: 36.8  C (98.2  F)   SpO2: 97%       Last vitals prior to Anesthesia Care Transfer:  CRNA VITALS  3/5/2021 1119 - 3/5/2021 1155      3/5/2021             Resp Rate (observed):  23          Electronically Signed By: Angella Howard MD  March 5, 2021  11:55 AM

## 2021-03-05 NOTE — DISCHARGE INSTRUCTIONS
ECT Discharge Instructions      During your ECT series:      Do not drive or work heavy equipment until 7 days after your last treatment.    Do not drink alcohol or use street drugs (illicit drugs) while you are having treatments.    Do not make important decisions, including legal decisions.    After your maintenance ECT treatment:      Do not drive for 24 hours after treatment.  If you will be having more than one treatment witihin a week, no driving until 7 days after your last ECT treatment.         After each treatment:      Get plenty of rest. A responsible adult must stay with your for at least 6 hours.    Avoid heavy or risky activities for 24 hours.    If you feel light-headed, sit for a few minutes before standing. Have someone help you get up.    If you have nausea (feel sick to your stomach): Drink only clear liquids such as apple juice, ginger ale, broth or 7UP, Be sure to drink plenty of liquids. Move to a normal diet as you feel able.     If you received Toradol, wait 6 hours before taking ibuprofen.    Call your doctor if:     You have a fever over 100F (37.7 C) (taken under the tongue), or a fever that last more than 24 hours.    Your IV site is red, swollen, very painful or is getting more tender.    You have nausea that gets very bad or does not improve.      If you have any symptoms after ECT, tell our staff before your next treatment.    The ECT Department can be reached at [phone].  The ECT Department is open Mondays, Wednesdays and Fridays from 7:00 AM to 2:00 PM.    Your next ECT treatment will be:     To speak to a doctor, call:Dr. Markham at [phone]    Other:     Today we completed your Covid test for your next appointment.  You do not need to do anything further. Your covid test results will be available by your next ECT treatment.    Instructions for your next appointment:    *Covid-19 Testing:  Our central scheduling department will be calling you to schedule a Covid-19 test.  You  will be scheduled to have this test before your next ECT treatment. If you do not receive a phone call, please call [phone] to schedule your Covid test to be performed 48-96 hours (2-4 days) before your scheduled ECT appointment.     Please come to the [facility] and check-in with Security before your ECT appointment.  The [facility] entrance is located right next to the Adult Emergency Room.  The address is [address] [place] [address].    After your appointment, you may be picked up at the [facility] entrance, which is located on the West side of our campus.  The address is [address] [place] [address].  [facility].

## 2021-03-05 NOTE — PROCEDURES
Procedure/Surgery Information   [facility]     Bedside Procedure Note  Date of Service (when I performed the procedure): 03/05/2021    Kuldeep Sequeira is a 50 year old male patient.  No diagnosis found.  No past medical history on file.                       Procedures     Wm Markham     [facility], [place]   ECT Procedure Note   03/05/2021    Kuldeep Sequeira [id number]   50 year old 1970     Patient Status: Outpatient    Is this the first in a series of 12 treatments?  No    History and Physical: Reviewed in medical record    Consent: Informed consent was signed by: patient    Date Consent Signed: 2/1/21     No Known Allergies    Weight:  0 lbs 0 oz    Patient Preparations: (none)    Wt Readings from Last 5 Encounters:   11/19/20 78.4 kg (172 lb 14.4 oz)       There were no vitals taken for this visit.         Indications for ECT:   Medications ineffective         Clinical Narrative:   Patient has a long history of depression and is continuing ECT for treatment-resistant depression.  He's ending a 2nd series of ECT now.  NPO after 2400.  Alert and Oriented x 3.  No problems with the last ECT.     Effexor dose was increased recently 1/27/21 from 150mg/d to 225 mg/d.  Last ECT was 1 week ago.  Trying to stretch out treatments.   Abilify was added for antidepressant augmentation this week.  Mood is depressed.  He's been working, which has been going well.           Diagnosis:   Major depression         Pause for the Cause:     Right patient Yes   Right procedure/laterality settings: Yes          Intra-Procedure Documentation:     ECT #: 10 of 12   Treatment number this series: 23   Total treatment number: 23     Type of ECT:   BILATERAL since treatment #6 (instead of Right, unilateral standard)     ECT Medications:      Zyprexa: 10mg at home  Tylenol: 1000mg at home  Robinul: 0.2mg  Toradol: 30mg  Zofran: 4mg  Lactated Ringers: 1/2  liter  Brevital: 100mg  Caffeine: 500mg  Succinyl Choline: 100mg  Versed: 2mg for h/o agitation (give at 50 seconds)      ECT Strip Summary:   Energy Level: 30 percent   Motor Seizure Duration:  58 seconds  EEG Seizure Duration:   128 seconds    Complications: none (but in the past he's had brief asystole and bradycardia which resolved without intervention)    Plan:   COVID test 3/20/21   Next ECT in 2+ weeks on  3/22/21   Increased Effexor XR from 150mg to 225mg daily.  (Extra 75mg caps sent to his pharmacy) on 1/27/21  On 2/3/21 added Vistaril 25 to 50mg po tid prn anxiety. (sent to [facility] in [place])  On 2/10/21 added Klonopin 0.5mg po tid prn anxiety.    On 2/22/21 added Abilify 2mg/d for augmentation. Increase to 4mg/d 3/5/21 until the 2mg tabs run out and then increase to 5mg/d  Have patient set up a med appointment with Dr. Markham (Dr. Markham will have his nurse call them).       Wm Markham MD

## 2021-03-18 DIAGNOSIS — F32.A DEPRESSION, UNSPECIFIED DEPRESSION TYPE: ICD-10-CM

## 2021-03-18 LAB
SARS-COV-2 RNA RESP QL NAA+PROBE: NORMAL
SPECIMEN SOURCE: NORMAL

## 2021-03-18 PROCEDURE — U0003 INFECTIOUS AGENT DETECTION BY NUCLEIC ACID (DNA OR RNA); SEVERE ACUTE RESPIRATORY SYNDROME CORONAVIRUS 2 (SARS-COV-2) (CORONAVIRUS DISEASE [COVID-19]), AMPLIFIED PROBE TECHNIQUE, MAKING USE OF HIGH THROUGHPUT TECHNOLOGIES AS DESCRIBED BY CMS-2020-01-R: HCPCS | Performed by: PSYCHIATRY & NEUROLOGY

## 2021-03-18 PROCEDURE — U0005 INFEC AGEN DETEC AMPLI PROBE: HCPCS | Performed by: PSYCHIATRY & NEUROLOGY

## 2021-03-22 ENCOUNTER — HOSPITAL ENCOUNTER (OUTPATIENT)
Dept: BEHAVIORAL HEALTH | Facility: CLINIC | Age: 51
Discharge: HOME | End: 2021-03-22
Attending: PSYCHIATRY & NEUROLOGY | Admitting: PSYCHIATRY & NEUROLOGY
Payer: COMMERCIAL

## 2021-03-22 ENCOUNTER — ANESTHESIA EVENT (OUTPATIENT)
Dept: BEHAVIORAL HEALTH | Facility: CLINIC | Age: 51
End: 2021-03-22

## 2021-03-22 ENCOUNTER — ANESTHESIA (OUTPATIENT)
Dept: BEHAVIORAL HEALTH | Facility: CLINIC | Age: 51
End: 2021-03-22

## 2021-03-22 VITALS
SYSTOLIC BLOOD PRESSURE: 147 MMHG | OXYGEN SATURATION: 98 % | TEMPERATURE: 99.1 F | DIASTOLIC BLOOD PRESSURE: 95 MMHG | HEART RATE: 128 BPM | RESPIRATION RATE: 16 BRPM

## 2021-03-22 DIAGNOSIS — F33.2 SEVERE RECURRENT MAJOR DEPRESSION WITHOUT PSYCHOTIC FEATURES (H): Primary | ICD-10-CM

## 2021-03-22 PROCEDURE — 90870 ELECTROCONVULSIVE THERAPY: CPT

## 2021-03-22 PROCEDURE — 250N000011 HC RX IP 250 OP 636: Performed by: PSYCHIATRY & NEUROLOGY

## 2021-03-22 PROCEDURE — 250N000009 HC RX 250: Performed by: PSYCHIATRY & NEUROLOGY

## 2021-03-22 PROCEDURE — 370N000017 HC ANESTHESIA TECHNICAL FEE, PER MIN

## 2021-03-22 PROCEDURE — 250N000011 HC RX IP 250 OP 636: Performed by: ANESTHESIOLOGY

## 2021-03-22 PROCEDURE — 250N000009 HC RX 250: Performed by: ANESTHESIOLOGY

## 2021-03-22 PROCEDURE — 258N000003 HC RX IP 258 OP 636: Performed by: PSYCHIATRY & NEUROLOGY

## 2021-03-22 RX ORDER — ACETAMINOPHEN 500 MG
1000 TABLET ORAL
Status: CANCELLED
Start: 2021-03-22 | End: 2021-03-22

## 2021-03-22 RX ORDER — GLYCOPYRROLATE 0.2 MG/ML
0.2 INJECTION, SOLUTION INTRAMUSCULAR; INTRAVENOUS
Status: CANCELLED
Start: 2021-03-22 | End: 2021-03-22

## 2021-03-22 RX ORDER — NALOXONE HYDROCHLORIDE 0.4 MG/ML
0.4 INJECTION, SOLUTION INTRAMUSCULAR; INTRAVENOUS; SUBCUTANEOUS
Status: DISCONTINUED | OUTPATIENT
Start: 2021-03-22 | End: 2021-03-23 | Stop reason: HOSPADM

## 2021-03-22 RX ORDER — ACETAMINOPHEN 500 MG
1000 TABLET ORAL
Status: ACTIVE | OUTPATIENT
Start: 2021-03-22 | End: 2021-03-22

## 2021-03-22 RX ORDER — CAFFEINE AND SODIUM BENZOATE 125 MG/ML
500 INJECTION, SOLUTION INTRAMUSCULAR; INTRAVENOUS
Status: COMPLETED | OUTPATIENT
Start: 2021-03-22 | End: 2021-03-22

## 2021-03-22 RX ORDER — MEPERIDINE HYDROCHLORIDE 25 MG/ML
12.5 INJECTION INTRAMUSCULAR; INTRAVENOUS; SUBCUTANEOUS
Status: DISCONTINUED | OUTPATIENT
Start: 2021-03-22 | End: 2021-03-23 | Stop reason: HOSPADM

## 2021-03-22 RX ORDER — ONDANSETRON 2 MG/ML
4 INJECTION INTRAMUSCULAR; INTRAVENOUS EVERY 30 MIN PRN
Status: DISCONTINUED | OUTPATIENT
Start: 2021-03-22 | End: 2021-03-23 | Stop reason: HOSPADM

## 2021-03-22 RX ORDER — SODIUM CHLORIDE, SODIUM LACTATE, POTASSIUM CHLORIDE, CALCIUM CHLORIDE 600; 310; 30; 20 MG/100ML; MG/100ML; MG/100ML; MG/100ML
INJECTION, SOLUTION INTRAVENOUS CONTINUOUS
Status: DISCONTINUED | OUTPATIENT
Start: 2021-03-22 | End: 2021-03-23 | Stop reason: HOSPADM

## 2021-03-22 RX ORDER — ONDANSETRON 2 MG/ML
4 INJECTION INTRAMUSCULAR; INTRAVENOUS
Status: COMPLETED | OUTPATIENT
Start: 2021-03-22 | End: 2021-03-22

## 2021-03-22 RX ORDER — ONDANSETRON 2 MG/ML
4 INJECTION INTRAMUSCULAR; INTRAVENOUS
Status: CANCELLED
Start: 2021-03-22 | End: 2021-03-22

## 2021-03-22 RX ORDER — NALOXONE HYDROCHLORIDE 0.4 MG/ML
0.2 INJECTION, SOLUTION INTRAMUSCULAR; INTRAVENOUS; SUBCUTANEOUS
Status: DISCONTINUED | OUTPATIENT
Start: 2021-03-22 | End: 2021-03-23 | Stop reason: HOSPADM

## 2021-03-22 RX ORDER — CAFFEINE AND SODIUM BENZOATE 125 MG/ML
500 INJECTION, SOLUTION INTRAMUSCULAR; INTRAVENOUS
Status: CANCELLED
Start: 2021-03-22 | End: 2021-03-22

## 2021-03-22 RX ORDER — ONDANSETRON 4 MG/1
4 TABLET, ORALLY DISINTEGRATING ORAL EVERY 30 MIN PRN
Status: DISCONTINUED | OUTPATIENT
Start: 2021-03-22 | End: 2021-03-23 | Stop reason: HOSPADM

## 2021-03-22 RX ORDER — GLYCOPYRROLATE 0.2 MG/ML
0.2 INJECTION, SOLUTION INTRAMUSCULAR; INTRAVENOUS
Status: COMPLETED | OUTPATIENT
Start: 2021-03-22 | End: 2021-03-22

## 2021-03-22 RX ORDER — KETOROLAC TROMETHAMINE 30 MG/ML
30 INJECTION, SOLUTION INTRAMUSCULAR; INTRAVENOUS
Status: CANCELLED
Start: 2021-03-22 | End: 2021-03-22

## 2021-03-22 RX ORDER — KETOROLAC TROMETHAMINE 30 MG/ML
30 INJECTION, SOLUTION INTRAMUSCULAR; INTRAVENOUS
Status: COMPLETED | OUTPATIENT
Start: 2021-03-22 | End: 2021-03-22

## 2021-03-22 RX ADMIN — ONDANSETRON 4 MG: 2 INJECTION INTRAMUSCULAR; INTRAVENOUS at 10:16

## 2021-03-22 RX ADMIN — METHOHEXITAL SODIUM 100 MG: 500 INJECTION, POWDER, LYOPHILIZED, FOR SOLUTION INTRAMUSCULAR; INTRAVENOUS; RECTAL at 10:24

## 2021-03-22 RX ADMIN — GLYCOPYRROLATE 0.2 MG: 0.2 INJECTION, SOLUTION INTRAMUSCULAR; INTRAVENOUS at 10:15

## 2021-03-22 RX ADMIN — SODIUM CHLORIDE, POTASSIUM CHLORIDE, SODIUM LACTATE AND CALCIUM CHLORIDE 500 ML: 600; 310; 30; 20 INJECTION, SOLUTION INTRAVENOUS at 10:17

## 2021-03-22 RX ADMIN — Medication 100 MG: at 10:24

## 2021-03-22 RX ADMIN — MIDAZOLAM 2 MG: 1 INJECTION INTRAMUSCULAR; INTRAVENOUS at 10:29

## 2021-03-22 RX ADMIN — KETOROLAC TROMETHAMINE 30 MG: 30 INJECTION, SOLUTION INTRAMUSCULAR at 10:16

## 2021-03-22 RX ADMIN — CAFFEINE AND SODIUM BENZOATE 500 MG: 125 INJECTION, SOLUTION INTRAMUSCULAR; INTRAVENOUS at 10:24

## 2021-03-22 NOTE — IP AVS SNAPSHOT
[facility]  [address] [address]  Phone: [phone]                                    After Visit Summary   3/22/2021    Kuldeep Sequeira    MRN: [id number]           After Visit Summary Signature Page    I have received my discharge instructions, and my questions have been answered. I have discussed any challenges I see with this plan with the nurse or doctor.    ..........................................................................................................................................  Patient/Patient Representative Signature      ..........................................................................................................................................  Patient Representative Print Name and Relationship to Patient    ..................................................               ................................................  Date                                   Time    ..........................................................................................................................................  Reviewed by Signature/Title    ...................................................              ..............................................  Date                                               Time          22EPIC Rev 08/18

## 2021-03-22 NOTE — ANESTHESIA POSTPROCEDURE EVALUATION
Patient: Kuldeep Sequeira    * No procedures listed *    Diagnosis:* No pre-op diagnosis entered *  Diagnosis Additional Information: No value filed.    Anesthesia Type:  General    Note:  Disposition: Outpatient   Postop Pain Control: Uneventful            Sign Out: Well controlled pain   PONV: No   Neuro/Psych: Uneventful            Sign Out: Acceptable/Baseline neuro status   Airway/Respiratory: Uneventful            Sign Out: Acceptable/Baseline resp. status   CV/Hemodynamics: Uneventful            Sign Out: Acceptable CV status   Other NRE: NONE   DID A NON-ROUTINE EVENT OCCUR?          Last vitals:  Vitals:    03/22/21 0951 03/22/21 1039 03/22/21 1046   BP: 122/74 (!) 137/96 (!) 158/95   Pulse: 86 111 122   Resp: 16 16 16   Temp: 36.2  C (97.1  F) 37  C (98.6  F) 37.6  C (99.6  F)   SpO2: 98% 96% 97%       Last vitals prior to Anesthesia Care Transfer:  CRNA VITALS  3/22/2021 1007 - 3/22/2021 1058      3/22/2021             Pulse:  96    SpO2:  99 %    Resp Rate (observed):  (!) 6          Electronically Signed By: Julianna Saeed MD  March 22, 2021  10:58 AM

## 2021-03-22 NOTE — PROGRESS NOTES
Patients VSS,Heart rate tachycardic 128. No ectopy. Patient denies pain A/O, meets phase 2 criteria and is able to move to discharge at this time.

## 2021-03-22 NOTE — PROCEDURES
Procedure/Surgery Information   [facility]     Bedside Procedure Note  Date of Service (when I performed the procedure): 03/22/2021    Kuldeep Sequeira is a 50 year old male patient.  No diagnosis found.  No past medical history on file.                       Procedures     Wm Markham     [facility], [place]   ECT Procedure Note   03/22/2021    Kuldeep Sequeira [id number]   50 year old 1970     Patient Status: Outpatient    Is this the first in a series of 12 treatments?  No    History and Physical: Reviewed in medical record    Consent: Informed consent was signed by: patient    Date Consent Signed: 2/1/21     No Known Allergies    Weight:  0 lbs 0 oz    Patient Preparations: (none)    Wt Readings from Last 5 Encounters:   11/19/20 78.4 kg (172 lb 14.4 oz)       There were no vitals taken for this visit.         Indications for ECT:   Medications ineffective         Clinical Narrative:   Patient has a long history of depression and is continuing ECT for treatment-resistant depression.  He's ending a 2nd series of ECT now.  NPO after 2400.  Alert and Oriented x 3.  No problems with the last ECT.     Effexor dose was increased to 225 mg/d and Abilify was added.  Last ECT was 2.5 weeks ago.  Considering transitioning from Effexor XR to Viibryd due to sexual SE.  He thinks maybe the meds make him tired.           Diagnosis:   Major depression         Pause for the Cause:     Right patient Yes   Right procedure/laterality settings: Yes          Intra-Procedure Documentation:     ECT #: 11 of 12   Treatment number this series: 24   Total treatment number: 24     Type of ECT:   BILATERAL since treatment #6 (instead of Right, unilateral standard)     ECT Medications:      Zyprexa: 10mg at home  Tylenol: 1000mg at home  Robinul: 0.2mg  Toradol: 30mg  Zofran: 4mg  Lactated Ringers: 1/2 liter  Brevital: 100mg  Caffeine: 500mg  Succinyl Choline:  100mg  Versed: 2mg for h/o agitation (give at 50 seconds)      ECT Strip Summary:   Energy Level: 26 percent   Motor Seizure Duration:  75 seconds  EEG Seizure Duration:   106 seconds    Complications: none (but in the past he's had brief asystole and bradycardia which resolved without intervention)    Plan:   COVID test 4/10/21   Next ECT in 3 weeks on  4/12/21   Increased Effexor XR from 150mg to 225mg daily.  (Extra 75mg caps sent to his pharmacy) on 1/27/21  On 2/3/21 added Vistaril 25 to 50mg po tid prn anxiety. (sent to [facility] in [place])  On 2/10/21 added Klonopin 0.5mg po tid prn anxiety.    On 2/22/21 added Abilify 2mg/d for augmentation. Increase to 4mg/d 3/5/21 until the 2mg tabs run out and then increase to 5mg/d  On 3/22/21 switched Abilify to 5mg at bedtime.  If still tired, cut Effexor XR back down to 150mg/d.    Have patient set up a med appointment with Dr. Markham (Dr. Markham will have his nurse call them).       Wm Markham MD

## 2021-03-22 NOTE — ANESTHESIA PREPROCEDURE EVALUATION
Anesthesia Pre-Procedure Evaluation    Patient: Kuldeep Sequeira   MRN: [id number] : 1970        Preoperative Diagnosis: * No pre-op diagnosis entered *   Procedure : * No procedures listed *     No past medical history on file.   No past surgical history on file.   No Known Allergies   Social History     Tobacco Use     Smoking status: Not on file   Substance Use Topics     Alcohol use: Not on file      Wt Readings from Last 1 Encounters:   20 78.4 kg (172 lb 14.4 oz)             Physical Exam    Airway        Mallampati: II   TM distance: > 3 FB   Neck ROM: full   Mouth opening: > 3 cm    Respiratory Devices and Support         Dental  no notable dental history         Cardiovascular   cardiovascular exam normal          Pulmonary   pulmonary exam normal                OUTSIDE LABS:  CBC:   Lab Results   Component Value Date    WBC 5.3 2020    HGB 15.5 2020    HCT 45.1 2020     2020     BMP:   Lab Results   Component Value Date     2020    POTASSIUM 4.6 2020    POTASSIUM 4.3 2020    CHLORIDE 106 2020    CO2 32 2020    BUN 18 2020    CR 0.80 2020    CR 0.91 2020    GLC 97 2020    GLC 90 2020     COAGS: No results found for: PTT, INR, FIBR  POC: No results found for: BGM, HCG, HCGS  HEPATIC:   Lab Results   Component Value Date    ALBUMIN 3.8 2020    PROTTOTAL 7.0 2020    ALT 22 2020    AST 10 2020    ALKPHOS 61 2020    BILITOTAL 1.1 2020     OTHER:   Lab Results   Component Value Date    RANJITH 8.8 2020    TSH 1.82 2020       Anesthesia Plan    ASA Status:  2      Anesthesia Type: General.   Induction: Intravenous.           Consents            Postoperative Care            Comments:                    Julianna Saeed MD

## 2021-03-22 NOTE — DISCHARGE INSTRUCTIONS
ECT Discharge Instructions      During your ECT series:      Do not drive or work heavy equipment until 7 days after your last treatment.    Do not drink alcohol or use street drugs (illicit drugs) while you are having treatments.    Do not make important decisions, including legal decisions.    After your maintenance ECT treatment:      Do not drive for 24 hours after treatment.  If you will be having more than one treatment witihin a week, no driving until 7 days after your last ECT treatment.       After each treatment:      Get plenty of rest. A responsible adult must stay with your for at least 6 hours.    Avoid heavy or risky activities for 24 hours.    If you feel light-headed, sit for a few minutes before standing. Have someone help you get up.    If you have nausea (feel sick to your stomach): Drink only clear liquids such as apple juice, ginger ale, broth or 7UP, Be sure to drink plenty of liquids. Move to a normal diet as you feel able.     If you received Toradol, wait 6 hours before taking ibuprofen.    Call your doctor if:     You have a fever over 100F (37.7 C) (taken under the tongue), or a fever that last more than 24 hours.    Your IV site is red, swollen, very painful or is getting more tender.    You have nausea that gets very bad or does not improve.      If you have any symptoms after ECT, tell our staff before your next treatment.    The ECT Department can be reached at [phone].  The ECT Department is open Mondays, Wednesdays and Fridays from 7:00 AM to 2:00 PM.    To speak to a doctor, call: Dr. Markham's office # [phone] Fax # [phone]    Today you received the following:    - Toradol 30mg today at 1015am  Toradol is an NSAID.  Do not take any NSAID's including: Ibuprofen, Naproxen, Aspirin, Advil, Motrin, Aleve, Rodrick, etc., until 6 hours after the above time (415p).  If you have any questions, contact your physician or pharmacist.    -500mL of fluids (lactated ringers)  - IV  Zofran 4mg at 1015am for nausea.     Per Dr Markham: On 3/22/21 switched Abilify to 5mg at bedtime.  If still tired, cut Effexor XR back down to 150mg/d.      Transported by: clifford Barnard    Instructions for your next appointment:    *Covid-19 Testing:  Our central scheduling department will be calling you to schedule a Covid-19 test.  You will be scheduled to have this test before your next ECT treatment. If you do not receive a phone call, please call [phone] to schedule your Covid test to be performed 2-4 days before your scheduled ECT appointment.     Please come to the [facility] entrance and check-in with Security before your ECT appointment.  The [facility] entrance is located right next to the Adult Emergency Room.  The address is [address] [place] [address].    After your appointment, you may be picked up at the [facility] entrance, which is located on the West side of our campus.  The address is [address] [place] [address].  [facility].

## 2021-04-07 ENCOUNTER — MEDICAL CORRESPONDENCE (OUTPATIENT)
Dept: HEALTH INFORMATION MANAGEMENT | Facility: CLINIC | Age: 51
End: 2021-04-07

## 2021-07-08 ENCOUNTER — TRANSFERRED RECORDS (OUTPATIENT)
Dept: HEALTH INFORMATION MANAGEMENT | Facility: CLINIC | Age: 51
End: 2021-07-08

## 2022-03-11 ENCOUNTER — TELEPHONE (OUTPATIENT)
Dept: BEHAVIORAL HEALTH | Facility: CLINIC | Age: 52
End: 2022-03-11

## 2022-03-11 ENCOUNTER — APPOINTMENT (OUTPATIENT)
Dept: CT IMAGING | Facility: CLINIC | Age: 52
End: 2022-03-11
Attending: FAMILY MEDICINE
Payer: COMMERCIAL

## 2022-03-11 ENCOUNTER — HOSPITAL ENCOUNTER (EMERGENCY)
Facility: CLINIC | Age: 52
Discharge: HOME | End: 2022-03-11
Attending: FAMILY MEDICINE | Admitting: FAMILY MEDICINE
Payer: COMMERCIAL

## 2022-03-11 VITALS
RESPIRATION RATE: 16 BRPM | TEMPERATURE: 98.1 F | OXYGEN SATURATION: 97 % | SYSTOLIC BLOOD PRESSURE: 143 MMHG | WEIGHT: 184 LBS | BODY MASS INDEX: 28.82 KG/M2 | HEART RATE: 84 BPM | DIASTOLIC BLOOD PRESSURE: 81 MMHG

## 2022-03-11 DIAGNOSIS — F33.2 SEVERE EPISODE OF RECURRENT MAJOR DEPRESSIVE DISORDER, WITHOUT PSYCHOTIC FEATURES (H): ICD-10-CM

## 2022-03-11 LAB
ALBUMIN SERPL-MCNC: 3.7 G/DL (ref 3.4–5)
ALP SERPL-CCNC: 59 U/L (ref 40–150)
ALT SERPL W P-5'-P-CCNC: 31 U/L (ref 0–70)
AMPHETAMINES UR QL SCN: ABNORMAL
ANION GAP SERPL CALCULATED.3IONS-SCNC: 5 MMOL/L (ref 3–14)
AST SERPL W P-5'-P-CCNC: 22 U/L (ref 0–45)
BARBITURATES UR QL: ABNORMAL
BASOPHILS # BLD AUTO: 0 10E3/UL (ref 0–0.2)
BASOPHILS NFR BLD AUTO: 0 %
BENZODIAZ UR QL: ABNORMAL
BILIRUB SERPL-MCNC: 0.6 MG/DL (ref 0.2–1.3)
BUN SERPL-MCNC: 16 MG/DL (ref 7–30)
CALCIUM SERPL-MCNC: 9.4 MG/DL (ref 8.5–10.1)
CANNABINOIDS UR QL SCN: ABNORMAL
CHLORIDE BLD-SCNC: 101 MMOL/L (ref 94–109)
CO2 SERPL-SCNC: 29 MMOL/L (ref 20–32)
COCAINE UR QL: ABNORMAL
CREAT SERPL-MCNC: 1.08 MG/DL (ref 0.66–1.25)
EOSINOPHIL # BLD AUTO: 0 10E3/UL (ref 0–0.7)
EOSINOPHIL NFR BLD AUTO: 0 %
ERYTHROCYTE [DISTWIDTH] IN BLOOD BY AUTOMATED COUNT: 12.8 % (ref 10–15)
ETHANOL UR QL SCN: ABNORMAL
GFR SERPL CREATININE-BSD FRML MDRD: 83 ML/MIN/1.73M2
GLUCOSE BLD-MCNC: 107 MG/DL (ref 70–99)
HCT VFR BLD AUTO: 50.8 % (ref 40–53)
HGB BLD-MCNC: 17.7 G/DL (ref 13.3–17.7)
IMM GRANULOCYTES # BLD: 0 10E3/UL
IMM GRANULOCYTES NFR BLD: 1 %
LYMPHOCYTES # BLD AUTO: 0.6 10E3/UL (ref 0.8–5.3)
LYMPHOCYTES NFR BLD AUTO: 11 %
MCH RBC QN AUTO: 30.1 PG (ref 26.5–33)
MCHC RBC AUTO-ENTMCNC: 34.8 G/DL (ref 31.5–36.5)
MCV RBC AUTO: 86 FL (ref 78–100)
MONOCYTES # BLD AUTO: 0.6 10E3/UL (ref 0–1.3)
MONOCYTES NFR BLD AUTO: 11 %
NEUTROPHILS # BLD AUTO: 4 10E3/UL (ref 1.6–8.3)
NEUTROPHILS NFR BLD AUTO: 77 %
NRBC # BLD AUTO: 0 10E3/UL
NRBC BLD AUTO-RTO: 0 /100
OPIATES UR QL SCN: ABNORMAL
PLATELET # BLD AUTO: 191 10E3/UL (ref 150–450)
POTASSIUM BLD-SCNC: 4.6 MMOL/L (ref 3.4–5.3)
PROT SERPL-MCNC: 7.1 G/DL (ref 6.8–8.8)
RBC # BLD AUTO: 5.89 10E6/UL (ref 4.4–5.9)
SARS-COV-2 RNA RESP QL NAA+PROBE: NEGATIVE
SODIUM SERPL-SCNC: 135 MMOL/L (ref 133–144)
TSH SERPL DL<=0.005 MIU/L-ACNC: 1.51 MU/L (ref 0.4–4)
WBC # BLD AUTO: 5.3 10E3/UL (ref 4–11)

## 2022-03-11 PROCEDURE — 80307 DRUG TEST PRSMV CHEM ANLYZR: CPT | Performed by: FAMILY MEDICINE

## 2022-03-11 PROCEDURE — 99285 EMERGENCY DEPT VISIT HI MDM: CPT | Mod: 25 | Performed by: FAMILY MEDICINE

## 2022-03-11 PROCEDURE — 85014 HEMATOCRIT: CPT | Performed by: FAMILY MEDICINE

## 2022-03-11 PROCEDURE — C9803 HOPD COVID-19 SPEC COLLECT: HCPCS | Performed by: FAMILY MEDICINE

## 2022-03-11 PROCEDURE — 99284 EMERGENCY DEPT VISIT MOD MDM: CPT | Performed by: FAMILY MEDICINE

## 2022-03-11 PROCEDURE — 36415 COLL VENOUS BLD VENIPUNCTURE: CPT | Performed by: FAMILY MEDICINE

## 2022-03-11 PROCEDURE — 90791 PSYCH DIAGNOSTIC EVALUATION: CPT

## 2022-03-11 PROCEDURE — 80053 COMPREHEN METABOLIC PANEL: CPT | Performed by: FAMILY MEDICINE

## 2022-03-11 PROCEDURE — 84443 ASSAY THYROID STIM HORMONE: CPT | Performed by: FAMILY MEDICINE

## 2022-03-11 PROCEDURE — 70450 CT HEAD/BRAIN W/O DYE: CPT

## 2022-03-11 PROCEDURE — U0005 INFEC AGEN DETEC AMPLI PROBE: HCPCS | Performed by: FAMILY MEDICINE

## 2022-03-11 RX ORDER — QUETIAPINE FUMARATE 25 MG/1
25 TABLET, FILM COATED ORAL AT BEDTIME
Status: DISCONTINUED | OUTPATIENT
Start: 2022-03-11 | End: 2022-03-11 | Stop reason: HOSPADM

## 2022-03-11 RX ORDER — BIOTIN 1 MG
2000 TABLET ORAL DAILY
COMMUNITY

## 2022-03-11 RX ORDER — IBUPROFEN 200 MG
1 CAPSULE ORAL DAILY
COMMUNITY

## 2022-03-11 RX ORDER — MULTIVIT WITH MINERALS/LUTEIN
1000 TABLET ORAL DAILY
COMMUNITY

## 2022-03-11 RX ORDER — CYANOCOBALAMIN (VITAMIN B-12) 500 MCG
400 LOZENGE ORAL AT BEDTIME
COMMUNITY

## 2022-03-11 RX ORDER — CHOLECALCIFEROL (VITAMIN D3) 125 MCG
200 CAPSULE ORAL 2 TIMES DAILY
COMMUNITY

## 2022-03-11 RX ORDER — TURMERIC ROOT EXTRACT 500 MG
500 TABLET ORAL 2 TIMES DAILY
COMMUNITY

## 2022-03-11 RX ORDER — PYRIDOXINE HCL (VITAMIN B6) 100 MG
200 TABLET ORAL DAILY
COMMUNITY

## 2022-03-11 RX ORDER — LANOLIN ALCOHOL/MO/W.PET/CERES
1000 CREAM (GRAM) TOPICAL DAILY
COMMUNITY

## 2022-03-11 RX ORDER — GINSENG 100 MG
75 CAPSULE ORAL AT BEDTIME
COMMUNITY

## 2022-03-11 RX ORDER — INOSITOL
.5-1 POWDER (GRAM) MISCELLANEOUS 2 TIMES DAILY
COMMUNITY

## 2022-03-11 RX ORDER — MULTIVIT-MIN/FOLIC/VIT K/LYCOP 400-300MCG
1 TABLET ORAL DAILY
Status: DISCONTINUED | OUTPATIENT
Start: 2022-03-11 | End: 2022-03-11

## 2022-03-11 RX ORDER — LANOLIN ALCOHOL/MO/W.PET/CERES
400 CREAM (GRAM) TOPICAL DAILY
Status: DISCONTINUED | OUTPATIENT
Start: 2022-03-11 | End: 2022-03-11

## 2022-03-11 RX ORDER — TESTOSTERONE ENANTHATE 100 MG/.5ML
100 INJECTION SUBCUTANEOUS WEEKLY
COMMUNITY

## 2022-03-11 NOTE — ED NOTES
"3/11/2022  Kuldeep Sequeira 1970     [facility] Crisis Assessment    Patient was assessed: in person  Patient location: [facility] room 3    Referral Data and Chief Complaint  Kuldeep is a 52 year old who uses she/her pronouns. Patient presented to the ED with family/friends and was referred to the ED by family/friends. Patient is presenting to the ED for the following concerns: increased depressive symptoms and suicidal ideation with a plan.      Informed Consent and Assessment Methods    Patient is his own guardian. Writer met with patient and explained the crisis assessment process, including applicable information disclosures and limits to confidentiality, assessed understanding of the process, and obtained consent to proceed with the assessment. Patient was observed to be able to participate in the assessment as evidenced by verbal engagement in assessment. Assessment methods included conducting a formal interview with patient, review of medical records, collaboration with medical staff, and obtaining relevant collateral information from family and community providers when available.    Narrative Summary of Presenting Problem and Current Functioning  What led to the patient presenting for crisis services, factors that make the crisis life threatening or complex, stressors, how is this disrupting the patient's life, and how current functioning is in comparison to baseline. How is patient presenting during the assessment.     The pt is a 53 yo male with a history of depression who comes to the ED accompanied by his wife, Evon, due to increase of depressive symptoms including not sleeping, \"feeling numb,\" decreased concentration, decreased appetite, fatigue and low energy, depressed mood and at times, suicidal and \"dark thoughts.\"  The  pt and his wife state the pt's symptoms have worsened since Saturday, 3/5/22.  Report that the pt has been increasingly confused.  State the  pt has not been able to  work the " "past two days.  The pt's wife states the pt woke up on 3/8/22 and 3/9/22, stating he was having \"dark thoughts\" about killing himself.  The  pt denies auditory and visual hallucinations.  The pt denies use of drugs.  The pt's wife states the pt typically has several alcoholic drinks on the weekend.  The pt's wife states  the pt recently got his \"medical marijuana card\" but has not started this yet.  The pt's wife states the did have a marijuana \"gummy\" yesterday because he felt anxious internally.      The pt is oriented to self, day of the week and president.  The pt does not know the date or which hospital he is at although he has been here before.  The pt is very slow to respond to questions and at times appears confused.      History of the Crisis  Duration of the current crisis, coping skills attempted to reduce the crisis, community resources used, and past presentations.    The pt reports a hx of depression for over 20 years.  The pt has managed it well for years until mid-2020.  Since then, the pt has a hx of two hospitalizations:  both in 11/2020, once in [facility] and one at [facility].  The pt did TMS in 2020 and has a hx of ECT with Dr Markham.  The pt has an outpatient psychiatrist and outpt therapist.  The pt does not have a hx of suicide attempts.  From reading the pt's medical record, the pt's presentation today is very similar to his presentation when he was admitted to [facility] 11/2020.      Collateral Information  Pt's wife, Evon, is present  at the hospital.  Left messages for the pt's psychiatrist to call, Dr [facility]ham at [facility]  [phone] without a return call at this time.      Risk Assessment    Risk of Harm to Self     ESS-6  1.a. Over the past 2 weeks, have you had thoughts of killing yourself? Yes  1.b. Have you ever attempted to kill yourself and, if yes, when did this last happen? No   2. Recent or current suicide plan? No   3. Recent or current intent to act on ideation? No  4. Lifetime " psychiatric hospitalization? Yes  5. Pattern of excessive substance use? No  6. Current irritability, agitation, or aggression? No  Scoring note: BOTH 1a and 1b must be yes for it to score 1 point, if both are not yes it is zero. All others are 1 point per number. If all questions 1a/1b - 6 are no, risk is negligible. If one of 1a/1b is yes, then risk is mild. If either question 2 or 3, but not both, is yes, then risk is automatically moderate regardless of total score. If both 2 and 3 are yes, risk is automatically high regardless of total score.     Score: 1, moderate risk    The patient has the following risk factors for suicide: depressive symptoms and significant behavioral changes    Is the patient experiencing current suicidal ideation: Yes. Passive wish to be dead without thoughts or plan.     Is the patient engaging in preparatory suicide behaviors (formulating how to act on plan, giving away possessions, saying goodbye, displaying dramatic behavior changes, etc)? No    Does the patient have access to firearms or other lethal means? no    The patient has the following protective factors: social support, voluntarily seeking mental health support, established relationship community mental health provider(s), ling system, safe/stable housing, and fulfilling employment    Support system information: family and friends and Mandaen    Patient strengths: has a full time job, has friends, able to engage in treatment.      Does the patient engage in non-suicidal self-injurious behavior (NSSI/SIB)? no    Is the patient vulnerable to sexual exploitation?  No    Is the patient experiencing abuse or neglect? no    Is the patient a vulnerable adult? No      Risk of Harm to Others  The patient has the following risk factors of harm to others: no risk factors identified    Does the patient have thoughts of harming others? No    Is the patient engaging in sexually inappropriate behavior?  no       Current Substance  Abuse    Is there recent substance abuse? Pt's wife states the pt has  several drinks on the weekend.  The pt does not use marijuana regularly but did take a marijuana gummy yesterday.      Was a urine drug screen or blood alcohol level obtained: Yes pending    CAGE AID  Have you felt you ought to cut down on your drinking or drug use?  No  Have people annoyed you by criticizing your drinking or drug use? No  Have you felt bad or guilty about your drinking or drug use? No  Have you ever had a drink or used drugs first thing in the morning to steady your nerves or to get rid of a hangover? No  Score: 0/4       Current Symptoms/Concerns    Symptoms  Attention, hyperactivity, and impulsivity symptoms present: No    Anxiety symptoms present: Yes: Generalized Symptoms: Cognitive anxiety - feelings of doom, racing thoughts, difficulty concentrating       Appetite symptoms present: No     Behavioral difficulties present: No     Cognitive impairment symptoms present: Yes: Orientation    Depressive symptoms present: Yes Appetite change/weight change , Depressed mood, Feelings of helplessness , Feelings of hopelessness , Impaired concentration, Impaired decision making , Isolative , Loss of interest / Anhedonia , Low self esteem , Psychomotor retardation, Sleep disturbance , and Thoughts of suicide/death      Eating disorder symptoms present: No    Learning disabilities, cognitive challenges, and/or developmental disorder symptoms present: No     Manic/hypomanic symptoms present: No    Personality and interpersonal functioning difficulties present : No    Psychosis symptoms present: No      Sleep difficulties present: Yes: Difficulty falling asleep  and Difficulty staying sleep     Substance abuse disorder symptoms present: No     Trauma and stressor related symptoms present: No           Mental Status Exam   Affect: Flat   Appearance: Disheveled    Attention Span/Concentration: Attentive?    Eye Contact: Variable   Fund of  Knowledge: Appropriate    Language /Speech Content: Fluent   Language /Speech Volume: Normal    Language /Speech Rate/Productions: Minimally Responsive and Slow    Recent Memory: Intact and Variable   Remote Memory: Intact   Mood: Depressed    Orientation to Person: Yes    Orientation to Place: No   Orientation to Time of Day: Yes    Orientation to Date: No    Situation (Do they understand why they are here?): Yes    Psychomotor Behavior: Underactive    Thought Content: Suicidal and Other: hopelessness    Thought Form: Intact       Mental Health and Substance Abuse History    History  Current and historical diagnoses or mental health concerns: major depressive disorder    Prior MH services (inpatient, programmatic care, outpatient, etc) : Yes Hospitalized twice in 11/2020 in [facility].  The pt has an outpt therapist and psychiatrist.  The pt has had a course of ECT and TMS.      Has the patient used [facility] crisis team services before?: No    History of substance abuse: No    Prior SINGH services (inpatient, programmatic care, detox, outpatient, etc) : No    History of commitment: No    Family history of MH/SINGH: Yes daughter has hx of depression.      Trauma history: No    Medication  Psychotropic medications:   Current Facility-Administered Medications   Medication    [START ON 3/12/2022] FLUoxetine (PROzac) capsule 60 mg    folic acid (FOLVITE) tablet 400 mcg    One-A-Day Mens TABS 1 tablet    QUEtiapine (SEROquel) tablet 25 mg    zolpidem (AMBIEN) half-tab 12.5 mg     Current Outpatient Medications   Medication    acetaminophen (TYLENOL) 500 MG tablet    ARIPiprazole (ABILIFY) 2 MG tablet    ARIPiprazole (ABILIFY) 5 MG tablet    clonazePAM (KLONOPIN) 0.5 MG tablet    fish oil-omega-3 fatty acids 1000 MG capsule    FLUoxetine (PROZAC) 40 MG capsule    folic acid (FOLVITE) 400 MCG tablet    Multiple Vitamin (ONE-A-DAY MENS PO)    OLANZapine (ZYPREXA) 10 MG tablet    QUEtiapine (SEROQUEL) 50 MG tablet     venlafaxine (EFFEXOR-XR) 75 MG 24 hr capsule    venlafaxine (EFFEXOR-XR) 75 MG 24 hr capsule    VITAMIN D PO    zolpidem ER (AMBIEN CR) 12.5 MG CR tablet        Current Care Team  Primary Care Provider: Herlinda Smith CNP; [facility]    Psychiatrist: [place] [address]    Therapist: Richi Chowdary MDiv, [facility]  [phone]    : No    CTSS or ARMHS: No    ACT Team: No    Other: No    Release of Information  Was a release of information signed: Yes. Providers included on the release: Herlinda Smith, Dr Markham, Richi Chowdary      Biopsychosocial Information    Socioeconomic Information  Current living situation: Pt lives with his wife and 3 children.  Pt has an older sister who is in college.      Employment/income source: The pt works as a .  Has not been able to work the past several days.      Relevant legal issues: Pt denies    Cultural, Sabianism, or spiritual influences on mental health care: Pt reports he is Latter-day and attends a Zoroastrianism    Is the patient active in the  or a : No      Relevant Medical Concerns   Patient identifies concerns with completing ADLs? No     Patient can ambulate independently? Yes     Other medical concerns? Yes--sleep apnea and receives weekly testosterone injections     History of concussion or TBI? Yes Pt states that he played high school hockey and football         Diagnosis  296.33 (F33.2) Major Depressive Disorder, Recurrent Episode, Severe _ and With anxious distress - by history       Therapeutic Intervention  The following therapeutic methodologies were employed when working with the patient: establishing rapport, active listening, assessing dimensions of crisis, and identifying additional supports and alternative coping skills. Patient response to intervention: Pt engaged in assessment.      Disposition  Recommended disposition: Inpatient Mental Health      Reviewed case and recommendations with  "attending provider. Attending Name: Dr Mega Rai      Attending concurs with disposition: Yes      Patient concurs with disposition: Yes      Guardian concurs with disposition: No    Final disposition: Inpatient mental health .     Inpatient Details (if applicable):  Is patient admitted voluntarily:Yes    Patient aware of potential for transfer if there is not appropriate placement? Yes     [facility]      Behavioral Intake Notified? Yes: Date: 3/114/2022 Time: 2:50pm.       Clinical Substantiation of Recommendations   Rationale with supporting factors for disposition and diagnosis.     Pt with a significant history of depression comes to the ED due to an exacerbation of his depressive sx.  For the past week, pt's reports \"feeling numb,\" decreased concentration, decreased appetite, fatigue and low energy, depressed mood and at times, suicidal and \"dark thoughts.\"  The  pt and his wife state the pt's symptoms have worsened since Saturday, 3/5/22.  Report that the pt has been increasingly confused.  State the  pt has not been able to  work the past two days.  The pt's wife states the pt woke up on 3/8/22 and 3/9/22, stating he was having \"dark thoughts\" about killing himself.  The pt appears confused and is not fulling oriented x4.  The pt has a hx of ECT and TMS.  Due to the pt's worsening depressive sx, and inability to functioning in most areas of his life, recommend inpatient hospitalization for safety and stabilization.        Assessment Details  Patient interview started at: 12pm and completed at: 1pm    Total duration spent on the patient case in minutes: 1.0 hrs     CPT code(s) utilized: 74756 - Psychotherapy for Crisis - 60 (30-74*) min           Gilma Negro [facility] HospitalSHONNA            "

## 2022-03-11 NOTE — ED PROVIDER NOTES
ED Provider Note  [facility]      History     Chief Complaint   Patient presents with     Suicidal     pt having increasing intrusive thoughts si 3 days ago     HPI  Kuldeep Sequeira is a 52 year old male who has a history of major depression, previous treatment with ECT and TMS.  Presenting today due to worsening depressive symptoms, forgetfulness, and suicidal ideation.  Patient and wife feel his symptoms started worsening in November, then he had Covid in December which seem to escalate his symptoms.  Has gotten progressively worse despite compliance with medications.  Symptoms became substantially worse starting 6 days ago.  He notes difficulty with concentration and thinking, decreased appetite, disturbed sleep, low energy and fatigue, ruminating about inability to care for himself, his family or his job.  He woke up 2 nights ago with suicidal thoughts and his wife had to sit up with him for 4 hours to work through this.  It has been continually questioning him about suicidal thoughts since that time, and feels it is getting more difficult to keep him safe.  He denies hallucinations or paranoia.  He does report 10 pound weight loss in the last week, night sweats.  Denies problems with coordination or speech.    Past Medical History  No past medical history on file.  No past surgical history on file.  acetaminophen (TYLENOL) 500 MG tablet  ARIPiprazole (ABILIFY) 2 MG tablet  ARIPiprazole (ABILIFY) 5 MG tablet  clonazePAM (KLONOPIN) 0.5 MG tablet  fish oil-omega-3 fatty acids 1000 MG capsule  FLUoxetine (PROZAC) 40 MG capsule  folic acid (FOLVITE) 400 MCG tablet  Multiple Vitamin (ONE-A-DAY MENS PO)  OLANZapine (ZYPREXA) 10 MG tablet  QUEtiapine (SEROQUEL) 50 MG tablet  venlafaxine (EFFEXOR-XR) 75 MG 24 hr capsule  venlafaxine (EFFEXOR-XR) 75 MG 24 hr capsule  VITAMIN D PO  zolpidem ER (AMBIEN CR) 12.5 MG CR tablet      No Known Allergies  Family History  No family history on  file.  Social History   Social History     Tobacco Use     Smoking status: Not on file     Smokeless tobacco: Not on file   Substance Use Topics     Alcohol use: Not on file     Drug use: Not on file      Past medical history, past surgical history, medications, allergies, family history, and social history were reviewed with the patient. No additional pertinent items.       Review of Systems  A complete review of systems was performed with pertinent positives and negatives noted in the HPI, and all other systems negative.    Physical Exam   BP: 125/87  Pulse: 84  Temp: 98.2  F (36.8  C)  Resp: 18  Weight: 83.5 kg (184 lb)  SpO2: 99 %  Physical Exam  Vitals and nursing note reviewed.   Constitutional:       General: He is not in acute distress.     Appearance: He is not diaphoretic.   HENT:      Head: Atraumatic.   Eyes:      General: No scleral icterus.     Pupils: Pupils are equal, round, and reactive to light.   Cardiovascular:      Heart sounds: Normal heart sounds.   Pulmonary:      Effort: No respiratory distress.      Breath sounds: Normal breath sounds.   Abdominal:      General: Bowel sounds are normal.      Palpations: Abdomen is soft.      Tenderness: There is no abdominal tenderness.   Musculoskeletal:         General: No tenderness.   Skin:     General: Skin is warm.      Findings: No rash.   Neurological:      General: No focal deficit present.      Mental Status: He is alert and oriented to person, place, and time.      Cranial Nerves: Cranial nerves are intact.      Sensory: Sensation is intact.      Motor: Motor function is intact.      Coordination: Coordination is intact.      Gait: Gait is intact.   Psychiatric:         Attention and Perception: Attention normal.         Mood and Affect: Mood is depressed. Affect is flat.         Speech: Speech is delayed.         Behavior: Behavior is withdrawn. Behavior is cooperative.         Thought Content: Thought content includes suicidal ideation. Thought  content does not include suicidal plan.         Cognition and Memory: Cognition is impaired.         Judgment: Judgment normal.         ED Course      Procedures       The medical record was reviewed and interpreted.  Current labs reviewed and interpreted.  Previous labs reviewed and interpreted.  Current images reviewed and interpreted: CT head negative.  Mental Health Risk Assessment      PSS-3    Date and Time Over the past 2 weeks have you felt down, depressed, or hopeless? Over the past 2 weeks have you had thoughts of killing yourself? Have you ever attempted to kill yourself? When did this last happen? User   03/11/22 1110 yes yes no -- ESAU      C-SSRS ([place])    Date and Time Q1 Wished to be Dead (Past Month) Q2 Suicidal Thoughts (Past Month) Q3 Suicidal Thought Method Q4 Suicidal Intent without Specific Plan Q5 Suicide Intent with Specific Plan Q6 Suicide Behavior (Lifetime) Within the Past 3 Months? RETIRED: Level of Risk per Screen Screening Not Complete User   03/11/22 1110 yes yes no no no no -- -- -- ESAU              Suicide assessment completed by mental health (D.E.C., LCSW, etc.)       Results for orders placed or performed during the hospital encounter of 03/11/22   CT Head w/o Contrast     Status: None    Narrative    CT OF THE HEAD WITHOUT CONTRAST 3/11/2022 2:29 PM     COMPARISON: Brain MRI 11/19/2020.    HISTORY:  Mental status change, unknown cause.    TECHNIQUE: Axial CT images of the head from the skull base to the  vertex were acquired without IV contrast.    FINDINGS: The ventricles and basal cisterns are within normal limits  in configuration. There is no midline shift. There are no extra-axial  fluid collections.  Gray-white differentiation is well maintained.    No intracranial hemorrhage, mass or recent infarct.    Again noted is polypoid mucosal thickening in the maxillary sinuses  bilaterally. No evidence for acute sinusitis. There is no mastoiditis.  There are no fractures of the  visualized bones.      Impression    IMPRESSION:  Normal head CT.    Radiation dose for this scan was reduced using automated exposure  control, adjustment of the mA and/or kV according to patient size, or  iterative reconstruction technique.     NATALY CLARKE MD         SYSTEM ID:  [id number]   Drug abuse screen 6 urine (chem dep) ([facility])     Status: Abnormal   Result Value Ref Range    Amphetamines Urine Screen Negative Screen Negative    Barbiturates Urine Screen Negative Screen Negative    Benzodiazepines Urine Screen Negative Screen Negative    Cannabinoids Urine Screen Positive (A) Screen Negative    Cocaine Urine Screen Negative Screen Negative    Ethanol Urine Screen Negative Screen Negative    Opiates Urine Screen Negative Screen Negative   Comprehensive metabolic panel     Status: Abnormal   Result Value Ref Range    Sodium 135 133 - 144 mmol/L    Potassium 4.6 3.4 - 5.3 mmol/L    Chloride 101 94 - 109 mmol/L    Carbon Dioxide (CO2) 29 20 - 32 mmol/L    Anion Gap 5 3 - 14 mmol/L    Urea Nitrogen 16 7 - 30 mg/dL    Creatinine 1.08 0.66 - 1.25 mg/dL    Calcium 9.4 8.5 - 10.1 mg/dL    Glucose 107 (H) 70 - 99 mg/dL    Alkaline Phosphatase 59 40 - 150 U/L    AST 22 0 - 45 U/L    ALT 31 0 - 70 U/L    Protein Total 7.1 6.8 - 8.8 g/dL    Albumin 3.7 3.4 - 5.0 g/dL    Bilirubin Total 0.6 0.2 - 1.3 mg/dL    GFR Estimate 83 >60 mL/min/1.73m2   TSH with free T4 reflex     Status: Normal   Result Value Ref Range    TSH 1.51 0.40 - 4.00 mU/L   Asymptomatic COVID-19 Virus (Coronavirus) by PCR Nasopharyngeal     Status: Normal    Specimen: Nasopharyngeal; Swab   Result Value Ref Range    SARS CoV2 PCR Negative Negative    Narrative    Testing was performed using the autumn  SARS-CoV-2 & Influenza A/B Assay on the autumn  [employer]  System.  This test should be ordered for the detection of SARS-COV-2 in individuals who meet SARS-CoV-2 clinical and/or epidemiological criteria. Test performance is unknown in asymptomatic patients.   This test is for in vitro diagnostic use under the FDA EUA for laboratories certified under CLIA to perform moderate and/or high complexity testing. This test has not been FDA cleared or approved.  A negative test does not rule out the presence of PCR inhibitors in the specimen or target RNA in concentration below the limit of detection for the assay. The possibility of a false negative should be considered if the patient's recent exposure or clinical presentation suggests COVID-19.  [facility] are certified under the Clinical Laboratory Improvement Amendments of 1988 (CLIA-88) as qualified to perform moderate and/or high complexity laboratory testing.   CBC with platelets and differential     Status: Abnormal   Result Value Ref Range    WBC Count 5.3 4.0 - 11.0 10e3/uL    RBC Count 5.89 4.40 - 5.90 10e6/uL    Hemoglobin 17.7 13.3 - 17.7 g/dL    Hematocrit 50.8 40.0 - 53.0 %    MCV 86 78 - 100 fL    MCH 30.1 26.5 - 33.0 pg    MCHC 34.8 31.5 - 36.5 g/dL    RDW 12.8 10.0 - 15.0 %    Platelet Count 191 150 - 450 10e3/uL    % Neutrophils 77 %    % Lymphocytes 11 %    % Monocytes 11 %    % Eosinophils 0 %    % Basophils 0 %    % Immature Granulocytes 1 %    NRBCs per 100 WBC 0 <1 /100    Absolute Neutrophils 4.0 1.6 - 8.3 10e3/uL    Absolute Lymphocytes 0.6 (L) 0.8 - 5.3 10e3/uL    Absolute Monocytes 0.6 0.0 - 1.3 10e3/uL    Absolute Eosinophils 0.0 0.0 - 0.7 10e3/uL    Absolute Basophils 0.0 0.0 - 0.2 10e3/uL    Absolute Immature Granulocytes 0.0 <=0.4 10e3/uL    Absolute NRBCs 0.0 10e3/uL   CBC with platelets differential     Status: Abnormal    Narrative    The following orders were created for panel order CBC with platelets differential.  Procedure                               Abnormality         Status                     ---------                               -----------         ------                     CBC with platelets and d...[[id number]]  Abnormal            Final result              "    Please view results for these tests on the individual orders.     Medications   FLUoxetine (PROzac) capsule 60 mg (has no administration in time range)   zolpidem (AMBIEN) half-tab 12.5 mg (has no administration in time range)   QUEtiapine (SEROquel) tablet 25 mg (has no administration in time range)   folic acid (FOLVITE) tablet 400 mcg (has no administration in time range)   One-A-Day Mens TABS 1 tablet (has no administration in time range)        Assessments & Plan (with Medical Decision Making)   A 52-year-old male with a history of refractory major depression presenting due to worsening depressive symptoms for several months but substantially worse in the last 6 days including weight loss, staying in bed, inability to work, and suicidal thoughts.  On exam his vital signs are normal, he has a very flat affect, his answers to questions are somewhat delayed and he appears to be having trouble tracking or concentrating.  His neck is supple, no focal motor or sensory deficits on exam.  Per wife, his symptoms are to such a degree that she was concerned \"something may be wrong in his brain\".  There is no history of substance abuse.  No sign of any substance abuse toxidrome on exam.  A medical work-up was entertained including labs and a head CT which did not show any acute findings, and labs without metabolic abnormality.  The patient was also seen by the [facility] , please refer to their extensive note/evaluation which was reviewed with me and is documented in [facility] on 3/11/2022 for further details.  Patient with severe recurrent major depression, extreme vegetative symptoms which make him completely incapacitated, and some suicidal thoughts.  We will refer him for admission.    I have reviewed the nursing notes. I have reviewed the findings, diagnosis, plan and need for follow up with the patient.    New Prescriptions    No medications on file       Final diagnoses:   Severe episode of recurrent major " depressive disorder, without psychotic features (H)       --  Jaron Rai MD  [facility] EMERGENCY DEPARTMENT  3/11/2022     Jaron Rai MD  03/11/22 5107

## 2022-03-11 NOTE — TELEPHONE ENCOUNTER
S:  3:20 PM  Call from DEC  in the [place] ED([facility]) requesting IP MH placement for a 53 YO M    B:  Hx of severe depression-has had ECT and TMS, pt of Dr. Markham, BIB his wife, depression sx have worsenned x 6 days, not sleeping, low energy, confused  The last  few days having  dark negative thoughts/SI, no plan or intent. Pt is slow to respond, oriented to self, day of week, and president name only. Patient unable to work due to inability to function. No sub use hx-except  patient tried a THC gummie yesterday to try to help with sleep. Last hospitalization  For MH November 2020.    Medical:  Sleep apnea(uses CPAP) and low teststerone      A:  vol    R:  Patient cleared and ready for behavioral bed placement: Yes

## 2022-03-11 NOTE — ED TRIAGE NOTES
Patient and wife concerned about increasing suicidal ideation. They note he has been slow to respond, forgetful over the last few days.

## 2022-03-11 NOTE — ED NOTES
"Kuldeep Sequeira  March 11, 2022    SAFE Note    Critical Safety Issues: Pt to be admitted due to increasing depressive sx which are impacting his ability to function in his day-to-day life and complete ADLs.  The pt is confused and slow to answer.  The pt reports suicidal ideation but denies a plan or intent.        Current Suicidal Ideation/Self-Injurious Concerns/Methods: Other Pt reports suicidal ideation and \"dark feelings\" but denies a plan or intent.        Current or Historical Inappropriate Sexual Behavior: No      Current or Historical Aggression/Homicidal Ideation: None - N/A      Triggers: Increasing depressive sx and hopelessness.      Updated care team: Yes: MD and RN aware    For additional details see full LMHP assessment.       RACHEL Lucero    "

## 2022-03-12 NOTE — ED PROVIDER NOTES
Patient is declining admission due to boarding.  He had to board in the past in the ER and he felt it made him worse.  Wife is supportive and will stay with him at all times. They are considering to visit family this weekend in a relaxing supportive environment and also looking and mental health programming in the [place] area.  He knows ect is offered here but does not want it to be his first choice of treatment here due to the negative physical effects of it.   He has no prior suicide attempts and can contract for safety.  His sleep has been bad last 3 days and recently increased from 20 to 60 mg of prozac.  Could be do to increase of prozac so will decrease to 40 mg daily and contact Dr. Markham, his psychiatrist, as soon as possible.       Debby Jansen MD  03/11/22 1951

## 2022-03-12 NOTE — ED NOTES
"If I am feeling unsafe or I am in a crisis, I will:   Contact my established care providers   Call the National Suicide Prevention Lifeline: [phone]   Go to the nearest emergency room   Call 91          Warning signs that I or other people might notice when a crisis is developing for me: I am appearing emotionless to my wife, I am \"shut down\", I am turning inwards and not communicating as I should.    Things I am able to do on my own to cope or help me feel better: Get good sleep, go for walks    Things that I am able to do with others to cope or help me better: Organize the garage, find something to do on the \"to do\" list. Set up a date night!    Things I can use or do for distraction: Organize the garage, engage \"TO DO\" list items, get busy.    Changes I can make to support my mental health and wellness: Recognize when I am shutting down, talk it out.    People in my life that I can ask for help: My wife, my , friends.    Your [facility] has a mental health crisis team you can call 24/7: [facility] Crisis: [phone]    Other things that are important when I m in crisis: I am not alone. I have resources. This can pass with mindfulness exercises.    Additional resources and information:       What is Mindfulness?   Mindfulness is an ancient eastern practice which is very relevant for our lives today.Mindfulness is a very simple concept. Mindfulness means paying attention in a particular way: on purpose, in the present moment, and non-judgementally.   Mindfulness does not conflict with any beliefs ortraditions, whether Pentecostal, cultural or scientific. It is simply a practical way to notice thoughts, physical sensations, sights, sounds, smells - anything we might not normally notice. The actual skillsmight be simple, but because it is so different to how our minds normally behave, it takes a lot of practice.   We might go out into the garden and as we look around, we might think \"That grassreally " "needs cutting, and that vegetable patch looks very untidy\". A young child on the other hand, will call over excitedly, \"Hey - come and look at this ant!\"   Mindfulness can simply be noticing whatwe don't normally notice, because our heads are too busy in the future or in the past - thinking about what we need to do, or going over what we have done.   Mindfulness might simply be described aschoosing and learning to control our focus of attention. Page 2 of 4 ScoreFeedersherShadow Healthco.uk/mindfulness.htm www.[employer]   Navya Olvera 2009, permission to use for therapy purposes.      In a car, we can sometimes drive for miles on  automatic  , without really being aware of what we are doing. In the same way, we may not be really  present , [id number], for much of our lives: We canoften be  miles away  without knowing it.   On automatic , we are more likely to have our  buttons pressed : around us and thoughts, feelings and sensations (of which we may be only dimly aware) can trigger old habits of thinking that are often unhelpful and may lead to worsening mood.   By becoming moreaware of our thoughts, feelings, and body sensations, from moment to moment, we give ourselves the possibility of greater freedom and choice; we do not have to go into the same old  mental ruts  that may have caused problems in the past.     Mindful Activity   If we wash the dishes each evening, we might tend to be  in our heads? as we?re washing up, thinking about what we have to do, what we've done earlier in the day, worrying about future events, or regretful thoughts about the past. Again, ayoung child might see things differently, \"Listen to those bubbles! They're fun!\"   Washing up or another routine activity can become a routine (practice of) mindful activity for us. We might notice thetemperature of the water and how it feels on the skin, the texture of the bubbles on the skin, and yes, we might hear the bubbles as they softly pop. " "The sounds of the water as we take out and put dishesinto the water. The smoothness of the plates, and the texture of the sponge. Just noticing what we might not normally notice.   A mindful walk brings new pleasures. Walking is something most of us doat some time during the day. We can practice, even if only for a couple of minutes at a time, mindful walking. Rather than be \"in our heads\", we can look around and notice what we see, hear, sense. We mightnotice the sensations in our own body just through the act of walking. Noticing the sensations and movement of our feet, legs, arms, head and body as we take each step. Noticing our breathing. Thoughts willcontinuously intrude, but we can just notice them, and then bring our attention back to our walking.   The more we practice, perhaps the more (initially at least) we will notice those thoughtsintruding, and that's ok. The only aim of mindful activity is to bring our attention back to the activity continually, noticing those sensations, from outside and within us. Page 3 of 4 www.EntrenaYaelfhelp.co.uk/mindfulness.htm www.[employer].meghann Olvera 2009, permission to use for therapy purposes.     Mindful Breathing   The primary focus in Mindfulness Meditation is the breathing. However, the primary goal is a calm, non-judging awareness, allowing thoughts and feelings to come and go withoutgetting caught up in them. This creates calmness and acceptance.   ? Sit comfortably, with your eyes closed and your spine reasonably straight.   ? Direct your attention to your breathing.   ? When thoughts, emotions, physical feelings or external sounds occur, accept them, giving them the space to come and go without judging or getting involved with them.   ? When you notice that your attention has drifted off and is becoming caught up in thoughts or feelings,simply note that the attention has drifted, and then gently bring the attention back to your breathing.     It's ok and natural for " thoughts to arise, and for your attention to follow them. No matterhow many times this happens, just keep bringing your attention back to your breathing.     Breathing Meditation 1 (Kaitlin 1996)   Assume a comfortable posture lying on your back or sitting. If you are sitting, keep the spine straight and let your shoulders drop.   Close your eyes if itfeels comfortable.   Bring your attention to your belly, feeling it rise or expand gently on the in-breath and fall or recede on the out-breath.   Keep your focus on the breathing,  being with? each in-breath for its full duration and with each out-breath for its full duration, as if you were riding the waves of your own breathing.   Every timeyou notice that your mind has wandered off the breath, notice what it was that took you away and then gently bring your attention back to your belly and the feeling of the breath coming in and out.   If your mind wanders away from the breath a thousand times, then your job is simply to bring it back to the breath every time, no matter what it becomes preoccupied with.   Practice this exercisefor fifteen minutes at a convenient time every day, whether you feel like it or not, for one week and see how it feels to incorporate a disciplined meditation practice into your life. Be aware of how itfeels to spend some time each day just being with your breath without having to do anything.Page 4 of 4 www.getselfhelp.co.uk/mindfulness.htm www.lev.meghann Olvera 2009, permission to use fortherapy purposes.     Breathing Meditation 2 (Kaitlin 1996)   ? Tune into your breathing at different times during the day, feeling the belly go through one or two risings and fallings.   ? Become aware of your thoughts andfeelings at these moments, just observing them without judging them or yourself.   ? At the same time, be aware of any changes in the way you are seeing things and feeling about yourself.     Using mindfulness to cope with  "negative experiences (thoughts, feelings, events)   As we become morepractised at using mindfulness for breathing, body sensations and routine daily activities, so we can then learn to be mindful of our thoughts and feelings, to become observers, and then more accepting ofthem. This results in less distressing feelings, and increases our ability to enjoy our lives.   With mindfulness, even the most disturbing sensations, feelings, thoughts, and experiences, can be viewedfrom a wider perspective as passing events in the mind, rather than as \"us\", or as being necessarily true. (Remi 2003)   When we are more practiced in using mindfulness, we can use it even in of intense distress, by becoming mindful of the actual experience as an observer, using mindful breathing and focussing our attention on the breathing, listening to the distressing thoughts mindfully,recognising them as merely thoughts, breathing with them, allowing them to happen without believing them or arguing with them. If thoughts are too strong or loud, then we can move our attention to ourbreath, the body, or to sounds around us.   Adrien Madrigal uses the example of waves to help explain mindfulness.   Think of your mind as the surface of a lake or an ocean. There are always waveson the water, sometimes big, sometimes small, sometimes almost imperceptible. The water's waves are churned up by winds, which come and go and vary in direction and intensity, just as do the winds of stressand change in our lives, which stir up waves in our mind. It's possible to find shelter from much of the wind that agitates the mind. Whatever we might do to prevent them, the winds of life and of the mind blow.   \"You can't stop the waves, but you can learn to surf\" (Kaitlin 2004     Mindfulness-Based Stress Reduction Classes:      [facility]  www.oceandharma.org  [phone]    [facility]  www.[facility]  [email or web address]    [facility]  [email or web address]    [facility] [place]  [email or web address]      Website with meditation tools:    www.takingcharge.[facility]

## 2022-03-12 NOTE — DISCHARGE INSTRUCTIONS
"Admission recommended but declined. Please return if safety concerns/worsening.      Follow up with Dr. Markham as soon as possible.     Your wife will stay with you at all times.     Decrease prozac to 40 mg daily.  Discuss this change of dosing with Dr. Markham.     If I am feeling unsafe or I am in a crisis, I will:   Contact my established care providers   Call the National Suicide Prevention Lifeline: [phone]   Go to the nearest emergency room   Call 91            Warning signs that I or other people might notice when a crisis is developing for me: I am appearing emotionless to my wife, I am \"shut down\", I am turning inwards and not communicating as I should.     Things I am able to do on my own to cope or help me feel better: Get good sleep, go for walks     Things that I am able to do with others to cope or help me better: Organize the garage, find something to do on the \"to do\" list. Set up a date night!     Things I can use or do for distraction: Organize the garage, engage \"TO DO\" list items, get busy.     Changes I can make to support my mental health and wellness: Recognize when I am shutting down, talk it out.     People in my life that I can ask for help: My wife, my , friends.     Your [facility] has a mental health crisis team you can call 24/7: [facility] Crisis: [phone]     Other things that are important when I m in crisis: I am not alone. I have resources. This can pass with mindfulness exercises.     Additional resources and information:         What is Mindfulness?   Mindfulness is an ancient eastern practice which is very relevant for our lives today.Mindfulness is a very simple concept. Mindfulness means paying attention in a particular way: on purpose, in the present moment, and non-judgementally.   Mindfulness does not conflict with any beliefs ortraditions, whether Yazidi, cultural or scientific. It is simply a practical way to notice thoughts, physical sensations, sights, " "sounds, smells - anything we might not normally notice. The actual skillsmight be simple, but because it is so different to how our minds normally behave, it takes a lot of practice.   We might go out into the garden and as we look around, we might think \"That grassreally needs cutting, and that vegetable patch looks very untidy\". A young child on the other hand, will call over excitedly, \"Hey - come and look at this ant!\"   Mindfulness can simply be noticing whatwe don't normally notice, because our heads are too busy in the future or in the past - thinking about what we need to do, or going over what we have done.   Mindfulness might simply be described aschoosing and learning to control our focus of attention. Page 2 of 4 daija.co.uk/mindfulness.htm www.lev.meghann Olvera 2009, permission to use for therapy purposes.      In a car, we can sometimes drive for miles on  automatic  , without really being aware of what we are doing. In the same way, we may not be really  present , [id number], for much of our lives: We canoften be  miles away  without knowing it.   On automatic , we are more likely to have our  buttons pressed : around us and thoughts, feelings and sensations (of which we may be only dimly aware) can trigger old habits of thinking that are often unhelpful and may lead to worsening mood.   By becoming moreaware of our thoughts, feelings, and body sensations, from moment to moment, we give ourselves the possibility of greater freedom and choice; we do not have to go into the same old  mental ruts  that may have caused problems in the past.      Mindful Activity   If we wash the dishes each evening, we might tend to be  in our heads? as we?re washing up, thinking about what we have to do, what we've done earlier in the day, worrying about future events, or regretful thoughts about the past. Again, ayoung child might see things differently, \"Listen to those bubbles! They're " "fun!\"   Washing up or another routine activity can become a routine (practice of) mindful activity for us. We might notice thetemperature of the water and how it feels on the skin, the texture of the bubbles on the skin, and yes, we might hear the bubbles as they softly pop. The sounds of the water as we take out and put dishesinto the water. The smoothness of the plates, and the texture of the sponge. Just noticing what we might not normally notice.   A mindful walk brings new pleasures. Walking is something most of us doat some time during the day. We can practice, even if only for a couple of minutes at a time, mindful walking. Rather than be \"in our heads\", we can look around and notice what we see, hear, sense. We mightnotice the sensations in our own body just through the act of walking. Noticing the sensations and movement of our feet, legs, arms, head and body as we take each step. Noticing our breathing. Thoughts willcontinuously intrude, but we can just notice them, and then bring our attention back to our walking.   The more we practice, perhaps the more (initially at least) we will notice those thoughtsintruding, and that's ok. The only aim of mindful activity is to bring our attention back to the activity continually, noticing those sensations, from outside and within us. Page 3 of 4 www.getselfhelp.co.uk/mindfulness.htm www.lev.meghann Olvera 2009, permission to use for therapy purposes.      Mindful Breathing   The primary focus in Mindfulness Meditation is the breathing. However, the primary goal is a calm, non-judging awareness, allowing thoughts and feelings to come and go withoutgetting caught up in them. This creates calmness and acceptance.   ? Sit comfortably, with your eyes closed and your spine reasonably straight.   ? Direct your attention to your breathing.   ? When thoughts, emotions, physical feelings or external sounds occur, accept them, giving them the space to come and go without " judging or getting involved with them.   ? When you notice that your attention has drifted off and is becoming caught up in thoughts or feelings,simply note that the attention has drifted, and then gently bring the attention back to your breathing.      It's ok and natural for thoughts to arise, and for your attention to follow them. No matterhow many times this happens, just keep bringing your attention back to your breathing.      Breathing Meditation 1 (Kaitlin 1996)   Assume a comfortable posture lying on your back or sitting. If you are sitting, keep the spine straight and let your shoulders drop.   Close your eyes if itfeels comfortable.   Bring your attention to your belly, feeling it rise or expand gently on the in-breath and fall or recede on the out-breath.   Keep your focus on the breathing,  being with? each in-breath for its full duration and with each out-breath for its full duration, as if you were riding the waves of your own breathing.   Every timeyou notice that your mind has wandered off the breath, notice what it was that took you away and then gently bring your attention back to your belly and the feeling of the breath coming in and out.   If your mind wanders away from the breath a thousand times, then your job is simply to bring it back to the breath every time, no matter what it becomes preoccupied with.   Practice this exercisefor fifteen minutes at a convenient time every day, whether you feel like it or not, for one week and see how it feels to incorporate a disciplined meditation practice into your life. Be aware of how itfeels to spend some time each day just being with your breath without having to do anything.Page 4 of 4 www.getselfhelp.co.uk/mindfulness.htm www.get.meghann Olvera 2009, permission to use fortherapy purposes.      Breathing Meditation 2 (Kaitlin 1996)   ? Tune into your breathing at different times during the day, feeling the belly go through one or two risings  "and fallings.   ? Become aware of your thoughts andfeelings at these moments, just observing them without judging them or yourself.   ? At the same time, be aware of any changes in the way you are seeing things and feeling about yourself.      Using mindfulness to cope with negative experiences (thoughts, feelings, events)   As we become morepractised at using mindfulness for breathing, body sensations and routine daily activities, so we can then learn to be mindful of our thoughts and feelings, to become observers, and then more accepting ofthem. This results in less distressing feelings, and increases our ability to enjoy our lives.   With mindfulness, even the most disturbing sensations, feelings, thoughts, and experiences, can be viewedfrom a wider perspective as passing events in the mind, rather than as \"us\", or as being necessarily true. (Remi 2003)   When we are more practiced in using mindfulness, we can use it even in of intense distress, by becoming mindful of the actual experience as an observer, using mindful breathing and focussing our attention on the breathing, listening to the distressing thoughts mindfully,recognising them as merely thoughts, breathing with them, allowing them to happen without believing them or arguing with them. If thoughts are too strong or loud, then we can move our attention to ourbreath, the body, or to sounds around us.   Adrien Madrigal uses the example of waves to help explain mindfulness.   Think of your mind as the surface of a lake or an ocean. There are always waveson the water, sometimes big, sometimes small, sometimes almost imperceptible. The water's waves are churned up by winds, which come and go and vary in direction and intensity, just as do the winds of stressand change in our lives, which stir up waves in our mind. It's possible to find shelter from much of the wind that agitates the mind. Whatever we might do to prevent them, the winds of life and of the " "mind blow.   \"You can't stop the waves, but you can learn to surf\" (Kaitlin 2004      Mindfulness-Based Stress Reduction Classes:        [facility]  www.[facility].org  [phone]     [facility]  www.[facility]  www.[employer]/classes     [facility]  [email or web address]     [facility]  www.[employer]        Website with meditation tools:    www.[email or web address].[facility]                               "

## 2022-03-12 NOTE — PHARMACY-ADMISSION MEDICATION HISTORY
Admission Medication History Completed by Pharmacy    See [facility] Admission Navigator for allergy information, preferred outpatient pharmacy, prior to admission medications and immunization status.     Medication History Sources:     Surescripts (fill history), CareEverywhere, and patient interview (via iPad) w/ spouse present    Copied medication list     Changes made to PTA medication list (reason):    Added:   o Rx: per patient report, fill history  - NAC   - Xyosted weekly injection     Non-formulary; patient aware they can bring home supply to continue therapy if appropriate/indicated  o OTC: per patient report, medication list brought to hospital  - Biotin  - Vitamin B6  - Vitamin B12  - Vitamin C  - Vitamin E  - CBD Oil  - Inositol  - L-Theanine  - Magnesium Glycinate  - Omega 3  - P5P  - SAMe  - Selenium  - Turmeric  - Zinc    Deleted: per patient report, fill history  o Rx:  - Aripiprazole (no longer taking, rx from May 2021)  - Clonazepam (no longer taking, rx from December 2021)  - Olanzapine (no longer taking, rx from December 2020)  - Venlafaxine (no longer taking, rx from December 2021)  o OTC/PRN:  - Folic acid (not taking)  - Gummy multivitamin (not taking)    Changed: per patient report, fill history  o Vitamin D (no strength) PO daily --> vitamin D 5000 units PO daily    Additional Information:    Miscellaneous vitamins/supplements - patient takes multiple OTCs/vitamins/supplements, added to list to screen for drug-drug interactions. Unable to add Lavella WS [id number], 1/2 to 1 scoop of powder BID (not in database). Provided patient and spouse information on non-essential medication policy and patient-supplied medication policy.    Prior to Admission medications    Medication Sig Last Dose Taking? Auth Provider   acetylcysteine (NAC) 600 MG CAPS capsule Take 1,200 mg by mouth At Bedtime 3/10/2022 at PM Yes Unknown, Entered By History   biotin 1000 MCG TABS tablet Take 2,000 mcg by mouth daily 3/11/2022  at AM Yes Unknown, Entered By History   calcium carbonate 500 mg, elemental, (OSCAL 500) 1250 (500 Ca) MG TABS tablet Take 1 tablet by mouth daily 3/11/2022 at AM Yes Unknown, Entered By History   cyanocobalamin (VITAMIN B-12) 1000 MCG tablet Take 1,000 mcg by mouth daily 3/11/2022 at AM Yes Unknown, Entered By History   Dietary Management Product (VB6 P5P PO) Take 200 mg by mouth daily 3/11/2022 at AM Yes Unknown, Entered By History   fish oil-omega-3 fatty acids 1000 MG capsule Take 2 g by mouth daily 3/11/2022 at AM Yes Reported, Patient   FLUoxetine (PROZAC) 20 MG capsule Take 60 mg by mouth daily  3/11/2022 at AM Yes Reported, Patient   HEMP OIL OR EXTRACT OR OTHER CBD CANNABINOID, NOT MEDICAL CANNABIS, Take 500 mg by mouth 2 times daily 3/11/2022 at AM Yes Unknown, Entered By History   Inositol POWD 0.5-1 Scoops 2 times daily 3/11/2022 at AM Yes Unknown, Entered By History   L-Theanine 200 MG CAPS Take 200 mg by mouth 2 times daily 3/11/2022 at AM Yes Unknown, Entered By History   MAGNESIUM GLYCINATE PO Take 500 mg by mouth At Bedtime 3/10/2022 at HS Yes Unknown, Entered By History   pyridOXINE (VITAMIN B6) 100 MG TABS Take 300 mg by mouth daily 3/11/2022 at AM Yes Unknown, Entered By History   QUEtiapine (SEROQUEL) 50 MG tablet Take 25 mg by mouth At Bedtime  3/10/2022 at HS Yes Jaycee Goins MD   S-Adenosylmethionine (SAME) 400 MG TABS Take 600 mg by mouth At Bedtime 3/10/2022 at PM Yes Unknown, Entered By History   Selenium 100 MCG TABS Take 200 mcg by mouth daily 3/11/2022 at AM Yes Unknown, Entered By History   Testosterone Enanthate (XYOSTED) 100 MG/0.5ML SOAJ Inject 100 mg Subcutaneous once a week 3/10/2022 Yes Unknown, Entered By History   Turmeric 500 MG TABS Take 500 mg by mouth 2 times daily 3/11/2022 at AM Yes Unknown, Entered By History   vitamin C (ASCORBIC ACID) 1000 MG TABS Take 1,000 mg by mouth daily 3/11/2022 at AM Yes Unknown, Entered By History   Vitamin D3 (CHOLECALCIFEROL) 125 MCG (5000  UT) tablet Take 1 tablet by mouth daily 3/11/2022 at AM Yes Unknown, Entered By History   vitamin E 400 units TABS Take 400 Units by mouth At Bedtime 3/10/2022 at PM Yes Unknown, Entered By History   zinc 50 MG TABS Take 75 mg by mouth At Bedtime 3/10/2022 at PM Yes Unknown, Entered By History   zolpidem ER (AMBIEN CR) 12.5 MG CR tablet Take 12.5 mg by mouth nightly as needed for sleep  3/10/2022 at HS Yes Jaycee Goins MD       Date completed: 03/11/22    Medication history completed by:     Nicollette McMann, PharmD  [facility]  Emergency Department: Ascom *[id number]

## 2022-03-13 ENCOUNTER — TELEPHONE (OUTPATIENT)
Dept: BEHAVIORAL HEALTH | Facility: CLINIC | Age: 52
End: 2022-03-13

## 2022-03-13 ENCOUNTER — HOSPITAL ENCOUNTER (EMERGENCY)
Facility: CLINIC | Age: 52
Discharge: HOME | End: 2022-03-15
Attending: EMERGENCY MEDICINE | Admitting: EMERGENCY MEDICINE
Payer: COMMERCIAL

## 2022-03-13 DIAGNOSIS — R45.851 SUICIDAL IDEATION: ICD-10-CM

## 2022-03-13 LAB
ALBUMIN SERPL-MCNC: 3.4 G/DL (ref 3.4–5)
ALP SERPL-CCNC: 49 U/L (ref 40–150)
ALT SERPL W P-5'-P-CCNC: 33 U/L (ref 0–70)
ANION GAP SERPL CALCULATED.3IONS-SCNC: 7 MMOL/L (ref 3–14)
APAP SERPL-MCNC: <2 MG/L (ref 10–30)
AST SERPL W P-5'-P-CCNC: 22 U/L (ref 0–45)
ATRIAL RATE - MUSE: 99 BPM
BASOPHILS # BLD AUTO: 0 10E3/UL (ref 0–0.2)
BASOPHILS NFR BLD AUTO: 0 %
BILIRUB SERPL-MCNC: 0.8 MG/DL (ref 0.2–1.3)
BUN SERPL-MCNC: 10 MG/DL (ref 7–30)
CALCIUM SERPL-MCNC: 8.5 MG/DL (ref 8.5–10.1)
CHLORIDE BLD-SCNC: 103 MMOL/L (ref 94–109)
CO2 SERPL-SCNC: 30 MMOL/L (ref 20–32)
CREAT SERPL-MCNC: 0.97 MG/DL (ref 0.66–1.25)
DIASTOLIC BLOOD PRESSURE - MUSE: NORMAL MMHG
EOSINOPHIL # BLD AUTO: 0 10E3/UL (ref 0–0.7)
EOSINOPHIL NFR BLD AUTO: 0 %
ERYTHROCYTE [DISTWIDTH] IN BLOOD BY AUTOMATED COUNT: 12.7 % (ref 10–15)
GFR SERPL CREATININE-BSD FRML MDRD: >90 ML/MIN/1.73M2
GLUCOSE BLD-MCNC: 93 MG/DL (ref 70–99)
HCT VFR BLD AUTO: 48 % (ref 40–53)
HGB BLD-MCNC: 16.4 G/DL (ref 13.3–17.7)
HOLD SPECIMEN: NORMAL
HOLD SPECIMEN: NORMAL
IMM GRANULOCYTES # BLD: 0 10E3/UL
IMM GRANULOCYTES NFR BLD: 1 %
INTERPRETATION ECG - MUSE: NORMAL
LYMPHOCYTES # BLD AUTO: 0.9 10E3/UL (ref 0.8–5.3)
LYMPHOCYTES NFR BLD AUTO: 16 %
MCH RBC QN AUTO: 30 PG (ref 26.5–33)
MCHC RBC AUTO-ENTMCNC: 34.2 G/DL (ref 31.5–36.5)
MCV RBC AUTO: 88 FL (ref 78–100)
MONOCYTES # BLD AUTO: 0.5 10E3/UL (ref 0–1.3)
MONOCYTES NFR BLD AUTO: 10 %
NEUTROPHILS # BLD AUTO: 4.1 10E3/UL (ref 1.6–8.3)
NEUTROPHILS NFR BLD AUTO: 73 %
NRBC # BLD AUTO: 0 10E3/UL
NRBC BLD AUTO-RTO: 0 /100
P AXIS - MUSE: 44 DEGREES
PLATELET # BLD AUTO: 215 10E3/UL (ref 150–450)
POTASSIUM BLD-SCNC: 3.6 MMOL/L (ref 3.4–5.3)
PR INTERVAL - MUSE: 148 MS
PROT SERPL-MCNC: 6.4 G/DL (ref 6.8–8.8)
QRS DURATION - MUSE: 80 MS
QT - MUSE: 328 MS
QTC - MUSE: 420 MS
R AXIS - MUSE: 32 DEGREES
RBC # BLD AUTO: 5.47 10E6/UL (ref 4.4–5.9)
SODIUM SERPL-SCNC: 140 MMOL/L (ref 133–144)
SYSTOLIC BLOOD PRESSURE - MUSE: NORMAL MMHG
T AXIS - MUSE: 25 DEGREES
VENTRICULAR RATE- MUSE: 99 BPM
WBC # BLD AUTO: 5.5 10E3/UL (ref 4–11)

## 2022-03-13 PROCEDURE — 80053 COMPREHEN METABOLIC PANEL: CPT | Performed by: EMERGENCY MEDICINE

## 2022-03-13 PROCEDURE — 36415 COLL VENOUS BLD VENIPUNCTURE: CPT | Performed by: EMERGENCY MEDICINE

## 2022-03-13 PROCEDURE — 85025 COMPLETE CBC W/AUTO DIFF WBC: CPT | Performed by: EMERGENCY MEDICINE

## 2022-03-13 PROCEDURE — 93005 ELECTROCARDIOGRAM TRACING: CPT | Performed by: EMERGENCY MEDICINE

## 2022-03-13 PROCEDURE — 99285 EMERGENCY DEPT VISIT HI MDM: CPT | Mod: 25 | Performed by: EMERGENCY MEDICINE

## 2022-03-13 PROCEDURE — C9803 HOPD COVID-19 SPEC COLLECT: HCPCS | Performed by: EMERGENCY MEDICINE

## 2022-03-13 PROCEDURE — 80143 DRUG ASSAY ACETAMINOPHEN: CPT | Performed by: EMERGENCY MEDICINE

## 2022-03-13 PROCEDURE — 90791 PSYCH DIAGNOSTIC EVALUATION: CPT

## 2022-03-13 PROCEDURE — 99285 EMERGENCY DEPT VISIT HI MDM: CPT | Performed by: EMERGENCY MEDICINE

## 2022-03-13 RX ORDER — QUETIAPINE FUMARATE 50 MG/1
50 TABLET, FILM COATED ORAL 2 TIMES DAILY
Status: DISCONTINUED | OUTPATIENT
Start: 2022-03-14 | End: 2022-03-13

## 2022-03-13 RX ORDER — QUETIAPINE FUMARATE 50 MG/1
50 TABLET, FILM COATED ORAL 2 TIMES DAILY
Status: DISCONTINUED | OUTPATIENT
Start: 2022-03-13 | End: 2022-03-15 | Stop reason: HOSPADM

## 2022-03-13 RX ORDER — QUETIAPINE FUMARATE 25 MG/1
25 TABLET, FILM COATED ORAL 2 TIMES DAILY
Status: DISCONTINUED | OUTPATIENT
Start: 2022-03-13 | End: 2022-03-13

## 2022-03-13 RX ORDER — LIDOCAINE 40 MG/G
CREAM TOPICAL
Status: DISCONTINUED | OUTPATIENT
Start: 2022-03-13 | End: 2022-03-15 | Stop reason: HOSPADM

## 2022-03-13 ASSESSMENT — ENCOUNTER SYMPTOMS
ABDOMINAL PAIN: 0
CONFUSION: 0
FEVER: 0
NECK STIFFNESS: 0
ARTHRALGIAS: 0
SHORTNESS OF BREATH: 0
COLOR CHANGE: 0
DIFFICULTY URINATING: 0
HEADACHES: 0
HALLUCINATIONS: 0
DYSPHORIC MOOD: 1
EYE REDNESS: 0

## 2022-03-13 NOTE — H&P (VIEW-ONLY)
"    [facility] EMERGENCY DEPARTMENT ([facility])  3/13/22  History     Chief Complaint   Patient presents with     Ingestion     intentional ambien     The history is provided by the patient and medical records.     Kuldeep Sequeira is a 52 year old male with a past medical history significant for severe recurrent MDD, suicidal ideation, insomnia, TALYA, and GERD who presents to the Emergency Department for evaluation of intentional drug overdose of Ambien.    Patient reports that today around 1100 this morning he began taking Ambien 12.5 mg to make himself sleep.  He states that afterwards he took 2 pills of Ambien and was not able to sleep to the desired effect.  He says that he continues to take more pills which total to about 8 to 9 pills.  He adds that he is normally prescribed to take 1 tablet of Ambien 12.5 mg as a sleep aid.  He notes that his intention of taking the Ambien today was that he would hopefully fall asleep and never wake up.  He adds that he does have thoughts of suicidal ideation.  He says that he has been battling depression for the last 2 years and is \"at the end of my rope\".  He reports that he does have guns in his home but would not use this as a method to harm himself because \"guns are the least subtle\".  Patient denies self cutting.  Patient reports that he started Prozac on 02/18/2022.  He states that he was raised to Prozac 40 mg on 02/25 and that was later raised to 60 mg of Prozac on 03/03.  Patient denies currently being on Klonopin.  He notes that he is currently taking Seroquel 25 mg with his Ambien at night.  Patient denies homicidal ideation.  Patient denies hallucinations.  Patient reports that he does have a established therapist that he sees once a week.  Patient denies Covid-like symptoms.    Per police report, patient was asked by police if he had any weapons nearby.  Patient responded by stating \"if there was a weapon I would have used it instead of attempting to swallow " "pills\".  Patient was also asked about what he meant by \"ending it\".  Patient responded to his question by imitating cutting his arms up and down with the knife and also imitated slicing his throat.    Social history: Patient has 4 children at home that are the ages of 7, 9, 15, and 17.    Per chart review, patient was seen at [facility] ED on 03/11/2022 for evaluation of suicidal ideation.  Patient presented to the ED with worsening depressive symptoms for several months but substantially worse in the last 6 days including weight loss, staying in bed, inability to work, and suicidal thoughts. Per wife, his symptoms are to such a degree that she was concerned \"something may be wrong in his brain\".  Patient was declining admission due to boarding.  He had to board in the past in the ER and he felt it made him worse.  Wife was supportive and will stay with him at all times. He knew ect is offered at [facility] but does not want it to be his first choice of treatment here due to the negative physical effects of it.   He had no prior suicide attempts and could contract for safety.      History reviewed. No pertinent past medical history.    History reviewed. No pertinent surgical history.    History reviewed. No pertinent family history.    Social History     Tobacco Use     Smoking status: Never Smoker     Smokeless tobacco: Never Used   Substance Use Topics     Alcohol use: Not on file       Current Facility-Administered Medications   Medication     lidocaine (LMX4) cream     lidocaine 1 % 0.1-1 mL     sodium chloride (PF) 0.9% PF flush 3 mL     sodium chloride (PF) 0.9% PF flush 3 mL     Current Outpatient Medications   Medication     acetylcysteine (NAC) 600 MG CAPS capsule     biotin 1000 MCG TABS tablet     calcium carbonate 500 mg, elemental, (OSCAL 500) 1250 (500 Ca) MG TABS tablet     cyanocobalamin (VITAMIN B-12) 1000 MCG tablet     Dietary Management Product (VB6 P5P PO)     fish oil-omega-3 fatty acids 1000 MG " capsule     FLUoxetine (PROZAC) 20 MG capsule     HEMP OIL OR EXTRACT OR OTHER CBD CANNABINOID, NOT MEDICAL CANNABIS,     Inositol POWD     L-Theanine 200 MG CAPS     MAGNESIUM GLYCINATE PO     pyridOXINE (VITAMIN B6) 100 MG TABS     QUEtiapine (SEROQUEL) 50 MG tablet     S-Adenosylmethionine (SAME) 400 MG TABS     Selenium 100 MCG TABS     Testosterone Enanthate (XYOSTED) 100 MG/0.5ML SOAJ     Turmeric 500 MG TABS     vitamin C (ASCORBIC ACID) 1000 MG TABS     Vitamin D3 (CHOLECALCIFEROL) 125 MCG (5000 UT) tablet     vitamin E 400 units TABS     zinc 50 MG TABS     zolpidem ER (AMBIEN CR) 12.5 MG CR tablet      No Known Allergies     I have reviewed the Medications, Allergies, Past Medical and Surgical History, and Social History in the Epic system.    Review of Systems   Constitutional: Negative for fever.   HENT: Negative for congestion.    Eyes: Negative for redness.   Respiratory: Negative for shortness of breath.    Cardiovascular: Negative for chest pain.   Gastrointestinal: Negative for abdominal pain.   Genitourinary: Negative for difficulty urinating.   Musculoskeletal: Negative for arthralgias and neck stiffness.   Skin: Negative for color change.   Neurological: Negative for headaches.   Psychiatric/Behavioral: Positive for dysphoric mood and suicidal ideas. Negative for confusion and hallucinations.        Negative for homicidal ideation     A complete review of systems was performed with pertinent positives and negatives noted in the HPI, and all other systems negative.    Physical Exam   BP: (!) 149/97  Pulse: 97  Temp: 98.3  F (36.8  C)  Resp: 16  SpO2: 96 %      Physical Exam  General: Alert, sitting on the bed in NAD  Neuro: Awake alert &O x 3 CN 2-12 intact; no pronator drift present; strength 5/5 at elbow flexion; strength 5/5 at elbow extension with good  strength bilaterally; strength 5/5 at ankle dorsiflexion; strength 5/5 at ankle plantar flexion;  finger-to-nose is intact and  symmetrical; sensation is  intact to light touch throughout;   Head: atraumatic, no maxillary no frontal sinus tenderness  Nose:  no rhinnhorea  Eyes: Pupils 3mm equal round and reactive to light  Mouth: mmm  Neck: no carotid bruits  Cardiovascular: regular; S1/S2 ; radial pulses equal and symmetrical  Lungs: CTAB bilaterally  Abdomen:  soft non-tender +BS    ED Course     At 3:50 PM the patient was seen and examined by Celine Johnson MD in Room ED02.     Soon after arrival patient was placed on a cardiac monitor, EKG showed normal sinus rhythm, normal QTC, no ST or T wave changes to suggest ischemia or infarction.  Tylenol level was normal, CMP and CBC were unremarkable.  Urine drugs of abuse screen is pending.       Spoke with poison control and they recommended monitoring patient for 4-6 hours after ingestion.     Patient denies any physical complaints at this time, just severe depression, still with plans to commit suicide either by pills, cutting his arms or throat, or with weapons in the house.  Patient does have guns in the house.    Ordered a [facility] assessment, and spoke to intake, the plan is to admit patient to mental health bed for further evaluation and treatment.  Patient is voluntary at this time, but holdable.    Patient had a negative Covid test for the last 72 hours, so we do not need repeat the test.    Given history, exam, and workup in the emergency department, patient is unsafe to be discharged to home at this time and will be admitted to the hospital for further evaluation and treatment.            Results for orders placed or performed during the hospital encounter of 03/13/22 (from the past 24 hour(s))   CBC with platelets differential    Narrative    The following orders were created for panel order CBC with platelets differential.  Procedure                               Abnormality         Status                     ---------                               -----------         ------                      CBC with platelets and d...[[id number]]                      Final result                 Please view results for these tests on the individual orders.   Comprehensive metabolic panel   Result Value Ref Range    Sodium 140 133 - 144 mmol/L    Potassium 3.6 3.4 - 5.3 mmol/L    Chloride 103 94 - 109 mmol/L    Carbon Dioxide (CO2) 30 20 - 32 mmol/L    Anion Gap 7 3 - 14 mmol/L    Urea Nitrogen 10 7 - 30 mg/dL    Creatinine 0.97 0.66 - 1.25 mg/dL    Calcium 8.5 8.5 - 10.1 mg/dL    Glucose 93 70 - 99 mg/dL    Alkaline Phosphatase 49 40 - 150 U/L    AST 22 0 - 45 U/L    ALT 33 0 - 70 U/L    Protein Total 6.4 (L) 6.8 - 8.8 g/dL    Albumin 3.4 3.4 - 5.0 g/dL    Bilirubin Total 0.8 0.2 - 1.3 mg/dL    GFR Estimate >90 >60 mL/min/1.73m2   Acetaminophen level   Result Value Ref Range    Acetaminophen <2 (L) 10 - 30 mg/L   CBC with platelets and differential   Result Value Ref Range    WBC Count 5.5 4.0 - 11.0 10e3/uL    RBC Count 5.47 4.40 - 5.90 10e6/uL    Hemoglobin 16.4 13.3 - 17.7 g/dL    Hematocrit 48.0 40.0 - 53.0 %    MCV 88 78 - 100 fL    MCH 30.0 26.5 - 33.0 pg    MCHC 34.2 31.5 - 36.5 g/dL    RDW 12.7 10.0 - 15.0 %    Platelet Count 215 150 - 450 10e3/uL    % Neutrophils 73 %    % Lymphocytes 16 %    % Monocytes 10 %    % Eosinophils 0 %    % Basophils 0 %    % Immature Granulocytes 1 %    NRBCs per 100 WBC 0 <1 /100    Absolute Neutrophils 4.1 1.6 - 8.3 10e3/uL    Absolute Lymphocytes 0.9 0.8 - 5.3 10e3/uL    Absolute Monocytes 0.5 0.0 - 1.3 10e3/uL    Absolute Eosinophils 0.0 0.0 - 0.7 10e3/uL    Absolute Basophils 0.0 0.0 - 0.2 10e3/uL    Absolute Immature Granulocytes 0.0 <=0.4 10e3/uL    Absolute NRBCs 0.0 10e3/uL   [place] Draw    Narrative    The following orders were created for panel order [place] Draw.  Procedure                               Abnormality         Status                     ---------                               -----------         ------                     Extra Red Top  Tube[[id number]]                               Final result                 Please view results for these tests on the individual orders.   Extra Red Top Tube   Result Value Ref Range    Hold Specimen JIC    [place] Draw    Narrative    The following orders were created for panel order [place] Draw.  Procedure                               Abnormality         Status                     ---------                               -----------         ------                     Extra Blue Top Tube[[id number]]                              Final result                 Please view results for these tests on the individual orders.   Extra Blue Top Tube   Result Value Ref Range    Hold Specimen JIC      Medications   lidocaine 1 % 0.1-1 mL (has no administration in time range)   lidocaine (LMX4) cream (has no administration in time range)   sodium chloride (PF) 0.9% PF flush 3 mL (has no administration in time range)   sodium chloride (PF) 0.9% PF flush 3 mL (has no administration in time range)             Assessments & Plan (with Medical Decision Making)   See MDM Above    Assessment:  Ingestion - 9 pills of Ambien  Suicidal Ideation - plan either pills, or cut wrists or neck, or weapon at home (has guns)    Plan:  Admit to Mental Health team for further evaluation and treatment      I have reviewed the nursing notes.    I have reviewed the findings, diagnosis, plan and need for follow up with the patient.    New Prescriptions    No medications on file       Final diagnoses:   None       I, Hanh Garcia am serving as a trained medical scribe to document services personally performed by Celine Johnson MD, based on the provider's statements to me.      ICeline MD, was physically present and have reviewed and verified the accuracy of this note documented by Hanh Garcia.     Celine Johnson MD  3/13/2022   [facility] EMERGENCY DEPARTMENT     Celine Johnson MD  03/13/22 [id number]

## 2022-03-13 NOTE — ED NOTES
Security performed search on patient. Patient's Ambien was packaged and dual signed with another nurse in security envelope and given to security. Patient's cross necklace was placed in a denture cup with patient label and secured in cart outside of room with ERT.

## 2022-03-13 NOTE — ED PROVIDER NOTES
"    [place] EMERGENCY DEPARTMENT ([facility])  3/13/22  History     Chief Complaint   Patient presents with     Ingestion     intentional ambien     The history is provided by the patient and medical records.     Kuldeep Sequeira is a 52 year old male with a past medical history significant for severe recurrent MDD, suicidal ideation, insomnia, TALAY, and GERD who presents to the Emergency Department for evaluation of intentional drug overdose of Ambien.    Patient reports that today around 1100 this morning he began taking Ambien 12.5 mg to make himself sleep.  He states that afterwards he took 2 pills of Ambien and was not able to sleep to the desired effect.  He says that he continues to take more pills which total to about 8 to 9 pills.  He adds that he is normally prescribed to take 1 tablet of Ambien 12.5 mg as a sleep aid.  He notes that his intention of taking the Ambien today was that he would hopefully fall asleep and never wake up.  He adds that he does have thoughts of suicidal ideation.  He says that he has been battling depression for the last 2 years and is \"at the end of my rope\".  He reports that he does have guns in his home but would not use this as a method to harm himself because \"guns are the least subtle\".  Patient denies self cutting.  Patient reports that he started Prozac on 02/18/2022.  He states that he was raised to Prozac 40 mg on 02/25 and that was later raised to 60 mg of Prozac on 03/03.  Patient denies currently being on Klonopin.  He notes that he is currently taking Seroquel 25 mg with his Ambien at night.  Patient denies homicidal ideation.  Patient denies hallucinations.  Patient reports that he does have a established therapist that he sees once a week.  Patient denies Covid-like symptoms.    Per police report, patient was asked by police if he had any weapons nearby.  Patient responded by stating \"if there was a weapon I would have used it instead of attempting to swallow " "pills\".  Patient was also asked about what he meant by \"ending it\".  Patient responded to his question by imitating cutting his arms up and down with the knife and also imitated slicing his throat.    Social history: Patient has 4 children at home that are the ages of 7, 9, 15, and 17.    Per chart review, patient was seen at [facility] ED on 03/11/2022 for evaluation of suicidal ideation.  Patient presented to the ED with worsening depressive symptoms for several months but substantially worse in the last 6 days including weight loss, staying in bed, inability to work, and suicidal thoughts. Per wife, his symptoms are to such a degree that she was concerned \"something may be wrong in his brain\".  Patient was declining admission due to boarding.  He had to board in the past in the ER and he felt it made him worse.  Wife was supportive and will stay with him at all times. He knew ect is offered at [facility] but does not want it to be his first choice of treatment here due to the negative physical effects of it.   He had no prior suicide attempts and could contract for safety.      History reviewed. No pertinent past medical history.    History reviewed. No pertinent surgical history.    History reviewed. No pertinent family history.    Social History     Tobacco Use     Smoking status: Never Smoker     Smokeless tobacco: Never Used   Substance Use Topics     Alcohol use: Not on file       Current Facility-Administered Medications   Medication     lidocaine (LMX4) cream     lidocaine 1 % 0.1-1 mL     sodium chloride (PF) 0.9% PF flush 3 mL     sodium chloride (PF) 0.9% PF flush 3 mL     Current Outpatient Medications   Medication     acetylcysteine (NAC) 600 MG CAPS capsule     biotin 1000 MCG TABS tablet     calcium carbonate 500 mg, elemental, (OSCAL 500) 1250 (500 Ca) MG TABS tablet     cyanocobalamin (VITAMIN B-12) 1000 MCG tablet     Dietary Management Product (VB6 P5P PO)     fish oil-omega-3 fatty acids 1000 MG " capsule     FLUoxetine (PROZAC) 20 MG capsule     HEMP OIL OR EXTRACT OR OTHER CBD CANNABINOID, NOT MEDICAL CANNABIS,     Inositol POWD     L-Theanine 200 MG CAPS     MAGNESIUM GLYCINATE PO     pyridOXINE (VITAMIN B6) 100 MG TABS     QUEtiapine (SEROQUEL) 50 MG tablet     S-Adenosylmethionine (SAME) 400 MG TABS     Selenium 100 MCG TABS     Testosterone Enanthate (XYOSTED) 100 MG/0.5ML SOAJ     Turmeric 500 MG TABS     vitamin C (ASCORBIC ACID) 1000 MG TABS     Vitamin D3 (CHOLECALCIFEROL) 125 MCG (5000 UT) tablet     vitamin E 400 units TABS     zinc 50 MG TABS     zolpidem ER (AMBIEN CR) 12.5 MG CR tablet      No Known Allergies     I have reviewed the Medications, Allergies, Past Medical and Surgical History, and Social History in the Epic system.    Review of Systems   Constitutional: Negative for fever.   HENT: Negative for congestion.    Eyes: Negative for redness.   Respiratory: Negative for shortness of breath.    Cardiovascular: Negative for chest pain.   Gastrointestinal: Negative for abdominal pain.   Genitourinary: Negative for difficulty urinating.   Musculoskeletal: Negative for arthralgias and neck stiffness.   Skin: Negative for color change.   Neurological: Negative for headaches.   Psychiatric/Behavioral: Positive for dysphoric mood and suicidal ideas. Negative for confusion and hallucinations.        Negative for homicidal ideation     A complete review of systems was performed with pertinent positives and negatives noted in the HPI, and all other systems negative.    Physical Exam   BP: (!) 149/97  Pulse: 97  Temp: 98.3  F (36.8  C)  Resp: 16  SpO2: 96 %      Physical Exam  General: Alert, sitting on the bed in NAD  Neuro: Awake alert &O x 3 CN 2-12 intact; no pronator drift present; strength 5/5 at elbow flexion; strength 5/5 at elbow extension with good  strength bilaterally; strength 5/5 at ankle dorsiflexion; strength 5/5 at ankle plantar flexion;  finger-to-nose is intact and  symmetrical; sensation is  intact to light touch throughout;   Head: atraumatic, no maxillary no frontal sinus tenderness  Nose:  no rhinnhorea  Eyes: Pupils 3mm equal round and reactive to light  Mouth: mmm  Neck: no carotid bruits  Cardiovascular: regular; S1/S2 ; radial pulses equal and symmetrical  Lungs: CTAB bilaterally  Abdomen:  soft non-tender +BS    ED Course     At 3:50 PM the patient was seen and examined by Celine Johnson MD in Room ED02.     Soon after arrival patient was placed on a cardiac monitor, EKG showed normal sinus rhythm, normal QTC, no ST or T wave changes to suggest ischemia or infarction.  Tylenol level was normal, CMP and CBC were unremarkable.  Urine drugs of abuse screen is pending.       Spoke with poison control and they recommended monitoring patient for 4-6 hours after ingestion.     Patient denies any physical complaints at this time, just severe depression, still with plans to commit suicide either by pills, cutting his arms or throat, or with weapons in the house.  Patient does have guns in the house.    Ordered a [facility] assessment, and spoke to intake, the plan is to admit patient to mental health bed for further evaluation and treatment.  Patient is voluntary at this time, but holdable.    Patient had a negative Covid test for the last 72 hours, so we do not need repeat the test.    Given history, exam, and workup in the emergency department, patient is unsafe to be discharged to home at this time and will be admitted to the hospital for further evaluation and treatment.            Results for orders placed or performed during the hospital encounter of 03/13/22 (from the past 24 hour(s))   CBC with platelets differential    Narrative    The following orders were created for panel order CBC with platelets differential.  Procedure                               Abnormality         Status                     ---------                               -----------         ------                      CBC with platelets and d...[[id number]]                      Final result                 Please view results for these tests on the individual orders.   Comprehensive metabolic panel   Result Value Ref Range    Sodium 140 133 - 144 mmol/L    Potassium 3.6 3.4 - 5.3 mmol/L    Chloride 103 94 - 109 mmol/L    Carbon Dioxide (CO2) 30 20 - 32 mmol/L    Anion Gap 7 3 - 14 mmol/L    Urea Nitrogen 10 7 - 30 mg/dL    Creatinine 0.97 0.66 - 1.25 mg/dL    Calcium 8.5 8.5 - 10.1 mg/dL    Glucose 93 70 - 99 mg/dL    Alkaline Phosphatase 49 40 - 150 U/L    AST 22 0 - 45 U/L    ALT 33 0 - 70 U/L    Protein Total 6.4 (L) 6.8 - 8.8 g/dL    Albumin 3.4 3.4 - 5.0 g/dL    Bilirubin Total 0.8 0.2 - 1.3 mg/dL    GFR Estimate >90 >60 mL/min/1.73m2   Acetaminophen level   Result Value Ref Range    Acetaminophen <2 (L) 10 - 30 mg/L   CBC with platelets and differential   Result Value Ref Range    WBC Count 5.5 4.0 - 11.0 10e3/uL    RBC Count 5.47 4.40 - 5.90 10e6/uL    Hemoglobin 16.4 13.3 - 17.7 g/dL    Hematocrit 48.0 40.0 - 53.0 %    MCV 88 78 - 100 fL    MCH 30.0 26.5 - 33.0 pg    MCHC 34.2 31.5 - 36.5 g/dL    RDW 12.7 10.0 - 15.0 %    Platelet Count 215 150 - 450 10e3/uL    % Neutrophils 73 %    % Lymphocytes 16 %    % Monocytes 10 %    % Eosinophils 0 %    % Basophils 0 %    % Immature Granulocytes 1 %    NRBCs per 100 WBC 0 <1 /100    Absolute Neutrophils 4.1 1.6 - 8.3 10e3/uL    Absolute Lymphocytes 0.9 0.8 - 5.3 10e3/uL    Absolute Monocytes 0.5 0.0 - 1.3 10e3/uL    Absolute Eosinophils 0.0 0.0 - 0.7 10e3/uL    Absolute Basophils 0.0 0.0 - 0.2 10e3/uL    Absolute Immature Granulocytes 0.0 <=0.4 10e3/uL    Absolute NRBCs 0.0 10e3/uL   [place] Draw    Narrative    The following orders were created for panel order [place] Draw.  Procedure                               Abnormality         Status                     ---------                               -----------         ------                     Extra Red Top  Tube[[id number]]                               Final result                 Please view results for these tests on the individual orders.   Extra Red Top Tube   Result Value Ref Range    Hold Specimen JIC    [place] Draw    Narrative    The following orders were created for panel order [place] Draw.  Procedure                               Abnormality         Status                     ---------                               -----------         ------                     Extra Blue Top Tube[[id number]]                              Final result                 Please view results for these tests on the individual orders.   Extra Blue Top Tube   Result Value Ref Range    Hold Specimen JIC      Medications   lidocaine 1 % 0.1-1 mL (has no administration in time range)   lidocaine (LMX4) cream (has no administration in time range)   sodium chloride (PF) 0.9% PF flush 3 mL (has no administration in time range)   sodium chloride (PF) 0.9% PF flush 3 mL (has no administration in time range)             Assessments & Plan (with Medical Decision Making)   See MDM Above    Assessment:  Ingestion - 9 pills of Ambien  Suicidal Ideation - plan either pills, or cut wrists or neck, or weapon at home (has guns)    Plan:  Admit to Mental Health team for further evaluation and treatment      I have reviewed the nursing notes.    I have reviewed the findings, diagnosis, plan and need for follow up with the patient.    New Prescriptions    No medications on file       Final diagnoses:   None       I, Hanh Garcia am serving as a trained medical scribe to document services personally performed by Celine Johnson MD, based on the provider's statements to me.      ICeline MD, was physically present and have reviewed and verified the accuracy of this note documented by Hanh Garcia.     Celine Johnson MD  3/13/2022   [facility] EMERGENCY DEPARTMENT     Celine Johnson MD  03/13/22 [id number]

## 2022-03-13 NOTE — TELEPHONE ENCOUNTER
S: [place] ED, DEC calling 6pm, 52/M, SA by overdose, SI     B: Pt attempted suicide today via overdose on 12.5mg Ambien, took two at first, and kept taking more until he took 9 in total. Pt is stable, fully medically cleared in ED. Pt reports feeling very depressed for the past two years. Pt has hx of ECT. Pt has MH hx of MDD, may have had IP in the past. Pt endorses seeing a therapist and a psychiatrist. Pt denies medical or CD concerns.     A: voluntary, COVID UTOX , labs unremarkable, Patient cleared and ready for behavioral bed placement: Yes [facility] only     R: Pt placed on waitlist pending bed availability.

## 2022-03-13 NOTE — ED TRIAGE NOTES
"Pt presents via Ems with an intentional overdose of Ambien.  Pt wasw brought in on an emergency evaluation hold from law enforcement. According to officer's report pt stated he was gong to take a kind of medication to \"end it\". When asked what pt imitated cutting his arms up and down and imitated slicing his throat.   "

## 2022-03-13 NOTE — ED NOTES
"3/13/2022  Kuldeep Sequeira 1970     [facility] Crisis Assessment    Patient was assessed: remote  Patient location: [facility]    Referral Data and Chief Complaint  Patient is a 52 year old who uses his/him pronouns. Patient presented to the ED via EMS and was referred to the ED by family/friends. Patient is presenting to the ED for the following concerns: Patient attempted suicide by overdosing on Ambien.  .      Informed Consent and Assessment Methods    Patient is his own guardian. Writer met with patient and explained the crisis assessment process, including applicable information disclosures and limits to confidentiality, assessed understanding of the process, and obtained consent to proceed with the assessment. Patient was observed to be able to participate in the assessment as evidenced by answering interview questions. Assessment methods included conducting a formal interview with patient, review of medical records, collaboration with medical staff, and obtaining relevant collateral information from family and community providers when available.    Narrative Summary of Presenting Problem and Current Functioning  What led to the patient presenting for crisis services, factors that make the crisis life threatening or complex, stressors, how is this disrupting the patient's life, and how current functioning is in comparison to baseline. How is patient presenting during the assessment.     Patient has a history of depression. Patient reported that he is a  and his depression and anxiety stem from his job functions and family.  Patient has four children and the youngest is six years old. Patient intentionally took more Ambien to \"sleep and not wake up again.\"      History of the Crisis  Duration of the current crisis, coping skills attempted to reduce the crisis, community resources used, and past presentations.    Patient has been depressed for over two years.  Patient has been on " "medication and ECT.      Collateral Information  Patient's wife - Evon reported that patient has been dealing with \"tough depression\" and that they were at the ED on 3/11/22.  Patient has some financial stressors.    Risk Assessment    Risk of Harm to Self     ESS-6  1.a. Over the past 2 weeks, have you had thoughts of killing yourself? Yes  1.b. Have you ever attempted to kill yourself and, if yes, when did this last happen? Yes overdose on Ambien   2. Recent or current suicide plan? Yes overdose   3. Recent or current intent to act on ideation? Yes  4. Lifetime psychiatric hospitalization? No  5. Pattern of excessive substance use? No  6. Current irritability, agitation, or aggression? No  Scoring note: BOTH 1a and 1b must be yes for it to score 1 point, if both are not yes it is zero. All others are 1 point per number. If all questions 1a/1b - 6 are no, risk is negligible. If one of 1a/1b is yes, then risk is mild. If either question 2 or 3, but not both, is yes, then risk is automatically moderate regardless of total score. If both 2 and 3 are yes, risk is automatically high regardless of total score.     Score: 3, high risk    The patient has the following risk factors for suicide: depressive symptoms    Is the patient experiencing current suicidal ideation: Yes. Thoughts to kill self with no plan or intent    Is the patient engaging in preparatory suicide behaviors (formulating how to act on plan, giving away possessions, saying goodbye, displaying dramatic behavior changes, etc)? No    Does the patient have access to firearms or other lethal means? no    The patient has the following protective factors: voluntarily seeking mental health support    Support system information: family and friends    Patient strengths: Patient lives with his wife and children    Does the patient engage in non-suicidal self-injurious behavior (NSSI/SIB)? no    Is the patient vulnerable to sexual exploitation?  No    Is the " patient experiencing abuse or neglect? no    Is the patient a vulnerable adult? Yes      Risk of Harm to Others  The patient has the following risk factors of harm to others: no risk factors identified    Does the patient have thoughts of harming others? No    Is the patient engaging in sexually inappropriate behavior?  no       Current Substance Abuse    Is there recent substance abuse? no    Was a urine drug screen or blood alcohol level obtained: No    CAGE AID  Have you felt you ought to cut down on your drinking or drug use?  No  Have people annoyed you by criticizing your drinking or drug use? No  Have you felt bad or guilty about your drinking or drug use? No  Have you ever had a drink or used drugs first thing in the morning to steady your nerves or to get rid of a hangover? No  Score: 0/4       Current Symptoms/Concerns    Symptoms  Attention, hyperactivity, and impulsivity symptoms present: No    Anxiety symptoms present: Yes: Generalized Symptoms: Excessive worry      Appetite symptoms present: Yes: Loss of Appetite     Behavioral difficulties present: No     Cognitive impairment symptoms present: No    Depressive symptoms present: Yes Depressed mood     Eating disorder symptoms present: No    Learning disabilities, cognitive challenges, and/or developmental disorder symptoms present: No     Manic/hypomanic symptoms present: No    Personality and interpersonal functioning difficulties present : No    Psychosis symptoms present: No      Sleep difficulties present: Yes: Difficulty falling asleep     Substance abuse disorder symptoms present: No     Trauma and stressor related symptoms present: Yes: Intrusions: Recurrent memories of the trauma           Mental Status Exam   Affect: Flat   Appearance: Disheveled    Attention Span/Concentration: Attentive?    Eye Contact: Variable   Fund of Knowledge: Appropriate    Language /Speech Content: Fluent   Language /Speech Volume: Soft    Language /Speech  Rate/Productions: Minimally Responsive    Recent Memory: Intact   Remote Memory: Intact   Mood: Sad    Orientation to Person: Yes    Orientation to Place: Yes   Orientation to Time of Day: No    Orientation to Date: Yes    Situation (Do they understand why they are here?): Yes    Psychomotor Behavior: Normal    Thought Content: Clear   Thought Form: Intact       Mental Health and Substance Abuse History    History  Current and historical diagnoses or mental health concerns: yes Depression and Anxiety    Prior MH services (inpatient, programmatic care, outpatient, etc) : Yes .[facility]    Has the patient used [facility] crisis team services before?: Yes 911    History of substance abuse: No    Prior SINGH services (inpatient, programmatic care, detox, outpatient, etc) : No    History of commitment: No    Family history of MH/SINGH: No    Trauma history: No    Medication  Psychotropic medications: Yes. Pt is currently taking see med list. Medication compliant: No: took extra. Recent medication changes: No    Current Care Team  Primary Care Provider: No    Psychiatrist: Yes. Name: Dr. Campos. Location: [facility]. Date of last visit: .. Frequency: .. Perceived helpfulness: No.    Therapist: Yes. Name: Dr. Rodriguez. Location: [facility]. Date of last visit: .. Frequency: .. Perceived helpfulness: No.    : No    CTSS or ARMHS: No    ACT Team: No    Other: No    Release of Information  Was a release of information signed: No. Reason: patient was seen virtually      Biopsychosocial Information    Socioeconomic Information  Current living situation: stable    Employment/income source: employed as a     Relevant legal issues: NO    Cultural, Shinto, or spiritual influences on mental health care: NA    Is the patient active in the  or a : No      Relevant Medical Concerns   Patient identifies concerns with completing ADLs? No     Patient can ambulate independently? Yes     Other  medical concerns? No     History of concussion or TBI? No        Diagnosis    296.32 (F33.1) Major Depressive Disorder, Recurrent Episode, Moderate _ and With anxious distress - provisional      300.02 (F41.1) Generalized Anxiety Disorder - by history     296.31 (F33.0) Major Depressive Disorder, Recurrent Episode, Mild _ and With anxious distress - provisional        Therapeutic Intervention  The following therapeutic methodologies were employed when working with the patient: establishing rapport. Patient response to intervention: Patient participated in interview and disclosed the source of stress..      Disposition  Recommended disposition: Inpatient Mental Health      Reviewed case and recommendations with attending provider. Attending Name: Dr. Johnson      Attending concurs with disposition: Yes      Patient concurs with disposition: Yes      Guardian concurs with disposition: NA     Final disposition: Inpatient mental health .     Inpatient Details (if applicable):  Is patient admitted voluntarily:Yes    Patient aware of potential for transfer if there is not appropriate placement? Yes     Patient is willing to travel outside of the [facility] for placement? No      Behavioral Intake Notified? Yes: Date: 3/13/22 Time: 5:45 pm.       Clinical Substantiation of Recommendations   Rationale with supporting factors for disposition and diagnosis.     Patient's wife called 911 after patient attempted suicide by overdosing on Ambien.  Patient has a long history of depression.      Assessment Details  Patient interview started at: 4:45 pm and completed at: 5:45 pm.    Total duration spent on the patient case in minutes: 1.0 hrs     CPT code(s) utilized: 49754 - Psychotherapy for Crisis - 60 (30-74*) min       Aftercare and Safety Planning  Follow up plans with MH/SINGH services: No      Aftercare plan placed in the AVS and provided to patient: No. Rationale: patient is going IP    RACHEL Zee

## 2022-03-14 LAB
AMPHETAMINES UR QL SCN: ABNORMAL
BARBITURATES UR QL: ABNORMAL
BENZODIAZ UR QL: ABNORMAL
CANNABINOIDS UR QL SCN: ABNORMAL
COCAINE UR QL: ABNORMAL
OPIATES UR QL SCN: ABNORMAL
SARS-COV-2 RNA RESP QL NAA+PROBE: NEGATIVE

## 2022-03-14 PROCEDURE — 250N000013 HC RX MED GY IP 250 OP 250 PS 637: Performed by: EMERGENCY MEDICINE

## 2022-03-14 PROCEDURE — 80307 DRUG TEST PRSMV CHEM ANLYZR: CPT | Performed by: EMERGENCY MEDICINE

## 2022-03-14 PROCEDURE — U0005 INFEC AGEN DETEC AMPLI PROBE: HCPCS | Performed by: EMERGENCY MEDICINE

## 2022-03-14 RX ORDER — FLUOXETINE 40 MG/1
40 CAPSULE ORAL DAILY
Status: DISCONTINUED | OUTPATIENT
Start: 2022-03-14 | End: 2022-03-15 | Stop reason: HOSPADM

## 2022-03-14 RX ADMIN — FLUOXETINE HYDROCHLORIDE 40 MG: 40 CAPSULE ORAL at 14:42

## 2022-03-14 RX ADMIN — MELATONIN 5 MG TABLET 10 MG: at 23:12

## 2022-03-14 RX ADMIN — QUETIAPINE FUMARATE 50 MG: 50 TABLET ORAL at 08:04

## 2022-03-14 RX ADMIN — QUETIAPINE FUMARATE 50 MG: 50 TABLET ORAL at 21:40

## 2022-03-14 RX ADMIN — QUETIAPINE FUMARATE 50 MG: 50 TABLET ORAL at 00:00

## 2022-03-14 ASSESSMENT — VISUAL ACUITY
OU: NORMAL ACUITY
OU: NORMAL ACUITY

## 2022-03-14 NOTE — ED NOTES
[facility] ED Mental Health Handoff Note:       Brief HPI:  This is a 52 year old male signed out to me by Dr. Marks.  See initial ED Provider note for full details of the presentation. Interval history is pertinent for patient with attempted overdose on Ambien. Medically cleared. Voluntary for psych admission, but holdable.     Home meds reviewed and ordered/administered: Yes    Medically stable for inpatient mental health admission: Yes.    Evaluated by mental health: Yes. The recommendation is for inpatient mental health treatment. Bed search in process    Safety concerns: At the time I received sign out, there were no safety concerns.    Hold Status:  Active Orders   N/A            Exam:   Patient Vitals for the past 24 hrs:   BP Temp Temp src Pulse Resp SpO2   03/13/22 2301 (!) 136/91 -- -- 82 17 100 %   03/13/22 1936 138/86 -- -- 84 18 98 %   03/13/22 1900 (!) 150/53 -- -- 101 17 --   03/13/22 1845 138/88 -- -- 90 26 94 %   03/13/22 1830 -- -- -- 88 13 97 %   03/13/22 1815 (!) 152/99 -- -- 93 22 --   03/13/22 1800 (!) 143/85 -- -- 93 14 --   03/13/22 1745 136/88 -- -- 95 11 94 %   03/13/22 1730 137/88 -- -- 90 14 95 %   03/13/22 1715 (!) 130/93 -- -- 93 18 95 %   03/13/22 1700 (!) 132/92 -- -- 90 14 95 %   03/13/22 1645 (!) 135/96 -- -- 94 28 96 %   03/13/22 1630 (!) 139/95 -- -- 93 23 95 %   03/13/22 1615 (!) 132/92 -- -- 100 12 97 %   03/13/22 1600 -- -- -- 100 -- 98 %   03/13/22 1545 (!) 136/94 -- -- 102 -- 97 %   03/13/22 1534 (!) 149/97 98.3  F (36.8  C) Oral 97 16 96 %           ED Course:    Medications   lidocaine 1 % 0.1-1 mL (has no administration in time range)   lidocaine (LMX4) cream (has no administration in time range)   sodium chloride (PF) 0.9% PF flush 3 mL (has no administration in time range)   sodium chloride (PF) 0.9% PF flush 3 mL (has no administration in time range)   QUEtiapine (SEROquel) tablet 50 mg (50 mg Oral Given 3/14/22 0804)            There were no significant events  during my shift.    Patient was signed out to the oncoming provider, Dr. Lepe.      Impression:    ICD-10-CM    1. Suicidal ideation  R45.851        Plan:    1. Awaiting inpatient mental health admission/transfer.      RESULTS:   Results for orders placed or performed during the hospital encounter of 03/13/22 (from the past 24 hour(s))   CBC with platelets differential     Status: None    Collection Time: 03/13/22  3:40 PM    Narrative    The following orders were created for panel order CBC with platelets differential.  Procedure                               Abnormality         Status                     ---------                               -----------         ------                     CBC with platelets and d...[[id number]]                      Final result                 Please view results for these tests on the individual orders.   Comprehensive metabolic panel     Status: Abnormal    Collection Time: 03/13/22  3:40 PM   Result Value Ref Range    Sodium 140 133 - 144 mmol/L    Potassium 3.6 3.4 - 5.3 mmol/L    Chloride 103 94 - 109 mmol/L    Carbon Dioxide (CO2) 30 20 - 32 mmol/L    Anion Gap 7 3 - 14 mmol/L    Urea Nitrogen 10 7 - 30 mg/dL    Creatinine 0.97 0.66 - 1.25 mg/dL    Calcium 8.5 8.5 - 10.1 mg/dL    Glucose 93 70 - 99 mg/dL    Alkaline Phosphatase 49 40 - 150 U/L    AST 22 0 - 45 U/L    ALT 33 0 - 70 U/L    Protein Total 6.4 (L) 6.8 - 8.8 g/dL    Albumin 3.4 3.4 - 5.0 g/dL    Bilirubin Total 0.8 0.2 - 1.3 mg/dL    GFR Estimate >90 >60 mL/min/1.73m2   Acetaminophen level     Status: Abnormal    Collection Time: 03/13/22  3:40 PM   Result Value Ref Range    Acetaminophen <2 (L) 10 - 30 mg/L   CBC with platelets and differential     Status: None    Collection Time: 03/13/22  3:40 PM   Result Value Ref Range    WBC Count 5.5 4.0 - 11.0 10e3/uL    RBC Count 5.47 4.40 - 5.90 10e6/uL    Hemoglobin 16.4 13.3 - 17.7 g/dL    Hematocrit 48.0 40.0 - 53.0 %    MCV 88 78 - 100 fL    MCH 30.0 26.5 - 33.0  pg    MCHC 34.2 31.5 - 36.5 g/dL    RDW 12.7 10.0 - 15.0 %    Platelet Count 215 150 - 450 10e3/uL    % Neutrophils 73 %    % Lymphocytes 16 %    % Monocytes 10 %    % Eosinophils 0 %    % Basophils 0 %    % Immature Granulocytes 1 %    NRBCs per 100 WBC 0 <1 /100    Absolute Neutrophils 4.1 1.6 - 8.3 10e3/uL    Absolute Lymphocytes 0.9 0.8 - 5.3 10e3/uL    Absolute Monocytes 0.5 0.0 - 1.3 10e3/uL    Absolute Eosinophils 0.0 0.0 - 0.7 10e3/uL    Absolute Basophils 0.0 0.0 - 0.2 10e3/uL    Absolute Immature Granulocytes 0.0 <=0.4 10e3/uL    Absolute NRBCs 0.0 10e3/uL   [facility] Draw     Status: None    Collection Time: 03/13/22  3:40 PM    Narrative    The following orders were created for panel order [facility] Draw.  Procedure                               Abnormality         Status                     ---------                               -----------         ------                     Extra Red Top Tube[[id number]]                               Final result                 Please view results for these tests on the individual orders.   Extra Red Top Tube     Status: None    Collection Time: 03/13/22  3:40 PM   Result Value Ref Range    Hold Specimen [facility]    [facility] Draw     Status: None    Collection Time: 03/13/22  3:42 PM    Narrative    The following orders were created for panel order [facility] Draw.  Procedure                               Abnormality         Status                     ---------                               -----------         ------                     Extra Blue Top Tube[[id number]]                              Final result                 Please view results for these tests on the individual orders.   Extra Blue Top Tube     Status: None    Collection Time: 03/13/22  3:42 PM   Result Value Ref Range    Hold Specimen [facility]    EKG 12 lead     Status: None    Collection Time: 03/13/22  3:56 PM   Result Value Ref Range    Systolic Blood Pressure  mmHg    Diastolic Blood Pressure  mmHg    Ventricular  Rate 99 BPM    Atrial Rate 99 BPM    TN Interval 148 ms    QRS Duration 80 ms     ms    QTc 420 ms    P Axis 44 degrees    R AXIS 32 degrees    T Axis 25 degrees    Interpretation ECG       Sinus rhythm  Normal ECG  Unconfirmed report - interpretation of this ECG is computer generated - see medical record for final interpretation  Confirmed by - EMERGENCY ROOM, PHYSICIAN ([id number]),  ADALI VALDES ([id number]) on 3/13/2022 9:20:34 PM               MD Demian Orourke Jill C, MD  03/14/22 5161

## 2022-03-14 NOTE — TELEPHONE ENCOUNTER
Inpatient Bed Call Log 3/14/2022 Morning done at 7:00a        [facility] is posting 0 beds.     [facility] is posting 0 beds.    [facility] is posting 0 beds.    [facility] is posting 0 beds.    [facility] is posting 0 beds.    [facility] is posting 0 beds.    [facility] is posting 0 beds.    [facility] is posting 0 beds.    [facility] is posting 0 beds. Mixed unit 12+. Low acuity only.     [facility] is positing 0 beds. No aggression.     [facility] is posting 0 beds.     [facility] is posting 0 beds. Low acuity only.    [facility] is posting 0 beds.    [facility] is posting 0 beds. Low acuity.     [facility] is posting 0 beds. 72 hr hold required.     [facility] is posting 0 beds.     [facility] is posting 0 beds. Vol only, No Hx of aggression, violence or assault. No sexual offenders. No 72 hr holds.    [facility] is posting 3 beds. (Must have the cognitive ability to do programming. No aggressive or violent behavior or recent HX in the last 2 yrs. MH must be primary.)    [facility] is posting 0 beds. Low acuity only. Violence and aggression capped.     [facility] is posting 0 beds. Low acuity, Neg Covid.     [facility] is posting 0 beds. Covid neg. Vol only. Combined adolescent and adult unit. No aggressive or violent behavior. No registered sex offenders.     [facility] is posting 0 beds. Call for details.    [facility] is posting 0 beds.    9:40pm- No approp beds

## 2022-03-14 NOTE — ED NOTES
Triage & Transition Services, Extended Care     Kuldeep Sequeira  March 14, 2022       Kuldeep is followed related to Placement delay: 24+ hours waiting for a IP MH Bed . Please see initial DEC/[facility] Crisis Assessment completed by RACHEL Zee on 3/13/22 for complete assessment information. Notable concerns include overdose on Ambien.      Current Supports and Contact Information  Evon Wife [phone]    Case Management  Case management for patient included no time to see pt today.        Plan  Continue with plan for IP MH Bed awaiting bed availability     Extended Care will follow and meet with patient/family/care team as able or requested.     KAYLYNN Rosales  [facility], Extended Care   [phone]

## 2022-03-14 NOTE — SAFE
Kuldeep Sequeira  March 14, 2022    SAFE Note    Critical Safety Issues: attempted suicide by overdosing on Ambien      Current Suicidal Ideation/Self-Injurious Concerns/Methods: None - N/A      Current or Historical Inappropriate Sexual Behavior: No      Current or Historical Aggression/Homicidal Ideation: None - N/A      Triggers: Depression for over 2 years, on medication and ECT, financial stressors,     Updated care team: Yes: Dr. Johnson  See assessment completed on 3/13/22 by Brittney Del Castillo [facility]      For additional details see full [facility] assessment.       Fanny Oden LMFT

## 2022-03-14 NOTE — TELEPHONE ENCOUNTER
R:  Bed search update of [facility] area @ 00:53:    [facility] @ cap  [facility] @ cap  [facility]: @ cap per website  [facility]:@ cap per website  [facility]: @ cap per website  [facility]: @ cap per website  [facility]: @ cap per website  [facility]: @ cap per website    Pt remains on work list until appropriate placement is available    Covid test has not been ordered for this current ED visit. Called ED @ 00:54 re: order/collection of Covid test

## 2022-03-15 ENCOUNTER — HOSPITAL ENCOUNTER (INPATIENT)
Facility: CLINIC | Age: 52
LOS: 2 days | Discharge: HOME | End: 2022-03-17
Attending: PSYCHIATRY & NEUROLOGY | Admitting: PSYCHIATRY & NEUROLOGY
Payer: COMMERCIAL

## 2022-03-15 VITALS
SYSTOLIC BLOOD PRESSURE: 140 MMHG | RESPIRATION RATE: 18 BRPM | TEMPERATURE: 99.2 F | OXYGEN SATURATION: 98 % | DIASTOLIC BLOOD PRESSURE: 84 MMHG | HEART RATE: 82 BPM

## 2022-03-15 DIAGNOSIS — G47.00 INSOMNIA, UNSPECIFIED TYPE: Chronic | ICD-10-CM

## 2022-03-15 DIAGNOSIS — F41.9 ANXIETY: Chronic | ICD-10-CM

## 2022-03-15 DIAGNOSIS — F33.2 MAJOR DEPRESSIVE DISORDER, RECURRENT EPISODE, SEVERE WITH ANXIOUS DISTRESS (H): Primary | Chronic | ICD-10-CM

## 2022-03-15 PROBLEM — F39 MOOD DISORDER (H): Status: ACTIVE | Noted: 2022-03-15

## 2022-03-15 PROCEDURE — 36415 COLL VENOUS BLD VENIPUNCTURE: CPT | Performed by: PSYCHIATRY & NEUROLOGY

## 2022-03-15 PROCEDURE — 128N000001 HC R&B CD/MH ADULT

## 2022-03-15 PROCEDURE — 82306 VITAMIN D 25 HYDROXY: CPT | Performed by: PSYCHIATRY & NEUROLOGY

## 2022-03-15 PROCEDURE — 99223 1ST HOSP IP/OBS HIGH 75: CPT | Performed by: PSYCHIATRY & NEUROLOGY

## 2022-03-15 PROCEDURE — 250N000013 HC RX MED GY IP 250 OP 250 PS 637: Performed by: EMERGENCY MEDICINE

## 2022-03-15 PROCEDURE — 250N000013 HC RX MED GY IP 250 OP 250 PS 637: Performed by: PSYCHIATRY & NEUROLOGY

## 2022-03-15 RX ORDER — SALIVA STIMULANT COMB. NO.3
1 SPRAY, NON-AEROSOL (ML) MUCOUS MEMBRANE 4 TIMES DAILY PRN
Status: DISCONTINUED | OUTPATIENT
Start: 2022-03-15 | End: 2022-03-17 | Stop reason: HOSPADM

## 2022-03-15 RX ORDER — CYANOCOBALAMIN (VITAMIN B-12) 500 MCG
400 LOZENGE ORAL AT BEDTIME
Status: DISCONTINUED | OUTPATIENT
Start: 2022-03-15 | End: 2022-03-15 | Stop reason: RX

## 2022-03-15 RX ORDER — HYDROXYZINE HYDROCHLORIDE 25 MG/1
50 TABLET, FILM COATED ORAL
Status: DISCONTINUED | OUTPATIENT
Start: 2022-03-15 | End: 2022-03-17 | Stop reason: HOSPADM

## 2022-03-15 RX ORDER — TRAZODONE HYDROCHLORIDE 50 MG/1
50 TABLET, FILM COATED ORAL
Status: DISCONTINUED | OUTPATIENT
Start: 2022-03-15 | End: 2022-03-17 | Stop reason: HOSPADM

## 2022-03-15 RX ORDER — GINSENG 100 MG
50 CAPSULE ORAL AT BEDTIME
Status: DISCONTINUED | OUTPATIENT
Start: 2022-03-15 | End: 2022-03-15

## 2022-03-15 RX ORDER — QUETIAPINE FUMARATE 25 MG/1
25 TABLET, FILM COATED ORAL 2 TIMES DAILY PRN
Status: DISCONTINUED | OUTPATIENT
Start: 2022-03-15 | End: 2022-03-17 | Stop reason: HOSPADM

## 2022-03-15 RX ORDER — CHOLECALCIFEROL (VITAMIN D3) 125 MCG
200 CAPSULE ORAL 2 TIMES DAILY
Status: DISCONTINUED | OUTPATIENT
Start: 2022-03-15 | End: 2022-03-15 | Stop reason: RX

## 2022-03-15 RX ORDER — PYRIDOXINE HCL (VITAMIN B6) 50 MG
300 TABLET ORAL DAILY
Status: DISCONTINUED | OUTPATIENT
Start: 2022-03-15 | End: 2022-03-17 | Stop reason: HOSPADM

## 2022-03-15 RX ORDER — ASCORBIC ACID 500 MG
1000 TABLET ORAL DAILY
Status: DISCONTINUED | OUTPATIENT
Start: 2022-03-15 | End: 2022-03-17 | Stop reason: HOSPADM

## 2022-03-15 RX ORDER — GABAPENTIN 300 MG/1
300 CAPSULE ORAL EVERY 6 HOURS PRN
Status: DISCONTINUED | OUTPATIENT
Start: 2022-03-15 | End: 2022-03-15

## 2022-03-15 RX ORDER — PYRIDOXINE HCL (VITAMIN B6) 100 MG
200 TABLET ORAL DAILY
Status: DISCONTINUED | OUTPATIENT
Start: 2022-03-15 | End: 2022-03-15 | Stop reason: RX

## 2022-03-15 RX ORDER — INOSITOL
.5-1 POWDER (GRAM) MISCELLANEOUS 2 TIMES DAILY
Status: DISCONTINUED | OUTPATIENT
Start: 2022-03-15 | End: 2022-03-15 | Stop reason: RX

## 2022-03-15 RX ORDER — BUSPIRONE HYDROCHLORIDE 10 MG/1
10 TABLET ORAL 3 TIMES DAILY PRN
Status: DISCONTINUED | OUTPATIENT
Start: 2022-03-15 | End: 2022-03-17 | Stop reason: HOSPADM

## 2022-03-15 RX ORDER — AMOXICILLIN 250 MG
1 CAPSULE ORAL 2 TIMES DAILY PRN
Status: DISCONTINUED | OUTPATIENT
Start: 2022-03-15 | End: 2022-03-17 | Stop reason: HOSPADM

## 2022-03-15 RX ORDER — QUETIAPINE FUMARATE 25 MG/1
25 TABLET, FILM COATED ORAL AT BEDTIME
Status: DISCONTINUED | OUTPATIENT
Start: 2022-03-15 | End: 2022-03-15

## 2022-03-15 RX ORDER — ACETAMINOPHEN 325 MG/1
650 TABLET ORAL EVERY 4 HOURS PRN
Status: DISCONTINUED | OUTPATIENT
Start: 2022-03-15 | End: 2022-03-16

## 2022-03-15 RX ORDER — OLANZAPINE 10 MG/1
10 TABLET ORAL 3 TIMES DAILY PRN
Status: DISCONTINUED | OUTPATIENT
Start: 2022-03-15 | End: 2022-03-17 | Stop reason: HOSPADM

## 2022-03-15 RX ORDER — GABAPENTIN 300 MG/1
300 CAPSULE ORAL 3 TIMES DAILY PRN
Status: DISCONTINUED | OUTPATIENT
Start: 2022-03-15 | End: 2022-03-17 | Stop reason: HOSPADM

## 2022-03-15 RX ORDER — LANOLIN ALCOHOL/MO/W.PET/CERES
1000 CREAM (GRAM) TOPICAL DAILY
Status: DISCONTINUED | OUTPATIENT
Start: 2022-03-15 | End: 2022-03-17 | Stop reason: HOSPADM

## 2022-03-15 RX ORDER — ZINC GLUCONATE 50 MG
50 TABLET ORAL AT BEDTIME
Status: DISCONTINUED | OUTPATIENT
Start: 2022-03-15 | End: 2022-03-17 | Stop reason: HOSPADM

## 2022-03-15 RX ORDER — IBUPROFEN 200 MG
1 CAPSULE ORAL DAILY
Status: DISCONTINUED | OUTPATIENT
Start: 2022-03-15 | End: 2022-03-16

## 2022-03-15 RX ORDER — OLANZAPINE 10 MG/2ML
10 INJECTION, POWDER, FOR SOLUTION INTRAMUSCULAR 3 TIMES DAILY PRN
Status: DISCONTINUED | OUTPATIENT
Start: 2022-03-15 | End: 2022-03-17 | Stop reason: HOSPADM

## 2022-03-15 RX ORDER — OMEGA-3/DHA/EPA/FISH OIL 60 MG-90MG
2 CAPSULE ORAL DAILY
Status: DISCONTINUED | OUTPATIENT
Start: 2022-03-15 | End: 2022-03-17 | Stop reason: HOSPADM

## 2022-03-15 RX ORDER — CHOLESTEROL/SOYBEAN OIL/C/E 60-200-80
POWDER (GRAM) ORAL DAILY
Status: DISCONTINUED | OUTPATIENT
Start: 2022-03-15 | End: 2022-03-15

## 2022-03-15 RX ADMIN — CHOLECALCIFEROL TAB 125 MCG (5000 UNIT) 125 MCG: 125 TAB at 13:33

## 2022-03-15 RX ADMIN — FLUOXETINE HYDROCHLORIDE 40 MG: 40 CAPSULE ORAL at 07:48

## 2022-03-15 RX ADMIN — PYRIDOXINE HCL TAB 50 MG 300 MG: 50 TAB at 13:26

## 2022-03-15 RX ADMIN — Medication 1200 MG: at 21:53

## 2022-03-15 RX ADMIN — Medication 50 MG: at 21:11

## 2022-03-15 RX ADMIN — Medication 1000 MCG: at 13:26

## 2022-03-15 RX ADMIN — Medication 2000 MG: at 13:26

## 2022-03-15 RX ADMIN — CALCIUM 500 MG: 500 TABLET ORAL at 14:41

## 2022-03-15 RX ADMIN — OXYCODONE HYDROCHLORIDE AND ACETAMINOPHEN 1000 MG: 500 TABLET ORAL at 13:26

## 2022-03-15 ASSESSMENT — ACTIVITIES OF DAILY LIVING (ADL)
CONCENTRATING,_REMEMBERING_OR_MAKING_DECISIONS_DIFFICULTY: NO
TOILETING_ISSUES: NO
FALL_HISTORY_WITHIN_LAST_SIX_MONTHS: NO
ORAL_HYGIENE: INDEPENDENT
CHANGE_IN_FUNCTIONAL_STATUS_SINCE_ONSET_OF_CURRENT_ILLNESS/INJURY: NO
HYGIENE/GROOMING: INDEPENDENT
DOING_ERRANDS_INDEPENDENTLY_DIFFICULTY: NO
DIFFICULTY_EATING/SWALLOWING: NO
WEAR_GLASSES_OR_BLIND: NO
DRESSING/BATHING_DIFFICULTY: NO
WALKING_OR_CLIMBING_STAIRS_DIFFICULTY: NO

## 2022-03-15 NOTE — PLAN OF CARE
"  Problem: Behavioral Health Plan of Care  Goal: Plan of Care Review  Outcome: Ongoing, Progressing     Problem: Behavioral Health Plan of Care  Goal: Adheres to Safety Considerations for Self and Others  Outcome: Ongoing, Progressing   Goal Outcome Evaluation:   Patient is 57 Y/O male, admitted on the unit from [facility] with history of depression. Patient attempted suicide with plan, overdose on Ambien, took a total of 9 pills (12.5mg).He says that he has been battling depression for the last 2 years and is \"at the end of my rope\".  He reports that he does have guns in his home but would not use this as a method to harm himself because \"guns are the least subtle\".On assessment pt rated his anxiety 7/10, depression 8/10, denies all other psych symptoms.  Stated, I am safe here. He alert and oriented  x 4. Down to gown done with Behavior Tech, pt skin intact. On regular diet ate 100% lunch.  He notes that his intention of taking the Ambien today was that he would hopefully fall asleep and never wake up.  He adds that he does have thoughts of suicidal ideation.                 "

## 2022-03-15 NOTE — ED NOTES
[facility] ED Mental Health Handoff Note:       Brief HPI:  This is a 52 year old male signed out to me.  See initial ED Provider note for full details of the presentation. Interval history is pertinent for no acute issues.    Home meds reviewed and ordered/administered: Yes    Medically stable for inpatient mental health admission: Yes.    Evaluated by mental health: Yes. The recommendation is for inpatient mental health treatment. Bed search in process    Safety concerns: At the time I received sign out, there were no safety concerns.    Hold Status:  Active Orders   N/A            Exam:   Patient Vitals for the past 24 hrs:   BP Temp Temp src Pulse Resp SpO2   03/15/22 0047 (!) 140/84 99.2  F (37.3  C) Oral 82 18 98 %   03/14/22 1948 (!) 138/99 98.7  F (37.1  C) Oral 78 18 100 %   03/14/22 0905 (!) 126/90 98.8  F (37.1  C) Oral -- 16 97 %           ED Course:    Medications   lidocaine 1 % 0.1-1 mL (has no administration in time range)   lidocaine (LMX4) cream (has no administration in time range)   sodium chloride (PF) 0.9% PF flush 3 mL (3 mLs Intracatheter Not Given 3/15/22 0053)   sodium chloride (PF) 0.9% PF flush 3 mL (has no administration in time range)   QUEtiapine (SEROquel) tablet 50 mg (50 mg Oral Given 3/14/22 2140)   FLUoxetine (PROzac) capsule 40 mg (40 mg Oral Given 3/14/22 1442)   melatonin tablet 10 mg (10 mg Oral Given 3/14/22 2312)            There were no significant events during my shift.    Patient was signed out to the oncoming provider, Dr. Alvarado.      Impression:    ICD-10-CM    1. Suicidal ideation  R45.851        Plan:    1. Awaiting inpatient mental health admission/transfer.      RESULTS:   Results for orders placed or performed during the hospital encounter of 03/13/22 (from the past 24 hour(s))   Asymptomatic COVID-19 Virus (Coronavirus) by PCR Nasopharyngeal     Status: Normal    Collection Time: 03/14/22 10:00 AM    Specimen: Nasopharyngeal; Swab   Result Value Ref  Range    SARS CoV2 PCR Negative Negative, Testing sent to reference lab. Results will be returned via unsolicited result    Narrative    Testing was performed using the Xpert Xpress SARS-CoV-2 Assay on the  Cepheid Gene-Xpert [employer]. Additional information about  this Emergency Use Authorization (EUA) assay can be found via the Lab  Guide. This test should be ordered for the detection of SARS-CoV-2 in  individuals who meet SARS-CoV-2 clinical and/or epidemiological  criteria. Test performance is unknown in asymptomatic patients. This  test is for in vitro diagnostic use under the FDA EUA for  laboratories certified under CLIA to perform high complexity testing.  This test has not been FDA cleared or approved. A negative result  does not rule out the presence of PCR inhibitors in the specimen or  target RNA in concentration below the limit of detection for the  assay. The possibility of a false negative should be considered if  the patient's recent exposure or clinical presentation suggests  COVID-19. This test was validated by the [facility]. This laboratory is certified under  the Clinical Laboratory Improvement Amendments of 1988 (CLIA-88) as  qualified to perform high complexity laboratory testing.     Urine Drugs of Abuse Screen     Status: Abnormal    Collection Time: 03/14/22  2:47 PM    Narrative    The following orders were created for panel order Urine Drugs of Abuse Screen.  Procedure                               Abnormality         Status                     ---------                               -----------         ------                     Drug abuse screen 1 urin...[[id number]]  Abnormal            Final result                 Please view results for these tests on the individual orders.   Drug abuse screen 1 urine (ED)     Status: Abnormal    Collection Time: 03/14/22  2:47 PM   Result Value Ref Range    Amphetamines Urine Screen Negative  Screen Negative    Barbiturates Urine Screen Negative Screen Negative    Benzodiazepines Urine Screen Negative Screen Negative    Cannabinoids Urine Screen Positive (A) Screen Negative    Cocaine Urine Screen Negative Screen Negative    Opiates Urine Screen Negative Screen Negative             MD Kendrick Pittman Melissa Ann, MD  03/15/22 0706

## 2022-03-15 NOTE — H&P
"PSYCHIATRY   HISTORY AND PHYSICAL     DATE OF SERVICE   3/15/2022       CHIEF COMPLAINT   \"I was feeling worse.\"       HISTORY OF PRESENT ILLNESS   This is a 52 year old male with history of depression, anxiety and insomnia.  Patient has prior history of psychiatric hospitalization on at least 2 previous occasions, but does not have past history of MI CD treatment or suicide attempt history.  Now, presenting secondary to worsening of depressive symptoms leading to overdose of uncertain intent with sleep medication.  Occurring in the setting of medication changes or depression by outpatient psychiatrist.  Admit is voluntary.    Care coordination performed in detail.  Includes, detailed review of care everywhere and epic to review electronic chart information and related mental health history to presentation.  Including, patient's ED presentation to [facility] on 3/13 due to depression and overdose.  Further review of patient's past hospitalization from November 2020.  Subsequent review of ECT documentation available for review.  Please see associated documentation for details.    In brief, patient presented to [facility] ED on 3/13 secondary to worsening of depression and subsequent overdose.  Reports worsening of depressive symptoms for at least the past 2 years with efforts of management to include medications, therapy, ECT and TMS.  Most recent efforts of management in the community through outpatient psychiatrist include initiation of fluoxetine on 2/18 at 20 mg daily with titration on 2/25 to 40 mg and further titration on 3/3 to 60 mg daily.  Continued on Seroquel.  Despite efforts of medication management in the community, continued to experience symptoms of depression.  According to collateral as obtained from wife, [facility] assessment, patient demonstrating worsening of symptoms especially within the past week.  Isolating in behavior.  Laying in bed.  Decreased appetite with weight loss.  Expressing " suicidal ideation and demonstrated behavior leading to admission.  Poor self cares.  Recommendation for inpatient psychiatric hospitalization voluntarily.    Further care coordination performed in detail.  Includes, detailed review of care everywhere.  Patient has documentation unable to be seen on epic viewer.  Specifically, documentations from past ECT for first hospitalization.  Able to review hospitalization from 11/18 through 11/19/2020.  Admitted due to depression, insomnia with suicidal ideation.  PTA medications continue to include venlafaxine 75 mg daily, quetiapine 200 mg p.o. nightly, with medication changes to include Ambien to as needed and addition of doxepin for sleep.    Further care coordination performed.  Referral for ECT initiated on 12/1/2020 by PCP.  Outpatient consult reviewed by Dr. Maher dated 12/4/2020.  Please see consult for full details.  Medically clear for ECT after providing informed consent to treat.  ECT series #1 initiated on 12/7/2020.    Further care coordination performed.  On 3/22/2021, completed second series of ECT.  Medications documented as ineffective.  Effexor XR increased to 225 mg daily and Abilify added.  Considered switch from Effexor XR to Viibryd due to sexual side effects.    Upon patient interview, patient review performed on unit 5500.  Cooperative on approach.  Evaluation effort to review events into presentation and clarify information as provided in collateral report.  Patient admit is voluntary.    Patient reports a history of depression and anxiety of greater than 20 years.  Medication management initiated at approximately age 28.  Patient has history of multiple medication trials with or without psychotherapy.  Further history of 2 prior hospitalizations in the month of November 2020 brief duration.  Associated with depressive symptoms in the setting of suicidal ideation and insomnia.  Despite efforts of medication management, proceeded with ECT as  initiated on 12/7/2020 after referral by outpatient psychiatrist.  Continue with ECT for 2 series.  Did not appear to have taper or maintenance series of ECT.  Then, initiated TMS on 4/7/2021.    Patient has been followed by Dr. Markham since initiating ECT.  Medication management has been performed in multiple.  Medications alone have been documented as ineffective.  Recent medication changes initiated in the community include start of Prozac after switch from alternate antidepressant therapy.  Initiated on 2/18 at 20 mg daily, then on 2/2540 mg daily then on 3/3 increased to 60 mg daily.      Patient presented to ED on 3/13 due to continued symptoms of depression with anxious distress.  Occurring in the setting of repeat event of inability to sleep.  Similar to prior hospitalizations.  Patient reportedly taking increasing amounts of Ambien CR with intent to sleep.  Later, making statement of not wanting to wake up.  Recommendation for inpatient psychiatric hospitalization for further assessment.    Patient denies further stressors or triggers clearly associated with presentation.  Reports taking medications as prescribed.  Symptoms of depression and anxiety continued with efforts of fluoxetine titration, leading to behavioral self-harm.  Denies plan or intent of suicide by means of Ambien CR ingestion.  Denies history of suicide attempt.  Does admit to gun access.  Agrees to removal of gun access prior to disposition.  States an appropriate crisis relapse plan.  Future oriented for family.    Past treatment reviewed in detail.  Last documented cares reviewed to include medications documented as lacking efficacy.  Further review of ECT documented as efficacious, due to medications alone is not effective.  Initiated ECT on 12/7/2020 after last hospitalization with last treatment in March 2021.  In April 2021, started series of TMS.  Denies benefit from TMS series.  Reports beneficial response from ECT, without  "significant side effect aside from mild cognition impairment.    On psychiatric review of symptoms, mood is, \"depressed.\"  Denies lindsey or hypomania.  Insomnia with low energy.  Lack of energy.  Lack of enjoyment with activities of prior interest.  Impacting activities at home and work as well as outside relationships.  Intact appetite and weight.  Intact concentration.  Negative thoughts of self.  Stating, \"end of my rope.\"  Denies tearfulness.  Endorses anxiety.  Denies psychosis.    Denies drug and alcohol use.  Denies physical issues that are new or worsening.    Consents to voluntary hospitalization to coordinate cares and medication management.  Consents to continuation of fluoxetine had decreased dosage of 40 mg daily.  Reviewed efforts of adjunct therapy.  Including, past interventions to include Seroquel, Zyprexa, Abilify for augmentation.  To target symptoms of insomnia, consents to repeat trial of Zyprexa.  Historically, associated with xerostomia.  Symptom management coordinated by her dose reduction of prior dosage and as needed medication management for symptom control.    Reviewed in further detail, indication to proceed with repeat series of ECT.  May proceed as an inpatient versus outpatient.  Patient provides knowledge of therapy.  Due to concerns of cognition, consideration to start with right unilateral placement to best preserve cognition.  If symptom severity continue, switch to bilateral placement.  May modify treatment series to best accommodate issues with work.  Patient aware of needing to have transportation and supervision if performed in the community.  Patient agrees to consider and discussed with wife.       CHEMICAL DEPENDENCY HISTORY   History   Drug Use Unknown     Social History    Substance and Sexual Activity      Alcohol use: Not on file    History   Smoking Status     Never Smoker   Smokeless Tobacco     Never Used     Treatment: Denies  Detox: Denies  Legal: Denies       PAST " PSYCHIATRIC HISTORY   Psychiatrist: Dr. Wm Markham.  [facility].  Has been with provider for past year.  Last visit end of February.  Therapist: Richi Chowdary.  [facility], [place].  Visits are every week.  Case Management: None reported  Hospitalizations: 2 prior hospitalizations.  Most recent November 2020.    History of Commitment: None reported  Past Medications: Including, not limited to:    Abilify, Zyprexa (dry mouth), Seroquel    Effexor XR, Viibryd, fluoxetine, mirtazapine, trazodone, Lexapro, Trintellix    Doxepin    Ambien CR, Lunesta  TMS: Yes, initiated April 2021.  No associated benefit.  ECT:  Yes     Series x2, 12 each.  Associated with efficacy.    No taper or maintenance as per patient report.  Suicide Attempts/Gun Access:     Aside for HPI, denies suicide attempt history.    Gun access reported, but agrees to removal of gun access.       PAST MEDICAL HISTORY   Past Medical History:   Diagnosis Date     Anxiety      Depressive disorder      GERD (gastroesophageal reflux disease)      No past surgical history on file.    Primary Care Provider: Celine Appiah  Medications:     acetylcysteine  1,200 mg Oral At Bedtime     calcium carbonate 500 mg (elemental)  1 tablet Oral Daily     cyanocobalamin  1,000 mcg Oral Daily     fish oil-omega-3 fatty acids  2 g Oral Daily     [START ON 3/16/2022] FLUoxetine  40 mg Oral Daily     OLANZapine  7.5 mg Oral At Bedtime     pyridOXINE  300 mg Oral Daily     vitamin C  1,000 mg Oral Daily     Vitamin D3  125 mcg Oral Daily     zinc gluconate  50 mg Oral At Bedtime     Medications as needed: acetaminophen, busPIRone, gabapentin, gabapentin, hydrOXYzine, nicotine polacrilex, OLANZapine **OR** OLANZapine, QUEtiapine, senna-docusate, traZODone    ALLERGIES: Patient has no known allergies.       MEDICATIONS   No current outpatient medications on file.     Medication adherence issues: MS Med Adherence Y/N: No  Medication side effects: MEDICATION SIDE  EFFECTS: no side effects reported  Benefit: Yes / No: Yes, Partial       ROS   A comprehensive review of systems was negative.       FAMILY HISTORY   No family history on file.     Psychiatric: Daughter, father, 2 sisters: Depression  Chemical: Alcohol  Suicide: None reported       SOCIAL HISTORY   Social History     Socioeconomic History     Marital status:      Spouse name: Not on file     Number of children: Not on file     Years of education: Not on file     Highest education level: Not on file   Occupational History     Not on file   Tobacco Use     Smoking status: Never Smoker     Smokeless tobacco: Never Used   Substance and Sexual Activity     Alcohol use: Not on file     Drug use: Not Currently     Sexual activity: Not on file   Other Topics Concern     Not on file   Social History Narrative     Not on file     Social Determinants of Health     Financial Resource Strain: Not on file   Food Insecurity: Not on file   Transportation Needs: Not on file   Physical Activity: Not on file   Stress: Not on file   Social Connections: Not on file   Intimate Partner Violence: Not on file   Housing Stability: Not on file     Occupation:   Marital Status:   Children: 4  Legal: None reported  Living Situation: Living arrangements - the patient lives with their family       MENTAL STATUS EXAM   Appearance:  Cooperative  Mood:  Mood: Depressed   Affect: blunted  was congruent to speech  Suicidal Ideation: PRESENT / ABSENT: absent   Homicidal Ideation: PRESENT / ABSENT: absent   Thought process: [place]   Thought content: denies suicidal and violent ideation.   Fund of Knowledge: Average  Attention/Concentration: Fair  Language ability:  Intact  Memory: Sufficient  Insight:  fair.  Judgement: fair  Orientation: Yes, x4  Psychomotor Behavior: normal or unremarkable    Muscle Strength and Tone: MuscleStrength: Normal  Gait and Station: Normal       PHYSICAL EXAM   Vitals: There were no vitals  taken for this visit.    Gen: No acute distress  HEENT: EOMI, no nystagmus or scleral icterus, moist mucous membranes  Skin: No diaphoresis or rash  Resp: Clear to auscultation bilaterally   CV: Regular rate and rhythm, no murmur   Abd: No pain  Ext: No cyanosis, clubbing or edema  Neuro: No abnormal movements, no focal deficits       LABS   personally reviewed.   No visits with results within 2 Month(s) from this visit.   Latest known visit with results is:   Orders Only on 03/18/2021   Component Date Value     COVID-19 Virus PCR to U * 03/18/2021 Nasopharyngeal      COVID-19 Virus PCR to U * 03/18/2021 Test received-See reflex to IDDL test SARS CoV2 (COVID-19) Virus RT-PCR      SARS-CoV-2 Virus Specime* 03/18/2021 Nasopharyngeal      SARS-CoV-2 PCR Result 03/18/2021 NEGATIVE      SARS-CoV-2 PCR Comment 03/18/2021 Testing was performed using the autumn SARS-CoV-2 assay on the autumn 6800 System.2      No results found for: PHENYTOIN, PHENOBARB, VALPROATE, CBMZ       ASSESSMENT   Patient admitted for third psychiatric hospitalization due to similar presentation of depression with suicide ideation versus behaviors occurring in the setting of issues of insomnia and no further clear triggers.  Efforts of medication management performed in the community without benefit.  Indication for inpatient psychiatric hospitalization to coordinate cares, medication management and disposition.       DIAGNOSIS   Principal Problem:    Major depressive disorder, recurrent episode, severe with anxious distress (H)    Active Problem List:  Patient Active Problem List   Diagnosis     Suicidal ideation     Depression, unspecified depression type     Major depressive disorder, recurrent episode, severe with anxious distress (H)     Mood disorder (H)     Insomnia     Anxiety        PLAN   1. Education given regarding diagnostic and treatment options with risks, benefits and alternatives and adequate verbalization of understanding.  2.  Admitted voluntarily.    3/15: Agrees to stay voluntarily coordinate cares and medication management.    Precautions in place.  3. Medications: 3/15/2022: PTA medications reviewed.    Fluoxetine: Continue PTA medication as recently initiated for depression and anxiety.  Reduce dose to minimize activating effects.    Seroquel: Discontinue PTA scheduled dosing of PTA medication and continue as needed for anxiety.    Ambien CR: Discontinue PTA medication due to lack of therapeutic benefit.  4. Medications:  Hospital    Zyprexa: 3/15: Perform medication retrial for adjunct therapy and symptom management of insomnia.    Gabapentin/BuSpar: 3/15: Perform medication trial for anxiety as needed.    Vitamin D level pending.  5.  ECT: History of ECT series x2 without taper or maintenance ECT.    Indication: Medications ineffective alone and history of good ECT response in 1 or more episodes of illness.    Treatment plan: Discussed initiating inpatient ECT with continuation of a series as an outpatient until clinical efficacy determined and proceed towards taper/maintenance ECT.    Education: Initiated education regarding indication, plan and procedure with risks, benefits and alternatives in detail.    Consent: Patient reports efficacy; agrees to consider.  6. Consultations:    Hospitalist to follow as needed.  7. Structure and Supervision    Unit 5500.    Barriers to Discharge: Imminent risk to self.  8.   is following in regards to collecting and reviewing collateral information, referrals and disposition planning.    Legal: Voluntary    Referrals: TBD    Care Coordination: Verification of gun access removal; wife.    Placement: Home with increase of community supports.    Anticipated Discharge: End of week/weekend.  9. Further treatment programming to be determined throughout the hospital course.        Risk Assessment: [facility] RISK ASSESSMENT: Patient able to contract for safety    This note was created with  help of Dragon dictation system. Grammatical / typing errors are not intentional.    William Mcnair MD       CERTIFICATION   Initial Certification I certify that the inpatient psychiatric facility admission was medically necessary for treatment which could   reasonably be expected to improve the patient s condition.                                       I estimate 5-7 days of hospitalization is necessary for proper treatment of the patient. My plans for post-hospital care for this patient are home with family.                                       William Mcnair MD     -     3/15/2022     -     1:26 PM

## 2022-03-15 NOTE — PHARMACY-ADMISSION MEDICATION HISTORY
Admission medication history completed at [facility]. Please see Pharmacy - Admission Medication History note from 03/11/2022.

## 2022-03-15 NOTE — PROGRESS NOTES
Requesting sleep medication. Very quiet and withdrawn. Ate well for dinner. No complaints. Waiting to transfer when bed opens. Uneventful evening.

## 2022-03-15 NOTE — ED NOTES
Ambien brought to bedside to this nurse for patient transport, sent with EMS for transfer to outside facility. Belongings sent with patient including pants, shirt, cross necklace.

## 2022-03-16 LAB — DEPRECATED CALCIDIOL+CALCIFEROL SERPL-MC: 43 UG/L (ref 30–80)

## 2022-03-16 PROCEDURE — 128N000001 HC R&B CD/MH ADULT

## 2022-03-16 PROCEDURE — 99356 PR PROLONGED SERV,INPATIENT,1ST HR: CPT | Performed by: PSYCHIATRY & NEUROLOGY

## 2022-03-16 PROCEDURE — 99233 SBSQ HOSP IP/OBS HIGH 50: CPT | Performed by: PSYCHIATRY & NEUROLOGY

## 2022-03-16 PROCEDURE — 250N000013 HC RX MED GY IP 250 OP 250 PS 637: Performed by: PSYCHIATRY & NEUROLOGY

## 2022-03-16 PROCEDURE — 99221 1ST HOSP IP/OBS SF/LOW 40: CPT

## 2022-03-16 RX ORDER — TESTOSTERONE ENANTHATE 200 MG/ML
100 INJECTION, SOLUTION INTRAMUSCULAR WEEKLY
Status: DISCONTINUED | OUTPATIENT
Start: 2022-03-17 | End: 2022-03-17 | Stop reason: HOSPADM

## 2022-03-16 RX ORDER — RISPERIDONE 1 MG/1
1 TABLET ORAL AT BEDTIME
Status: DISCONTINUED | OUTPATIENT
Start: 2022-03-16 | End: 2022-03-17 | Stop reason: HOSPADM

## 2022-03-16 RX ORDER — ONDANSETRON 4 MG/1
4 TABLET, FILM COATED ORAL EVERY 6 HOURS PRN
Status: DISCONTINUED | OUTPATIENT
Start: 2022-03-16 | End: 2022-03-17 | Stop reason: HOSPADM

## 2022-03-16 RX ORDER — ACETAMINOPHEN 325 MG/1
650 TABLET ORAL EVERY 4 HOURS PRN
Status: DISCONTINUED | OUTPATIENT
Start: 2022-03-16 | End: 2022-03-16

## 2022-03-16 RX ORDER — ACETAMINOPHEN 500 MG
1000 TABLET ORAL EVERY 6 HOURS PRN
Status: DISCONTINUED | OUTPATIENT
Start: 2022-03-16 | End: 2022-03-17 | Stop reason: HOSPADM

## 2022-03-16 RX ORDER — CALCIUM CARBONATE 500(1250)
500 TABLET ORAL DAILY
Status: DISCONTINUED | OUTPATIENT
Start: 2022-03-16 | End: 2022-03-17 | Stop reason: HOSPADM

## 2022-03-16 RX ADMIN — RISPERIDONE 1 MG: 1 TABLET ORAL at 20:11

## 2022-03-16 RX ADMIN — CHOLECALCIFEROL TAB 125 MCG (5000 UNIT) 125 MCG: 125 TAB at 08:35

## 2022-03-16 RX ADMIN — OXYCODONE HYDROCHLORIDE AND ACETAMINOPHEN 1000 MG: 500 TABLET ORAL at 08:35

## 2022-03-16 RX ADMIN — FLUOXETINE 40 MG: 20 CAPSULE ORAL at 08:36

## 2022-03-16 RX ADMIN — Medication 50 MG: at 20:11

## 2022-03-16 RX ADMIN — Medication 2000 MG: at 08:35

## 2022-03-16 RX ADMIN — CALCIUM 500 MG: 500 TABLET ORAL at 08:36

## 2022-03-16 RX ADMIN — Medication 1000 MCG: at 08:35

## 2022-03-16 RX ADMIN — Medication 1200 MG: at 20:12

## 2022-03-16 RX ADMIN — GABAPENTIN 300 MG: 300 CAPSULE ORAL at 10:53

## 2022-03-16 ASSESSMENT — ACTIVITIES OF DAILY LIVING (ADL)
LAUNDRY: WITH SUPERVISION
ORAL_HYGIENE: INDEPENDENT
HYGIENE/GROOMING: INDEPENDENT
HYGIENE/GROOMING: INDEPENDENT
DRESS: INDEPENDENT
ORAL_HYGIENE: INDEPENDENT
DRESS: INDEPENDENT

## 2022-03-16 NOTE — PLAN OF CARE
Problem: Behavioral Health Plan of Care  Goal: Plan of Care Review  3/16/2022 1635 by Nelida Nails, ASHLEE  Outcome: Ongoing, Progressing  3/16/2022 1635 by Nelida Nails, ASHLEE  Outcome: Ongoing, Progressing     Problem: Behavioral Health Plan of Care  Goal: Patient-Specific Goal (Individualization)  Outcome: Ongoing, Progressing     Problem: Behavioral Health Plan of Care  Goal: Adheres to Safety Considerations for Self and Others  Outcome: Ongoing, Progressing     Problem: Suicide Risk  Goal: Absence of Self-Harm  Outcome: Ongoing, Progressing   Goal Outcome Evaluation:       PatieThe wife called and they talked for more than an hour.    Patient visible on the unit, affect flat. Rated anxiety, denies pain,depression,SI/HI and all others psych symptoms. Contracts for safety.The wife called and they talked for more than an hour. Patient approached writer and stated that he will be leaving tomorrow and his wife will pick him up. Patient states that the  confirmed to him that he can leave tomorrow and his wife will be driving here to pick him up. Patient encouraged to talk to his provider tomorrow morning. Patient also express the plan is to discontinue taking all his med's. Writer discouraged patient on abruptly stopping anti-depressants and to talk to his provider.

## 2022-03-16 NOTE — PROGRESS NOTES
"PSYCHIATRY  PROGRESS NOTE     DATE OF SERVICE   03/16/2022       CHIEF COMPLAINT   \"Not good.\"       SUBJECTIVE   Nursing reports patient minimizing events associated with presentation.  Expresses concerns of medication side effects worsening depressive symptoms.  Endorses depression at 5.  Contracts for safety for use of CPAP.  Declined Zyprexa..     intake in progress.    Care coordination performed in detail.  Includes, request to contact outpatient psychiatrist, Dr. Markham for treatment recommendation.  Recommend ECT secondary to history of previous ECT response in the past episodes of illness.  Discussed events into presentation, medication management and disposition.       OBJECTIVE   Upon patient interview, patient interview performed on unit 5500 in multiple.  Patient initially somnolent, arousable on approach.  Repeat assessment performed secondary to treatment plan requests to pursue ECT as initially discussed at time of intake.  Evaluation effort to review events into presentation, and discussed treatment plan from intake.    Patient admitted from [facility] ED on 3/13 after symptoms of worsening depression and subsequent overdose with Ambien CR.  Reports symptoms of depression of at least 2 years time with efforts of medication management, therapy, ECT and TMS.  Most recent efforts of treatment in the community include management through outpatient psychiatrist and initiation of fluoxetine on 2/18 with serial titration from 20 mg to 60 mg daily with most recent dose titration on 3/3.  Despite efforts of medication management in the community, patient continued to experience symptoms of depression leading to further symptoms of function impairment as reported by wife to DEC evaluated.  Symptoms of anhedonia to include isolating, staying in bed, decreased appetite, weight loss, expressing suicidal ideation into behavioral ingesting Ambien CR in excess prior to admission.  Poor self " "cares.  Leading to recommendation for inpatient hospitalization voluntarily.    Treatment plan discussed in detail at time of intake with consideration of past treatment associated with efficacy and tolerability.  Medication management provided.  Documented history of medications alone has not been beneficial.  Medications with ECT documented as beneficial, given to previous treatment series associated with benefit.  Subsequent to most recent ECT series, patient proceeded to TMS.  The latter not associated with benefit.  Patient encouraged to consider ECT at time of intake, with patient agreed to contemplate and discussed with wife.  In the meantime, efforts of prior medication management reviewed potential benefit.  Recommendation to perform retrial of Zyprexa to Prozac for adjunct therapy to target symptoms of depression, anxious distress and insomnia.  Seroquel continued as needed for anxiety.  Ambien CR discontinued due to misuse.    Today, patient reports continued symptoms of depression with anxious distress exacerbated by continued difficulties with insomnia.  Patient admits to not taking Zyprexa secondary to recommendation by wife.  Medication management reviewed and patient declined Zyprexa secondary to voice recommendation.  Patient requesting alternate options at therapy.  Previous options included titration of PTA Seroquel, but declined.  Consents to trial of risperidone for adjunct therapy.  Patient requests Ambien CR, but recommendation to not continue secondary to ECT as discussed.    Initially, patient continued to contemplate ECT.  Resistant to series #3 secondary to report by wife.  Patient provides self report of benefit from treatment.  Did not continue with taper or maintenance to continue benefit.  Declined care coordination with patient's wife after consenting to treatment.  Patient stating, \"it is my decision.\"    Education provided in detail regarding risk, benefits and alternatives to ECT.  " The following benefit analysis was performed:  RISK VERSUS BENEFIT ANALYSIS OF TREATMENT WITH ELECTROCONVULSIVE THERAPY FOR THIS PATIENT    GENERAL RISKS OF ECT: Reviewed  INCREASED RISKS OF ECT FOR THIS PATIENT: None  ESTIMATION OF PROBABILITY PATIENT WILL BENEFIT FROM ECT: Good  RISKS OF NOT PROCEEDING WITH A TRIAL OF ECT: Continued depression with anxious distress symptoms and risk of suicide.    This is a  52-year-old male with symptoms of depression refractory to multiple medication trials and has history of good ECT prior episodes of illness after treatment by Electroconvulsive Therapy. He is a good candidate for ECT or series #3.    The patient and this provider had a lengthy discussion regarding the risks and benefits of ECT. The discussion included the possible risk of headaches, body aches, nausea, confusion, memory loss, mouth/lip/dental/tongue injury (largely dependent on the particular buccal anatomy of the individual) as well as the rare incidence of fatal cardiac arrhythmia. Education involving the procedure, the recovery period and post ECT instructions were provided. We discussed option of re-starting with bilateral electrode placement for symptoms of severity associated with presentation vs. right unilateral electrode placement to better preserve memory and cognitive function. If this place is not effective we will discuss change to Right Unilateral placement.      The patient asked appropriate questions and they were answered in detail. The patient was made aware that they must be transported to and from ECT by a responsible adult and be supervised for twenty-four hour post ECT procedure. Patient is informed that it is recommended that he/she does not make any major life changing decisions or major financial decisions while severely depressed and or in the midst of an acute ECT series. Driving is not recommended during initial ECT series or after if is feeling that he/she is less  cognitively aware or is experiencing confusion or excessive forgetfulness. The patient is informed that while receiving ECT he is not to use alcohol or illicit substances as this may inhibit effects of ECT, interfere with anesthesia effects/dosing, may cause detrimental cognitive problems putting the patient, mental and physical health at risk. If the patient cannot adhere to this requirement of complete drug and alcohol abstinence, ECT will be discontinued. The patient verbalizes understanding of the above and wishes to proceed with a trial of ECT. After weighing the potential benefits and risks of ECT for this patient I am in favor of a trial with ECT with following recommendations:    Initiate ECT work-up to include hospitalist consult for medical clearance, pre-ECT cognitive screen, COVID testing and consent to treat.  Medication management continued to optimize management targeting symptoms of depression prior to initiation of ECT.    Patient is aware of  consult to assist with collecting and reviewing collateral information, care coordination and disposition planning.  Includes, efforts of coordinating cares with wife to review supports to convert to outpatient ECT.    Denies physical issues that are new or worsening.  Impairment of ADLs to include insomnia.  Comfort measures discussed in detail and care coordination with OT in progress.    Otherwise, offers no further questions, concerns and/or expectations.       MEDICATIONS   Medications:  Scheduled Meds:    acetylcysteine  1,200 mg Oral At Bedtime     calcium carbonate (OS-RANJITH) 500 mg (elemental) tablet  500 mg Oral Daily     cyanocobalamin  1,000 mcg Oral Daily     fish oil-omega-3 fatty acids  2 g Oral Daily     FLUoxetine  40 mg Oral Daily     OLANZapine  7.5 mg Oral At Bedtime     pyridOXINE  300 mg Oral Daily     vitamin C  1,000 mg Oral Daily     Vitamin D3  125 mcg Oral Daily     zinc gluconate  50 mg Oral At Bedtime     Continuous  "Infusions:  PRN Meds:.acetaminophen, artificial saliva, busPIRone, gabapentin, hydrOXYzine, nicotine polacrilex, OLANZapine **OR** OLANZapine, QUEtiapine, senna-docusate, traZODone    Medication adherence issues: MS Med Adherence Y/N: Yes, Hospitalization  Medication side effects: MEDICATION SIDE EFFECTS: insomnia  Benefit: Yes / No: Yes, Partial       ROS   A comprehensive review of systems was negative.       MENTAL STATUS EXAM   Vitals: BP (!) 134/93 (BP Location: Left arm, Patient Position: Sitting, Cuff Size: Adult Regular)   Pulse 78   Temp 98.5  F (36.9  C) (Oral)   Resp 22   SpO2 97%     Appearance:  Cooperative  Mood:  Mood: \"Not good\"  Affect: blunted  was congruent to speech  Suicidal Ideation: PRESENT / ABSENT: absent   Homicidal Ideation: PRESENT / ABSENT: absent   Thought process: perseverative   Thought content: denies suicidal and violent ideation.   Fund of Knowledge: Sufficient  Attention/Concentration: Fair  Language ability:  Intact  Memory: Sufficient  Insight:  fair.  Able to provide informed consent to treat.  Judgement: fair  Orientation: Yes, x4  Psychomotor Behavior: restless    Muscle Strength and Tone: MuscleStrength: Normal  Gait and Station: Normal       LABS   personally reviewed.     No results found for: PHENYTOIN, PHENOBARB, VALPROATE, CBMZ       DIAGNOSIS   Principal Problem:    Major depressive disorder, recurrent episode, severe with anxious distress (H)    Active Problem List:  Patient Active Problem List   Diagnosis     Suicidal ideation     Depression, unspecified depression type     Major depressive disorder, recurrent episode, severe with anxious distress (H)     Mood disorder (H)     Insomnia     Anxiety        PLAN   1. Ongoing education given regarding diagnostic and treatment options with risks, benefits and alternatives and adequate verbalization of understanding.  2. Admitted voluntarily.    3/15: Agreed to stay voluntarily coordinate cares and medication " management.    3/16: Care coordination performed with outpatient psychiatrist/clinic to review medication management/treatment plan recommendations.  Patient declined care coordination with wife.    Precautions in place.  3. Medications: 3/15/2022: PTA medications reviewed.    Fluoxetine: Continued PTA medication as recently initiated for depression and anxiety.  Reduced dose to minimize activating effects.    Seroquel: Discontinued PTA scheduled dosing of PTA medication and continue as needed for anxiety.    Ambien CR: Discontinued PTA medication due to lack of therapeutic benefit.  4. Medications:  Hospital    Risperdal: 3/16: Perform medication trial to replace Zyprexa/Seroquel for adjunct therapy and symptom management of sedation.    Gabapentin: 3/15: Performed medication trial for anxiety as needed.  3/16: Pending outcome of trial, consider scheduled dosing for augmentation.    BuSpar: 3/15: Performed medication trial for anxiety management as needed.    Vitamin D level (3/15/2022) = 43.    Zyprexa: 3/15: Performed medication retrial for adjunct therapy and symptom management of insomnia.  2/16: Discontinue retrial secondary to patient request to pursue alternate therapy.  5.  ECT Series #3: Plan to initiate ECT series #3 of up to 12 treatments as inpatient with anticipation to convert to outpatient pending medical clearance on 3/18/2022.  Plan supported by outpatient psychiatrist.    Indication: Medications ineffective alone and history of good ECT response in 1 or more episodes of illness.    Treatment plan: ECT treatment #1 of up to 12 treatments with pending start on 3/18.    Education: Continued education regarding risk, benefits and alternatives.  Reviewed multiple questions and concerns.  Further education provided by means of written and video information.    Consent: Patient able to provide informed consent to treat.  Written consent completed on 3/16/2022.    Pre-ECT work-up: Consult placed to  hospitalist for medical clearance; OT for pre-ECT cognitive screen; video education; COVID testing as per ECT protocol.    Placement: Bilateral placement due to symptom severity.  Monitor for indication to switch to right unilateral placement as indicated.    Legal: Voluntary    Medications:  Optimization medication management in progress.    Setting:  Inpatient ECT indicated for initiation until coordination of cares for outpatient treatment.  6. Consultations:    Hospitalist: 3/16: ECT clearance.  7. Structure and Supervision    Unit 5500.    Barriers to Discharge:  ECT supervision, functional impairment and coordination of cares.  8.   is following in regards to collecting and reviewing collateral information, referrals and disposition planning.    Legal: Voluntary    Referrals: TBD    Care Coordination: Verification of gun access removal; wife.    Placement: Home with increase of community supports.    Anticipated Discharge:  TBD  9. Further treatment programming to be determined throughout the hospital course.        Risk Assessment:  MH RISK ASSESSMENT: Patient on precautions    Coordination of Care:   Treatment Plan reviewed and physician signed, Care discussed with Care/Treatment Team Members, Chart reviewed and Patient seen      Re-Certification I certify that the inpatient psychiatric facility services furnished since the previous certification were, and continue to be, medically necessary for, either, treatment which could reasonably be expected to improve the patient s condition or diagnostic study and that the hospital records indicate that the services furnished were, either, intensive treatment services, admission and related services necessary for diagnostic study, or equivalent services.     I certify that the patient continues to need, on a daily basis, active treatment furnished directly by or requiring the supervision of inpatient psychiatric facility personnel.   I estimate  7-10 days of hospitalization is necessary for proper treatment of the patient. My plans for post-hospital care for this patient are  Structured and supervised placement     William Mcnair MD    -     03/16/2022  -     11:58 AM    Total time  >60 minutes with > 50%spent on coordination of cares and psycho-education.    Total Visit Time: 65  o For Outpatient, 'Total Visit Time' = Face-to-Face time only.  o For Inpatient, 'Total Visit Time' = Unit Floor + Face-to-Face time.    Total Face-to-Face Prolonged Service Time: 30    Content of the Prolonged Time: Treatment team discussion; documentation review; care coordination with outpatient psychiatrist; patient reassessment; documentation entry; .      This note was created with help of Dragon dictation system. Grammatical / typing errors are not intentional.    William Mcnair MD

## 2022-03-16 NOTE — PLAN OF CARE
Problem: Behavioral Health Plan of Care  Goal: Adheres to Safety Considerations for Self and Others  Outcome: Ongoing, Progressing  Goal: Absence of New-Onset Illness or Injury  Outcome: Ongoing, Progressing   Goal Outcome Evaluation:       Patient was visible on the unit. Patient attended community meeting.Patient watched movie with peers.Patient appears sad. Mood is calm. Patient minimizes the event that brought him to the hospital. Patient said that a certain anti-depressant made him depressed. Patient expresses the need to discharge soon so that he can take care of his family. Patient denies pain.anxiety, SI, HI, and hallucinations.Patient endorses depression 5/10. Patient visited with his wife.Patient's wife brought CPAP machine. Patient contracts for safety with CPAP use. Patient was moved to a room with an outlet.Patient declined to take hs Zyprexa.Patient ate 100% dinner and hs snack. Patient is on self-injurious and suicide precautions. None was reported this shift.

## 2022-03-16 NOTE — PLAN OF CARE
"    Initial Psychosocial Assessment    Type of CM visit: Initial Assessment, Clinical Treatment Coordinator Role Introduction, Offer Discharge Planning    Information obtained from: [x]Patient   [x]Chart review  [x]Collateral Contacts  []Court Website    Hospitalization information:   Kuldeep Sequeira is a 52 year old who was admitted to unit 5500 on 3/15/2022 due to Overdose with unclear intent    Patient Self-Assessment  Patient reported reason for admission: \" Took a couple of sleeping pills, I just wanted to go back to sleep.\"     Patient reported symptoms of concern: [x]sadness    [x]anxiety     []anger    []poor sleep     []medications not working    []racing thoughts     []substance use     []agitation     []hearing voices     []hopelessness   []Eating concerns    []Self-injury      [] Other   Comments:    Current suicidal ideation:  [x]No    []Yes, no plan     []Yes, with plan (describe):          Comments:   Current homicidal ideation:  [x]No   [] Yes       Comments:     Legal Status at Admission: Voluntary/Patient has signed consent for treatment      History of Mental Health:  Describe current and past mental health symptoms present?   Self reports difficulty sleeping and increased depression over the last 2 years.      Chart [facility] notes - depressed mood      296.32 (F33.1) Major Depressive Disorder, Recurrent Episode, Moderate _ and With anxious distress - provisional      300.02 (F41.1) Generalized Anxiety Disorder - by history     296.31 (F33.0) Major Depressive Disorder, Recurrent Episode, Mild  and With anxious distress - provisional         Do you understand your mental health diagnosis? YES [x]   NO []    History of psychiatric hospitalizations?  YES [x]     NO  []  Details: Reports brief hospitalization at  [facility] 2 years ago   If YES, within the last 30 days? YES []     NO  []    History of commitment?  YES []     NO  [x]    Details:   History of ECT?  YES [x]     NO  []    Details:     History of " Substance Use Disorder:  Have you used alcohol or substances in the past 12 months? YES []/ NO [x]              If Yes, Type  Frequency  Once weekly 3 drinks    Would you like a substance use disorder evaluation? YES [] / NO [x]    Previous Treatment? YES []/ NO [x]  Details:     Significant Life Events  (Illness, Death, Loss):   None reported      Is there a history of abuse or psychological trauma:    [x]Denies       []Yes, present (type):         []Yes, past (type):        []Patient declined to answer    Identify current stressors:    []financial,    []legal issues,    []homelessness,    []housing,     []recent loss,    []relationships,    []substance use concerns,    []medical     []unemployment     []employment  concerns    []isolation,    []lack of resources,     []out of home placements,     []parenting issues     []domestic violence     []other:  Comments:       Living Situation:     [x]House/apt    []Group Home    []IRTS     []Homeless     []Assisted Living     []Nursing home    []Lives alone    []Lives with :                         []Other:          Family Composition: Self/wife/ 3 children    Children, ages and current location if minor: 1 adult child at college age 20 years, 17, 11 and 6 year old at home with wife     Relationship status  []Single     [x]     []     []       []Significant Other   []Other:     Educational Background:  []Less Than High School     []High School     []GED     [x]College       Cognitive/learning concerns: none reported    Financial Status: [x] Employed, status and location:   []Unemployment    []County Assistance     []SSI/SSDI      []Waivered services    []Other:Declined Dr. Amin    Legal status(present):   [x]Voluntary, []72-hour hold, []Commitment, []Guardianship, []Revocation, []Stay of commitment,    Details:    Other legal issues identified:  [x]None, []Arrest,  []Probation/[place],  []Driving under influence,  []Incarceration,   "[]Sexual offense (level):   []Child Protective Services,      []Other:      Details:    Ethnic/Cultural considerations:  52 year old white cisgender male    Spiritual considerations: Holiness     Service History:  [x]No     []Yes: details:    Social Functioning (organization, interests) and strengths: Active in [employer] community and family life. Supportive network of friends    Current Treatment Providers Are:     NO Name, Agency, and phone   Psychiatrist  [] Dr [facility] [address], [place] [address]   [phone]    Chart notes on 3/13  Psychiatrist: Yes. Name: Dr. Campos. Location: [facility]. Date of last visit: .. Frequency: .. Perceived helpfulness: No.   Psychotherapist  [] Richi Chowdary Unknown Facility  Chart noteson 3/13: Therapist: Yes. Name: Dr. Rodriguez. Location: [facility]. Date of last visit: .. Frequency: .. Perceived helpfulness: No   [facility] worker  [x]      [x]    Waivered Services  [x]    ACT Teams  [x]    Day Treatment/PHP/SINGH trtmt  [x]    Group Home/AFC/AL  [x]      [x]    Other:  [] Evon            Social Service Assessment of identified patient needs and plan to meet those needs:     Patient was in bed resting when writer approached. Patient was guarded in presentation and provided minimal information. Patient stated that he has been struggling to sleep for the last two years. Noting that he did not overdose on Ambien intentionally. He stated that he had woke in the morning and wanted to go back to sleep. He denied current or past stressors stating \"I have a family and work\". He did not endorse current SI expressing that \"I did not want to kill myself \". Patient denied SA/SIB/HI. Patient expressed a desire to leave. Writer explained a 12 hour intent and process. Writer clarified that psychiatrist would meet with patient again tomorrow and make a decision regarding discharge Writer was clear  that discharge would not " "occur in the middle of the night. Patient provided information regarding psychiatry and therapy however preferred that wife make appointments. He expressed that he has professional and community supports stating that \"I have a , I am Pentecostal and I attend Judaism once weekly.\" Patient could not verbalize r identify coping skills that he utilizes when he does not sleep. Patient remains adamant that he wants to discharge and discuss treatment options with his outpatient psychiatrist. Patient signed ORION for outpatient psychiatrist and for wife.    Wife stated that she does not want patient to remain in the hospital. She stated that patient had contacted her and noted that a course of ECT had been planned . She expressed that patient was dismayed and did not want ECT. According to wife patient expressed that he felt that he needed to accept treatment or he would be forced to stay in the hospital. Wife expressed that she does not feel comfortable with patient being in the hospital and he \"is worse\" then when he is admitted. She voiced that the 15 minute checks were disturbing patient sleep. Expressing that patient was \"not in his right state of mind and needed time to make the decision about ECT treatments\". She expressed that she would like to speak to patient's outpatient psychiatrist and discuss options. Noting that If ECT were chosen wife would like to coordinate plan prior to hospitalization to ensure that patient job is secure and that patient feels that he had an opportunity to review his choices with outpatient psychiatrist. Wife stated that she can provide transportation for outpatient ECT and they prefer [facility] location. She would like patient to discharge as soon as possible. Wife is currently setting the soonest follow up appointments with psychiatrist and therapist. Wife stated that she would leave voicemail with social work regarding appointments. Wife was noticeably anxious at beginning of conversation " however appeared to calm during conversation. Despite increasing her self regulation wife remains adamant about discharge. She has removed all weapons from home. Guns are now located at her sister's home. Writer provided information regarding 12 hour intent and deferred discharge decision to psychiatrist.    Patient would benefit from remaining hospitalized to adjust medications and discuss ECT. Patient has a hx of medication resistant depression. Patient has declined to remain I the hospital at this time.    Possible discharge plan: Return to home with established supports and discussion with ECT on outpatient basis vs remaining hospitlized to adjust medications and establish ECT        Barriers: Medication Management, Symptom Stabilization, Coordination of Care

## 2022-03-16 NOTE — CONSULTS
"[facility]  Consult Note - Hospitalist Service  Date of Admission:  3/15/2022  Consult Requested by: William Mcnair MD  Reason for Consult: ECT clearance    Assessment & Plan   Kuldeep Sequeira is a 52 year old male admitted on 3/15/2022. He has a PMH of depression. Has had ECT treatments in the past. States his wife does not want him to get them this visit and would rather he discharged. Assured patient that I would continue to clear him for ECT but whether he receives them is up to the rest of the team. Face to face exam and interview completed on unit in patient room, cooperative and calm during assessment.    #ECT clearance  He has had ECT treatment \"about a year ago\"  - needs tylenol and zofran before ECT treatments to prevent nausea and headaches  - no history of unstable or severe cardiovascular disease, space-occupying intracranial lesion with evidence of elevated intracranial pressure, recent cerebral hemorrhage or stroke, bleeding or otherwise unstable vascular aneurysm, severe pulmonary condition  - medications reviewed. Herbal supplements do not include Ginkgo biloba, ginseng, Miramar's wort, valerian, or kava kava. Not on theophylline  - no previous difficulties with anesthesia or ECT.  - No history of skull fracture  - no history of diabetes, coronary artery disease, AF, heart failure, valvular disease, pacemaker/ICD, coexisting pulmonary disease, brain tumor, stroke, dementia, neuromuscular disease, epilepsy, cranial metallic objects.   - EKG from 3/13- NSR without ectopy, t wave changes  - BMP from 3/13 resulted in normal electrolyte levels and normal kidney function    Cleared for ECT treatment per medical team. [facility] will sign off.       The patient's care was discussed with the Patient.    Total time spent on the unit 30 minutes in reviewing the record, examination of patient, lab results and completing documentation. 20 minutes was spent in discussion and counseling " "with patient concerning diagnosis, medications, lab results and treatment plan. Care discussed and coordinated with patient and nurse.        [facility]  Securely message with the Vocera Web Console (learn more here)  Text page via [employer]       Hospitalist Service    Clinically Significant Risk Factors Present on Admission                 ______________________________________________________________________    Chief Complaint   \"I'm fine\"    History is obtained from the patient    History of Present Illness   Kuldeep Sequeira is a 52 year old male who is an inpatient in the mental health unit. Is unsure if he will actually do the ECT treatment.     Review of Systems   12 point review of systems negative except for pertinent positives mentioned in the HPI and Assessment and Plan.    Past Medical History    I have reviewed this patient's medical history and updated it with pertinent information if needed.   Past Medical History:   Diagnosis Date     Anxiety      Depressive disorder      GERD (gastroesophageal reflux disease)        Past Surgical History   I have reviewed this patient's surgical history and updated it with pertinent information if needed.  No past surgical history on file.    Social History   I have reviewed this patient's social history and updated it with pertinent information if needed.  Social History     Tobacco Use     Smoking status: Never Smoker     Smokeless tobacco: Never Used   Substance Use Topics     Alcohol use: Not on file     Drug use: Not Currently       Family History     Medications   I have reviewed this patient's current medications    Allergies   No Known Allergies    Physical Exam   Vital Signs: Temp: 97.4  F (36.3  C) Temp src: Oral BP: (!) 134/93     Resp: 18 SpO2: 94 % O2 Device: None (Room air)    Weight: 0 lbs 0 oz    Constitutional: awake, alert, cooperative, no apparent distress, and appears stated " age  Respiratory: No increased work of breathing, good air exchange, clear to auscultation bilaterally, no crackles or wheezing  Cardiovascular: Normal apical impulse, regular rate and rhythm, normal S1 and S2, no S3 or S4, and no murmur noted  Neurologic: Awake, alert, oriented to name, place and time.  Cranial nerves II-XII are grossly intact.  Motor is 5 out of 5 bilaterally.  Cerebellar finger to nose, heel to shin intact.  Sensory is intact.  Babinski down going, Romberg negative, and gait is normal.  Neuropsychiatric: General: normal, calm and normal eye contact    Data   I personally reviewed the EKG tracing showing NSR.

## 2022-03-16 NOTE — PROGRESS NOTES
P:Depression  G:Improving mood  O:unchanged    Admitted yesterday with c/o depression aggravated by Olanzapine. Had taken 9 tab of 12.5 mg Ambien in the morning, expressed the hope he would not wake. Continues obsessive about sleep. Has tried the [place] method, TMS, several medications.    Depressed since 2000, also had thoughts of using guns or knife and has access to both.  Per MD note, R unilateral ECT is planned. Previous ECT in 2020 and 2021. Endorsed depression (5) yesterday, passive thoughts of Suicide.  C/o loss of interest, anhedonia worthlessness.  Has not made any actual suicide attempts.    Appeared to be sleeping uneventfully through the night  Continue to monitor.    Plan: Monitor and document mood and behaviour, thought process and content. Establish and maintain therapeutic relationship. Educate about diagnoses, medications, treatment, legal status, plan of care. Address preexisting and concurrent medical concerns.

## 2022-03-16 NOTE — PLAN OF CARE
Problem: Behavioral Health Plan of Care  Goal: Adheres to Safety Considerations for Self and Others  Outcome: Ongoing, Progressing     Problem: Behavioral Health Plan of Care  Goal: Absence of New-Onset Illness or Injury  Outcome: Ongoing, Progressing   Goal Outcome Evaluation:  Patient visible on the unit, affect flat.Rated anxiety 3/10, depression 0/10. Denies suicidal/homicidal, ideations, and all others psych symptoms. Patient watched ECT video per provider ordered. Patient wife called and told writer, she wants to take pt home this evening.   informed about pt wife requested to take pt home today.

## 2022-03-17 VITALS
DIASTOLIC BLOOD PRESSURE: 81 MMHG | SYSTOLIC BLOOD PRESSURE: 132 MMHG | OXYGEN SATURATION: 96 % | TEMPERATURE: 97.6 F | HEART RATE: 80 BPM | RESPIRATION RATE: 18 BRPM

## 2022-03-17 PROBLEM — R45.851 SUICIDAL IDEATION: Status: RESOLVED | Noted: 2020-11-18 | Resolved: 2022-03-17

## 2022-03-17 PROCEDURE — 90853 GROUP PSYCHOTHERAPY: CPT

## 2022-03-17 PROCEDURE — 999N000157 HC STATISTIC RCP TIME EA 10 MIN

## 2022-03-17 PROCEDURE — 99239 HOSP IP/OBS DSCHRG MGMT >30: CPT | Performed by: PSYCHIATRY & NEUROLOGY

## 2022-03-17 PROCEDURE — 250N000013 HC RX MED GY IP 250 OP 250 PS 637: Performed by: PSYCHIATRY & NEUROLOGY

## 2022-03-17 RX ORDER — FLUOXETINE 40 MG/1
40 CAPSULE ORAL DAILY
Qty: 30 CAPSULE | Refills: 0 | Status: SHIPPED | OUTPATIENT
Start: 2022-03-18 | End: 2022-04-08

## 2022-03-17 RX ORDER — RISPERIDONE 1 MG/1
1 TABLET ORAL AT BEDTIME
Qty: 30 TABLET | Refills: 0 | Status: SHIPPED | OUTPATIENT
Start: 2022-03-17 | End: 2022-04-08

## 2022-03-17 RX ORDER — QUETIAPINE FUMARATE 25 MG/1
25 TABLET, FILM COATED ORAL 2 TIMES DAILY PRN
Qty: 30 TABLET | Refills: 0 | Status: SHIPPED | OUTPATIENT
Start: 2022-03-17 | End: 2022-04-08

## 2022-03-17 RX ADMIN — PYRIDOXINE HCL TAB 50 MG 300 MG: 50 TAB at 08:26

## 2022-03-17 RX ADMIN — CHOLECALCIFEROL TAB 125 MCG (5000 UNIT) 125 MCG: 125 TAB at 08:26

## 2022-03-17 RX ADMIN — OXYCODONE HYDROCHLORIDE AND ACETAMINOPHEN 1000 MG: 500 TABLET ORAL at 08:26

## 2022-03-17 RX ADMIN — Medication 1000 MCG: at 08:26

## 2022-03-17 RX ADMIN — CALCIUM 500 MG: 500 TABLET ORAL at 08:26

## 2022-03-17 RX ADMIN — FLUOXETINE 40 MG: 20 CAPSULE ORAL at 08:27

## 2022-03-17 RX ADMIN — Medication 2000 MG: at 08:26

## 2022-03-17 ASSESSMENT — ACTIVITIES OF DAILY LIVING (ADL)
HYGIENE/GROOMING: INDEPENDENT
ORAL_HYGIENE: INDEPENDENT
DRESS: INDEPENDENT

## 2022-03-17 NOTE — PLAN OF CARE
Problem: Behavioral Health Plan of Care  Goal: Plan of Care Review  Outcome: Adequate for Care Transition  Flowsheets (Taken 3/17/2022 1000)  Plan of Care Reviewed With: patient   Goal Outcome Evaluation:    Plan of Care Reviewed With: patient Patient plan of care goals, met. Discharged home to wife. Explained implemented cares while in the hospital to him.  Educated pt discharged medications list,. Patient belonging checklist reviewed with  him by Behavior Tech.  Patient acknowledged that his belongings are correct., he signed all discharged documents.  Sent pt with his belongings and home medication, transported by his wife.

## 2022-03-17 NOTE — PLAN OF CARE
Assessment/Intervention/Current Symtoms and Care Coordination  Patient and writer met to discuss discharge. Patient stated that he is ready to discharge. Patient denied SI/HI/SIB. Outpatient appointments were reviewed with patient expressing that he will be discussing ECT treatments with outpatient provider. Patient noted no other concerns at this time.       Wife and patient are in agreement with discharge with wife stating that she will take responsibility for patient care at discharge and with outpatient ECT. Weapons have been removed from home to wife's sister's house. Wife will be providing transportation at 12    Discharge Plan or Goal Return home with established psychiatry and therapy    Barriers to Discharge recommendation to follow up with ECT referral post discharge    Referral Status  Completed- appointments scheduled by wife. Patient did not sign ORION for therapist    Psychiatry: Dr Markham Time:  Monday 3/21 @ 8:50 am  Location: [facility] [address], [place] [address]  [phone]    Therapy  Richi Chowdary Friday 3/18 @ 9am Location: Unknown Phone : Unknown    Legal Status  Voluntary      Willa Lopez, [facility], [facility] , 3/17/2022, 9:31 AM

## 2022-03-17 NOTE — DISCHARGE INSTRUCTIONS
Behavioral Discharge Planning and Instructions    Summary: You were admitted on 3/15/2022  due to worsening depression and SI.  You were treated by Dr. Mcnair and discharged on 03/17/2022 from [facility] to Home    Main Diagnosis: ***    Health Care Follow-up:     Completed- appointments scheduled by wife.     Psychiatry: Dr Markham Time:  Monday 3/21 @ 8:50 am  Location: Psych [address], [place] [address]  [phone]    Therapy  Richi Chowdary Friday 3/18 @ 9am Location: Unknown Phone : Unknown    Attend all scheduled appointments with your outpatient providers. Call at least 24 hours in advance if you need to reschedule an appointment to ensure continued access to your outpatient providers.     Major Treatments, Procedures and Findings:  You were provided with: a psychiatric assessment, medication evaluation and/or management, group therapy, individual therapy, milieu management and medical interventions    Symptoms to Report: feeling more aggressive, increased confusion, losing more sleep, mood getting worse or thoughts of suicide    Early warning signs can include: increased depression or anxiety increased thoughts or behaviors of suicide or self-harm  increased unusual thinking, such as paranoia or hearing voices    Safety and Wellness:  Take all medicines as directed.  Make no changes unless your doctor suggests them.      Follow treatment recommendations.  Refrain from alcohol and non-prescribed drugs.  If there is a concern for safety, call 281.    Resources:   National [place] on Mental Illness (www.[place].russell.org): 405.451.4004 or 345-068-5230.  [place] Association for Children's Mental Health (www.macmh.org): 446.715.3786.  Suicide Awareness Voices of Education (SAVE) (www.save.org): 901-578-HIHR (7283)  National Suicide Prevention Line (www.mentalhealthmn.org): 363-713-YBUM (9795)  [facility] Crisis (COPE) Response - Adult 895 973-5885    General Medication  Instructions:   See your medication sheet(s) for instructions.   Take all medicines as directed.  Make no changes unless your doctor suggests them.   Go to all your doctor visits.  Be sure to have all your required lab tests. This way, your medicines can be refilled on time.  Do not use any drugs not prescribed by your doctor.  Avoid alcohol.    Advance Directives:   Scanned document on file with [employer]? No scanned doc (Pt states I don't know.)  Is document scanned? Pt states no documents  Honoring Choices Your Rights Handout: Informed and given (Given to pt)  Was more information offered? Pt declined    The Treatment team has appreciated the opportunity to work with you. If you have any questions or concerns about your recent admission, you can contact the unit which can receive your call 24 hours a day, 7 days a week. They will be able to get in touch with a Provider if needed. The unit number is [phone] .

## 2022-03-17 NOTE — PROGRESS NOTES
BIPAP/CPAP NOTE    UNIT TYPE:  CPAP  MASK TYPE:  Medium/full face    SETTINGS:      CPAP - +5   FI02 - 21%     PATIENT'S TOLERANCE OF DEVICE - Pt room outlet not working. Maintenance are working on to fix it. Nursing staff will let RT know when it is available. Pt stated that he can wait. Hospital CPAP unit set up, ready to use and currently stored in [facility] .   RT will cont to follow up.

## 2022-03-17 NOTE — DISCHARGE SUMMARY
PSYCHIATRY  DISCHARGE SUMMARY     DATE OF DISCHARGE   03/17/2022       DISCHARGE DIAGNOSIS   Major depressive disorder, recurrent episode, severe with anxious distress (H)    Patient Active Problem List   Diagnosis     Depression, unspecified depression type     Major depressive disorder, recurrent episode, severe with anxious distress (H)     Mood disorder (H)     Insomnia     Anxiety        REASON FOR ADMISSION   This is a 52 year old male with history of depression, anxiety and insomnia.  Patient has prior history of psychiatric hospitalization on at least 2 previous occasions, but does not have past history of MI CD treatment or suicide attempt history.  Now, presenting secondary to worsening of depressive symptoms leading to overdose of uncertain intent with sleep medication.  Occurring in the setting of medication changes or depression by outpatient psychiatrist.  Admit is voluntary.     Care coordination performed in detail.  Includes, detailed review of care everywhere and epic to review electronic chart information and related mental health history to presentation.  Including, patient's ED presentation to [facility] on 3/13 due to depression and overdose.  Further review of patient's past hospitalization from November 2020.  Subsequent review of ECT documentation available for review.  Please see associated documentation for details.     In brief, patient presented to [facility] ED on 3/13 secondary to worsening of depression and subsequent overdose.  Reports worsening of depressive symptoms for at least the past 2 years with efforts of management to include medications, therapy, ECT and TMS.  Most recent efforts of management in the community through outpatient psychiatrist include initiation of fluoxetine on 2/18 at 20 mg daily with titration on 2/25 to 40 mg and further titration on 3/3 to 60 mg daily.  Continued on Seroquel.  Despite efforts of medication management in the community,  continued to experience symptoms of depression.  According to collateral as obtained from wife, [facility] assessment, patient demonstrating worsening of symptoms especially within the past week.  Isolating in behavior.  Laying in bed.  Decreased appetite with weight loss.  Expressing suicidal ideation and demonstrated behavior leading to admission.  Poor self cares.  Recommendation for inpatient psychiatric hospitalization voluntarily.     Further care coordination performed in detail.  Includes, detailed review of care everywhere.  Patient has documentation unable to be seen on epic viewer.  Specifically, documentations from past ECT for first hospitalization.  Able to review hospitalization from 11/18 through 11/19/2020.  Admitted due to depression, insomnia with suicidal ideation.  PTA medications continue to include venlafaxine 75 mg daily, quetiapine 200 mg p.o. nightly, with medication changes to include Ambien to as needed and addition of doxepin for sleep.     Further care coordination performed.  Referral for ECT initiated on 12/1/2020 by PCP.  Outpatient consult reviewed by Dr. Maher dated 12/4/2020.  Please see consult for full details.  Medically clear for ECT after providing informed consent to treat.  ECT series #1 initiated on 12/7/2020.     Further care coordination performed.  On 3/22/2021, completed second series of ECT.  Medications documented as ineffective.  Effexor XR increased to 225 mg daily and Abilify added.  Considered switch from Effexor XR to Viibryd due to sexual side effects.     Upon patient interview, patient review performed on unit 5500.  Cooperative on approach.  Evaluation effort to review events into presentation and clarify information as provided in collateral report.  Patient admit is voluntary.     Patient reports a history of depression and anxiety of greater than 20 years.  Medication management initiated at approximately age 28.  Patient has history of multiple medication  trials with or without psychotherapy.  Further history of 2 prior hospitalizations in the month of November 2020 brief duration.  Associated with depressive symptoms in the setting of suicidal ideation and insomnia.  Despite efforts of medication management, proceeded with ECT as initiated on 12/7/2020 after referral by outpatient psychiatrist.  Continue with ECT for 2 series.  Did not appear to have taper or maintenance series of ECT.  Then, initiated TMS on 4/7/2021.     Patient has been followed by Dr. Markham since initiating ECT.  Medication management has been performed in multiple.  Medications alone have been documented as ineffective.  Recent medication changes initiated in the community include start of Prozac after switch from alternate antidepressant therapy.  Initiated on 2/18 at 20 mg daily, then on 2/2540 mg daily then on 3/3 increased to 60 mg daily.       Patient presented to ED on 3/13 due to continued symptoms of depression with anxious distress.  Occurring in the setting of repeat event of inability to sleep.  Similar to prior hospitalizations.  Patient reportedly taking increasing amounts of Ambien CR with intent to sleep.  Later, making statement of not wanting to wake up.  Recommendation for inpatient psychiatric hospitalization for further assessment.     Patient denies further stressors or triggers clearly associated with presentation.  Reports taking medications as prescribed.  Symptoms of depression and anxiety continued with efforts of fluoxetine titration, leading to behavioral self-harm.  Denies plan or intent of suicide by means of Ambien CR ingestion.  Denies history of suicide attempt.  Does admit to gun access.  Agrees to removal of gun access prior to disposition.  States an appropriate crisis relapse plan.  Future oriented for family.     Past treatment reviewed in detail.  Last documented cares reviewed to include medications documented as lacking efficacy.  Further review of ECT  "documented as efficacious, due to medications alone is not effective.  Initiated ECT on 12/7/2020 after last hospitalization with last treatment in March 2021.  In April 2021, started series of TMS.  Denies benefit from TMS series.  Reports beneficial response from ECT, without significant side effect aside from mild cognition impairment.     On psychiatric review of symptoms, mood is, \"depressed.\"  Denies lindsey or hypomania.  Insomnia with low energy.  Lack of energy.  Lack of enjoyment with activities of prior interest.  Impacting activities at home and work as well as outside relationships.  Intact appetite and weight.  Intact concentration.  Negative thoughts of self.  Stating, \"end of my rope.\"  Denies tearfulness.  Endorses anxiety.  Denies psychosis.     Denies drug and alcohol use.  Denies physical issues that are new or worsening.     Consents to voluntary hospitalization to coordinate cares and medication management.  Consents to continuation of fluoxetine had decreased dosage of 40 mg daily.  Reviewed efforts of adjunct therapy.  Including, past interventions to include Seroquel, Zyprexa, Abilify for augmentation.  To target symptoms of insomnia, consents to repeat trial of Zyprexa.  Historically, associated with xerostomia.  Symptom management coordinated by her dose reduction of prior dosage and as needed medication management for symptom control.     Reviewed in further detail, indication to proceed with repeat series of ECT.  May proceed as an inpatient versus outpatient.  Patient provides knowledge of therapy.  Due to concerns of cognition, consideration to start with right unilateral placement to best preserve cognition.  If symptom severity continue, switch to bilateral placement.  May modify treatment series to best accommodate issues with work.  Patient aware of needing to have transportation and supervision if performed in the community.  Patient agrees to consider and discussed with " wife.    Please refer to history and physical as performed by this psychiatrist dated 3/15/2022 for details of presentation.       HOSPITAL COURSE   Admitted due to aforementioned presentation.  Education regarding diagnostic and treatment options with risks, benefits and alternatives and adequate verbalization of understanding.  Discussed reviewed in further detail, stressors and events leading to presentation.    Placed on routine precautions at time of admission with precautions discontinued at time of disposition.    Admitted on a voluntary basis.  Agreed to stay voluntarily coordinate cares, medication management and disposition.  Included, care coordination with outpatient psychiatrist.  Reviewed admit events and treatment plan.  Supported effort of ECT secondary to medications alone as not being effective.  Associated concerns of issues of compliance and adherence from the community.  After care coordination performed, patient requested disposition on this date of visit.  Effort of disposition initiated by wife's request.  Care is coordinated to include follow-up in the community to continue mental health management.  Patient has follow-up appointment with therapist on the following date.  Follow-up appointment with outpatient psychiatrist on 3/21.  Patient agreed to continue intentions of outpatient ECT if clinically indicated during course of medication management as an outpatient.  Patient provided information to proceed with referral from the community.    Psychiatric medication management performed in detail.  Medication management performed to target signs and symptoms principal diagnosis.  Further medication management performed to target comorbid illnesses.  Medications alone have not been effective, leading to recommendation to proceed with ECT given history of good ECT response in 2 prior episodes of illness.  Efforts of medication optimization prior to ECT initiation intentions performed.   Including, initial trial of Zyprexa as adjunct therapy to dose reduction of Prozac.  However, patient declined Zyprexa as per wife's recommendation.  Alternate therapies offered for adjunct therapy included risperidone.  Patient adherent to medication trial with subsequent symptom improvement.  Psychiatric medication management included:  -Medications: 3/15/2022: PTA medications reviewed.    Fluoxetine: Continued PTA medication as recently initiated for depression and anxiety.  Reduced dose to minimize activating effects.    Seroquel: Discontinued PTA scheduled dosing of PTA medication and continued as needed for anxiety.    Ambien CR: Discontinued PTA medication due to lack of therapeutic benefit.  -Medications:  Hospital    Risperdal: 3/16: Performed medication trial to replace Zyprexa/Seroquel for adjunct therapy and symptom management of sedation.    Gabapentin: 3/15: Performed medication trial for anxiety as needed.  3/16: Pending outcome of trial, consider scheduled dosing for augmentation.    BuSpar: 3/15: Performed medication trial for anxiety management as needed.    Vitamin D level (3/15/2022) = 43.    Zyprexa: 3/15: Performed medication retrial for adjunct therapy and symptom management of insomnia.  2/16: Discontinued retrial secondary to patient request to pursue alternate therapy.    Medication adherent with the exception of Zyprexa retrial.  Continued with PTA fluoxetine with dose reduction due to concerns of symptom exacerbation.  Continue with PTA Seroquel as needed for anxiety.  To place adjunct therapy, started on trial of risperidone.  Associated with efficacy at targeting symptoms of mood and anxiety.  No issues of tolerability reported.     At same time, recommendation to proceed with ECT given symptom severity.  Education provided having knowledge from prior episodes of illness.  Pre-ECT work-up initiated and cleared medically.  Consent signed.  Cognitive screen requested.  However, patient  "elected to withdraw consent on follow-up.  Agreed to consider resuming treatment from the community if clinically indicated after consultation with outpatient psychiatrist.  Patient aware of care coordination with provider.    On date of disposition, reported symptom improvement with mood.  Denied depression, lindsey or hypomania.  Improved ADLs of sleep.  Sleep described as primary indicator of mood state.  Improved ADLs of energy and appetite.  Intact concentration.  No behaviors.  Denies psychosis.  Denied suicide and homicide ideation.  Future oriented for family.  No gun access reported.  Crisis and labs plan reviewed in detail.    Denied physical issues that were new or worsening at time of disposition.  Demonstrated improvement of ADLs of sleep, energy and appetite.     followed regards to collecting and reviewing collateral information, care coordination and disposition planning.  Includes, verification of follow-up appointments with therapist and outpatient psychiatrist.    Despite requests to speak with patient's wife and what he is available, patient requested that this provider would not contact wife.  [place] that all questions were answered for treatment and disposition planning.       MENTAL STATUS EXAM   Vitals: /81 (BP Location: Left arm)   Pulse 80   Temp 97.6  F (36.4  C) (Oral)   Resp 18   SpO2 96%     Appearance:  Clean/neat  Mood:  \"Better\"  Affect: mood congruent  Suicidal Ideation: absent  Homicidal Ideation: absent  Thought process: normal  Thought content: Normal  Fund of Knowledge: Sufficient  Attention/Concentration: intact  Language ability: intact  Memory: recent and remote memory intact  Insight and Judgement: age appropriate  Orientation: person, place, time and situation  Psychomotor Behavior: Normal  Muscle Strength and Tone: normal  Gait and Station: normal gait and station       DISCHARGE MEDICATIONS   Discharge Medication Options:   Current Discharge " Medication List      START taking these medications    Details   risperiDONE (RISPERDAL) 1 MG tablet Take 1 tablet (1 mg) by mouth At Bedtime  Qty: 30 tablet, Refills: 0    Associated Diagnoses: Major depressive disorder, recurrent episode, severe with anxious distress (H); Insomnia, unspecified type         CONTINUE these medications which have CHANGED    Details   FLUoxetine (PROZAC) 40 MG capsule Take 1 capsule (40 mg) by mouth daily  Qty: 30 capsule, Refills: 0    Associated Diagnoses: Major depressive disorder, recurrent episode, severe with anxious distress (H)      QUEtiapine (SEROQUEL) 25 MG tablet Take 1 tablet (25 mg) by mouth 2 times daily as needed (Anxiety)  Qty: 30 tablet, Refills: 0    Associated Diagnoses: Anxiety         CONTINUE these medications which have NOT CHANGED    Details   acetylcysteine (NAC) 600 MG CAPS capsule Take 1,200 mg by mouth At Bedtime      biotin 1000 MCG TABS tablet Take 2,000 mcg by mouth daily      calcium carbonate 500 mg, elemental, (OSCAL 500) 1250 (500 Ca) MG TABS tablet Take 1 tablet by mouth daily      cyanocobalamin (VITAMIN B-12) 1000 MCG tablet Take 1,000 mcg by mouth daily      Dietary Management Product (VB6 P5P PO) Take 200 mg by mouth daily      fish oil-omega-3 fatty acids 1000 MG capsule Take 2 g by mouth daily      HEMP OIL OR EXTRACT OR OTHER CBD CANNABINOID, NOT MEDICAL CANNABIS, Take 500 mg by mouth 2 times daily      Inositol POWD 0.5-1 Scoops 2 times daily      L-Theanine 200 MG CAPS Take 200 mg by mouth 2 times daily      MAGNESIUM GLYCINATE PO Take 500 mg by mouth At Bedtime      pyridOXINE (VITAMIN B6) 100 MG TABS Take 300 mg by mouth daily      S-Adenosylmethionine (SAME) 400 MG TABS Take 600 mg by mouth At Bedtime      Selenium 100 MCG TABS Take 200 mcg by mouth daily      Testosterone Enanthate (XYOSTED) 100 MG/0.5ML SOAJ Inject 100 mg Subcutaneous once a week      Turmeric 500 MG TABS Take 500 mg by mouth 2 times daily      vitamin C (ASCORBIC  ACID) 1000 MG TABS Take 1,000 mg by mouth daily      Vitamin D3 (CHOLECALCIFEROL) 125 MCG (5000 UT) tablet Take 1 tablet by mouth daily      vitamin E 400 units TABS Take 400 Units by mouth At Bedtime      zinc 50 MG TABS Take 75 mg by mouth At Bedtime         STOP taking these medications       zolpidem ER (AMBIEN CR) 12.5 MG CR tablet Comments:   Reason for Stopping:           Medication adherence issues: MS Med Adherence Y/N: No  Medication side effects: MEDICATION SIDE EFFECTS: no side effects reported       DISCHARGE PLAN   1.  Education given regarding diagnostic and treatment options with risks, benefits and alternatives with adequate verbalization of understanding.  2.  Discharge to home as accepted by wife.  Upon detailed review of risk factors, patient amenable for release.   3.  Continue aforementioned medications and associated medication changes with follow-up by outpatient mental health provider.  4.  Crisis management planning in place.    5.  Continue efforts for sobriety.  6.  Nursing and  to review further discharge recommendations.     TOTAL TIME:  Greater than 30 minutes for discharge planning.    This note was created with help of Dragon dictation system. Grammatical / typing errors are not intentional.    William Mcnair MD

## 2022-03-17 NOTE — PLAN OF CARE
Problem: Behavioral Health Plan of Care  Goal: Absence of New-Onset Illness or Injury  Outcome: Ongoing, Progressing     Problem: Behavioral Health Plan of Care  Goal: Optimal Comfort and Wellbeing  Outcome: Ongoing, Progressing     Awakened at the beginning of the shift. RT here with CPAP. Pt unable to use CPAP d/t issue with electrical outlet in the room.  called but to fix. Patient politely decline to be moved to a different room. Up x1 to check the time. Otherwise, Safety checks executed per routine. Safety--SIB, suicide and fall precautions in place. No behavior issues this shift. Patient resting quietly with eyes closed, respirations noted even, and unlabored on all other safety checks at [facility]. No further indication to staff of being awake.  No signs of pain distress/discomfort.

## 2022-03-31 ENCOUNTER — TELEPHONE (OUTPATIENT)
Dept: PSYCHIATRY | Facility: CLINIC | Age: 52
End: 2022-03-31
Payer: COMMERCIAL

## 2022-03-31 ENCOUNTER — TRANSFERRED RECORDS (OUTPATIENT)
Dept: HEALTH INFORMATION MANAGEMENT | Facility: CLINIC | Age: 52
End: 2022-03-31
Payer: COMMERCIAL

## 2022-03-31 NOTE — TELEPHONE ENCOUNTER
Evon patient spouse called to speak with ASHLEE Meol about ECT with Dr. Murillo. Previous Dr. Markham patient

## 2022-04-01 ENCOUNTER — TRANSFERRED RECORDS (OUTPATIENT)
Dept: HEALTH INFORMATION MANAGEMENT | Facility: CLINIC | Age: 52
End: 2022-04-01
Payer: COMMERCIAL

## 2022-04-01 ENCOUNTER — TELEPHONE (OUTPATIENT)
Dept: PSYCHIATRY | Facility: CLINIC | Age: 52
End: 2022-04-01
Payer: COMMERCIAL

## 2022-04-01 NOTE — TELEPHONE ENCOUNTER
----- Message from RACHEL Catherine sent at 4/1/2022 10:26 AM CDT -----  Regarding: RE: ECT  Hello -    We have ran the benefits for this patient and no authorization is required for OP ECT.    Thanks,  Dionna   ----- Message -----  From: Lorie Hood RN  Sent: 4/1/2022   9:35 AM CDT  To: [facility]  Subject: ECT                                              Hello,       We are accepting a consult for Dr. Markham on this patient for outpatient ECT.  They are looking to start ECT sometime next week.  Can you pull benefits for him?  Thanks!    Sia

## 2022-04-01 NOTE — TELEPHONE ENCOUNTER
returned call to Evon.  She is stating that Dr. Markham is doing the ECT consult.   spoke with Dr. Villafuerte and they will accept Dr. Markham's ECT consult.         spoke with Juliana at ECT; they have not received anything from Dr. Markham's office on this patient.   called and left a message with Dr. Markham's nurse Florentin to follow up        Pre-op instructions sent to patient via [employer], as they are having this done today.        Message also sent to UR team to follow up on insurance.

## 2022-04-04 ENCOUNTER — LAB (OUTPATIENT)
Dept: URGENT CARE | Facility: URGENT CARE | Age: 52
End: 2022-04-04
Attending: PSYCHIATRY & NEUROLOGY
Payer: COMMERCIAL

## 2022-04-04 ENCOUNTER — TELEPHONE (OUTPATIENT)
Dept: PSYCHIATRY | Facility: CLINIC | Age: 52
End: 2022-04-04
Payer: COMMERCIAL

## 2022-04-04 DIAGNOSIS — F33.1 MAJOR DEPRESSIVE DISORDER, RECURRENT EPISODE, MODERATE (H): Primary | ICD-10-CM

## 2022-04-04 DIAGNOSIS — F33.1 MAJOR DEPRESSIVE DISORDER, RECURRENT EPISODE, MODERATE (H): ICD-10-CM

## 2022-04-04 LAB — SARS-COV-2 RNA RESP QL NAA+PROBE: NEGATIVE

## 2022-04-04 PROCEDURE — U0005 INFEC AGEN DETEC AMPLI PROBE: HCPCS

## 2022-04-04 PROCEDURE — U0003 INFECTIOUS AGENT DETECTION BY NUCLEIC ACID (DNA OR RNA); SEVERE ACUTE RESPIRATORY SYNDROME CORONAVIRUS 2 (SARS-COV-2) (CORONAVIRUS DISEASE [COVID-19]), AMPLIFIED PROBE TECHNIQUE, MAKING USE OF HIGH THROUGHPUT TECHNOLOGIES AS DESCRIBED BY CMS-2020-01-R: HCPCS

## 2022-04-04 NOTE — TELEPHONE ENCOUNTER
Writer called and let patient and wife know that we received pre-op and have sent it over to ECT.  They should get a call to schedule.  They are aware this is the same place Dr. Markham did ECT previously.

## 2022-04-04 NOTE — TELEPHONE ENCOUNTER
Patient's wife, Evon left messge about scheduling and ECT appointment for Wed 4/6/22, for her  Kuldeep.   Appointment was scheduled for 8:40 am.      Medications instructions were given and Evon was informed to have her  hold his medical cannibis the evening before and morning of ECT.  Patient can discuss taking cannibis with Dr. Murillo prior to his first appointment.      As patient has had ECT at our facility,  approximately within the last year, he is familiar with where  to enter and exit the building.    Covid testing order was entered by Dr. Murillo and patient will schedule a test prior to his 4/6/22 appointment.      Patient's wife verbalized understanding of instructions and was given the ECT department phone number to call back with any further questions.

## 2022-04-05 ENCOUNTER — ANESTHESIA EVENT (OUTPATIENT)
Dept: BEHAVIORAL HEALTH | Facility: CLINIC | Age: 52
End: 2022-04-05
Payer: COMMERCIAL

## 2022-04-06 ENCOUNTER — HOSPITAL ENCOUNTER (OUTPATIENT)
Dept: BEHAVIORAL HEALTH | Facility: CLINIC | Age: 52
Discharge: HOME | End: 2022-04-06
Attending: PSYCHIATRY & NEUROLOGY
Payer: COMMERCIAL

## 2022-04-06 ENCOUNTER — ANESTHESIA (OUTPATIENT)
Dept: BEHAVIORAL HEALTH | Facility: CLINIC | Age: 52
End: 2022-04-06
Payer: COMMERCIAL

## 2022-04-06 VITALS
RESPIRATION RATE: 20 BRPM | HEART RATE: 98 BPM | TEMPERATURE: 98.7 F | OXYGEN SATURATION: 94 % | DIASTOLIC BLOOD PRESSURE: 84 MMHG | SYSTOLIC BLOOD PRESSURE: 123 MMHG

## 2022-04-06 DIAGNOSIS — F33.2 MAJOR DEPRESSIVE DISORDER, RECURRENT EPISODE, SEVERE WITH ANXIOUS DISTRESS (H): Primary | ICD-10-CM

## 2022-04-06 LAB — SARS-COV-2 RNA RESP QL NAA+PROBE: NEGATIVE

## 2022-04-06 PROCEDURE — 90870 ELECTROCONVULSIVE THERAPY: CPT

## 2022-04-06 PROCEDURE — 370N000017 HC ANESTHESIA TECHNICAL FEE, PER MIN

## 2022-04-06 PROCEDURE — 250N000011 HC RX IP 250 OP 636: Performed by: PSYCHIATRY & NEUROLOGY

## 2022-04-06 PROCEDURE — 250N000011 HC RX IP 250 OP 636: Performed by: ANESTHESIOLOGY

## 2022-04-06 PROCEDURE — 250N000009 HC RX 250: Performed by: ANESTHESIOLOGY

## 2022-04-06 PROCEDURE — 87635 SARS-COV-2 COVID-19 AMP PRB: CPT | Performed by: PSYCHIATRY & NEUROLOGY

## 2022-04-06 PROCEDURE — 258N000003 HC RX IP 258 OP 636: Performed by: PSYCHIATRY & NEUROLOGY

## 2022-04-06 PROCEDURE — 90870 ELECTROCONVULSIVE THERAPY: CPT | Performed by: PSYCHIATRY & NEUROLOGY

## 2022-04-06 PROCEDURE — 250N000009 HC RX 250: Performed by: PSYCHIATRY & NEUROLOGY

## 2022-04-06 RX ORDER — ONDANSETRON 2 MG/ML
4 INJECTION INTRAMUSCULAR; INTRAVENOUS EVERY 30 MIN PRN
Status: DISCONTINUED | OUTPATIENT
Start: 2022-04-06 | End: 2022-04-07 | Stop reason: HOSPADM

## 2022-04-06 RX ORDER — GLYCOPYRROLATE 0.2 MG/ML
0.2 INJECTION, SOLUTION INTRAMUSCULAR; INTRAVENOUS
Status: CANCELLED
Start: 2022-04-06 | End: 2022-04-06

## 2022-04-06 RX ORDER — ONDANSETRON 2 MG/ML
4 INJECTION INTRAMUSCULAR; INTRAVENOUS
Status: COMPLETED | OUTPATIENT
Start: 2022-04-06 | End: 2022-04-06

## 2022-04-06 RX ORDER — KETOROLAC TROMETHAMINE 30 MG/ML
30 INJECTION, SOLUTION INTRAMUSCULAR; INTRAVENOUS ONCE
Status: CANCELLED
Start: 2022-04-06 | End: 2022-04-06

## 2022-04-06 RX ORDER — ACETAMINOPHEN 500 MG
1000 TABLET ORAL
Status: CANCELLED
Start: 2022-04-06 | End: 2022-04-06

## 2022-04-06 RX ORDER — CAFFEINE AND SODIUM BENZOATE 125 MG/ML
500 INJECTION, SOLUTION INTRAMUSCULAR; INTRAVENOUS
Status: CANCELLED
Start: 2022-04-06 | End: 2022-04-06

## 2022-04-06 RX ORDER — GLYCOPYRROLATE 0.2 MG/ML
0.2 INJECTION, SOLUTION INTRAMUSCULAR; INTRAVENOUS
Status: COMPLETED | OUTPATIENT
Start: 2022-04-06 | End: 2022-04-06

## 2022-04-06 RX ORDER — LABETALOL HYDROCHLORIDE 5 MG/ML
10 INJECTION, SOLUTION INTRAVENOUS
Status: DISCONTINUED | OUTPATIENT
Start: 2022-04-06 | End: 2022-04-07 | Stop reason: HOSPADM

## 2022-04-06 RX ORDER — ONDANSETRON 4 MG/1
4 TABLET, ORALLY DISINTEGRATING ORAL EVERY 30 MIN PRN
Status: DISCONTINUED | OUTPATIENT
Start: 2022-04-06 | End: 2022-04-07 | Stop reason: HOSPADM

## 2022-04-06 RX ORDER — KETOROLAC TROMETHAMINE 30 MG/ML
30 INJECTION, SOLUTION INTRAMUSCULAR; INTRAVENOUS ONCE
Status: DISCONTINUED | OUTPATIENT
Start: 2022-04-06 | End: 2022-04-07 | Stop reason: HOSPADM

## 2022-04-06 RX ORDER — CAFFEINE AND SODIUM BENZOATE 125 MG/ML
500 INJECTION, SOLUTION INTRAMUSCULAR; INTRAVENOUS
Status: ACTIVE | OUTPATIENT
Start: 2022-04-06 | End: 2022-04-06

## 2022-04-06 RX ORDER — ONDANSETRON 2 MG/ML
4 INJECTION INTRAMUSCULAR; INTRAVENOUS ONCE
Status: DISCONTINUED | OUTPATIENT
Start: 2022-04-06 | End: 2022-04-07 | Stop reason: HOSPADM

## 2022-04-06 RX ORDER — SODIUM CHLORIDE, SODIUM LACTATE, POTASSIUM CHLORIDE, CALCIUM CHLORIDE 600; 310; 30; 20 MG/100ML; MG/100ML; MG/100ML; MG/100ML
INJECTION, SOLUTION INTRAVENOUS CONTINUOUS
Status: DISCONTINUED | OUTPATIENT
Start: 2022-04-06 | End: 2022-04-07 | Stop reason: HOSPADM

## 2022-04-06 RX ORDER — ACETAMINOPHEN 500 MG
1000 TABLET ORAL
Status: ACTIVE | OUTPATIENT
Start: 2022-04-06 | End: 2022-04-06

## 2022-04-06 RX ORDER — KETOROLAC TROMETHAMINE 30 MG/ML
30 INJECTION, SOLUTION INTRAMUSCULAR; INTRAVENOUS
Status: COMPLETED | OUTPATIENT
Start: 2022-04-06 | End: 2022-04-06

## 2022-04-06 RX ORDER — KETOROLAC TROMETHAMINE 30 MG/ML
30 INJECTION, SOLUTION INTRAMUSCULAR; INTRAVENOUS
Status: CANCELLED
Start: 2022-04-06 | End: 2022-04-06

## 2022-04-06 RX ORDER — ONDANSETRON 2 MG/ML
4 INJECTION INTRAMUSCULAR; INTRAVENOUS
Status: CANCELLED
Start: 2022-04-06 | End: 2022-04-06

## 2022-04-06 RX ORDER — ONDANSETRON 2 MG/ML
4 INJECTION INTRAMUSCULAR; INTRAVENOUS ONCE
Status: CANCELLED
Start: 2022-04-06 | End: 2022-04-06

## 2022-04-06 RX ADMIN — METHOHEXITAL SODIUM 100 MG: 500 INJECTION, POWDER, LYOPHILIZED, FOR SOLUTION INTRAMUSCULAR; INTRAVENOUS; RECTAL at 09:31

## 2022-04-06 RX ADMIN — GLYCOPYRROLATE 0.2 MG: 0.2 INJECTION, SOLUTION INTRAMUSCULAR; INTRAVENOUS at 09:16

## 2022-04-06 RX ADMIN — SODIUM CHLORIDE, POTASSIUM CHLORIDE, SODIUM LACTATE AND CALCIUM CHLORIDE 500 ML: 600; 310; 30; 20 INJECTION, SOLUTION INTRAVENOUS at 09:18

## 2022-04-06 RX ADMIN — MIDAZOLAM HYDROCHLORIDE 2 MG: 1 INJECTION, SOLUTION INTRAMUSCULAR; INTRAVENOUS at 09:36

## 2022-04-06 RX ADMIN — Medication 100 MG: at 09:32

## 2022-04-06 RX ADMIN — KETOROLAC TROMETHAMINE 30 MG: 30 INJECTION, SOLUTION INTRAMUSCULAR at 09:17

## 2022-04-06 RX ADMIN — ONDANSETRON 4 MG: 2 INJECTION INTRAMUSCULAR; INTRAVENOUS at 09:17

## 2022-04-06 ASSESSMENT — LIFESTYLE VARIABLES: TOBACCO_USE: 0

## 2022-04-06 NOTE — PROCEDURES
Procedures  Kuldeep Sequeira is a 52 year old  year old male patient.  [id number]  @DX@    [facility] [place]   ECT Procedure Note   04/06/2022    Patient Status: Outpatient    Is this the first in a series of 12 treatments?  Yes   No Known Allergies    Weight:  0 lbs 0 oz         Indications for ECT:     History of good ECT response in one or more previous episodes of illness         Clinical Narrative:     This is a 52 year old male with history of depression, anxiety and insomnia.  Patient has prior history of psychiatric hospitalization on at least 2 previous occasions, but does not have past history of MI CD treatment or suicide attempt history.  Now, presenting secondary to worsening of depressive symptoms leading to overdose of uncertain intent with sleep medication.  Occurring in the setting of medication changes or depression by outpatient psychiatrist.  Admit is voluntary.     Care coordination performed in detail.  Includes, detailed review of care everywhere and epic to review electronic chart information and related mental health history to presentation.  Including, patient's ED presentation to [facility] on 3/13 due to depression and overdose.  Further review of patient's past hospitalization from November 2020.  Subsequent review of ECT documentation available for review.  Please see associated documentation for details.     In brief, patient presented to [facility] ED on 3/13 secondary to worsening of depression and subsequent overdose.  Reports worsening of depressive symptoms for at least the past 2 years with efforts of management to include medications, therapy, ECT and TMS.  Most recent efforts of management in the community through outpatient psychiatrist include initiation of fluoxetine on 2/18 at 20 mg daily with titration on 2/25 to 40 mg and further titration on 3/3 to 60 mg daily.  Continued on Seroquel.  Despite efforts of medication management in  the community, continued to experience symptoms of depression.  According to collateral as obtained from wife, BEC assessment, patient demonstrating worsening of symptoms especially within the past week.  Isolating in behavior.  Laying in bed.  Decreased appetite with weight loss.  Expressing suicidal ideation and demonstrated behavior leading to admission.  Poor self cares.  Recommendation for inpatient psychiatric hospitalization voluntarily.     Further care coordination performed in detail.  Includes, detailed review of care everywhere.  Patient has documentation unable to be seen on epic viewer.  Specifically, documentations from past ECT for first hospitalization.  Able to review hospitalization from 11/18 through 11/19/2020.  Admitted due to depression, insomnia with suicidal ideation.  PTA medications continue to include venlafaxine 75 mg daily, quetiapine 200 mg p.o. nightly, with medication changes to include Ambien to as needed and addition of doxepin for sleep.     Further care coordination performed.  Referral for ECT initiated on 12/1/2020 by PCP.  Outpatient consult reviewed by Dr. Maher dated 12/4/2020.  Please see consult for full details.  Medically clear for ECT after providing informed consent to treat.  ECT series #1 initiated on 12/7/2020.     Further care coordination performed.  On 3/22/2021, completed second series of ECT.  Medications documented as ineffective.  Effexor XR increased to 225 mg daily and Abilify added.  Considered switch from Effexor XR to Viibryd due to sexual side effects.     Upon patient interview, patient review performed on unit 5500.  Cooperative on approach.  Evaluation effort to review events into presentation and clarify information as provided in collateral report.  Patient admit is voluntary.     Patient reports a history of depression and anxiety of greater than 20 years.  Medication management initiated at approximately age 28.  Patient has history of  multiple medication trials with or without psychotherapy.  Further history of 2 prior hospitalizations in the month of November 2020 brief duration.  Associated with depressive symptoms in the setting of suicidal ideation and insomnia.  Despite efforts of medication management, proceeded with ECT as initiated on 12/7/2020 after referral by outpatient psychiatrist.  Continue with ECT for 2 series.  Did not appear to have taper or maintenance series of ECT.  Then, initiated TMS on 4/7/2021.     Patient has been followed by Dr. Markham since initiating ECT.  Medication management has been performed in multiple.  Medications alone have been documented as ineffective.  Recent medication changes initiated in the community include start of Prozac after switch from alternate antidepressant therapy.  Initiated on 2/18 at 20 mg daily, then on 2/2540 mg daily then on 3/3 increased to 60 mg daily.       Patient presented to ED on 3/13 due to continued symptoms of depression with anxious distress.  Occurring in the setting of repeat event of inability to sleep.  Similar to prior hospitalizations.  Patient reportedly taking increasing amounts of Ambien CR with intent to sleep.  Later, making statement of not wanting to wake up.  Recommendation for inpatient psychiatric hospitalization for further assessment.     Patient denies further stressors or triggers clearly associated with presentation.  Reports taking medications as prescribed.  Symptoms of depression and anxiety continued with efforts of fluoxetine titration, leading to behavioral self-harm.  Denies plan or intent of suicide by means of Ambien CR ingestion.  Denies history of suicide attempt.  Does admit to gun access.  Agrees to removal of gun access prior to disposition.  States an appropriate crisis relapse plan.  Future oriented for family.     Past treatment reviewed in detail.  Last documented cares reviewed to include medications documented as lacking efficacy.  " Further review of ECT documented as efficacious, due to medications alone is not effective.  Initiated ECT on 12/7/2020 after last hospitalization with last treatment in March 2021.  In April 2021, started series of TMS.  Denies benefit from TMS series.  Reports beneficial response from ECT, without significant side effect aside from mild cognition impairment.     On psychiatric review of symptoms, mood is, \"depressed.\"  Denies lindsey or hypomania.  Insomnia with low energy.  Lack of energy.  Lack of enjoyment with activities of prior interest.  Impacting activities at home and work as well as outside relationships.  Intact appetite and weight.  Intact concentration.  Negative thoughts of self.  Stating, \"end of my rope.\"  Denies tearfulness.  Endorses anxiety.  Denies psychosis.         Diagnosis:     Major depression         ECT Log:   #1 4/6/22 Consent is obtained for the procedure. Starting mood is 0/10 (10 being the best). Passive death wishes. Denies intent to kill self when took extra ambien 'to sleep' and was hospitalized. Is on CBD/THC 16/1 in am and 1/1 pm for sleep.          Pause for the Cause:     Right patient Yes   Right procedure/laterality settings: Yes          Intra-Procedure Documentation:       ECT number at [facility]: 1+   Treatment number this series: 1   Lifetime total treatment number: 15+     Type of ECT:  Right, unilateral ultrabrief    ECT Medications:    Toradol: 30mg  Zofran: 4mg  Zyprexa Zydis: 10mg  Brevital: 100 mg  Succinyl Choline: 100 mg  Versed: 2 mg    ECT Strip Summary:   Energy Level: Titration:  19.2 mC; 0.3 msec; 20 Hz; 2 sec; 800 mA  Motor Seizure Duration: 68 seconds  EEG Seizure Duration: 84 seconds    Complications: No    Time for re-orientation    Plan:   Continue RUL UBP ECT Q MWF at 115.2 mC  Hold CBD/THC on morning of ECT  Monitor depression severity with clinical assessment augmented with IDS-SR every 3rd treatment  Continue current medications    Will likely need to " discuss maintenance ECT when completing this course. Also, is a good candidate for VNS (this is his 3rd course of ECT).     Dontrell Murillo MD  Professor and Executive    Department Psychiatry and Behavioral Sciences

## 2022-04-06 NOTE — ANESTHESIA CARE TRANSFER NOTE
Patient: Kuldeep Sequeira    Procedure: * No procedures listed *  Electroconvulsive Therapy    Diagnosis: * No pre-op diagnosis entered *  Diagnosis Additional Information: No value filed.    Anesthesia Type:   General     Note:    Oropharynx: nasal airway in place and spontaneously breathing  Level of Consciousness: awake  Oxygen Supplementation: room air    Independent Airway: airway patency satisfactory and stable  Dentition: dentition unchanged  Vital Signs Stable: post-procedure vital signs reviewed and stable  Report to RN Given: handoff report given  Patient transferred to: PACU    Handoff Report: Identifed the Patient, Identified the Reponsible Provider, Reviewed the pertinent medical history, Discussed the surgical course, Reviewed Intra-OP anesthesia mangement and issues during anesthesia, Set expectations for post-procedure period and Allowed opportunity for questions and acknowledgement of understanding      Vitals:  Vitals Value Taken Time   BP     Temp     Pulse     Resp     SpO2         Electronically Signed By: Annalise Ferro MD  April 6, 2022  9:45 AM

## 2022-04-06 NOTE — DISCHARGE INSTRUCTIONS
ECT Discharge Instructions      During your ECT series:    If you have had more than 1 treatment within a week, Do Not drive until 7 days after your last treatment.    Do not drink alcohol or use street drugs (illicit drugs) while you are having treatments.    Do not make any important decisions, including legal decisions, while having treatments.      After each treatment:    If you have 1 or less treatments within a week, Do Not drive for 24 hours after each ECT treatment.    Get plenty of rest.     A responsible adult must stay with your for at least 6 hours.    Avoid heavy or risky activities for 24 hours.    If you feel light-headed, sit for a few minutes before standing. Have someone  help you get up.    If you have nausea (feel sick to your stomach): Drink only clear liquids such as apple juice, ginger ale, broth or 7UP, be sure to drink plenty of liquids. Move to a  normal diet as you feel able.       If you received Toradol, wait 6 hours before taking ibuprofen, motrin, Advil, Aleve or any NSAID.    Call your doctor if:     You have a fever over 100F (37.7 C) (taken under the tongue), or a fever that last more than 24 hours.    Your IV site is red, swollen, very painful or is getting more tender.    You have nausea that gets very bad or does not improve.    Before your next treatment:    If you have any symptoms after ECT, be sure to tell our staff before your next treatment.         To speak to a doctor, call Dr. Murillo:  Office phone # [phone]; Fax phone # [phone]      New prescriptions Please hold the CBD/THCTHE NIGHT BEFORE AND MORNING OF ECT.  May take after ECT.      Other: Toradol 30mg today at 9:00 am.  Toradol is an NSAID.  Do not take any NSAID's including: Ibuprofen, Naproxen, Aspirin, Advil, Motrin, Aleve, Rodrick, etc., until 6 hours after the above time 3:00 pm.  If you have any questions, contact your physician or pharmacist.      You also had Versed 2 mg IV at 9:36 am, Robinul 0.2 mg  IV at 9:16 am, and Zofran 4 mg IV at 9:17 am.      Transported by: Wife-Evon       Covid-19 Testing Instructions:You had a covid-19 test done today in the ECT department and we will have results by your next ECT appointment.       Drop Off Instructions: Please come to the [facility] entrance and check-in with Security before your ECT appointment.  The [facility] entrance is located at [address] [place] [address], which is on the West side of our campus.  Our security personnel will notify our staff so we may escort you to the ECT department.     Instructions: After your appointment, you may be picked up at the [facility] entrance, which is located at [address] [place] [address], about 1 block North of the [facility] entrance. You will be given a set of discharge instructions to take home with you and an ECT staff person will transport you to the entrance of the [facility] to meet your ride.        The ECT Department can be reached at [phone].  The ECT Department is  open Mondays, Wednesdays and Fridays from 7:00 AM to 2:00 PM.  Our Fax number is [phone]

## 2022-04-06 NOTE — PROCEDURES
Procedures  Kuldeep Sequeira is a 52 year old  year old male patient.  [id number]  @DX@    [facility], [place]   ECT Procedure Note   04/06/2022    Patient Status: Outpatient    Is this the first in a series of 12 treatments?  Yes   No Known Allergies    Weight:  0 lbs 0 oz         Indications for ECT:     History of good ECT response in one or more previous episodes of illness         Clinical Narrative:     This is a 52 year old male with history of depression, anxiety and insomnia.  Patient has prior history of psychiatric hospitalization on at least 2 previous occasions, but does not have past history of MI CD treatment or suicide attempt history.  Now, presenting secondary to worsening of depressive symptoms leading to overdose of uncertain intent with sleep medication.  Occurring in the setting of medication changes or depression by outpatient psychiatrist.  Admit is voluntary.     Care coordination performed in detail.  Includes, detailed review of care everywhere and epic to review electronic chart information and related mental health history to presentation.  Including, patient's ED presentation to [facility] on 3/13 due to depression and overdose.  Further review of patient's past hospitalization from November 2020.  Subsequent review of ECT documentation available for review.  Please see associated documentation for details.     In brief, patient presented to [facility] ED on 3/13 secondary to worsening of depression and subsequent overdose.  Reports worsening of depressive symptoms for at least the past 2 years with efforts of management to include medications, therapy, ECT and TMS.  Most recent efforts of management in the community through outpatient psychiatrist include initiation of fluoxetine on 2/18 at 20 mg daily with titration on 2/25 to 40 mg and further titration on 3/3 to 60 mg daily.  Continued on Seroquel.  Despite efforts of medication management in  the community, continued to experience symptoms of depression.  According to collateral as obtained from wife, BEC assessment, patient demonstrating worsening of symptoms especially within the past week.  Isolating in behavior.  Laying in bed.  Decreased appetite with weight loss.  Expressing suicidal ideation and demonstrated behavior leading to admission.  Poor self cares.  Recommendation for inpatient psychiatric hospitalization voluntarily.     Further care coordination performed in detail.  Includes, detailed review of care everywhere.  Patient has documentation unable to be seen on epic viewer.  Specifically, documentations from past ECT for first hospitalization.  Able to review hospitalization from 11/18 through 11/19/2020.  Admitted due to depression, insomnia with suicidal ideation.  PTA medications continue to include venlafaxine 75 mg daily, quetiapine 200 mg p.o. nightly, with medication changes to include Ambien to as needed and addition of doxepin for sleep.     Further care coordination performed.  Referral for ECT initiated on 12/1/2020 by PCP.  Outpatient consult reviewed by Dr. Maher dated 12/4/2020.  Please see consult for full details.  Medically clear for ECT after providing informed consent to treat.  ECT series #1 initiated on 12/7/2020.     Further care coordination performed.  On 3/22/2021, completed second series of ECT.  Medications documented as ineffective.  Effexor XR increased to 225 mg daily and Abilify added.  Considered switch from Effexor XR to Viibryd due to sexual side effects.     Upon patient interview, patient review performed on unit 5500.  Cooperative on approach.  Evaluation effort to review events into presentation and clarify information as provided in collateral report.  Patient admit is voluntary.     Patient reports a history of depression and anxiety of greater than 20 years.  Medication management initiated at approximately age 28.  Patient has history of  multiple medication trials with or without psychotherapy.  Further history of 2 prior hospitalizations in the month of November 2020 brief duration.  Associated with depressive symptoms in the setting of suicidal ideation and insomnia.  Despite efforts of medication management, proceeded with ECT as initiated on 12/7/2020 after referral by outpatient psychiatrist.  Continue with ECT for 2 series.  Did not appear to have taper or maintenance series of ECT.  Then, initiated TMS on 4/7/2021.     Patient has been followed by Dr. Markham since initiating ECT.  Medication management has been performed in multiple.  Medications alone have been documented as ineffective.  Recent medication changes initiated in the community include start of Prozac after switch from alternate antidepressant therapy.  Initiated on 2/18 at 20 mg daily, then on 2/2540 mg daily then on 3/3 increased to 60 mg daily.       Patient presented to ED on 3/13 due to continued symptoms of depression with anxious distress.  Occurring in the setting of repeat event of inability to sleep.  Similar to prior hospitalizations.  Patient reportedly taking increasing amounts of Ambien CR with intent to sleep.  Later, making statement of not wanting to wake up.  Recommendation for inpatient psychiatric hospitalization for further assessment.     Patient denies further stressors or triggers clearly associated with presentation.  Reports taking medications as prescribed.  Symptoms of depression and anxiety continued with efforts of fluoxetine titration, leading to behavioral self-harm.  Denies plan or intent of suicide by means of Ambien CR ingestion.  Denies history of suicide attempt.  Does admit to gun access.  Agrees to removal of gun access prior to disposition.  States an appropriate crisis relapse plan.  Future oriented for family.     Past treatment reviewed in detail.  Last documented cares reviewed to include medications documented as lacking efficacy.  " Further review of ECT documented as efficacious, due to medications alone is not effective.  Initiated ECT on 12/7/2020 after last hospitalization with last treatment in March 2021.  In April 2021, started series of TMS.  Denies benefit from TMS series.  Reports beneficial response from ECT, without significant side effect aside from mild cognition impairment.     On psychiatric review of symptoms, mood is, \"depressed.\"  Denies lindsey or hypomania.  Insomnia with low energy.  Lack of energy.  Lack of enjoyment with activities of prior interest.  Impacting activities at home and work as well as outside relationships.  Intact appetite and weight.  Intact concentration.  Negative thoughts of self.  Stating, \"end of my rope.\"  Denies tearfulness.  Endorses anxiety.  Denies psychosis.         Diagnosis:     Major depression         ECT Log:   #1 4/6/22 Consent is obtained for the procedure. Starting mood is 0/10 (10 being the best). Passive death wishes. Denies intent to kill self when took extra ambien 'to sleep' and was hospitalized. Is on CBD/THC 16/1 in am and 1/1 pm for sleep.          Pause for the Cause:     Right patient Yes   Right procedure/laterality settings: Yes          Intra-Procedure Documentation:       ECT number at [facility]: 1+   Treatment number this series: 1   Lifetime total treatment number: 15+     Type of ECT:  Right, unilateral ultrabrief    ECT Medications:    Toradol: 30mg  Zofran: 4mg  Zyprexa Zydis: 10mg  Brevital: 100 mg  Succinyl Choline: 100 mg  Versed: 2 mg    ECT Strip Summary:   Energy Level: Titration:  19.2 mC; 0.3 msec; 20 Hz; 2 sec; 800 mA  Motor Seizure Duration: 68 seconds  EEG Seizure Duration: 84 seconds    Complications: No    Time for re-orientation    Plan:   Continue RUL UBP ECT Q MWF at 115.2 mC  Hold CBD/THC on morning of ECT and evening prior. May take his Evening dose the night of ECT and morning dose the day after ECT  Monitor depression severity with clinical assessment " augmented with IDS-SR every 3rd treatment  Continue current medications    Will likely need to discuss maintenance ECT when completing this course. Also, is a good candidate for VNS (this is his 3rd course of ECT).     Dontrell Murillo MD  Professor and Executive    Department Psychiatry and Behavioral Sciences

## 2022-04-06 NOTE — ANESTHESIA PREPROCEDURE EVALUATION
Anesthesia Pre-Procedure Evaluation    Patient: Kuldeep Sequeira   MRN: [id number] : 1970        Procedure :   Electroconvulsive Therapy       Past Medical History:   Diagnosis Date     Anxiety      Depressive disorder      GERD (gastroesophageal reflux disease)       No past surgical history on file.   No Known Allergies   Social History     Tobacco Use     Smoking status: Never Smoker     Smokeless tobacco: Never Used   Substance Use Topics     Alcohol use: Not on file      Wt Readings from Last 1 Encounters:   22 83.5 kg (184 lb)        Anesthesia Evaluation   Pt has had prior anesthetic. Type: General.        ROS/MED HX  ENT/Pulmonary:     (+) sleep apnea, uses CPAP,  (-) tobacco use   Neurologic:       Cardiovascular:       METS/Exercise Tolerance:     Hematologic:       Musculoskeletal:       GI/Hepatic:       Renal/Genitourinary:       Endo:       Psychiatric/Substance Use:     (+) psychiatric history depression     Infectious Disease:       Malignancy:       Other:            Physical Exam    Airway        Mallampati: II   TM distance: > 3 FB   Neck ROM: full   Mouth opening: > 3 cm    Respiratory Devices and Support         Dental  no notable dental history         Cardiovascular   cardiovascular exam normal       Rhythm and rate: regular and normal     Pulmonary   pulmonary exam normal        breath sounds clear to auscultation           OUTSIDE LABS:  CBC:   Lab Results   Component Value Date    WBC 5.5 2022    WBC 5.3 2022    HGB 16.4 2022    HGB 17.7 2022    HCT 48.0 2022    HCT 50.8 2022     2022     2022     BMP:   Lab Results   Component Value Date     2022     2022    POTASSIUM 3.6 2022    POTASSIUM 4.6 2022    CHLORIDE 98 2022    CHLORIDE 103 2022    CO2 30 2022    CO2 29 2022    BUN 10 2022    BUN 16 2022    CR 0.97 2022    CR 1.08 2022    GLC  93 03/13/2022     (H) 03/11/2022     COAGS: No results found for: PTT, INR, FIBR  POC: No results found for: BGM, HCG, HCGS  HEPATIC:   Lab Results   Component Value Date    ALBUMIN 3.4 03/13/2022    PROTTOTAL 6.4 (L) 03/13/2022    ALT 33 03/13/2022    AST 22 03/13/2022    ALKPHOS 49 03/13/2022    BILITOTAL 0.8 03/13/2022     OTHER:   Lab Results   Component Value Date    RANJITH 8.5 03/13/2022    TSH 1.51 03/11/2022       Anesthesia Plan    ASA Status:  3   NPO Status:  NPO Appropriate    Anesthesia Type: General.     - Airway: Native airway              Consents         - Extended Intubation/Ventilatory Support Discussed: No.      - Patient is DNR/DNI Status: No    Use of blood products discussed: No .     Postoperative Care            Comments:           H&P reviewed: Unable to attach H&P to encounter due to EHR limitations. H&P Update: appropriate H&P reviewed, patient examined. No interval changes since H&P (within 30 days).         MD Eva Minor MD

## 2022-04-08 ENCOUNTER — HOSPITAL ENCOUNTER (OUTPATIENT)
Dept: BEHAVIORAL HEALTH | Facility: CLINIC | Age: 52
Discharge: HOME | End: 2022-04-08
Attending: PSYCHIATRY & NEUROLOGY
Payer: COMMERCIAL

## 2022-04-08 ENCOUNTER — ANESTHESIA (OUTPATIENT)
Dept: BEHAVIORAL HEALTH | Facility: CLINIC | Age: 52
End: 2022-04-08
Payer: COMMERCIAL

## 2022-04-08 ENCOUNTER — ANESTHESIA EVENT (OUTPATIENT)
Dept: BEHAVIORAL HEALTH | Facility: CLINIC | Age: 52
End: 2022-04-08
Payer: COMMERCIAL

## 2022-04-08 VITALS
OXYGEN SATURATION: 95 % | DIASTOLIC BLOOD PRESSURE: 89 MMHG | TEMPERATURE: 97.7 F | RESPIRATION RATE: 20 BRPM | SYSTOLIC BLOOD PRESSURE: 130 MMHG | HEART RATE: 89 BPM

## 2022-04-08 DIAGNOSIS — F33.2 MAJOR DEPRESSIVE DISORDER, RECURRENT EPISODE, SEVERE WITH ANXIOUS DISTRESS (H): Primary | ICD-10-CM

## 2022-04-08 LAB — SARS-COV-2 RNA RESP QL NAA+PROBE: NEGATIVE

## 2022-04-08 PROCEDURE — 90870 ELECTROCONVULSIVE THERAPY: CPT | Performed by: PSYCHIATRY & NEUROLOGY

## 2022-04-08 PROCEDURE — 250N000011 HC RX IP 250 OP 636: Performed by: ANESTHESIOLOGY

## 2022-04-08 PROCEDURE — 258N000003 HC RX IP 258 OP 636: Performed by: PSYCHIATRY & NEUROLOGY

## 2022-04-08 PROCEDURE — 90870 ELECTROCONVULSIVE THERAPY: CPT

## 2022-04-08 PROCEDURE — 250N000011 HC RX IP 250 OP 636: Performed by: PSYCHIATRY & NEUROLOGY

## 2022-04-08 PROCEDURE — 250N000009 HC RX 250: Performed by: PSYCHIATRY & NEUROLOGY

## 2022-04-08 PROCEDURE — 250N000009 HC RX 250: Performed by: ANESTHESIOLOGY

## 2022-04-08 PROCEDURE — 87635 SARS-COV-2 COVID-19 AMP PRB: CPT | Performed by: PSYCHIATRY & NEUROLOGY

## 2022-04-08 PROCEDURE — 370N000017 HC ANESTHESIA TECHNICAL FEE, PER MIN

## 2022-04-08 RX ORDER — LABETALOL HYDROCHLORIDE 5 MG/ML
INJECTION, SOLUTION INTRAVENOUS PRN
Status: DISCONTINUED | OUTPATIENT
Start: 2022-04-08 | End: 2022-04-08

## 2022-04-08 RX ORDER — KETOROLAC TROMETHAMINE 30 MG/ML
30 INJECTION, SOLUTION INTRAMUSCULAR; INTRAVENOUS
Status: COMPLETED | OUTPATIENT
Start: 2022-04-08 | End: 2022-04-08

## 2022-04-08 RX ORDER — VENLAFAXINE 75 MG/1
150 TABLET ORAL DAILY
COMMUNITY

## 2022-04-08 RX ORDER — ONDANSETRON 2 MG/ML
4 INJECTION INTRAMUSCULAR; INTRAVENOUS
Status: CANCELLED
Start: 2022-04-08 | End: 2022-04-08

## 2022-04-08 RX ORDER — ACETAMINOPHEN 500 MG
1000 TABLET ORAL
Status: CANCELLED
Start: 2022-04-08 | End: 2022-04-08

## 2022-04-08 RX ORDER — GLYCOPYRROLATE 0.2 MG/ML
0.2 INJECTION, SOLUTION INTRAMUSCULAR; INTRAVENOUS
Status: CANCELLED
Start: 2022-04-08 | End: 2022-04-08

## 2022-04-08 RX ORDER — GLYCOPYRROLATE 0.2 MG/ML
0.2 INJECTION, SOLUTION INTRAMUSCULAR; INTRAVENOUS
Status: COMPLETED | OUTPATIENT
Start: 2022-04-08 | End: 2022-04-08

## 2022-04-08 RX ORDER — ONDANSETRON 2 MG/ML
4 INJECTION INTRAMUSCULAR; INTRAVENOUS
Status: COMPLETED | OUTPATIENT
Start: 2022-04-08 | End: 2022-04-08

## 2022-04-08 RX ORDER — ACETAMINOPHEN 500 MG
1000 TABLET ORAL
Status: ACTIVE | OUTPATIENT
Start: 2022-04-08 | End: 2022-04-08

## 2022-04-08 RX ORDER — KETOROLAC TROMETHAMINE 30 MG/ML
30 INJECTION, SOLUTION INTRAMUSCULAR; INTRAVENOUS
Status: CANCELLED
Start: 2022-04-08 | End: 2022-04-08

## 2022-04-08 RX ADMIN — LABETALOL HYDROCHLORIDE 10 MG: 5 INJECTION, SOLUTION INTRAVENOUS at 10:10

## 2022-04-08 RX ADMIN — Medication 100 MG: at 10:11

## 2022-04-08 RX ADMIN — ONDANSETRON 4 MG: 2 INJECTION INTRAMUSCULAR; INTRAVENOUS at 10:00

## 2022-04-08 RX ADMIN — SODIUM CHLORIDE, POTASSIUM CHLORIDE, SODIUM LACTATE AND CALCIUM CHLORIDE 500 ML: 600; 310; 30; 20 INJECTION, SOLUTION INTRAVENOUS at 10:00

## 2022-04-08 RX ADMIN — GLYCOPYRROLATE 0.2 MG: 0.2 INJECTION, SOLUTION INTRAMUSCULAR; INTRAVENOUS at 09:59

## 2022-04-08 RX ADMIN — MIDAZOLAM HYDROCHLORIDE 2 MG: 1 INJECTION, SOLUTION INTRAMUSCULAR; INTRAVENOUS at 10:18

## 2022-04-08 RX ADMIN — KETOROLAC TROMETHAMINE 30 MG: 30 INJECTION, SOLUTION INTRAMUSCULAR at 10:02

## 2022-04-08 RX ADMIN — METHOHEXITAL SODIUM 100 MG: 500 INJECTION, POWDER, LYOPHILIZED, FOR SOLUTION INTRAMUSCULAR; INTRAVENOUS; RECTAL at 10:11

## 2022-04-08 NOTE — ANESTHESIA POSTPROCEDURE EVALUATION
Patient: Kuldeep Sequeira    Procedure: * No procedures listed *  Electroconvulsive Therapy    Anesthesia Type:  General    Note:  Disposition: Outpatient   Postop Pain Control: Uneventful            Sign Out: Well controlled pain   PONV: No   Neuro/Psych: Uneventful            Sign Out: Acceptable/Baseline neuro status   Airway/Respiratory: Uneventful            Sign Out: Acceptable/Baseline resp. status   CV/Hemodynamics: Uneventful            Sign Out: Acceptable CV status; No obvious hypovolemia; No obvious fluid overload   Other NRE: NONE   DID A NON-ROUTINE EVENT OCCUR? No           Last vitals:  Vitals Value Taken Time   BP     Temp     Pulse 95 04/08/22 1030   Resp 20 04/08/22 1030   SpO2 97 % 04/08/22 1030       Electronically Signed By: Alena Irvin MD  April 8, 2022  10:45 AM

## 2022-04-08 NOTE — ANESTHESIA CARE TRANSFER NOTE
Patient: Kuldeep Sequeira    Procedure: * No procedures listed *  Electroconvulsive Therapy    Diagnosis: * No pre-op diagnosis entered *  Diagnosis Additional Information: No value filed.    Anesthesia Type:   General     Note:    Oropharynx: oropharynx clear of all foreign objects and nasal airway in place  Level of Consciousness: drowsy  Oxygen Supplementation: face mask    Independent Airway: airway patency satisfactory and stable  Dentition: dentition unchanged  Vital Signs Stable: post-procedure vital signs reviewed and stable  Report to RN Given: handoff report given  Patient transferred to: PACU    Handoff Report: Identifed the Patient, Identified the Reponsible Provider, Reviewed the pertinent medical history, Discussed the surgical course, Reviewed Intra-OP anesthesia mangement and issues during anesthesia, Set expectations for post-procedure period and Allowed opportunity for questions and acknowledgement of understanding      Vitals:  Vitals Value Taken Time   BP     Temp     Pulse     Resp     SpO2         Electronically Signed By: Alena Irvin MD  April 8, 2022  10:29 AM

## 2022-04-08 NOTE — PROCEDURES
Procedures  Kuldeep Sequeira is a 52 year old  year old male patient.  [id number]  @DX@    [facility], [place]   ECT Procedure Note   04/08/2022    Patient Status: Outpatient    Is this the first in a series of 12 treatments?  No   No Known Allergies    Weight:  0 lbs 0 oz         Indications for ECT:     History of good ECT response in one or more previous episodes of illness         Clinical Narrative:     This is a 52 year old male with history of depression, anxiety and insomnia.  Patient has prior history of psychiatric hospitalization on at least 2 previous occasions, but does not have past history of MI CD treatment or suicide attempt history.  Now, presenting secondary to worsening of depressive symptoms leading to overdose of uncertain intent with sleep medication.  Occurring in the setting of medication changes or depression by outpatient psychiatrist.  Admit is voluntary.     Care coordination performed in detail.  Includes, detailed review of care everywhere and epic to review electronic chart information and related mental health history to presentation.  Including, patient's ED presentation to [facility] on 3/13 due to depression and overdose.  Further review of patient's past hospitalization from November 2020.  Subsequent review of ECT documentation available for review.  Please see associated documentation for details.     In brief, patient presented to [facility] ED on 3/13 secondary to worsening of depression and subsequent overdose.  Reports worsening of depressive symptoms for at least the past 2 years with efforts of management to include medications, therapy, ECT and TMS.  Most recent efforts of management in the community through outpatient psychiatrist include initiation of fluoxetine on 2/18 at 20 mg daily with titration on 2/25 to 40 mg and further titration on 3/3 to 60 mg daily.  Continued on Seroquel.  Despite efforts of medication management in the  community, continued to experience symptoms of depression.  According to collateral as obtained from wife, [facility] assessment, patient demonstrating worsening of symptoms especially within the past week.  Isolating in behavior.  Laying in bed.  Decreased appetite with weight loss.  Expressing suicidal ideation and demonstrated behavior leading to admission.  Poor self cares.  Recommendation for inpatient psychiatric hospitalization voluntarily.     Further care coordination performed in detail.  Includes, detailed review of care everywhere.  Patient has documentation unable to be seen on epic viewer.  Specifically, documentations from past ECT for first hospitalization.  Able to review hospitalization from 11/18 through 11/19/2020.  Admitted due to depression, insomnia with suicidal ideation.  PTA medications continue to include venlafaxine 75 mg daily, quetiapine 200 mg p.o. nightly, with medication changes to include Ambien to as needed and addition of doxepin for sleep.     Further care coordination performed.  Referral for ECT initiated on 12/1/2020 by PCP.  Outpatient consult reviewed by Dr. Maher dated 12/4/2020.  Please see consult for full details.  Medically clear for ECT after providing informed consent to treat.  ECT series #1 initiated on 12/7/2020.     Further care coordination performed.  On 3/22/2021, completed second series of ECT.  Medications documented as ineffective.  Effexor XR increased to 225 mg daily and Abilify added.  Considered switch from Effexor XR to Viibryd due to sexual side effects.     Upon patient interview, patient review performed on unit 5500.  Cooperative on approach.  Evaluation effort to review events into presentation and clarify information as provided in collateral report.  Patient admit is voluntary.     Patient reports a history of depression and anxiety of greater than 20 years.  Medication management initiated at approximately age 28.  Patient has history of multiple  medication trials with or without psychotherapy.  Further history of 2 prior hospitalizations in the month of November 2020 brief duration.  Associated with depressive symptoms in the setting of suicidal ideation and insomnia.  Despite efforts of medication management, proceeded with ECT as initiated on 12/7/2020 after referral by outpatient psychiatrist.  Continue with ECT for 2 series.  Did not appear to have taper or maintenance series of ECT.  Then, initiated TMS on 4/7/2021.     Patient has been followed by Dr. Markham since initiating ECT.  Medication management has been performed in multiple.  Medications alone have been documented as ineffective.  Recent medication changes initiated in the community include start of Prozac after switch from alternate antidepressant therapy.  Initiated on 2/18 at 20 mg daily, then on 2/2540 mg daily then on 3/3 increased to 60 mg daily.       Patient presented to ED on 3/13 due to continued symptoms of depression with anxious distress.  Occurring in the setting of repeat event of inability to sleep.  Similar to prior hospitalizations.  Patient reportedly taking increasing amounts of Ambien CR with intent to sleep.  Later, making statement of not wanting to wake up.  Recommendation for inpatient psychiatric hospitalization for further assessment.     Patient denies further stressors or triggers clearly associated with presentation.  Reports taking medications as prescribed.  Symptoms of depression and anxiety continued with efforts of fluoxetine titration, leading to behavioral self-harm.  Denies plan or intent of suicide by means of Ambien CR ingestion.  Denies history of suicide attempt.  Does admit to gun access.  Agrees to removal of gun access prior to disposition.  States an appropriate crisis relapse plan.  Future oriented for family.     Past treatment reviewed in detail.  Last documented cares reviewed to include medications documented as lacking efficacy.  Further  "review of ECT documented as efficacious, due to medications alone is not effective.  Initiated ECT on 12/7/2020 after last hospitalization with last treatment in March 2021.  In April 2021, started series of TMS.  Denies benefit from TMS series.  Reports beneficial response from ECT, without significant side effect aside from mild cognition impairment.     On psychiatric review of symptoms, mood is, \"depressed.\"  Denies lindsey or hypomania.  Insomnia with low energy.  Lack of energy.  Lack of enjoyment with activities of prior interest.  Impacting activities at home and work as well as outside relationships.  Intact appetite and weight.  Intact concentration.  Negative thoughts of self.  Stating, \"end of my rope.\"  Denies tearfulness.  Endorses anxiety.  Denies psychosis.         Diagnosis:     Major depression         ECT Log:   #1 4/6/22 Consent is obtained for the procedure. Starting mood is 0/10 (10 being the best). Passive death wishes. Denies intent to kill self when took extra ambien 'to sleep' and was hospitalized. Is on CBD/THC 16/1 in am and 1/1 pm for sleep.   #2 4/8/22 Did get anxious as he was receiving anesthesia but recovery was uneventful and no side effects afterwards. Mood low.          Pause for the Cause:     Right patient Yes   Right procedure/laterality settings: Yes          Intra-Procedure Documentation:       ECT number at [facility]: 2+   Treatment number this series: 2   Lifetime total treatment number: 16+     Type of ECT:  Right, unilateral ultrabrief    ECT Medications:    Tylenol 1000 mg  Toradol: 30mg  Zofran: 4mg  Zyprexa Zydis: 10mg  Brevital: 100 mg  Succinyl Choline: 100 mg  Versed: 2 mg   Labetolol 10 mg pre-ECT    ECT Strip Summary:   Energy Level: Titration: 19.2 mC  115.2 ; 0.3 msec; 30 Hz; 8 sec; 800 mA  Motor Seizure Duration: 53 seconds  EEG Seizure Duration: 123 seconds    Complications: No    Time for re-orientation    Plan:   Continue RUL UBP ECT Q MWF at 115.2 mC  Hold CBD/THC " on morning of ECT and evening prior. May take his Evening dose the night of ECT and morning dose the day after ECT  Monitor depression severity with clinical assessment augmented with IDS-SR every 3rd treatment  Continue current medications    ASK PATIENT TO BRING CPAP MACHINE WITH HIM TO USE IN RECOVERY    Will likely need to discuss maintenance ECT when completing this course. Also, is a good candidate for VNS (this is his 3rd course of ECT).     Dontrell Murillo MD  Professor and Executive    Department Psychiatry and Behavioral Sciences

## 2022-04-08 NOTE — DISCHARGE INSTRUCTIONS
ECT Discharge Instructions      During your ECT series:    If you have had more than 1 treatment within a week, Do Not drive until 7 days after your last treatment.    Do not drink alcohol or use street drugs (illicit drugs) while you are having treatments.    Do not make any important decisions, including legal decisions, while having treatments.      After each treatment:    If you have 1 or less treatments within a week, Do Not drive for 24 hours after each ECT treatment.    Get plenty of rest.     A responsible adult must stay with your for at least 6 hours.    Avoid heavy or risky activities for 24 hours.    If you feel light-headed, sit for a few minutes before standing. Have someone  help you get up.    If you have nausea (feel sick to your stomach): Drink only clear liquids such as apple juice, ginger ale, broth or 7UP, be sure to drink plenty of liquids. Move to a  normal diet as you feel able.       If you received Toradol, wait 6 hours before taking ibuprofen, motrin, Advil, Aleve or any NSAID.    Call your doctor if:     You have a fever over 100F (37.7 C) (taken under the tongue), or a fever that last more than 24 hours.    Your IV site is red, swollen, very painful or is getting more tender.    You have nausea that gets very bad or does not improve.    Before your next treatment:    If you have any symptoms after ECT, be sure to tell our staff before your next treatment.         To speak to a doctor, call Dr. Murillo:  Office phone # [phone]; Fax phone # [phone]    Hold CBD/THC on morning of ECT and evening prior. You then may take your Evening dose the night of ECT and morning dose the day after ECT    You had 4 mg of zofran IV at 10:00 am to help prevent nausea.    You also hadToradol 30mg today at 1000 am.  Toradol is an NSAID.  Do not take any NSAID's including: Ibuprofen, Naproxen, Aspirin, Advil, Motrin, Aleve, Rodrick, etc., until 6 hours after the above time (4:00 pm).  If you have any  questions, contact your physician or pharmacist.        Transported by Wife: Evon        Covid-19 Testing Instructions:You had a covid-19 test done today in the ECT department and we will have results by your next ECT appointment.       Drop Off Instructions: Please come to the [facility] entrance and check-in with Security before your ECT appointment.  The [facility] entrance is located at [address] [place] [address], which is on the West side of our campus.  Our security personnel will notify our staff so we may escort you to the ECT department.     Instructions: After your appointment, you may be picked up at the [facility] entrance, which is located at [address] [place] [address], about 1 block North of the [facility]. You will be given a set of discharge instructions to take home with you and an ECT staff person will transport you to the entrance of the [facility] to meet your ride.        The ECT Department can be reached at [phone].  The ECT Department is  open Mondays, Wednesdays and Fridays from 7:00 AM to 2:00 PM.  Our Fax number is [phone]

## 2022-04-08 NOTE — ANESTHESIA PREPROCEDURE EVALUATION
Anesthesia Pre-Procedure Evaluation    Patient: Kuldeep Sequeira   MRN: [id number] : 1970        Preoperative Diagnosis: * No pre-op diagnosis entered *   Procedure : * No procedures listed *     Past Medical History:   Diagnosis Date     Anxiety      Depressive disorder      GERD (gastroesophageal reflux disease)       No past surgical history on file.   No Known Allergies   Social History     Tobacco Use     Smoking status: Never Smoker     Smokeless tobacco: Never Used   Substance Use Topics     Alcohol use: Not on file      Wt Readings from Last 1 Encounters:   22 83.5 kg (184 lb)        Anesthesia Evaluation            ROS/MED HX  ENT/Pulmonary:  - neg pulmonary ROS     Neurologic:  - neg neurologic ROS     Cardiovascular:  - neg cardiovascular ROS     METS/Exercise Tolerance:     Hematologic:  - neg hematologic  ROS     Musculoskeletal:  - neg musculoskeletal ROS     GI/Hepatic:     (+) GERD,     Renal/Genitourinary:  - neg Renal ROS     Endo:  - neg endo ROS     Psychiatric/Substance Use:     (+) psychiatric history anxiety and depression     Infectious Disease:  - neg infectious disease ROS     Malignancy:  - neg malignancy ROS     Other:            Physical Exam    Airway        Mallampati: II   TM distance: > 3 FB   Neck ROM: full   Mouth opening: > 3 cm    Respiratory Devices and Support         Dental  no notable dental history         Cardiovascular   cardiovascular exam normal          Pulmonary   pulmonary exam normal                OUTSIDE LABS:  CBC:   Lab Results   Component Value Date    WBC 5.5 2022    WBC 5.3 2022    HGB 16.4 2022    HGB 17.7 2022    HCT 48.0 2022    HCT 50.8 2022     2022     2022     BMP:   Lab Results   Component Value Date     2022     2022    POTASSIUM 3.6 2022    POTASSIUM 4.6 2022    CHLORIDE 98 2022    CHLORIDE 103 2022    CO2 30 2022    CO2 29  03/11/2022    BUN 10 03/13/2022    BUN 16 03/11/2022    CR 0.97 03/13/2022    CR 1.08 03/11/2022    GLC 93 03/13/2022     (H) 03/11/2022     COAGS: No results found for: PTT, INR, FIBR  POC: No results found for: BGM, HCG, HCGS  HEPATIC:   Lab Results   Component Value Date    ALBUMIN 3.4 03/13/2022    PROTTOTAL 6.4 (L) 03/13/2022    ALT 33 03/13/2022    AST 22 03/13/2022    ALKPHOS 49 03/13/2022    BILITOTAL 0.8 03/13/2022     OTHER:   Lab Results   Component Value Date    RANJITH 8.5 03/13/2022    TSH 1.51 03/11/2022       Anesthesia Plan    ASA Status:  2   NPO Status:  NPO Appropriate    Anesthesia Type: General.     - Airway: Mask Only   Induction: Intravenous.   Maintenance: N/A.        Consents    Anesthesia Plan(s) and associated risks, benefits, and realistic alternatives discussed. Questions answered and patient/representative(s) expressed understanding.    - Discussed:     - Discussed with:  Patient      - Extended Intubation/Ventilatory Support Discussed: No.      - Patient is DNR/DNI Status: No    Use of blood products discussed: No .     Postoperative Care            Comments:                    Alena Irvin MD

## 2022-04-09 NOTE — ANESTHESIA POSTPROCEDURE EVALUATION
Patient: Kuldeep Sequeira    Procedure: * No procedures listed *  Electroconvulsive Therapy    Anesthesia Type:  General    Note:  Disposition: Outpatient   Postop Pain Control: Uneventful            Sign Out: Well controlled pain   PONV: No   Neuro/Psych: Uneventful            Sign Out: Acceptable/Baseline neuro status   Airway/Respiratory: Uneventful            Sign Out: Acceptable/Baseline resp. status   CV/Hemodynamics: Uneventful            Sign Out: Acceptable CV status; No obvious hypovolemia; No obvious fluid overload   Other NRE: NONE   DID A NON-ROUTINE EVENT OCCUR? No           Last vitals:  Vitals Value Taken Time   BP     Temp     Pulse     Resp     SpO2         Electronically Signed By: Eva Ibarra MD  April 8, 2022  7:52 PM

## 2022-04-11 ENCOUNTER — HOSPITAL ENCOUNTER (OUTPATIENT)
Dept: BEHAVIORAL HEALTH | Facility: CLINIC | Age: 52
Discharge: HOME | End: 2022-04-11
Attending: PSYCHIATRY & NEUROLOGY
Payer: COMMERCIAL

## 2022-04-11 ENCOUNTER — ANESTHESIA EVENT (OUTPATIENT)
Dept: BEHAVIORAL HEALTH | Facility: CLINIC | Age: 52
End: 2022-04-11
Payer: COMMERCIAL

## 2022-04-11 ENCOUNTER — ANESTHESIA (OUTPATIENT)
Dept: BEHAVIORAL HEALTH | Facility: CLINIC | Age: 52
End: 2022-04-11
Payer: COMMERCIAL

## 2022-04-11 VITALS
SYSTOLIC BLOOD PRESSURE: 115 MMHG | RESPIRATION RATE: 16 BRPM | OXYGEN SATURATION: 96 % | HEART RATE: 81 BPM | DIASTOLIC BLOOD PRESSURE: 75 MMHG | TEMPERATURE: 98.7 F

## 2022-04-11 DIAGNOSIS — F33.2 MAJOR DEPRESSIVE DISORDER, RECURRENT EPISODE, SEVERE WITH ANXIOUS DISTRESS (H): Primary | ICD-10-CM

## 2022-04-11 LAB — SARS-COV-2 RNA RESP QL NAA+PROBE: NEGATIVE

## 2022-04-11 PROCEDURE — 250N000009 HC RX 250: Performed by: PSYCHIATRY & NEUROLOGY

## 2022-04-11 PROCEDURE — 258N000003 HC RX IP 258 OP 636: Performed by: PSYCHIATRY & NEUROLOGY

## 2022-04-11 PROCEDURE — 370N000017 HC ANESTHESIA TECHNICAL FEE, PER MIN

## 2022-04-11 PROCEDURE — 250N000009 HC RX 250: Performed by: STUDENT IN AN ORGANIZED HEALTH CARE EDUCATION/TRAINING PROGRAM

## 2022-04-11 PROCEDURE — 90870 ELECTROCONVULSIVE THERAPY: CPT

## 2022-04-11 PROCEDURE — 250N000011 HC RX IP 250 OP 636: Performed by: PSYCHIATRY & NEUROLOGY

## 2022-04-11 PROCEDURE — 250N000011 HC RX IP 250 OP 636: Performed by: STUDENT IN AN ORGANIZED HEALTH CARE EDUCATION/TRAINING PROGRAM

## 2022-04-11 PROCEDURE — U0005 INFEC AGEN DETEC AMPLI PROBE: HCPCS | Performed by: PSYCHIATRY & NEUROLOGY

## 2022-04-11 RX ORDER — ONDANSETRON 2 MG/ML
4 INJECTION INTRAMUSCULAR; INTRAVENOUS
Status: COMPLETED | OUTPATIENT
Start: 2022-04-11 | End: 2022-04-11

## 2022-04-11 RX ORDER — ONDANSETRON 2 MG/ML
4 INJECTION INTRAMUSCULAR; INTRAVENOUS EVERY 30 MIN PRN
Status: DISCONTINUED | OUTPATIENT
Start: 2022-04-11 | End: 2022-04-12 | Stop reason: HOSPADM

## 2022-04-11 RX ORDER — ONDANSETRON 4 MG/1
4 TABLET, ORALLY DISINTEGRATING ORAL EVERY 30 MIN PRN
Status: DISCONTINUED | OUTPATIENT
Start: 2022-04-11 | End: 2022-04-12 | Stop reason: HOSPADM

## 2022-04-11 RX ORDER — ONDANSETRON 2 MG/ML
4 INJECTION INTRAMUSCULAR; INTRAVENOUS
Status: CANCELLED
Start: 2022-04-11 | End: 2022-04-11

## 2022-04-11 RX ORDER — SODIUM CHLORIDE, SODIUM LACTATE, POTASSIUM CHLORIDE, CALCIUM CHLORIDE 600; 310; 30; 20 MG/100ML; MG/100ML; MG/100ML; MG/100ML
INJECTION, SOLUTION INTRAVENOUS CONTINUOUS
Status: DISCONTINUED | OUTPATIENT
Start: 2022-04-11 | End: 2022-04-12 | Stop reason: HOSPADM

## 2022-04-11 RX ORDER — ACETAMINOPHEN 500 MG
1000 TABLET ORAL
Status: CANCELLED
Start: 2022-04-11 | End: 2022-04-11

## 2022-04-11 RX ORDER — KETOROLAC TROMETHAMINE 30 MG/ML
30 INJECTION, SOLUTION INTRAMUSCULAR; INTRAVENOUS
Status: CANCELLED
Start: 2022-04-11 | End: 2022-04-11

## 2022-04-11 RX ORDER — GLYCOPYRROLATE 0.2 MG/ML
0.2 INJECTION, SOLUTION INTRAMUSCULAR; INTRAVENOUS
Status: COMPLETED | OUTPATIENT
Start: 2022-04-11 | End: 2022-04-11

## 2022-04-11 RX ORDER — KETOROLAC TROMETHAMINE 30 MG/ML
30 INJECTION, SOLUTION INTRAMUSCULAR; INTRAVENOUS
Status: COMPLETED | OUTPATIENT
Start: 2022-04-11 | End: 2022-04-11

## 2022-04-11 RX ORDER — LABETALOL HYDROCHLORIDE 5 MG/ML
INJECTION, SOLUTION INTRAVENOUS PRN
Status: DISCONTINUED | OUTPATIENT
Start: 2022-04-11 | End: 2022-04-11

## 2022-04-11 RX ORDER — GLYCOPYRROLATE 0.2 MG/ML
0.2 INJECTION, SOLUTION INTRAMUSCULAR; INTRAVENOUS
Status: CANCELLED
Start: 2022-04-11 | End: 2022-04-11

## 2022-04-11 RX ORDER — ACETAMINOPHEN 500 MG
1000 TABLET ORAL
Status: ACTIVE | OUTPATIENT
Start: 2022-04-11 | End: 2022-04-11

## 2022-04-11 RX ADMIN — GLYCOPYRROLATE 0.2 MG: 0.2 INJECTION, SOLUTION INTRAMUSCULAR; INTRAVENOUS at 09:41

## 2022-04-11 RX ADMIN — ONDANSETRON 4 MG: 2 INJECTION INTRAMUSCULAR; INTRAVENOUS at 09:44

## 2022-04-11 RX ADMIN — SODIUM CHLORIDE, POTASSIUM CHLORIDE, SODIUM LACTATE AND CALCIUM CHLORIDE 500 ML: 600; 310; 30; 20 INJECTION, SOLUTION INTRAVENOUS at 09:43

## 2022-04-11 RX ADMIN — LABETALOL HYDROCHLORIDE 10 MG: 5 INJECTION, SOLUTION INTRAVENOUS at 09:54

## 2022-04-11 RX ADMIN — METHOHEXITAL SODIUM 100 MG: 500 INJECTION, POWDER, LYOPHILIZED, FOR SOLUTION INTRAMUSCULAR; INTRAVENOUS; RECTAL at 09:54

## 2022-04-11 RX ADMIN — KETOROLAC TROMETHAMINE 30 MG: 30 INJECTION, SOLUTION INTRAMUSCULAR at 09:43

## 2022-04-11 RX ADMIN — MIDAZOLAM HYDROCHLORIDE 2 MG: 1 INJECTION, SOLUTION INTRAMUSCULAR; INTRAVENOUS at 10:01

## 2022-04-11 RX ADMIN — Medication 100 MG: at 09:54

## 2022-04-11 NOTE — ANESTHESIA POSTPROCEDURE EVALUATION
Patient: Kuldeep Sequeira    Procedure: * No procedures listed *  Electroconvulsive Therapy    Anesthesia Type:  General    Note:  Disposition: Outpatient   Postop Pain Control: Uneventful            Sign Out: Well controlled pain   PONV: No   Neuro/Psych: Uneventful            Sign Out: Acceptable/Baseline neuro status   Airway/Respiratory: Uneventful            Sign Out: Acceptable/Baseline resp. status   CV/Hemodynamics: Uneventful            Sign Out: Acceptable CV status; No obvious hypovolemia; No obvious fluid overload   Other NRE: NONE   DID A NON-ROUTINE EVENT OCCUR? No           Last vitals:  Vitals Value Taken Time   BP     Temp     Pulse     Resp     SpO2         Electronically Signed By: Gaurav Srinivasan MD  April 11, 2022  3:28 PM

## 2022-04-11 NOTE — ANESTHESIA CARE TRANSFER NOTE
Patient: Kuldeep Sequeira    Procedure: * No procedures listed *  Electroconvulsive Therapy    Diagnosis: * No pre-op diagnosis entered *  Diagnosis Additional Information: No value filed.    Anesthesia Type:   General     Note:    Oropharynx: oropharynx clear of all foreign objects, nasal airway in place and spontaneously breathing  Level of Consciousness: drowsy  Oxygen Supplementation: face mask    Independent Airway: airway patency satisfactory and stable  Dentition: dentition unchanged  Vital Signs Stable: post-procedure vital signs reviewed and stable  Report to RN Given: handoff report given  Patient transferred to: PACU    Handoff Report: Identifed the Patient, Identified the Reponsible Provider, Reviewed the pertinent medical history, Discussed the surgical course, Reviewed Intra-OP anesthesia mangement and issues during anesthesia, Set expectations for post-procedure period and Allowed opportunity for questions and acknowledgement of understanding      Vitals:  Vitals Value Taken Time   BP     Temp     Pulse     Resp     SpO2         Electronically Signed By: Gaurav Srinivasan MD  April 11, 2022  3:28 PM

## 2022-04-11 NOTE — DISCHARGE INSTRUCTIONS
ECT Discharge Instructions      During your ECT series:    Do not drive or work heavy equipment until 7 days after your last treatment.  Do not drink alcohol or use street drugs (illicit drugs) while you are having treatments.  Do not make important decisions, including legal decisions.    After your maintenance ECT treatment:    Do not drive for 24 hours after treatment.  If you will be having more than one treatment witihin a week, no driving until 7 days after your last ECT treatment.         After each treatment:    Get plenty of rest. A responsible adult must stay with your for at least 6 hours.  Avoid heavy or risky activities for 24 hours.  If you feel light-headed, sit for a few minutes before standing. Have someone help you get up.  If you have nausea (feel sick to your stomach): Drink only clear liquids such as apple juice, ginger ale, broth or 7UP, Be sure to drink plenty of liquids. Move to a normal diet as you feel able.   If you received Toradol, wait 6 hours before taking ibuprofen.  Call your doctor if:   You have a fever over 100F (37.7 C) (taken under the tongue), or a fever that last more than 24 hours.  Your IV site is red, swollen, very painful or is getting more tender.  You have nausea that gets very bad or does not improve.    If you have any symptoms after ECT, tell our staff before your next treatment.  The ECT Department can be reached at [phone].  The ECT Department is open Mondays, Wednesdays and Fridays from 7:00 AM to 2:00 PM.    To speak to a doctor, call: [facility] psychiatry Dr. Murillo, Dr. Diaz, and Dr. Villafuerte [phone] fax [phone]     New instructions: Hold CBD/THC on morning of ECT and evening prior. May take his Evening dose the night of ECT and morning dose the day after ECT    ASK PATIENT TO BRING CPAP MACHINE WITH HIM TO USE IN RECOVERY    Other: You have received Toradol today at 9:43 am.  Toradol is an NSAID.  Do not take any NSAID's including:  Ibuprofen, Naproxen Sodium, Asprin , Advil, Motrin, Aleve, Rodrick, etc., until six hours after the above time which would be 3:43 PM If you have any questions check back with your Physician, or pharmacist.  You were given zofran to prevent nausea.    Transported by: Evon    Today we completed your Covid test for your next appointment.  You do not need to do anything further. Your covid test results will be available by your next ECT treatment.    Instructions for your next appointment:      Please come to the [facility] and check-in with Security before your ECT appointment.  The [facility] entrance is located right next to the Adult Emergency Room.  The address is [address] [place] [address].    After your appointment, you may be picked up at the [facility] entrance, which is located on the West side of our campus.  The address is [address] [place] [address].  [facility].

## 2022-04-11 NOTE — ANESTHESIA PREPROCEDURE EVALUATION
Anesthesia Pre-Procedure Evaluation    Patient: Kuldeep Sequeira   MRN: [id number] : 1970        Procedure : * No procedures listed *  Electroconvulsive Therapy       Past Medical History:   Diagnosis Date     Anxiety      Depressive disorder      GERD (gastroesophageal reflux disease)       No past surgical history on file.   No Known Allergies   Social History     Tobacco Use     Smoking status: Never Smoker     Smokeless tobacco: Never Used   Substance Use Topics     Alcohol use: Not on file      Wt Readings from Last 1 Encounters:   22 83.5 kg (184 lb)        Anesthesia Evaluation            ROS/MED HX  ENT/Pulmonary:  - neg pulmonary ROS     Neurologic:  - neg neurologic ROS     Cardiovascular:  - neg cardiovascular ROS     METS/Exercise Tolerance:     Hematologic:  - neg hematologic  ROS     Musculoskeletal:  - neg musculoskeletal ROS     GI/Hepatic:     (+) GERD, Asymptomatic on medication,     Renal/Genitourinary:  - neg Renal ROS     Endo:  - neg endo ROS     Psychiatric/Substance Use:     (+) psychiatric history anxiety and depression     Infectious Disease:  - neg infectious disease ROS     Malignancy:  - neg malignancy ROS     Other:            Physical Exam    Airway  airway exam normal           Respiratory Devices and Support         Dental  no notable dental history         Cardiovascular   cardiovascular exam normal          Pulmonary   pulmonary exam normal                OUTSIDE LABS:  CBC:   Lab Results   Component Value Date    WBC 5.5 2022    WBC 5.3 2022    HGB 16.4 2022    HGB 17.7 2022    HCT 48.0 2022    HCT 50.8 2022     2022     2022     BMP:   Lab Results   Component Value Date     2022     2022    POTASSIUM 3.6 2022    POTASSIUM 4.6 2022    CHLORIDE 98 2022    CHLORIDE 103 2022    CO2 30 2022    CO2 29 2022    BUN 10 2022    BUN 16 2022    CR  0.97 03/13/2022    CR 1.08 03/11/2022    GLC 93 03/13/2022     (H) 03/11/2022     COAGS: No results found for: PTT, INR, FIBR  POC: No results found for: BGM, HCG, HCGS  HEPATIC:   Lab Results   Component Value Date    ALBUMIN 3.4 03/13/2022    PROTTOTAL 6.4 (L) 03/13/2022    ALT 33 03/13/2022    AST 22 03/13/2022    ALKPHOS 49 03/13/2022    BILITOTAL 0.8 03/13/2022     OTHER:   Lab Results   Component Value Date    RANJITH 8.5 03/13/2022    TSH 1.51 03/11/2022       Anesthesia Plan    ASA Status:  2   NPO Status:  NPO Appropriate    Anesthesia Type: General.     - Airway: Native airway   Induction: Intravenous.   Maintenance: TIVA.        Consents    Anesthesia Plan(s) and associated risks, benefits, and realistic alternatives discussed. Questions answered and patient/representative(s) expressed understanding.    - Discussed:     - Discussed with:  Patient         Postoperative Care       PONV prophylaxis: Ondansetron (or other 5HT-3)     Comments:                Gaurav Srinivasan MD

## 2022-04-11 NOTE — PROCEDURES
Procedures  Kuldeep Sequeira is a 52 year old  year old male patient.  [id number]  @DX@    [facility], [place]   ECT Procedure Note   04/11/2022    Patient Status: Outpatient    Is this the first in a series of 12 treatments?  No   No Known Allergies    Weight:  0 lbs 0 oz         Indications for ECT:     History of good ECT response in one or more previous episodes of illness         Clinical Narrative:     This is a 52 year old male with history of depression, anxiety and insomnia.  Patient has prior history of psychiatric hospitalization on at least 2 previous occasions, but does not have past history of MI CD treatment or suicide attempt history.  Now, presenting secondary to worsening of depressive symptoms leading to overdose of uncertain intent with sleep medication.  Occurring in the setting of medication changes or depression by outpatient psychiatrist.  Admit is voluntary.     Care coordination performed in detail.  Includes, detailed review of care everywhere and epic to review electronic chart information and related mental health history to presentation.  Including, patient's ED presentation to [facility] on 3/13 due to depression and overdose.  Further review of patient's past hospitalization from November 2020.  Subsequent review of ECT documentation available for review.  Please see associated documentation for details.     In brief, patient presented to [facility] ED on 3/13 secondary to worsening of depression and subsequent overdose.  Reports worsening of depressive symptoms for at least the past 2 years with efforts of management to include medications, therapy, ECT and TMS.  Most recent efforts of management in the community through outpatient psychiatrist include initiation of fluoxetine on 2/18 at 20 mg daily with titration on 2/25 to 40 mg and further titration on 3/3 to 60 mg daily.  Continued on Seroquel.  Despite efforts of medication management in the  community, continued to experience symptoms of depression.  According to collateral as obtained from wife, [facility] assessment, patient demonstrating worsening of symptoms especially within the past week.  Isolating in behavior.  Laying in bed.  Decreased appetite with weight loss.  Expressing suicidal ideation and demonstrated behavior leading to admission.  Poor self cares.  Recommendation for inpatient psychiatric hospitalization voluntarily.     Further care coordination performed in detail.  Includes, detailed review of care everywhere.  Patient has documentation unable to be seen on epic viewer.  Specifically, documentations from past ECT for first hospitalization.  Able to review hospitalization from 11/18 through 11/19/2020.  Admitted due to depression, insomnia with suicidal ideation.  PTA medications continue to include venlafaxine 75 mg daily, quetiapine 200 mg p.o. nightly, with medication changes to include Ambien to as needed and addition of doxepin for sleep.     Further care coordination performed.  Referral for ECT initiated on 12/1/2020 by PCP.  Outpatient consult reviewed by Dr. Maher dated 12/4/2020.  Please see consult for full details.  Medically clear for ECT after providing informed consent to treat.  ECT series #1 initiated on 12/7/2020.     Further care coordination performed.  On 3/22/2021, completed second series of ECT.  Medications documented as ineffective.  Effexor XR increased to 225 mg daily and Abilify added.  Considered switch from Effexor XR to Viibryd due to sexual side effects.     Upon patient interview, patient review performed on unit 5500.  Cooperative on approach.  Evaluation effort to review events into presentation and clarify information as provided in collateral report.  Patient admit is voluntary.     Patient reports a history of depression and anxiety of greater than 20 years.  Medication management initiated at approximately age 28.  Patient has history of multiple  medication trials with or without psychotherapy.  Further history of 2 prior hospitalizations in the month of November 2020 brief duration.  Associated with depressive symptoms in the setting of suicidal ideation and insomnia.  Despite efforts of medication management, proceeded with ECT as initiated on 12/7/2020 after referral by outpatient psychiatrist.  Continue with ECT for 2 series.  Did not appear to have taper or maintenance series of ECT.  Then, initiated TMS on 4/7/2021.     Patient has been followed by Dr. Markham since initiating ECT.  Medication management has been performed in multiple.  Medications alone have been documented as ineffective.  Recent medication changes initiated in the community include start of Prozac after switch from alternate antidepressant therapy.  Initiated on 2/18 at 20 mg daily, then on 2/2540 mg daily then on 3/3 increased to 60 mg daily.       Patient presented to ED on 3/13 due to continued symptoms of depression with anxious distress.  Occurring in the setting of repeat event of inability to sleep.  Similar to prior hospitalizations.  Patient reportedly taking increasing amounts of Ambien CR with intent to sleep.  Later, making statement of not wanting to wake up.  Recommendation for inpatient psychiatric hospitalization for further assessment.     Patient denies further stressors or triggers clearly associated with presentation.  Reports taking medications as prescribed.  Symptoms of depression and anxiety continued with efforts of fluoxetine titration, leading to behavioral self-harm.  Denies plan or intent of suicide by means of Ambien CR ingestion.  Denies history of suicide attempt.  Does admit to gun access.  Agrees to removal of gun access prior to disposition.  States an appropriate crisis relapse plan.  Future oriented for family.     Past treatment reviewed in detail.  Last documented cares reviewed to include medications documented as lacking efficacy.  Further  "review of ECT documented as efficacious, due to medications alone is not effective.  Initiated ECT on 12/7/2020 after last hospitalization with last treatment in March 2021.  In April 2021, started series of TMS.  Denies benefit from TMS series.  Reports beneficial response from ECT, without significant side effect aside from mild cognition impairment.     On psychiatric review of symptoms, mood is, \"depressed.\"  Denies lindsey or hypomania.  Insomnia with low energy.  Lack of energy.  Lack of enjoyment with activities of prior interest.  Impacting activities at home and work as well as outside relationships.  Intact appetite and weight.  Intact concentration.  Negative thoughts of self.  Stating, \"end of my rope.\"  Denies tearfulness.  Endorses anxiety.  Denies psychosis.         Diagnosis:     Major depression         ECT Log:     #1 4/6/22 Consent is obtained for the procedure. Starting mood is 0/10 (10 being the best). Passive death wishes. Denies intent to kill self when took extra ambien 'to sleep' and was hospitalized. Is on CBD/THC 16/1 in am and 1/1 pm for sleep.   #2 4/8/22 Did get anxious as he was receiving anesthesia but recovery was uneventful and no side effects afterwards. Mood low.   #3 4/11/22  Mood unchanged. Wife notices a bit more energy and concentration. No SI. Some headache         Pause for the Cause:     Right patient Yes   Right procedure/laterality settings: Yes          Intra-Procedure Documentation:       ECT number at [facility]: 3+   Treatment number this series: 3   Lifetime total treatment number: 17+     Type of ECT:  Right, unilateral ultrabrief    ECT Medications:    Tylenol 1000 mg  Toradol: 30mg  Zofran: 4mg  Zyprexa Zydis: 10mg  Brevital: 100 mg  Succinyl Choline: 100 mg  Versed: 2 mg   Labetolol 10 mg pre-ECT    ECT Strip Summary:   Energy Level: Titration: 19.2 mC  115.2 ; 0.3 msec; 30 Hz; 8 sec; 800 mA  Motor Seizure Duration: 69 seconds  EEG Seizure Duration: 121 " seconds    Complications: No    Time for re-orientation    Plan:   Continue RUL UBP ECT Q MWF at 115.2 mC  Hold CBD/THC on morning of ECT and evening prior. May take his Evening dose the night of ECT and morning dose the day after ECT  Monitor depression severity with clinical assessment augmented with IDS-SR every 3rd treatment  Continue current medications    ASK PATIENT TO BRING CPAP MACHINE WITH HIM TO USE IN RECOVERY    Will likely need to discuss maintenance ECT when completing this course. Also, is a good candidate for VNS (this is his 3rd course of ECT).     Dontrell Murillo MD  Professor and Executive    Department Psychiatry and Behavioral Sciences

## 2022-04-13 ENCOUNTER — TELEPHONE (OUTPATIENT)
Dept: BEHAVIORAL HEALTH | Facility: CLINIC | Age: 52
End: 2022-04-13

## 2022-04-13 ENCOUNTER — ANESTHESIA EVENT (OUTPATIENT)
Dept: BEHAVIORAL HEALTH | Facility: CLINIC | Age: 52
End: 2022-04-13
Payer: COMMERCIAL

## 2022-04-13 ENCOUNTER — TELEPHONE (OUTPATIENT)
Dept: GASTROENTEROLOGY | Facility: CLINIC | Age: 52
End: 2022-04-13

## 2022-04-13 ENCOUNTER — ANESTHESIA (OUTPATIENT)
Dept: BEHAVIORAL HEALTH | Facility: CLINIC | Age: 52
End: 2022-04-13
Payer: COMMERCIAL

## 2022-04-13 PROCEDURE — 250N000009 HC RX 250: Performed by: ANESTHESIOLOGY

## 2022-04-13 PROCEDURE — 250N000011 HC RX IP 250 OP 636: Performed by: ANESTHESIOLOGY

## 2022-04-13 RX ORDER — LABETALOL HYDROCHLORIDE 5 MG/ML
INJECTION, SOLUTION INTRAVENOUS PRN
Status: DISCONTINUED | OUTPATIENT
Start: 2022-04-13 | End: 2022-04-13

## 2022-04-13 RX ADMIN — METHOHEXITAL SODIUM 100 MG: 500 INJECTION, POWDER, LYOPHILIZED, FOR SOLUTION INTRAMUSCULAR; INTRAVENOUS; RECTAL at 09:05

## 2022-04-13 RX ADMIN — Medication 100 MG: at 09:05

## 2022-04-13 RX ADMIN — LABETALOL HYDROCHLORIDE 10 MG: 5 INJECTION, SOLUTION INTRAVENOUS at 09:05

## 2022-04-13 NOTE — TELEPHONE ENCOUNTER
Received call from patient's wife with questions about next treatment as patient tested positive for Covid when tested during his treatment today.   Called patient back. After discussion with Dr. Murillo, patient able to be treated last with special precautions, as long as our census numbers stay low. Informed patient that appointment would be changed to the end of the day, at 1000 am on Friday 4/15/22. Informed patient's wife that she would need to be PCR tested and negative to bring patient in to his appointment that day.

## 2022-04-13 NOTE — ANESTHESIA PREPROCEDURE EVALUATION
Anesthesia Pre-Procedure Evaluation    Patient: Kuldeep Sequeira   MRN: [id number] : 1970        Procedure : * No procedures listed *  Electroconvulsive Therapy       Past Medical History:   Diagnosis Date     Anxiety      Depressive disorder      GERD (gastroesophageal reflux disease)       No past surgical history on file.   No Known Allergies   Social History     Tobacco Use     Smoking status: Never Smoker     Smokeless tobacco: Never Used   Substance Use Topics     Alcohol use: Not on file      Wt Readings from Last 1 Encounters:   22 83.5 kg (184 lb)        Anesthesia Evaluation            ROS/MED HX  ENT/Pulmonary:  - neg pulmonary ROS     Neurologic:  - neg neurologic ROS     Cardiovascular:  - neg cardiovascular ROS     METS/Exercise Tolerance:     Hematologic:  - neg hematologic  ROS     Musculoskeletal:  - neg musculoskeletal ROS     GI/Hepatic:     (+) GERD, Asymptomatic on medication,     Renal/Genitourinary:  - neg Renal ROS     Endo:  - neg endo ROS     Psychiatric/Substance Use:     (+) psychiatric history anxiety and depression     Infectious Disease:  - neg infectious disease ROS     Malignancy:  - neg malignancy ROS     Other:            Physical Exam    Airway  airway exam normal           Respiratory Devices and Support         Dental  no notable dental history         Cardiovascular   cardiovascular exam normal          Pulmonary   pulmonary exam normal                OUTSIDE LABS:  CBC:   Lab Results   Component Value Date    WBC 5.5 2022    WBC 5.3 2022    HGB 16.4 2022    HGB 17.7 2022    HCT 48.0 2022    HCT 50.8 2022     2022     2022     BMP:   Lab Results   Component Value Date     2022     2022    POTASSIUM 3.6 2022    POTASSIUM 4.6 2022    CHLORIDE 98 2022    CHLORIDE 103 2022    CO2 30 2022    CO2 29 2022    BUN 10 2022    BUN 16 2022    CR  0.97 03/13/2022    CR 1.08 03/11/2022    GLC 93 03/13/2022     (H) 03/11/2022     COAGS: No results found for: PTT, INR, FIBR  POC: No results found for: BGM, HCG, HCGS  HEPATIC:   Lab Results   Component Value Date    ALBUMIN 3.4 03/13/2022    PROTTOTAL 6.4 (L) 03/13/2022    ALT 33 03/13/2022    AST 22 03/13/2022    ALKPHOS 49 03/13/2022    BILITOTAL 0.8 03/13/2022     OTHER:   Lab Results   Component Value Date    RANJITH 8.5 03/13/2022    TSH 1.51 03/11/2022       Anesthesia Plan    ASA Status:  2   NPO Status:  NPO Appropriate    Anesthesia Type: General.     - Airway: Native airway   Induction: Intravenous.   Maintenance: TIVA.        Consents    Anesthesia Plan(s) and associated risks, benefits, and realistic alternatives discussed. Questions answered and patient/representative(s) expressed understanding.    - Discussed:     - Discussed with:  Patient         Postoperative Care       PONV prophylaxis: Ondansetron (or other 5HT-3)     Comments:                    Angella Howard MD

## 2022-04-13 NOTE — ANESTHESIA CARE TRANSFER NOTE
Patient: Kuldeep Sequeira    Procedure: * No procedures listed *  Electroconvulsive Therapy    Diagnosis: * No pre-op diagnosis entered *  Diagnosis Additional Information: No value filed.    Anesthesia Type:   General     Note:    Oropharynx: spontaneously breathing  Level of Consciousness: awake  Oxygen Supplementation: room air    Independent Airway: airway patency satisfactory and stable  Dentition: dentition unchanged  Vital Signs Stable: post-procedure vital signs reviewed and stable  Report to RN Given: handoff report given  Patient transferred to: PACU    Handoff Report: Identifed the Patient, Identified the Reponsible Provider, Reviewed the pertinent medical history, Discussed the surgical course, Reviewed Intra-OP anesthesia mangement and issues during anesthesia, Set expectations for post-procedure period and Allowed opportunity for questions and acknowledgement of understanding      Vitals:  Vitals Value Taken Time   /72 04/13/22 0944   Temp 36.8  C (98.3  F) 04/13/22 0944   Pulse 83 04/13/22 0944   Resp 12 04/13/22 0944   SpO2 94 % 04/13/22 0944       Electronically Signed By: Angella Howard MD  April 13, 2022  9:58 AM

## 2022-04-13 NOTE — TELEPHONE ENCOUNTER
"Coronavirus (COVID-19) Notification    Caller Name (Patient, parent, daughter/son, grandparent, etc)  patient    Reason for call  Notify of Positive Coronavirus (COVID-19) lab results, assess symptoms,  review [facility] recommendations    Lab Result    Lab test:  2019-nCoV rRt-PCR or SARS-CoV-2 PCR    Oropharyngeal AND/OR nasopharyngeal swabs is POSITIVE for 2019-nCoV RNA/SARS-COV-2 PCR (COVID-19 virus)    Brief introduction script  Introduce self then review script:  \"I am calling on behalf of [facility].  We were notified that your Coronavirus test (COVID-19) for was POSITIVE for the virus.\"    Gather patient reported symptoms   Assessment   Current Symptoms at time of phone call, reported by patient: (if no symptoms, document No symptoms] No symptoms   n/a n/a     If at time of call, Patients symptoms hare worsened, the Patient should contact 911 or have someone drive them to Emergency Dept promptly:      If Patient calling 911, inform 911 personal that you have tested positive for the Coronavirus (COVID-19).  Place mask on and await 911 to arrive.    If Emergency Dept, If possible, please have another adult drive you to the Emergency Dept but you need to wear mask when in contact with other people.      Monoclonal Antibody Administration    You may be eligible to receive a new treatment with a monoclonal antibody for preventing hospitalization in patients at high risk for complications from COVID-19. This medication is still experimental and available on a limited basis; it is given through an IV and must be given at an infusion center. Please note that not all people who are eligible will receive the medication since it is in limited supply.  Is the patient symptomatic? No. Patient does not qualify.    Review information with Patient    Your result was positive. This means you have COVID-19 (coronavirus).      How can I protect others?    These guidelines are for isolating before returning to " work, school or .       If you DO have symptoms:  o Stay home and away from others  - For at least 5 days after your symptoms started, AND   - You are fever free for 24 hours (with no medicine that reduces fever), AND  - Your other symptoms are better.  o Wear a mask for 10 full days any time you are around others.    If you DON'T have symptoms:  o Stay at home and away from others for at least 5 days after your positive test.  o Wear a mask for 10 full days any time you are around others.    There may be different guidelines for healthcare facilities. Please check with the specific sites before arriving.     If you've been told by a doctor that you were severely ill with COVID-19 or are immunocompromised, you should isolate for at least 10 days.    You should not go back to work until you meet the guidelines above for ending your home isolation. You don't need to be retested for COVID-19 before going back to work--studies show that you won't spread the virus if it's been at least 10 days since your symptoms started (or 20 days, if you have a weak immune system).    Employers, schools, and daycares: This is an official notice for this person's medical guidelines for returning in-person. They must meet the above guidelines before going back to work, school, or  in person.    You will receive a positive COVID-19 letter via [employer] or the mail soon with additional self-care information (exception, no letters sent to presurgical/preprocedure patients).     Would you like me to review some of that information with you now?  No    If you were tested for an upcoming procedure, please contact your provider for next steps.     Abril Aly LPN

## 2022-04-13 NOTE — TELEPHONE ENCOUNTER
PRE-SURGERY PATIENT          Coronavirus (COVID-19) Notification    Reason for call  Notify of POSITIVE  COVID-19 lab result, assess symptoms,  review [facility] recommendations    Lab Result   Lab test for 2019-nCoV rRt-PCR or SARS-COV-2 PCR  Oropharyngeal AND/OR nasopharyngeal swabs were POSITIVE for 2019-nCoV RNA [OR] SARS-COV-2 RNA (COVID-19) RNA     We have been unable to reach Patient by phone at this time to notify of their Positive COVID-19 result.    Left voicemail message requesting a call back to [phone] [facility] for results.        A Positive COVID-19 letter will be sent via [employer] or the mail. (Exception, no letters sent to Presurgerical/Preprocedure Patients)    Abril Aly LPN

## 2022-04-13 NOTE — ANESTHESIA POSTPROCEDURE EVALUATION
Patient: Kuldeep Sequeira    Procedure: * No procedures listed *  Electroconvulsive Therapy    Anesthesia Type:  General    Note:  Disposition: Outpatient   Postop Pain Control: Uneventful            Sign Out: Well controlled pain   PONV: No   Neuro/Psych: Uneventful            Sign Out: Acceptable/Baseline neuro status   Airway/Respiratory: Uneventful            Sign Out: Acceptable/Baseline resp. status   CV/Hemodynamics: Uneventful            Sign Out: Acceptable CV status; No obvious hypovolemia; No obvious fluid overload   Other NRE:    DID A NON-ROUTINE EVENT OCCUR?            Last vitals:  Vitals Value Taken Time   /72 04/13/22 0944   Temp 36.8  C (98.3  F) 04/13/22 0944   Pulse 83 04/13/22 0944   Resp 12 04/13/22 0944   SpO2 94 % 04/13/22 0944       Electronically Signed By: Angella Howard MD  April 13, 2022  9:58 AM

## 2022-04-15 ENCOUNTER — TELEPHONE (OUTPATIENT)
Dept: BEHAVIORAL HEALTH | Facility: CLINIC | Age: 52
End: 2022-04-15
Payer: COMMERCIAL

## 2022-04-15 NOTE — TELEPHONE ENCOUNTER
"Patient called to report he was having some \"cold symptoms\" including a small amount of phlegm, nasal congestion, headache yesterday, slight shortness of breath on exertion. No fever and no other symptoms. Discussed with Dr. Murillo. Plan to cancel treatments for Friday and Monday. Let patient know we will call him on Monday 4/18/22 to check on on how he is doing and to discuss plan for future treatments at that time.   "

## 2022-04-19 ENCOUNTER — ANESTHESIA EVENT (OUTPATIENT)
Dept: BEHAVIORAL HEALTH | Facility: CLINIC | Age: 52
End: 2022-04-19

## 2022-04-20 ENCOUNTER — HOSPITAL ENCOUNTER (OUTPATIENT)
Dept: BEHAVIORAL HEALTH | Facility: CLINIC | Age: 52
Discharge: HOME | End: 2022-04-20
Attending: PSYCHIATRY & NEUROLOGY | Admitting: PSYCHIATRY & NEUROLOGY
Payer: COMMERCIAL

## 2022-04-20 ENCOUNTER — ANESTHESIA (OUTPATIENT)
Dept: BEHAVIORAL HEALTH | Facility: CLINIC | Age: 52
End: 2022-04-20

## 2022-04-20 VITALS
TEMPERATURE: 98.6 F | DIASTOLIC BLOOD PRESSURE: 85 MMHG | RESPIRATION RATE: 15 BRPM | OXYGEN SATURATION: 93 % | HEART RATE: 95 BPM | SYSTOLIC BLOOD PRESSURE: 122 MMHG

## 2022-04-20 DIAGNOSIS — F33.2 MAJOR DEPRESSIVE DISORDER, RECURRENT EPISODE, SEVERE WITH ANXIOUS DISTRESS (H): Primary | ICD-10-CM

## 2022-04-20 PROCEDURE — 250N000013 HC RX MED GY IP 250 OP 250 PS 637: Performed by: PSYCHIATRY & NEUROLOGY

## 2022-04-20 PROCEDURE — 250N000011 HC RX IP 250 OP 636: Performed by: PSYCHIATRY & NEUROLOGY

## 2022-04-20 PROCEDURE — 258N000003 HC RX IP 258 OP 636: Performed by: PSYCHIATRY & NEUROLOGY

## 2022-04-20 PROCEDURE — 250N000011 HC RX IP 250 OP 636: Performed by: STUDENT IN AN ORGANIZED HEALTH CARE EDUCATION/TRAINING PROGRAM

## 2022-04-20 PROCEDURE — 90870 ELECTROCONVULSIVE THERAPY: CPT | Performed by: PSYCHIATRY & NEUROLOGY

## 2022-04-20 PROCEDURE — 370N000017 HC ANESTHESIA TECHNICAL FEE, PER MIN

## 2022-04-20 PROCEDURE — 250N000009 HC RX 250: Performed by: PSYCHIATRY & NEUROLOGY

## 2022-04-20 PROCEDURE — 90870 ELECTROCONVULSIVE THERAPY: CPT

## 2022-04-20 PROCEDURE — 250N000009 HC RX 250: Performed by: STUDENT IN AN ORGANIZED HEALTH CARE EDUCATION/TRAINING PROGRAM

## 2022-04-20 RX ORDER — OLANZAPINE 5 MG/1
5 TABLET, ORALLY DISINTEGRATING ORAL
Status: COMPLETED | OUTPATIENT
Start: 2022-04-20 | End: 2022-04-20

## 2022-04-20 RX ORDER — ACETAMINOPHEN 500 MG
1000 TABLET ORAL
Status: CANCELLED
Start: 2022-04-20 | End: 2022-04-20

## 2022-04-20 RX ORDER — OXYCODONE HYDROCHLORIDE 5 MG/1
5 TABLET ORAL EVERY 4 HOURS PRN
Status: DISCONTINUED | OUTPATIENT
Start: 2022-04-20 | End: 2022-04-21 | Stop reason: HOSPADM

## 2022-04-20 RX ORDER — ONDANSETRON 2 MG/ML
4 INJECTION INTRAMUSCULAR; INTRAVENOUS
Status: CANCELLED
Start: 2022-04-20 | End: 2022-04-20

## 2022-04-20 RX ORDER — KETOROLAC TROMETHAMINE 30 MG/ML
30 INJECTION, SOLUTION INTRAMUSCULAR; INTRAVENOUS
Status: CANCELLED
Start: 2022-04-20 | End: 2022-04-20

## 2022-04-20 RX ORDER — FENTANYL CITRATE 50 UG/ML
25 INJECTION, SOLUTION INTRAMUSCULAR; INTRAVENOUS
Status: DISCONTINUED | OUTPATIENT
Start: 2022-04-20 | End: 2022-04-21 | Stop reason: HOSPADM

## 2022-04-20 RX ORDER — FENTANYL CITRATE 50 UG/ML
25 INJECTION, SOLUTION INTRAMUSCULAR; INTRAVENOUS EVERY 5 MIN PRN
Status: DISCONTINUED | OUTPATIENT
Start: 2022-04-20 | End: 2022-04-21 | Stop reason: HOSPADM

## 2022-04-20 RX ORDER — ACETAMINOPHEN 500 MG
1000 TABLET ORAL
Status: ACTIVE | OUTPATIENT
Start: 2022-04-20 | End: 2022-04-20

## 2022-04-20 RX ORDER — NICARDIPINE HCL-0.9% SOD CHLOR 1 MG/10 ML
SYRINGE (ML) INTRAVENOUS PRN
Status: DISCONTINUED | OUTPATIENT
Start: 2022-04-20 | End: 2022-04-20

## 2022-04-20 RX ORDER — KETOROLAC TROMETHAMINE 30 MG/ML
30 INJECTION, SOLUTION INTRAMUSCULAR; INTRAVENOUS
Status: COMPLETED | OUTPATIENT
Start: 2022-04-20 | End: 2022-04-20

## 2022-04-20 RX ORDER — ONDANSETRON 2 MG/ML
4 INJECTION INTRAMUSCULAR; INTRAVENOUS
Status: COMPLETED | OUTPATIENT
Start: 2022-04-20 | End: 2022-04-20

## 2022-04-20 RX ORDER — MEPERIDINE HYDROCHLORIDE 25 MG/ML
12.5 INJECTION INTRAMUSCULAR; INTRAVENOUS; SUBCUTANEOUS
Status: DISCONTINUED | OUTPATIENT
Start: 2022-04-20 | End: 2022-04-21 | Stop reason: HOSPADM

## 2022-04-20 RX ORDER — GLYCOPYRROLATE 0.2 MG/ML
0.2 INJECTION, SOLUTION INTRAMUSCULAR; INTRAVENOUS
Status: COMPLETED | OUTPATIENT
Start: 2022-04-20 | End: 2022-04-20

## 2022-04-20 RX ORDER — OLANZAPINE 5 MG/1
5 TABLET ORAL ONCE
COMMUNITY
End: 2022-04-21

## 2022-04-20 RX ORDER — SODIUM CHLORIDE, SODIUM LACTATE, POTASSIUM CHLORIDE, CALCIUM CHLORIDE 600; 310; 30; 20 MG/100ML; MG/100ML; MG/100ML; MG/100ML
INJECTION, SOLUTION INTRAVENOUS CONTINUOUS
Status: DISCONTINUED | OUTPATIENT
Start: 2022-04-20 | End: 2022-04-21 | Stop reason: HOSPADM

## 2022-04-20 RX ORDER — GLYCOPYRROLATE 0.2 MG/ML
0.2 INJECTION, SOLUTION INTRAMUSCULAR; INTRAVENOUS
Status: CANCELLED
Start: 2022-04-20 | End: 2022-04-20

## 2022-04-20 RX ORDER — ONDANSETRON 2 MG/ML
4 INJECTION INTRAMUSCULAR; INTRAVENOUS EVERY 30 MIN PRN
Status: DISCONTINUED | OUTPATIENT
Start: 2022-04-20 | End: 2022-04-21 | Stop reason: HOSPADM

## 2022-04-20 RX ORDER — ACETAMINOPHEN 500 MG
1000 TABLET ORAL ONCE
COMMUNITY

## 2022-04-20 RX ORDER — ONDANSETRON 4 MG/1
4 TABLET, ORALLY DISINTEGRATING ORAL EVERY 30 MIN PRN
Status: DISCONTINUED | OUTPATIENT
Start: 2022-04-20 | End: 2022-04-21 | Stop reason: HOSPADM

## 2022-04-20 RX ORDER — ESMOLOL HYDROCHLORIDE 10 MG/ML
INJECTION INTRAVENOUS PRN
Status: DISCONTINUED | OUTPATIENT
Start: 2022-04-20 | End: 2022-04-20

## 2022-04-20 RX ORDER — HYDROMORPHONE HCL IN WATER/PF 6 MG/30 ML
0.2 PATIENT CONTROLLED ANALGESIA SYRINGE INTRAVENOUS EVERY 5 MIN PRN
Status: DISCONTINUED | OUTPATIENT
Start: 2022-04-20 | End: 2022-04-21 | Stop reason: HOSPADM

## 2022-04-20 RX ADMIN — MIDAZOLAM HYDROCHLORIDE 2 MG: 1 INJECTION, SOLUTION INTRAMUSCULAR; INTRAVENOUS at 11:06

## 2022-04-20 RX ADMIN — KETOROLAC TROMETHAMINE 30 MG: 30 INJECTION, SOLUTION INTRAMUSCULAR at 10:53

## 2022-04-20 RX ADMIN — Medication 100 MG: at 11:00

## 2022-04-20 RX ADMIN — ESMOLOL HYDROCHLORIDE 100 MG: 10 INJECTION, SOLUTION INTRAVENOUS at 11:00

## 2022-04-20 RX ADMIN — NICARDIPINE HYDROCHLORIDE 1000 MCG: 25 INJECTION INTRAVENOUS at 11:00

## 2022-04-20 RX ADMIN — OLANZAPINE 5 MG: 5 TABLET, ORALLY DISINTEGRATING ORAL at 10:36

## 2022-04-20 RX ADMIN — METHOHEXITAL SODIUM 100 MG: 500 INJECTION, POWDER, LYOPHILIZED, FOR SOLUTION INTRAMUSCULAR; INTRAVENOUS; RECTAL at 11:00

## 2022-04-20 RX ADMIN — SODIUM CHLORIDE, POTASSIUM CHLORIDE, SODIUM LACTATE AND CALCIUM CHLORIDE 500 ML: 600; 310; 30; 20 INJECTION, SOLUTION INTRAVENOUS at 10:56

## 2022-04-20 RX ADMIN — GLYCOPYRROLATE 0.2 MG: 0.2 INJECTION, SOLUTION INTRAMUSCULAR; INTRAVENOUS at 10:52

## 2022-04-20 RX ADMIN — ONDANSETRON 4 MG: 2 INJECTION INTRAMUSCULAR; INTRAVENOUS at 10:53

## 2022-04-20 NOTE — ANESTHESIA POSTPROCEDURE EVALUATION
Patient: Kuldeep Sequeira    Procedure: * No procedures listed *  Electroconvulsive Therapy    Anesthesia Type:  No value filed.    Note:     Postop Pain Control: Uneventful            Sign Out: Well controlled pain   PONV: No   Neuro/Psych: Uneventful            Sign Out: Acceptable/Baseline neuro status   Airway/Respiratory: Uneventful            Sign Out: Acceptable/Baseline resp. status   CV/Hemodynamics: Uneventful            Sign Out: Acceptable CV status; No obvious hypovolemia; No obvious fluid overload   Other NRE: NONE   DID A NON-ROUTINE EVENT OCCUR? No           Last vitals:  Vitals Value Taken Time   BP     Temp     Pulse     Resp     SpO2         Electronically Signed By: Mejia Rivera MD  April 20, 2022  11:13 AM

## 2022-04-20 NOTE — ANESTHESIA PREPROCEDURE EVALUATION
Anesthesia Pre-Procedure Evaluation    Patient: Kuldeep Sequeira   MRN: [id number] : 1970        Procedure : * No procedures listed *  Electroconvulsive Therapy       Past Medical History:   Diagnosis Date     Anxiety      Depressive disorder      GERD (gastroesophageal reflux disease)       No past surgical history on file.   No Known Allergies   Social History     Tobacco Use     Smoking status: Never Smoker     Smokeless tobacco: Never Used   Substance Use Topics     Alcohol use: Not on file      Wt Readings from Last 1 Encounters:   22 83.5 kg (184 lb)        Anesthesia Evaluation            ROS/MED HX  ENT/Pulmonary:  - neg pulmonary ROS     Neurologic:  - neg neurologic ROS     Cardiovascular:  - neg cardiovascular ROS     METS/Exercise Tolerance:     Hematologic:  - neg hematologic  ROS     Musculoskeletal:  - neg musculoskeletal ROS     GI/Hepatic:     (+) GERD, Asymptomatic on medication,     Renal/Genitourinary:  - neg Renal ROS     Endo:  - neg endo ROS     Psychiatric/Substance Use:     (+) psychiatric history anxiety and depression     Infectious Disease:  - neg infectious disease ROS     Malignancy:  - neg malignancy ROS     Other:            Physical Exam    Airway  airway exam normal           Respiratory Devices and Support         Dental  no notable dental history         Cardiovascular   cardiovascular exam normal          Pulmonary   pulmonary exam normal                OUTSIDE LABS:  CBC:   Lab Results   Component Value Date    WBC 5.5 2022    WBC 5.3 2022    HGB 16.4 2022    HGB 17.7 2022    HCT 48.0 2022    HCT 50.8 2022     2022     2022     BMP:   Lab Results   Component Value Date     2022     2022    POTASSIUM 3.6 2022    POTASSIUM 4.6 2022    CHLORIDE 98 2022    CHLORIDE 103 2022    CO2 30 2022    CO2 29 2022    BUN 10 2022    BUN 16 2022    CR  0.97 2022    CR 1.08 2022    GLC 93 2022     (H) 2022     COAGS: No results found for: PTT, INR, FIBR  POC: No results found for: BGM, HCG, HCGS  HEPATIC:   Lab Results   Component Value Date    ALBUMIN 3.4 2022    PROTTOTAL 6.4 (L) 2022    ALT 33 2022    AST 22 2022    ALKPHOS 49 2022    BILITOTAL 0.8 2022     OTHER:   Lab Results   Component Value Date    RANJITH 8.5 2022    TSH 1.51 2022       Anesthesia Plan    ASA Status:  2   NPO Status:  NPO Appropriate    Anesthesia Type: General.     - Airway: Native airway   Induction: Intravenous.   Maintenance: TIVA.        Consents    Anesthesia Plan(s) and associated risks, benefits, and realistic alternatives discussed. Questions answered and patient/representative(s) expressed understanding.    - Discussed:     - Discussed with:  Patient         Postoperative Care       PONV prophylaxis: Ondansetron (or other 5HT-3)     Comments:                    Anita Driscoll Pre-Procedure Evaluation    Patient: Kuldeep Sequeira   MRN: [id number] : 1970        Procedure :   Electroconvulsive Therapy       Past Medical History:   Diagnosis Date     Anxiety      Depressive disorder      GERD (gastroesophageal reflux disease)       No past surgical history on file.   No Known Allergies   Social History     Tobacco Use     Smoking status: Never Smoker     Smokeless tobacco: Never Used   Substance Use Topics     Alcohol use: Not on file      Wt Readings from Last 1 Encounters:   22 83.5 kg (184 lb)        Anesthesia Evaluation            ROS/MED HX  ENT/Pulmonary:  - neg pulmonary ROS     Neurologic:  - neg neurologic ROS     Cardiovascular:  - neg cardiovascular ROS     METS/Exercise Tolerance:     Hematologic:  - neg hematologic  ROS     Musculoskeletal:  - neg musculoskeletal ROS     GI/Hepatic:     (+) GERD, Asymptomatic on medication,     Renal/Genitourinary:  - neg Renal  ROS     Endo:  - neg endo ROS     Psychiatric/Substance Use:     (+) psychiatric history anxiety and depression     Infectious Disease: Comment: COVID+ 4/13/22      Malignancy:  - neg malignancy ROS     Other:            Physical Exam    Airway        Mallampati: II   TM distance: > 3 FB   Neck ROM: full   Mouth opening: > 3 cm    Respiratory Devices and Support         Dental  no notable dental history         Cardiovascular   cardiovascular exam normal          Pulmonary   pulmonary exam normal                OUTSIDE LABS:  CBC:   Lab Results   Component Value Date    WBC 5.5 03/13/2022    WBC 5.3 03/11/2022    HGB 16.4 03/13/2022    HGB 17.7 03/11/2022    HCT 48.0 03/13/2022    HCT 50.8 03/11/2022     03/13/2022     03/11/2022     BMP:   Lab Results   Component Value Date     03/13/2022     03/11/2022    POTASSIUM 3.6 03/13/2022    POTASSIUM 4.6 03/11/2022    CHLORIDE 98 04/01/2022    CHLORIDE 103 03/13/2022    CO2 30 03/13/2022    CO2 29 03/11/2022    BUN 10 03/13/2022    BUN 16 03/11/2022    CR 0.97 03/13/2022    CR 1.08 03/11/2022    GLC 93 03/13/2022     (H) 03/11/2022     COAGS: No results found for: PTT, INR, FIBR  POC: No results found for: BGM, HCG, HCGS  HEPATIC:   Lab Results   Component Value Date    ALBUMIN 3.4 03/13/2022    PROTTOTAL 6.4 (L) 03/13/2022    ALT 33 03/13/2022    AST 22 03/13/2022    ALKPHOS 49 03/13/2022    BILITOTAL 0.8 03/13/2022     OTHER:   Lab Results   Component Value Date    RANJITH 8.5 03/13/2022    TSH 1.51 03/11/2022       Anesthesia Plan    ASA Status:  2      Anesthesia Type: General.   Induction: Intravenous.   Maintenance: Balanced.        Consents    Anesthesia Plan(s) and associated risks, benefits, and realistic alternatives discussed. Questions answered and patient/representative(s) expressed understanding.    - Discussed:     - Discussed with:  Patient         Postoperative Care    Pain management: Multi-modal analgesia.   PONV  prophylaxis: Ondansetron (or other 5HT-3)     Comments:                Michael Mann MD

## 2022-04-20 NOTE — ANESTHESIA CARE TRANSFER NOTE
Patient: Kuldeep Sequeira    Procedure: * No procedures listed *  Electroconvulsive Therapy    Diagnosis: * No pre-op diagnosis entered *  Diagnosis Additional Information: No value filed.    Anesthesia Type:   No value filed.     Note:      Level of Consciousness: awake      Independent Airway: airway patency satisfactory and stable        Patient transferred to: PACU    Handoff Report: Identifed the Patient, Identified the Reponsible Provider, Reviewed the pertinent medical history, Discussed the surgical course, Reviewed Intra-OP anesthesia mangement and issues during anesthesia, Set expectations for post-procedure period and Allowed opportunity for questions and acknowledgement of understanding      Vitals:  Vitals Value Taken Time   BP     Temp     Pulse     Resp     SpO2         Electronically Signed By: Mejia Rivera MD  April 20, 2022  11:12 AM

## 2022-04-20 NOTE — PROCEDURES
Procedures    Kuldeep Sequeira is a 52 year old  year old male patient.  [id number]  @DX@    [facility]   ECT Procedure Note   04/20/2022    Patient Status: Outpatient    Is this the first in a series of 12 treatments?  No   No Known Allergies    Weight:  0 lbs 0 oz         Indications for ECT:     History of good ECT response in one or more previous episodes of illness         Clinical Narrative:     This is a 52 year old male with history of depression, anxiety and insomnia.  Patient has prior history of psychiatric hospitalization on at least 2 previous occasions, but does not have past history of MI CD treatment or suicide attempt history.  Now, presenting secondary to worsening of depressive symptoms leading to overdose of uncertain intent with sleep medication.  Occurring in the setting of medication changes or depression by outpatient psychiatrist.  Admit is voluntary.     Care coordination performed in detail.  Includes, detailed review of care everywhere and epic to review electronic chart information and related mental health history to presentation.  Including, patient's ED presentation to [facility] on 3/13 due to depression and overdose.  Further review of patient's past hospitalization from November 2020.  Subsequent review of ECT documentation available for review.  Please see associated documentation for details.     In brief, patient presented to [facility] ED on 3/13 secondary to worsening of depression and subsequent overdose.  Reports worsening of depressive symptoms for at least the past 2 years with efforts of management to include medications, therapy, ECT and TMS.  Most recent efforts of management in the community through outpatient psychiatrist include initiation of fluoxetine on 2/18 at 20 mg daily with titration on 2/25 to 40 mg and further titration on 3/3 to 60 mg daily.  Continued on Seroquel.  Despite efforts of medication management in  the community, continued to experience symptoms of depression.  According to collateral as obtained from wife, [facility] assessment, patient demonstrating worsening of symptoms especially within the past week.  Isolating in behavior.  Laying in bed.  Decreased appetite with weight loss.  Expressing suicidal ideation and demonstrated behavior leading to admission.  Poor self cares.  Recommendation for inpatient psychiatric hospitalization voluntarily.     Further care coordination performed in detail.  Includes, detailed review of care everywhere.  Patient has documentation unable to be seen on epic viewer.  Specifically, documentations from past ECT for first hospitalization.  Able to review hospitalization from 11/18 through 11/19/2020.  Admitted due to depression, insomnia with suicidal ideation.  PTA medications continue to include venlafaxine 75 mg daily, quetiapine 200 mg p.o. nightly, with medication changes to include Ambien to as needed and addition of doxepin for sleep.     Further care coordination performed.  Referral for ECT initiated on 12/1/2020 by PCP.  Outpatient consult reviewed by Dr. Maher dated 12/4/2020.  Please see consult for full details.  Medically clear for ECT after providing informed consent to treat.  ECT series #1 initiated on 12/7/2020.     Further care coordination performed.  On 3/22/2021, completed second series of ECT.  Medications documented as ineffective.  Effexor XR increased to 225 mg daily and Abilify added.  Considered switch from Effexor XR to Viibryd due to sexual side effects.     Upon patient interview, patient review performed on unit 5500.  Cooperative on approach.  Evaluation effort to review events into presentation and clarify information as provided in collateral report.  Patient admit is voluntary.     Patient reports a history of depression and anxiety of greater than 20 years.  Medication management initiated at approximately age 28.  Patient has history of  multiple medication trials with or without psychotherapy.  Further history of 2 prior hospitalizations in the month of November 2020 brief duration.  Associated with depressive symptoms in the setting of suicidal ideation and insomnia.  Despite efforts of medication management, proceeded with ECT as initiated on 12/7/2020 after referral by outpatient psychiatrist.  Continue with ECT for 2 series.  Did not appear to have taper or maintenance series of ECT.  Then, initiated TMS on 4/7/2021.     Patient has been followed by Dr. Markham since initiating ECT.  Medication management has been performed in multiple.  Medications alone have been documented as ineffective.  Recent medication changes initiated in the community include start of Prozac after switch from alternate antidepressant therapy.  Initiated on 2/18 at 20 mg daily, then on 2/2540 mg daily then on 3/3 increased to 60 mg daily.       Patient presented to ED on 3/13 due to continued symptoms of depression with anxious distress.  Occurring in the setting of repeat event of inability to sleep.  Similar to prior hospitalizations.  Patient reportedly taking increasing amounts of Ambien CR with intent to sleep.  Later, making statement of not wanting to wake up.  Recommendation for inpatient psychiatric hospitalization for further assessment.     Patient denies further stressors or triggers clearly associated with presentation.  Reports taking medications as prescribed.  Symptoms of depression and anxiety continued with efforts of fluoxetine titration, leading to behavioral self-harm.  Denies plan or intent of suicide by means of Ambien CR ingestion.  Denies history of suicide attempt.  Does admit to gun access.  Agrees to removal of gun access prior to disposition.  States an appropriate crisis relapse plan.  Future oriented for family.     Past treatment reviewed in detail.  Last documented cares reviewed to include medications documented as lacking efficacy.  " Further review of ECT documented as efficacious, due to medications alone is not effective.  Initiated ECT on 12/7/2020 after last hospitalization with last treatment in March 2021.  In April 2021, started series of TMS.  Denies benefit from TMS series.  Reports beneficial response from ECT, without significant side effect aside from mild cognition impairment.     On psychiatric review of symptoms, mood is, \"depressed.\"  Denies lindsey or hypomania.  Insomnia with low energy.  Lack of energy.  Lack of enjoyment with activities of prior interest.  Impacting activities at home and work as well as outside relationships.  Intact appetite and weight.  Intact concentration.  Negative thoughts of self.  Stating, \"end of my rope.\"  Denies tearfulness.  Endorses anxiety.  Denies psychosis.         Diagnosis:     Major depression         ECT Log:     #1 4/6/22 Consent is obtained for the procedure. Starting mood is 0/10 (10 being the best). Passive death wishes. Denies intent to kill self when took extra ambien 'to sleep' and was hospitalized. Is on CBD/THC 16/1 in am and 1/1 pm for sleep.   #2 4/8/22 Did get anxious as he was receiving anesthesia but recovery was uneventful and no side effects afterwards. Mood low.   #3 4/11/22  Mood unchanged. Wife notices a bit more energy and concentration. No SI. Some headache  #4 4/13/22 Anxious. Took hydroxyzine yesterday and slept most of the day. On venlafaxine 75 mg  #5 4/20/22 Tested positive for COVID on 4/13/22. Now resuming ECT. Very anxious on the morning of.  Will treat today as last patient. On Venlafaxine 150 mg         Pause for the Cause:     Right patient Yes   Right procedure/laterality settings: Yes          Intra-Procedure Documentation:       ECT number at [facility]: 5+   Treatment number this series: 5   Lifetime total treatment number: 19+     Type of ECT:  Right, unilateral ultrabrief    ECT Medications:    Tylenol 1000 mg  Robinul 0.2 mg  Toradol: 30mg  Zofran: " 4mg  Zyprexa Zydis: 10mg  Brevital: 100 mg  Succinyl Choline: 100 mg  Versed: 2 mg   Labetolol 10 mg pre-ECT    ECT Strip Summary:   Energy Level: Titration: 19.2 mC  115.2 ; 0.3 msec; 30 Hz; 8 sec; 800 mA  Motor Seizure Duration: 36 seconds  EEG Seizure Duration: 68 seconds    Complications: No    Time for re-orientation: 9 minutes on 4/13/22    Plan:   Continue RUL UBP ECT Q MWF at 115.2 mC  COVID PCR positive on 4/13/22 (no COVID PCR testing until 7/13/22)    Hold CBD/THC on morning of ECT and evening prior. May take his Evening dose the night of ECT and morning dose the day after ECT  Monitor depression severity with clinical assessment augmented with IDS-SR every 3rd treatment    ASK PATIENT TO BRING CPAP MACHINE WITH HIM TO USE IN RECOVERY    Will likely need to discuss maintenance ECT when completing this course. Also, is a good candidate for VNS (this is his 3rd course of ECT).     Dontrell Murillo MD  Professor and Executive    Department Psychiatry and Behavioral Sciences

## 2022-04-20 NOTE — DISCHARGE INSTRUCTIONS
ECT Discharge Instructions      During your ECT series:    Do not drive or work heavy equipment until 7 days after your last treatment.  Do not drink alcohol or use street drugs (illicit drugs) while you are having treatments.  Do not make important decisions, including legal decisions.    After your maintenance ECT treatment:    Do not drive for 24 hours after treatment.  If you will be having more than one treatment witihin a week, no driving until 7 days after your last ECT treatment.       After each treatment:    Get plenty of rest. A responsible adult must stay with your for at least 6 hours.  Avoid heavy or risky activities for 24 hours.  If you feel light-headed, sit for a few minutes before standing. Have someone help you get up.  If you have nausea (feel sick to your stomach): Drink only clear liquids such as apple juice, ginger ale, broth or 7UP, Be sure to drink plenty of liquids. Move to a normal diet as you feel able.   If you received Toradol, wait 6 hours before taking ibuprofen.  Call your doctor if:   You have a fever over 100F (37.7 C) (taken under the tongue), or a fever that last more than 24 hours.  Your IV site is red, swollen, very painful or is getting more tender.  You have nausea that gets very bad or does not improve.    If you have any symptoms after ECT, tell our staff before your next treatment.  The ECT Department can be reached at [phone].  The ECT Department is open Mondays, Wednesdays and Fridays from 7:00 AM to 2:00 PM.    To speak to a doctor, call: Dr. Dontrell Murillo: Office [phone], Fax [phone]    Today you received the following:   - Toradol 30mg today at 1053am.  Toradol is an NSAID.  Do not take any NSAID's including: Ibuprofen, Naproxen, Aspirin, Advil, Motrin, Aleve, Rodrick, etc., until 6 hours after the above time (453pm).  If you have any questions, contact your physician or pharmacist.    -500mL of fluids (lactated ringers)  -Zyprexa 5mg at 1036am  -Zofran 4mg  at 1053am    Transported by: michael Barnard    Instructions for your next appointment:    *Covid-19 Testing:  You have tested positive for Covid on 4/13/22. You do not need to test for Covid for 90 days after a positive test (7/12/22).      Please come to the [facility] entrance and check-in with Security before your ECT appointment.  The [facility] entrance is located right next to the Adult Emergency Room.  The address is [address] [place] [address].    After your appointment, you may be picked up at the [facility] entrance, which is located on the West side of our campus.  The address is [address] [place] [address].  [facility].

## 2022-04-21 DIAGNOSIS — F33.1 MAJOR DEPRESSIVE DISORDER, RECURRENT EPISODE, MODERATE (H): Primary | ICD-10-CM

## 2022-04-21 RX ORDER — OLANZAPINE 5 MG/1
5 TABLET ORAL ONCE
Qty: 1 TABLET | Refills: 0 | Status: CANCELLED | OUTPATIENT
Start: 2022-04-21 | End: 2022-04-21

## 2022-04-21 RX ORDER — OLANZAPINE 10 MG/1
10 TABLET ORAL DAILY
Qty: 1 TABLET | Refills: 0 | Status: SHIPPED | OUTPATIENT
Start: 2022-04-21

## 2022-04-21 NOTE — TELEPHONE ENCOUNTER
Patient state that the dose for the Zyprexa was suppose to be increase to 10 mg .  
Writer spoke with Dr. Murillo he did agree to prescribe olanzapine 10 mg daily.      Received VORB from Dr. Murillo to enter order for olanzapine 10 mg daily zero refills   
color consistent with ethnicity/race

## 2022-04-22 ENCOUNTER — HOSPITAL ENCOUNTER (OUTPATIENT)
Dept: BEHAVIORAL HEALTH | Facility: CLINIC | Age: 52
Discharge: HOME | End: 2022-04-22
Attending: PSYCHIATRY & NEUROLOGY | Admitting: PSYCHIATRY & NEUROLOGY
Payer: COMMERCIAL

## 2022-04-22 ENCOUNTER — ANESTHESIA (OUTPATIENT)
Dept: BEHAVIORAL HEALTH | Facility: CLINIC | Age: 52
End: 2022-04-22
Payer: COMMERCIAL

## 2022-04-22 ENCOUNTER — ANESTHESIA EVENT (OUTPATIENT)
Dept: BEHAVIORAL HEALTH | Facility: CLINIC | Age: 52
End: 2022-04-22
Payer: COMMERCIAL

## 2022-04-22 VITALS
SYSTOLIC BLOOD PRESSURE: 114 MMHG | RESPIRATION RATE: 16 BRPM | TEMPERATURE: 98.4 F | DIASTOLIC BLOOD PRESSURE: 79 MMHG | HEART RATE: 81 BPM | OXYGEN SATURATION: 95 %

## 2022-04-22 DIAGNOSIS — F33.2 MAJOR DEPRESSIVE DISORDER, RECURRENT EPISODE, SEVERE WITH ANXIOUS DISTRESS (H): Primary | ICD-10-CM

## 2022-04-22 PROCEDURE — 250N000009 HC RX 250: Performed by: ANESTHESIOLOGY

## 2022-04-22 PROCEDURE — 258N000003 HC RX IP 258 OP 636: Performed by: PSYCHIATRY & NEUROLOGY

## 2022-04-22 PROCEDURE — 250N000011 HC RX IP 250 OP 636: Performed by: PSYCHIATRY & NEUROLOGY

## 2022-04-22 PROCEDURE — 250N000011 HC RX IP 250 OP 636: Performed by: ANESTHESIOLOGY

## 2022-04-22 PROCEDURE — 250N000009 HC RX 250: Performed by: PSYCHIATRY & NEUROLOGY

## 2022-04-22 PROCEDURE — 90870 ELECTROCONVULSIVE THERAPY: CPT | Performed by: PSYCHIATRY & NEUROLOGY

## 2022-04-22 PROCEDURE — 370N000017 HC ANESTHESIA TECHNICAL FEE, PER MIN

## 2022-04-22 PROCEDURE — 90870 ELECTROCONVULSIVE THERAPY: CPT

## 2022-04-22 RX ORDER — ONDANSETRON 2 MG/ML
4 INJECTION INTRAMUSCULAR; INTRAVENOUS
Status: CANCELLED
Start: 2022-04-22 | End: 2022-04-22

## 2022-04-22 RX ORDER — ONDANSETRON 2 MG/ML
4 INJECTION INTRAMUSCULAR; INTRAVENOUS EVERY 30 MIN PRN
Status: DISCONTINUED | OUTPATIENT
Start: 2022-04-22 | End: 2022-04-23 | Stop reason: HOSPADM

## 2022-04-22 RX ORDER — KETOROLAC TROMETHAMINE 30 MG/ML
30 INJECTION, SOLUTION INTRAMUSCULAR; INTRAVENOUS
Status: COMPLETED | OUTPATIENT
Start: 2022-04-22 | End: 2022-04-22

## 2022-04-22 RX ORDER — ACETAMINOPHEN 500 MG
1000 TABLET ORAL
Status: ACTIVE | OUTPATIENT
Start: 2022-04-22 | End: 2022-04-22

## 2022-04-22 RX ORDER — OXYCODONE HYDROCHLORIDE 5 MG/1
5 TABLET ORAL EVERY 4 HOURS PRN
Status: DISCONTINUED | OUTPATIENT
Start: 2022-04-22 | End: 2022-04-23 | Stop reason: HOSPADM

## 2022-04-22 RX ORDER — GLYCOPYRROLATE 0.2 MG/ML
0.2 INJECTION, SOLUTION INTRAMUSCULAR; INTRAVENOUS
Status: CANCELLED
Start: 2022-04-22 | End: 2022-04-22

## 2022-04-22 RX ORDER — ONDANSETRON 4 MG/1
4 TABLET, ORALLY DISINTEGRATING ORAL EVERY 30 MIN PRN
Status: DISCONTINUED | OUTPATIENT
Start: 2022-04-22 | End: 2022-04-23 | Stop reason: HOSPADM

## 2022-04-22 RX ORDER — ONDANSETRON 2 MG/ML
4 INJECTION INTRAMUSCULAR; INTRAVENOUS
Status: COMPLETED | OUTPATIENT
Start: 2022-04-22 | End: 2022-04-22

## 2022-04-22 RX ORDER — HYDROMORPHONE HCL IN WATER/PF 6 MG/30 ML
0.2 PATIENT CONTROLLED ANALGESIA SYRINGE INTRAVENOUS EVERY 5 MIN PRN
Status: DISCONTINUED | OUTPATIENT
Start: 2022-04-22 | End: 2022-04-23 | Stop reason: HOSPADM

## 2022-04-22 RX ORDER — GLYCOPYRROLATE 0.2 MG/ML
0.2 INJECTION, SOLUTION INTRAMUSCULAR; INTRAVENOUS
Status: COMPLETED | OUTPATIENT
Start: 2022-04-22 | End: 2022-04-22

## 2022-04-22 RX ORDER — MEPERIDINE HYDROCHLORIDE 25 MG/ML
12.5 INJECTION INTRAMUSCULAR; INTRAVENOUS; SUBCUTANEOUS
Status: DISCONTINUED | OUTPATIENT
Start: 2022-04-22 | End: 2022-04-23 | Stop reason: HOSPADM

## 2022-04-22 RX ORDER — LABETALOL HYDROCHLORIDE 5 MG/ML
INJECTION, SOLUTION INTRAVENOUS PRN
Status: DISCONTINUED | OUTPATIENT
Start: 2022-04-22 | End: 2022-04-22

## 2022-04-22 RX ORDER — FENTANYL CITRATE 50 UG/ML
25 INJECTION, SOLUTION INTRAMUSCULAR; INTRAVENOUS
Status: DISCONTINUED | OUTPATIENT
Start: 2022-04-22 | End: 2022-04-23 | Stop reason: HOSPADM

## 2022-04-22 RX ORDER — KETOROLAC TROMETHAMINE 30 MG/ML
30 INJECTION, SOLUTION INTRAMUSCULAR; INTRAVENOUS
Status: CANCELLED
Start: 2022-04-22 | End: 2022-04-22

## 2022-04-22 RX ORDER — FENTANYL CITRATE 50 UG/ML
25 INJECTION, SOLUTION INTRAMUSCULAR; INTRAVENOUS EVERY 5 MIN PRN
Status: DISCONTINUED | OUTPATIENT
Start: 2022-04-22 | End: 2022-04-23 | Stop reason: HOSPADM

## 2022-04-22 RX ORDER — SODIUM CHLORIDE, SODIUM LACTATE, POTASSIUM CHLORIDE, CALCIUM CHLORIDE 600; 310; 30; 20 MG/100ML; MG/100ML; MG/100ML; MG/100ML
INJECTION, SOLUTION INTRAVENOUS CONTINUOUS
Status: DISCONTINUED | OUTPATIENT
Start: 2022-04-22 | End: 2022-04-23 | Stop reason: HOSPADM

## 2022-04-22 RX ORDER — ACETAMINOPHEN 500 MG
1000 TABLET ORAL
Status: CANCELLED
Start: 2022-04-22 | End: 2022-04-22

## 2022-04-22 RX ADMIN — LABETALOL HYDROCHLORIDE 20 MG: 5 INJECTION, SOLUTION INTRAVENOUS at 11:32

## 2022-04-22 RX ADMIN — SODIUM CHLORIDE, POTASSIUM CHLORIDE, SODIUM LACTATE AND CALCIUM CHLORIDE 500 ML: 600; 310; 30; 20 INJECTION, SOLUTION INTRAVENOUS at 11:27

## 2022-04-22 RX ADMIN — METHOHEXITAL SODIUM 100 MG: 500 INJECTION, POWDER, LYOPHILIZED, FOR SOLUTION INTRAMUSCULAR; INTRAVENOUS; RECTAL at 11:32

## 2022-04-22 RX ADMIN — KETOROLAC TROMETHAMINE 30 MG: 30 INJECTION, SOLUTION INTRAMUSCULAR at 11:24

## 2022-04-22 RX ADMIN — MIDAZOLAM HYDROCHLORIDE 2 MG: 1 INJECTION, SOLUTION INTRAMUSCULAR; INTRAVENOUS at 11:42

## 2022-04-22 RX ADMIN — ONDANSETRON 4 MG: 2 INJECTION INTRAMUSCULAR; INTRAVENOUS at 11:25

## 2022-04-22 RX ADMIN — Medication 100 MG: at 11:32

## 2022-04-22 RX ADMIN — GLYCOPYRROLATE 0.2 MG: 0.2 INJECTION, SOLUTION INTRAMUSCULAR; INTRAVENOUS at 11:23

## 2022-04-22 NOTE — ANESTHESIA PREPROCEDURE EVALUATION
Anesthesia Pre-Procedure Evaluation    Patient: Kuldeep Sequeira   MRN: [id number] : 1970        Procedure : * No procedures listed *  Electroconvulsive Therapy       Past Medical History:   Diagnosis Date     Anxiety      Depressive disorder      GERD (gastroesophageal reflux disease)       No past surgical history on file.   No Known Allergies   Social History     Tobacco Use     Smoking status: Never Smoker     Smokeless tobacco: Never Used   Substance Use Topics     Alcohol use: Not on file      Wt Readings from Last 1 Encounters:   22 83.5 kg (184 lb)        Anesthesia Evaluation            ROS/MED HX  ENT/Pulmonary:  - neg pulmonary ROS     Neurologic:  - neg neurologic ROS     Cardiovascular:  - neg cardiovascular ROS     METS/Exercise Tolerance:     Hematologic:  - neg hematologic  ROS     Musculoskeletal:  - neg musculoskeletal ROS     GI/Hepatic:     (+) GERD, Asymptomatic on medication,     Renal/Genitourinary:  - neg Renal ROS     Endo:  - neg endo ROS     Psychiatric/Substance Use:     (+) psychiatric history anxiety and depression     Infectious Disease:  - neg infectious disease ROS     Malignancy:  - neg malignancy ROS     Other:            Physical Exam    Airway  airway exam normal           Respiratory Devices and Support         Dental  no notable dental history         Cardiovascular   cardiovascular exam normal          Pulmonary   pulmonary exam normal                OUTSIDE LABS:  CBC:   Lab Results   Component Value Date    WBC 5.5 2022    WBC 5.3 2022    HGB 16.4 2022    HGB 17.7 2022    HCT 48.0 2022    HCT 50.8 2022     2022     2022     BMP:   Lab Results   Component Value Date     2022     2022    POTASSIUM 3.6 2022    POTASSIUM 4.6 2022    CHLORIDE 98 2022    CHLORIDE 103 2022    CO2 30 2022    CO2 29 2022    BUN 10 2022    BUN 16 2022    CR  0.97 2022    CR 1.08 2022    GLC 93 2022     (H) 2022     COAGS: No results found for: PTT, INR, FIBR  POC: No results found for: BGM, HCG, HCGS  HEPATIC:   Lab Results   Component Value Date    ALBUMIN 3.4 2022    PROTTOTAL 6.4 (L) 2022    ALT 33 2022    AST 22 2022    ALKPHOS 49 2022    BILITOTAL 0.8 2022     OTHER:   Lab Results   Component Value Date    RANJITH 8.5 2022    TSH 1.51 2022       Anesthesia Plan    ASA Status:  2   NPO Status:  NPO Appropriate    Anesthesia Type: General.     - Airway: Native airway   Induction: Intravenous.   Maintenance: TIVA.        Consents    Anesthesia Plan(s) and associated risks, benefits, and realistic alternatives discussed. Questions answered and patient/representative(s) expressed understanding.    - Discussed:     - Discussed with:  Patient         Postoperative Care       PONV prophylaxis: Ondansetron (or other 5HT-3)     Comments:                    Anita Canales Pre-Procedure Evaluation    Patient: Kuldeep Sequeira   MRN: [id number] : 1970        Procedure :   Electroconvulsive Therapy       Past Medical History:   Diagnosis Date     Anxiety      Depressive disorder      GERD (gastroesophageal reflux disease)       No past surgical history on file.   No Known Allergies   Social History     Tobacco Use     Smoking status: Never Smoker     Smokeless tobacco: Never Used   Substance Use Topics     Alcohol use: Not on file      Wt Readings from Last 1 Encounters:   22 83.5 kg (184 lb)        Anesthesia Evaluation            ROS/MED HX  ENT/Pulmonary:  - neg pulmonary ROS     Neurologic:  - neg neurologic ROS     Cardiovascular:  - neg cardiovascular ROS     METS/Exercise Tolerance:     Hematologic:  - neg hematologic  ROS     Musculoskeletal:  - neg musculoskeletal ROS     GI/Hepatic:     (+) GERD, Asymptomatic on medication,     Renal/Genitourinary:  - neg Renal ROS      Endo:  - neg endo ROS     Psychiatric/Substance Use:     (+) psychiatric history anxiety and depression     Infectious Disease: Comment: COVID+ 4/13/22      Malignancy:  - neg malignancy ROS     Other:            Physical Exam    Airway        Mallampati: II   TM distance: > 3 FB   Neck ROM: full   Mouth opening: > 3 cm    Respiratory Devices and Support         Dental  no notable dental history         Cardiovascular   cardiovascular exam normal          Pulmonary   pulmonary exam normal                OUTSIDE LABS:  CBC:   Lab Results   Component Value Date    WBC 5.5 03/13/2022    WBC 5.3 03/11/2022    HGB 16.4 03/13/2022    HGB 17.7 03/11/2022    HCT 48.0 03/13/2022    HCT 50.8 03/11/2022     03/13/2022     03/11/2022     BMP:   Lab Results   Component Value Date     03/13/2022     03/11/2022    POTASSIUM 3.6 03/13/2022    POTASSIUM 4.6 03/11/2022    CHLORIDE 98 04/01/2022    CHLORIDE 103 03/13/2022    CO2 30 03/13/2022    CO2 29 03/11/2022    BUN 10 03/13/2022    BUN 16 03/11/2022    CR 0.97 03/13/2022    CR 1.08 03/11/2022    GLC 93 03/13/2022     (H) 03/11/2022     COAGS: No results found for: PTT, INR, FIBR  POC: No results found for: BGM, HCG, HCGS  HEPATIC:   Lab Results   Component Value Date    ALBUMIN 3.4 03/13/2022    PROTTOTAL 6.4 (L) 03/13/2022    ALT 33 03/13/2022    AST 22 03/13/2022    ALKPHOS 49 03/13/2022    BILITOTAL 0.8 03/13/2022     OTHER:   Lab Results   Component Value Date    RANJITH 8.5 03/13/2022    TSH 1.51 03/11/2022       Anesthesia Plan    ASA Status:  2      Anesthesia Type: General.   Induction: Intravenous.   Maintenance: Balanced.        Consents    Anesthesia Plan(s) and associated risks, benefits, and realistic alternatives discussed. Questions answered and patient/representative(s) expressed understanding.    - Discussed:     - Discussed with:  Patient         Postoperative Care    Pain management: Multi-modal analgesia.   PONV prophylaxis:  Ondansetron (or other 5HT-3)     Comments:                    Angella Howard MD   8

## 2022-04-22 NOTE — ANESTHESIA CARE TRANSFER NOTE
Patient: Kuldeep Sequeira    Procedure: * No procedures listed *  Electroconvulsive Therapy    Diagnosis: * No pre-op diagnosis entered *  Diagnosis Additional Information: No value filed.    Anesthesia Type:   General     Note:    Oropharynx: spontaneously breathing  Level of Consciousness: awake  Oxygen Supplementation: room air    Independent Airway: airway patency satisfactory and stable  Dentition: dentition unchanged  Vital Signs Stable: post-procedure vital signs reviewed and stable  Report to RN Given: handoff report given  Patient transferred to: PACU    Handoff Report: Identifed the Patient, Identified the Reponsible Provider, Reviewed the pertinent medical history, Discussed the surgical course, Reviewed Intra-OP anesthesia mangement and issues during anesthesia, Set expectations for post-procedure period and Allowed opportunity for questions and acknowledgement of understanding      Vitals:  Vitals Value Taken Time   /98 04/22/22 1148   Temp 37.3  C (99.2  F) 04/22/22 1148   Pulse 115 04/22/22 1148   Resp 16 04/22/22 1148   SpO2 98 % 04/22/22 1148       Electronically Signed By: Angella Howard MD  April 22, 2022  11:59 AM

## 2022-04-22 NOTE — PROCEDURES
Procedures    Kuldeep Sequeira is a 52 year old  year old male patient.  [id number]  @DX@    [facility] [place]   ECT Procedure Note   04/22/2022    Patient Status: Outpatient    Is this the first in a series of 12 treatments?  No   No Known Allergies    Weight:  0 lbs 0 oz         Indications for ECT:     History of good ECT response in one or more previous episodes of illness         Clinical Narrative:     This is a 52 year old male with history of depression, anxiety and insomnia.  Patient has prior history of psychiatric hospitalization on at least 2 previous occasions, but does not have past history of MI CD treatment or suicide attempt history.  Now, presenting secondary to worsening of depressive symptoms leading to overdose of uncertain intent with sleep medication.  Occurring in the setting of medication changes or depression by outpatient psychiatrist.  Admit is voluntary.     Care coordination performed in detail.  Includes, detailed review of care everywhere and epic to review electronic chart information and related mental health history to presentation.  Including, patient's ED presentation to [facility] on 3/13 due to depression and overdose.  Further review of patient's past hospitalization from November 2020.  Subsequent review of ECT documentation available for review.  Please see associated documentation for details.     In brief, patient presented to [facility] ED on 3/13 secondary to worsening of depression and subsequent overdose.  Reports worsening of depressive symptoms for at least the past 2 years with efforts of management to include medications, therapy, ECT and TMS.  Most recent efforts of management in the community through outpatient psychiatrist include initiation of fluoxetine on 2/18 at 20 mg daily with titration on 2/25 to 40 mg and further titration on 3/3 to 60 mg daily.  Continued on Seroquel.  Despite efforts of medication management in  the community, continued to experience symptoms of depression.  According to collateral as obtained from wife, [facility] assessment, patient demonstrating worsening of symptoms especially within the past week.  Isolating in behavior.  Laying in bed.  Decreased appetite with weight loss.  Expressing suicidal ideation and demonstrated behavior leading to admission.  Poor self cares.  Recommendation for inpatient psychiatric hospitalization voluntarily.     Further care coordination performed in detail.  Includes, detailed review of care everywhere.  Patient has documentation unable to be seen on epic viewer.  Specifically, documentations from past ECT for first hospitalization.  Able to review hospitalization from 11/18 through 11/19/2020.  Admitted due to depression, insomnia with suicidal ideation.  PTA medications continue to include venlafaxine 75 mg daily, quetiapine 200 mg p.o. nightly, with medication changes to include Ambien to as needed and addition of doxepin for sleep.     Further care coordination performed.  Referral for ECT initiated on 12/1/2020 by PCP.  Outpatient consult reviewed by Dr. Maher dated 12/4/2020.  Please see consult for full details.  Medically clear for ECT after providing informed consent to treat.  ECT series #1 initiated on 12/7/2020.     Further care coordination performed.  On 3/22/2021, completed second series of ECT.  Medications documented as ineffective.  Effexor XR increased to 225 mg daily and Abilify added.  Considered switch from Effexor XR to Viibryd due to sexual side effects.     Upon patient interview, patient review performed on unit 5500.  Cooperative on approach.  Evaluation effort to review events into presentation and clarify information as provided in collateral report.  Patient admit is voluntary.     Patient reports a history of depression and anxiety of greater than 20 years.  Medication management initiated at approximately age 28.  Patient has history of  multiple medication trials with or without psychotherapy.  Further history of 2 prior hospitalizations in the month of November 2020 brief duration.  Associated with depressive symptoms in the setting of suicidal ideation and insomnia.  Despite efforts of medication management, proceeded with ECT as initiated on 12/7/2020 after referral by outpatient psychiatrist.  Continue with ECT for 2 series.  Did not appear to have taper or maintenance series of ECT.  Then, initiated TMS on 4/7/2021.     Patient has been followed by Dr. Markham since initiating ECT.  Medication management has been performed in multiple.  Medications alone have been documented as ineffective.  Recent medication changes initiated in the community include start of Prozac after switch from alternate antidepressant therapy.  Initiated on 2/18 at 20 mg daily, then on 2/2540 mg daily then on 3/3 increased to 60 mg daily.       Patient presented to ED on 3/13 due to continued symptoms of depression with anxious distress.  Occurring in the setting of repeat event of inability to sleep.  Similar to prior hospitalizations.  Patient reportedly taking increasing amounts of Ambien CR with intent to sleep.  Later, making statement of not wanting to wake up.  Recommendation for inpatient psychiatric hospitalization for further assessment.     Patient denies further stressors or triggers clearly associated with presentation.  Reports taking medications as prescribed.  Symptoms of depression and anxiety continued with efforts of fluoxetine titration, leading to behavioral self-harm.  Denies plan or intent of suicide by means of Ambien CR ingestion.  Denies history of suicide attempt.  Does admit to gun access.  Agrees to removal of gun access prior to disposition.  States an appropriate crisis relapse plan.  Future oriented for family.     Past treatment reviewed in detail.  Last documented cares reviewed to include medications documented as lacking efficacy.  " Further review of ECT documented as efficacious, due to medications alone is not effective.  Initiated ECT on 12/7/2020 after last hospitalization with last treatment in March 2021.  In April 2021, started series of TMS.  Denies benefit from TMS series.  Reports beneficial response from ECT, without significant side effect aside from mild cognition impairment.     On psychiatric review of symptoms, mood is, \"depressed.\"  Denies lindsey or hypomania.  Insomnia with low energy.  Lack of energy.  Lack of enjoyment with activities of prior interest.  Impacting activities at home and work as well as outside relationships.  Intact appetite and weight.  Intact concentration.  Negative thoughts of self.  Stating, \"end of my rope.\"  Denies tearfulness.  Endorses anxiety.  Denies psychosis.         Diagnosis:     Major depression         ECT Log:     #1 4/6/22 Consent is obtained for the procedure. Starting mood is 0/10 (10 being the best). Passive death wishes. Denies intent to kill self when took extra ambien 'to sleep' and was hospitalized. Is on CBD/THC 16/1 in am and 1/1 pm for sleep.   #2 4/8/22 Did get anxious as he was receiving anesthesia but recovery was uneventful and no side effects afterwards. Mood low.   #3 4/11/22  Mood unchanged. Wife notices a bit more energy and concentration. No SI. Some headache  #4 4/13/22 Anxious. Took hydroxyzine yesterday and slept most of the day. On venlafaxine 75 mg  #5 4/20/22 Tested positive for COVID on 4/13/22. Now resuming ECT. Very anxious on the morning of.  Will treat today as last patient. On Venlafaxine 150 mg  #6 4/22/22 Continues to struggle with low mood         Pause for the Cause:     Right patient Yes   Right procedure/laterality settings: Yes          Intra-Procedure Documentation:       ECT number at [facility]: 6+   Treatment number this series: 6   Lifetime total treatment number: 20+     Type of ECT:  Right, unilateral ultrabrief    ECT Medications:    Tylenol 1000 " mg  Robinul 0.2 mg  Toradol: 30mg  Zofran: 4mg  Zyprexa Zydis: 10mg  Brevital: 100 mg  Succinyl Choline: 100 mg  Versed: 2 mg   Labetolol 10 mg pre-ECT    ECT Strip Summary:   Energy Level: Titration: 19.2 mC  115.2 ; 0.3 msec; 30 Hz; 8 sec; 800 mA  Motor Seizure Duration: 42 seconds  EEG Seizure Duration:  50 seconds    Complications: No    Time for re-orientation: 9 minutes on 4/13/22    Plan:   Continue RUL UBP ECT Q MWF at 115.2 mC  COVID PCR positive on 4/13/22 (no COVID PCR testing until 7/13/22)    Hold CBD/THC on morning of ECT and evening prior. May take his Evening dose the night of ECT and morning dose the day after ECT  Monitor depression severity with clinical assessment augmented with IDS-SR every 3rd treatment    ASK PATIENT TO BRING CPAP MACHINE WITH HIM TO USE IN RECOVERY    Will likely need to discuss maintenance ECT when completing this course. Also, is a good candidate for VNS (this is his 3rd course of ECT).     Dontrell Murillo MD  Professor and Executive    Department Psychiatry and Behavioral Sciences

## 2022-04-22 NOTE — DISCHARGE INSTRUCTIONS
ECT Discharge Instructions      During your ECT series:    Do not drive or work heavy equipment until 7 days after your last treatment.  Do not drink alcohol or use street drugs (illicit drugs) while you are having treatments.  Do not make important decisions, including legal decisions.    After your maintenance ECT treatment:    Do not drive for 24 hours after treatment.  If you will be having more than one treatment witihin a week, no driving until 7 days after your last ECT treatment.       After each treatment:    Get plenty of rest. A responsible adult must stay with your for at least 6 hours.  Avoid heavy or risky activities for 24 hours.  If you feel light-headed, sit for a few minutes before standing. Have someone help you get up.  If you have nausea (feel sick to your stomach): Drink only clear liquids such as apple juice, ginger ale, broth or 7UP, Be sure to drink plenty of liquids. Move to a normal diet as you feel able.   If you received Toradol, wait 6 hours before taking ibuprofen.  Call your doctor if:   You have a fever over 100F (37.7 C) (taken under the tongue), or a fever that last more than 24 hours.  Your IV site is red, swollen, very painful or is getting more tender.  You have nausea that gets very bad or does not improve.    If you have any symptoms after ECT, tell our staff before your next treatment.  The ECT Department can be reached at [phone].  The ECT Department is open Mondays, Wednesdays and Fridays from 7:00 AM to 2:00 PM.    To speak to a doctor, call: Dr. Dontrell Murillo: Office [phone], Fax [phone]    Today you received the following:   - Toradol 30mg today at 1125am.  Toradol is an NSAID.  Do not take any NSAID's including: Ibuprofen, Naproxen, Aspirin, Advil, Motrin, Aleve, Rodrick, etc., until 6 hours after the above time (438p).  If you have any questions, contact your physician or pharmacist.    - IV Zofran 4mg at 1125a for nausea.   -500mL of fluids (lactated  ringers)    Transported by: michael Barnard    Instructions for your next appointment:    You tested positive for covid on 4/13/22. You do not need to test for 90 days after a positive result.      Please come to the [facility] entrance and check-in with Security before your ECT appointment.  The [facility] is located right next to the Adult Emergency Room.  The address is [address] [place] [address].    After your appointment, you may be picked up at the [facility] entrance, which is located on the West side of our campus.  The address is [address] [place] [address].

## 2022-04-22 NOTE — PROGRESS NOTES
Patient met criteria for phase I and transition to phase II. Isolation maintained post ECT recovery per policy and procedures.

## 2022-04-25 ENCOUNTER — HOSPITAL ENCOUNTER (OUTPATIENT)
Dept: BEHAVIORAL HEALTH | Facility: CLINIC | Age: 52
Discharge: HOME | End: 2022-04-25
Attending: PSYCHIATRY & NEUROLOGY
Payer: COMMERCIAL

## 2022-04-25 ENCOUNTER — ANESTHESIA EVENT (OUTPATIENT)
Dept: BEHAVIORAL HEALTH | Facility: CLINIC | Age: 52
End: 2022-04-25
Payer: COMMERCIAL

## 2022-04-25 ENCOUNTER — ANESTHESIA (OUTPATIENT)
Dept: BEHAVIORAL HEALTH | Facility: CLINIC | Age: 52
End: 2022-04-25
Payer: COMMERCIAL

## 2022-04-25 VITALS
RESPIRATION RATE: 16 BRPM | DIASTOLIC BLOOD PRESSURE: 87 MMHG | HEART RATE: 80 BPM | OXYGEN SATURATION: 95 % | SYSTOLIC BLOOD PRESSURE: 114 MMHG | TEMPERATURE: 98.4 F

## 2022-04-25 DIAGNOSIS — F33.2 MAJOR DEPRESSIVE DISORDER, RECURRENT EPISODE, SEVERE WITH ANXIOUS DISTRESS (H): Primary | ICD-10-CM

## 2022-04-25 PROCEDURE — 250N000009 HC RX 250: Performed by: ANESTHESIOLOGY

## 2022-04-25 PROCEDURE — 999N000011 HC STATISTIC ANESTHESIA CASE

## 2022-04-25 PROCEDURE — 250N000011 HC RX IP 250 OP 636: Performed by: PSYCHIATRY & NEUROLOGY

## 2022-04-25 PROCEDURE — 250N000009 HC RX 250: Performed by: PSYCHIATRY & NEUROLOGY

## 2022-04-25 PROCEDURE — 90870 ELECTROCONVULSIVE THERAPY: CPT | Performed by: PSYCHIATRY & NEUROLOGY

## 2022-04-25 PROCEDURE — 370N000017 HC ANESTHESIA TECHNICAL FEE, PER MIN

## 2022-04-25 PROCEDURE — 250N000011 HC RX IP 250 OP 636: Performed by: ANESTHESIOLOGY

## 2022-04-25 PROCEDURE — 90870 ELECTROCONVULSIVE THERAPY: CPT

## 2022-04-25 PROCEDURE — 258N000003 HC RX IP 258 OP 636: Performed by: PSYCHIATRY & NEUROLOGY

## 2022-04-25 RX ORDER — ACETAMINOPHEN 500 MG
1000 TABLET ORAL
Status: ACTIVE | OUTPATIENT
Start: 2022-04-25 | End: 2022-04-25

## 2022-04-25 RX ORDER — LABETALOL HYDROCHLORIDE 5 MG/ML
INJECTION, SOLUTION INTRAVENOUS PRN
Status: DISCONTINUED | OUTPATIENT
Start: 2022-04-25 | End: 2022-04-25

## 2022-04-25 RX ORDER — SODIUM CHLORIDE, SODIUM LACTATE, POTASSIUM CHLORIDE, CALCIUM CHLORIDE 600; 310; 30; 20 MG/100ML; MG/100ML; MG/100ML; MG/100ML
INJECTION, SOLUTION INTRAVENOUS CONTINUOUS
Status: DISCONTINUED | OUTPATIENT
Start: 2022-04-25 | End: 2022-04-26 | Stop reason: HOSPADM

## 2022-04-25 RX ORDER — ONDANSETRON 2 MG/ML
4 INJECTION INTRAMUSCULAR; INTRAVENOUS EVERY 30 MIN PRN
Status: DISCONTINUED | OUTPATIENT
Start: 2022-04-25 | End: 2022-04-26 | Stop reason: HOSPADM

## 2022-04-25 RX ORDER — KETOROLAC TROMETHAMINE 30 MG/ML
30 INJECTION, SOLUTION INTRAMUSCULAR; INTRAVENOUS
Status: CANCELLED
Start: 2022-04-25 | End: 2022-04-25

## 2022-04-25 RX ORDER — GLYCOPYRROLATE 0.2 MG/ML
0.2 INJECTION, SOLUTION INTRAMUSCULAR; INTRAVENOUS
Status: CANCELLED
Start: 2022-04-25 | End: 2022-04-25

## 2022-04-25 RX ORDER — ONDANSETRON 4 MG/1
4 TABLET, ORALLY DISINTEGRATING ORAL EVERY 30 MIN PRN
Status: DISCONTINUED | OUTPATIENT
Start: 2022-04-25 | End: 2022-04-26 | Stop reason: HOSPADM

## 2022-04-25 RX ORDER — GLYCOPYRROLATE 0.2 MG/ML
0.2 INJECTION, SOLUTION INTRAMUSCULAR; INTRAVENOUS
Status: COMPLETED | OUTPATIENT
Start: 2022-04-25 | End: 2022-04-25

## 2022-04-25 RX ORDER — ONDANSETRON 2 MG/ML
4 INJECTION INTRAMUSCULAR; INTRAVENOUS
Status: COMPLETED | OUTPATIENT
Start: 2022-04-25 | End: 2022-04-25

## 2022-04-25 RX ORDER — ONDANSETRON 2 MG/ML
4 INJECTION INTRAMUSCULAR; INTRAVENOUS
Status: CANCELLED
Start: 2022-04-25 | End: 2022-04-25

## 2022-04-25 RX ORDER — ACETAMINOPHEN 500 MG
1000 TABLET ORAL
Status: CANCELLED
Start: 2022-04-25 | End: 2022-04-25

## 2022-04-25 RX ORDER — KETOROLAC TROMETHAMINE 30 MG/ML
30 INJECTION, SOLUTION INTRAMUSCULAR; INTRAVENOUS
Status: COMPLETED | OUTPATIENT
Start: 2022-04-25 | End: 2022-04-25

## 2022-04-25 RX ADMIN — ONDANSETRON 4 MG: 2 INJECTION INTRAMUSCULAR; INTRAVENOUS at 08:15

## 2022-04-25 RX ADMIN — GLYCOPYRROLATE 0.2 MG: 0.2 INJECTION, SOLUTION INTRAMUSCULAR; INTRAVENOUS at 08:12

## 2022-04-25 RX ADMIN — SODIUM CHLORIDE, POTASSIUM CHLORIDE, SODIUM LACTATE AND CALCIUM CHLORIDE 500 ML: 600; 310; 30; 20 INJECTION, SOLUTION INTRAVENOUS at 08:14

## 2022-04-25 RX ADMIN — MIDAZOLAM HYDROCHLORIDE 2 MG: 1 INJECTION, SOLUTION INTRAMUSCULAR; INTRAVENOUS at 08:34

## 2022-04-25 RX ADMIN — Medication 100 MG: at 08:27

## 2022-04-25 RX ADMIN — KETOROLAC TROMETHAMINE 30 MG: 30 INJECTION, SOLUTION INTRAMUSCULAR at 08:15

## 2022-04-25 RX ADMIN — METHOHEXITAL SODIUM 100 MG: 500 INJECTION, POWDER, LYOPHILIZED, FOR SOLUTION INTRAMUSCULAR; INTRAVENOUS; RECTAL at 08:26

## 2022-04-25 RX ADMIN — LABETALOL HYDROCHLORIDE 20 MG: 5 INJECTION, SOLUTION INTRAVENOUS at 08:23

## 2022-04-25 NOTE — DISCHARGE INSTRUCTIONS
ECT Discharge Instructions      During your ECT series:    Do not drive or work heavy equipment until 7 days after your last treatment.  Do not drink alcohol or use street drugs (illicit drugs) while you are having treatments.  Do not make important decisions, including legal decisions.    After your maintenance ECT treatment:    Do not drive for 24 hours after treatment.  If you will be having more than one treatment witihin a week, no driving until 7 days after your last ECT treatment.       After each treatment:    Get plenty of rest. A responsible adult must stay with your for at least 6 hours.  Avoid heavy or risky activities for 24 hours.  If you feel light-headed, sit for a few minutes before standing. Have someone help you get up.  If you have nausea (feel sick to your stomach): Drink only clear liquids such as apple juice, ginger ale, broth or 7UP, Be sure to drink plenty of liquids. Move to a normal diet as you feel able.   If you received Toradol, wait 6 hours before taking ibuprofen.  Call your doctor if:   You have a fever over 100F (37.7 C) (taken under the tongue), or a fever that last more than 24 hours.  Your IV site is red, swollen, very painful or is getting more tender.  You have nausea that gets very bad or does not improve.    If you have any symptoms after ECT, tell our staff before your next treatment.  The ECT Department can be reached at [phone].  The ECT Department is open Mondays, Wednesdays and Fridays from 7:00 AM to 2:00 PM.    To speak to a doctor, call: Dr. Dontrell Murillo: Office [phone], Fax [phone]    Today you received the following:   - Toradol 30mg today at 815am.  Toradol is an NSAID.  Do not take any NSAID's including: Ibuprofen, Naproxen, Aspirin, Advil, Motrin, Aleve, Rodrick, etc., until 6 hours after the above time (215p).  If you have any questions, contact your physician or pharmacist.    -- IV Zofran 4mg at 815am for nausea.   -500mL of fluids (lactated  ringers)    Transported by: michael Barnard    Instructions for your next appointment:    *Covid-19 Testing:  You tested positive for Covid-10 on 4/13/22. You do not need to test for 90 days after a positive result.     Please come to the [facility] and check-in with Security before your ECT appointment.  The [facility] is located right next to the Adult Emergency Room.  The address is [address] [place] [address].    After your appointment, you may be picked up at the [facility] entrance, which is located on the West side of our campus.  The address is [address] [place] [address].  [facility].

## 2022-04-25 NOTE — ANESTHESIA CARE TRANSFER NOTE
Patient: Kuldeep Sequeira    Procedure: * No procedures listed *  Electroconvulsive Therapy    Diagnosis: * No pre-op diagnosis entered *  Diagnosis Additional Information: No value filed.    Anesthesia Type:   General     Note:    Oropharynx: oropharynx clear of all foreign objects and spontaneously breathing  Level of Consciousness: drowsy  Oxygen Supplementation: face mask    Independent Airway: airway patency satisfactory and stable  Dentition: dentition unchanged  Vital Signs Stable: post-procedure vital signs reviewed and stable  Report to RN Given: handoff report given  Patient transferred to: PACU    Handoff Report: Identifed the Patient, Identified the Reponsible Provider, Reviewed the pertinent medical history, Discussed the surgical course, Reviewed Intra-OP anesthesia mangement and issues during anesthesia, Set expectations for post-procedure period and Allowed opportunity for questions and acknowledgement of understanding      Vitals:  Vitals Value Taken Time   /95 04/25/22 0847   Temp 37.3  C (99.1  F) 04/25/22 0847   Pulse 85 04/25/22 0847   Resp 16 04/25/22 0847   SpO2 96 % 04/25/22 0847       Electronically Signed By: Alena Armas MD  April 25, 2022  8:57 AM

## 2022-04-25 NOTE — PROCEDURES
Procedures    Kuldeep Sequeira is a 52 year old  year old male patient.  [id number]  @DX@    [facility], [place]   ECT Procedure Note   04/25/2022    Patient Status: Outpatient    Is this the first in a series of 12 treatments?  No   No Known Allergies    Weight:  0 lbs 0 oz         Indications for ECT:     History of good ECT response in one or more previous episodes of illness         Clinical Narrative:     This is a 52 year old male with history of depression, anxiety and insomnia.  Patient has prior history of psychiatric hospitalization on at least 2 previous occasions, but does not have past history of MI CD treatment or suicide attempt history.  Now, presenting secondary to worsening of depressive symptoms leading to overdose of uncertain intent with sleep medication.  Occurring in the setting of medication changes or depression by outpatient psychiatrist.  Admit is voluntary.     Care coordination performed in detail.  Includes, detailed review of care everywhere and epic to review electronic chart information and related mental health history to presentation.  Including, patient's ED presentation to [facility] on 3/13 due to depression and overdose.  Further review of patient's past hospitalization from November 2020.  Subsequent review of ECT documentation available for review.  Please see associated documentation for details.     In brief, patient presented to [facility] ED on 3/13 secondary to worsening of depression and subsequent overdose.  Reports worsening of depressive symptoms for at least the past 2 years with efforts of management to include medications, therapy, ECT and TMS.  Most recent efforts of management in the community through outpatient psychiatrist include initiation of fluoxetine on 2/18 at 20 mg daily with titration on 2/25 to 40 mg and further titration on 3/3 to 60 mg daily.  Continued on Seroquel.  Despite efforts of medication management in  the community, continued to experience symptoms of depression.  According to collateral as obtained from wife, [facility] assessment, patient demonstrating worsening of symptoms especially within the past week.  Isolating in behavior.  Laying in bed.  Decreased appetite with weight loss.  Expressing suicidal ideation and demonstrated behavior leading to admission.  Poor self cares.  Recommendation for inpatient psychiatric hospitalization voluntarily.     Further care coordination performed in detail.  Includes, detailed review of care everywhere.  Patient has documentation unable to be seen on epic viewer.  Specifically, documentations from past ECT for first hospitalization.  Able to review hospitalization from 11/18 through 11/19/2020.  Admitted due to depression, insomnia with suicidal ideation.  PTA medications continue to include venlafaxine 75 mg daily, quetiapine 200 mg p.o. nightly, with medication changes to include Ambien to as needed and addition of doxepin for sleep.     Further care coordination performed.  Referral for ECT initiated on 12/1/2020 by PCP.  Outpatient consult reviewed by Dr. Maher dated 12/4/2020.  Please see consult for full details.  Medically clear for ECT after providing informed consent to treat.  ECT series #1 initiated on 12/7/2020.     Further care coordination performed.  On 3/22/2021, completed second series of ECT.  Medications documented as ineffective.  Effexor XR increased to 225 mg daily and Abilify added.  Considered switch from Effexor XR to Viibryd due to sexual side effects.     Upon patient interview, patient review performed on unit 5500.  Cooperative on approach.  Evaluation effort to review events into presentation and clarify information as provided in collateral report.  Patient admit is voluntary.     Patient reports a history of depression and anxiety of greater than 20 years.  Medication management initiated at approximately age 28.  Patient has history of  multiple medication trials with or without psychotherapy.  Further history of 2 prior hospitalizations in the month of November 2020 brief duration.  Associated with depressive symptoms in the setting of suicidal ideation and insomnia.  Despite efforts of medication management, proceeded with ECT as initiated on 12/7/2020 after referral by outpatient psychiatrist.  Continue with ECT for 2 series.  Did not appear to have taper or maintenance series of ECT.  Then, initiated TMS on 4/7/2021.     Patient has been followed by Dr. Markham since initiating ECT.  Medication management has been performed in multiple.  Medications alone have been documented as ineffective.  Recent medication changes initiated in the community include start of Prozac after switch from alternate antidepressant therapy.  Initiated on 2/18 at 20 mg daily, then on 2/2540 mg daily then on 3/3 increased to 60 mg daily.       Patient presented to ED on 3/13 due to continued symptoms of depression with anxious distress.  Occurring in the setting of repeat event of inability to sleep.  Similar to prior hospitalizations.  Patient reportedly taking increasing amounts of Ambien CR with intent to sleep.  Later, making statement of not wanting to wake up.  Recommendation for inpatient psychiatric hospitalization for further assessment.     Patient denies further stressors or triggers clearly associated with presentation.  Reports taking medications as prescribed.  Symptoms of depression and anxiety continued with efforts of fluoxetine titration, leading to behavioral self-harm.  Denies plan or intent of suicide by means of Ambien CR ingestion.  Denies history of suicide attempt.  Does admit to gun access.  Agrees to removal of gun access prior to disposition.  States an appropriate crisis relapse plan.  Future oriented for family.     Past treatment reviewed in detail.  Last documented cares reviewed to include medications documented as lacking efficacy.  " Further review of ECT documented as efficacious, due to medications alone is not effective.  Initiated ECT on 12/7/2020 after last hospitalization with last treatment in March 2021.  In April 2021, started series of TMS.  Denies benefit from TMS series.  Reports beneficial response from ECT, without significant side effect aside from mild cognition impairment.     On psychiatric review of symptoms, mood is, \"depressed.\"  Denies lindsey or hypomania.  Insomnia with low energy.  Lack of energy.  Lack of enjoyment with activities of prior interest.  Impacting activities at home and work as well as outside relationships.  Intact appetite and weight.  Intact concentration.  Negative thoughts of self.  Stating, \"end of my rope.\"  Denies tearfulness.  Endorses anxiety.  Denies psychosis.         Diagnosis:     Major depression         ECT Log:     #1 4/6/22 Consent is obtained for the procedure. Starting mood is 0/10 (10 being the best). Passive death wishes. Denies intent to kill self when took extra ambien 'to sleep' and was hospitalized. Is on CBD/THC 16/1 in am and 1/1 pm for sleep.   #2 4/8/22 Did get anxious as he was receiving anesthesia but recovery was uneventful and no side effects afterwards. Mood low.   #3 4/11/22  Mood unchanged. Wife notices a bit more energy and concentration. No SI. Some headache  #4 4/13/22 Anxious. Took hydroxyzine yesterday and slept most of the day. On venlafaxine 75 mg  #5 4/20/22 Tested positive for COVID on 4/13/22. Now resuming ECT. Very anxious on the morning of.  Will treat today as last patient. On Venlafaxine 150 mg  #6 4/22/22 Continues to struggle with low mood  #7 4/25/22 Mood 5/10 . More energy, enjoying things somewhat better. No SI. No side effects. Mild headaches,.          Pause for the Cause:     Right patient Yes   Right procedure/laterality settings: Yes          Intra-Procedure Documentation:       ECT number at [facility]: 7+   Treatment number this series: 7   Lifetime " total treatment number: 21+     Type of ECT:  Right, unilateral ultrabrief    ECT Medications:    Tylenol 1000 mg  Robinul 0.2 mg  Toradol: 30mg  Zofran: 4mg  Zyprexa Zydis: 10mg  Brevital: 100 mg  Succinyl Choline: 100 mg  Versed: 2 mg   Labetolol 10 mg pre-ECT    ECT Strip Summary:   Energy Level: Titration: 19.2 mC  115.2 ; 0.3 msec; 30 Hz; 8 sec; 800 mA  Motor Seizure Duration: 48 seconds  EEG Seizure Duration: 65  seconds    Complications: No    Time for re-orientation: 10 minutes on 4/25/22 (9 minutes on 4/13/22)    Plan:   Continue RUL UBP ECT Q MWF at 115.2 mC  COVID PCR positive on 4/13/22 (no COVID PCR testing until 7/13/22)    Hold CBD/THC on morning of ECT and evening prior. May take his Evening dose the night of ECT and morning dose the day after ECT  Monitor depression severity with clinical assessment augmented with IDS-SR every 3rd treatment    ASK PATIENT TO BRING CPAP MACHINE WITH HIM TO USE IN RECOVERY    Will likely need to discuss maintenance ECT when completing this course. Also, is a good candidate for VNS (this is his 3rd course of ECT).     Dontrell Murillo MD  Professor and Executive    Department Psychiatry and Behavioral Sciences

## 2022-04-25 NOTE — ANESTHESIA PREPROCEDURE EVALUATION
Anesthesia Pre-Procedure Evaluation    Patient: Kuldeep Sequeira   MRN: [id number] : 1970        Procedure : * No procedures listed *  Electroconvulsive Therapy       Past Medical History:   Diagnosis Date     Anxiety      Depressive disorder      GERD (gastroesophageal reflux disease)       No past surgical history on file.   No Known Allergies   Social History     Tobacco Use     Smoking status: Never Smoker     Smokeless tobacco: Never Used   Substance Use Topics     Alcohol use: Not on file      Wt Readings from Last 1 Encounters:   22 83.5 kg (184 lb)        Anesthesia Evaluation            ROS/MED HX  ENT/Pulmonary:  - neg pulmonary ROS     Neurologic:  - neg neurologic ROS     Cardiovascular:  - neg cardiovascular ROS     METS/Exercise Tolerance:     Hematologic:  - neg hematologic  ROS     Musculoskeletal:  - neg musculoskeletal ROS     GI/Hepatic:     (+) GERD, Asymptomatic on medication,     Renal/Genitourinary:  - neg Renal ROS     Endo:  - neg endo ROS     Psychiatric/Substance Use:     (+) psychiatric history anxiety and depression     Infectious Disease:  - neg infectious disease ROS     Malignancy:  - neg malignancy ROS     Other:            Physical Exam    Airway  airway exam normal           Respiratory Devices and Support         Dental  no notable dental history         Cardiovascular   cardiovascular exam normal          Pulmonary   pulmonary exam normal                OUTSIDE LABS:  CBC:   Lab Results   Component Value Date    WBC 5.5 2022    WBC 5.3 2022    HGB 16.4 2022    HGB 17.7 2022    HCT 48.0 2022    HCT 50.8 2022     2022     2022     BMP:   Lab Results   Component Value Date     2022     2022    POTASSIUM 3.6 2022    POTASSIUM 4.6 2022    CHLORIDE 98 2022    CHLORIDE 103 2022    CO2 30 2022    CO2 29 2022    BUN 10 2022    BUN 16 2022    CR  0.97 2022    CR 1.08 2022    GLC 93 2022     (H) 2022     COAGS: No results found for: PTT, INR, FIBR  POC: No results found for: BGM, HCG, HCGS  HEPATIC:   Lab Results   Component Value Date    ALBUMIN 3.4 2022    PROTTOTAL 6.4 (L) 2022    ALT 33 2022    AST 22 2022    ALKPHOS 49 2022    BILITOTAL 0.8 2022     OTHER:   Lab Results   Component Value Date    RANJITH 8.5 2022    TSH 1.51 2022       Anesthesia Plan    ASA Status:  2   NPO Status:  NPO Appropriate    Anesthesia Type: General.     - Airway: Native airway   Induction: Intravenous.   Maintenance: TIVA.        Consents    Anesthesia Plan(s) and associated risks, benefits, and realistic alternatives discussed. Questions answered and patient/representative(s) expressed understanding.    - Discussed:     - Discussed with:  Patient         Postoperative Care       PONV prophylaxis: Ondansetron (or other 5HT-3)     Comments:                    Anita Partida Pre-Procedure Evaluation    Patient: Kuldeep Sequeira   MRN: [id number] : 1970        Procedure :   Electroconvulsive Therapy       Past Medical History:   Diagnosis Date     Anxiety      Depressive disorder      GERD (gastroesophageal reflux disease)       No past surgical history on file.   No Known Allergies   Social History     Tobacco Use     Smoking status: Never Smoker     Smokeless tobacco: Never Used   Substance Use Topics     Alcohol use: Not on file      Wt Readings from Last 1 Encounters:   22 83.5 kg (184 lb)        Anesthesia Evaluation   Pt has had prior anesthetic. Type: General.        ROS/MED HX  ENT/Pulmonary:  - neg pulmonary ROS     Neurologic:  - neg neurologic ROS     Cardiovascular:  - neg cardiovascular ROS     METS/Exercise Tolerance:     Hematologic:  - neg hematologic  ROS     Musculoskeletal:  - neg musculoskeletal ROS     GI/Hepatic:    (-) GERD   Renal/Genitourinary:  - neg Renal  ROS     Endo:  - neg endo ROS     Psychiatric/Substance Use:     (+) psychiatric history anxiety and depression     Infectious Disease: Comment: COVID+ 4/13/22      Malignancy:  - neg malignancy ROS     Other:            Physical Exam    Airway        Mallampati: II   TM distance: > 3 FB   Neck ROM: full   Mouth opening: > 3 cm    Respiratory Devices and Support         Dental           Cardiovascular          Rhythm and rate: regular and normal     Pulmonary           breath sounds clear to auscultation           OUTSIDE LABS:  CBC:   Lab Results   Component Value Date    WBC 5.5 03/13/2022    WBC 5.3 03/11/2022    HGB 16.4 03/13/2022    HGB 17.7 03/11/2022    HCT 48.0 03/13/2022    HCT 50.8 03/11/2022     03/13/2022     03/11/2022     BMP:   Lab Results   Component Value Date     03/13/2022     03/11/2022    POTASSIUM 3.6 03/13/2022    POTASSIUM 4.6 03/11/2022    CHLORIDE 98 04/01/2022    CHLORIDE 103 03/13/2022    CO2 30 03/13/2022    CO2 29 03/11/2022    BUN 10 03/13/2022    BUN 16 03/11/2022    CR 0.97 03/13/2022    CR 1.08 03/11/2022    GLC 93 03/13/2022     (H) 03/11/2022     COAGS: No results found for: PTT, INR, FIBR  POC: No results found for: BGM, HCG, HCGS  HEPATIC:   Lab Results   Component Value Date    ALBUMIN 3.4 03/13/2022    PROTTOTAL 6.4 (L) 03/13/2022    ALT 33 03/13/2022    AST 22 03/13/2022    ALKPHOS 49 03/13/2022    BILITOTAL 0.8 03/13/2022     OTHER:   Lab Results   Component Value Date    RANJITH 8.5 03/13/2022    TSH 1.51 03/11/2022       Anesthesia Plan    ASA Status:  2      Anesthesia Type: General.   Induction: Intravenous.   Maintenance: TIVA.        Consents    Anesthesia Plan(s) and associated risks, benefits, and realistic alternatives discussed. Questions answered and patient/representative(s) expressed understanding.    - Discussed:     - Discussed with:  Patient         Postoperative Care            Comments:                    Alena Armas MD

## 2022-04-26 ENCOUNTER — ANESTHESIA EVENT (OUTPATIENT)
Dept: BEHAVIORAL HEALTH | Facility: CLINIC | Age: 52
End: 2022-04-26
Payer: COMMERCIAL

## 2022-04-27 ENCOUNTER — ANESTHESIA (OUTPATIENT)
Dept: BEHAVIORAL HEALTH | Facility: CLINIC | Age: 52
End: 2022-04-27
Payer: COMMERCIAL

## 2022-04-27 ENCOUNTER — HOSPITAL ENCOUNTER (OUTPATIENT)
Dept: BEHAVIORAL HEALTH | Facility: CLINIC | Age: 52
Discharge: HOME | End: 2022-04-27
Attending: PSYCHIATRY & NEUROLOGY
Payer: COMMERCIAL

## 2022-04-27 VITALS
DIASTOLIC BLOOD PRESSURE: 75 MMHG | TEMPERATURE: 97.6 F | OXYGEN SATURATION: 94 % | SYSTOLIC BLOOD PRESSURE: 113 MMHG | HEART RATE: 88 BPM | RESPIRATION RATE: 18 BRPM

## 2022-04-27 DIAGNOSIS — F33.2 MAJOR DEPRESSIVE DISORDER, RECURRENT EPISODE, SEVERE WITH ANXIOUS DISTRESS (H): Primary | ICD-10-CM

## 2022-04-27 PROCEDURE — 90870 ELECTROCONVULSIVE THERAPY: CPT | Performed by: PSYCHIATRY & NEUROLOGY

## 2022-04-27 PROCEDURE — 90870 ELECTROCONVULSIVE THERAPY: CPT

## 2022-04-27 PROCEDURE — 250N000011 HC RX IP 250 OP 636: Performed by: PSYCHIATRY & NEUROLOGY

## 2022-04-27 PROCEDURE — 250N000009 HC RX 250: Performed by: PSYCHIATRY & NEUROLOGY

## 2022-04-27 PROCEDURE — 250N000009 HC RX 250: Performed by: ANESTHESIOLOGY

## 2022-04-27 PROCEDURE — 370N000017 HC ANESTHESIA TECHNICAL FEE, PER MIN

## 2022-04-27 PROCEDURE — 258N000003 HC RX IP 258 OP 636: Performed by: PSYCHIATRY & NEUROLOGY

## 2022-04-27 PROCEDURE — 250N000011 HC RX IP 250 OP 636: Performed by: ANESTHESIOLOGY

## 2022-04-27 RX ORDER — ONDANSETRON 2 MG/ML
4 INJECTION INTRAMUSCULAR; INTRAVENOUS EVERY 30 MIN PRN
Status: DISCONTINUED | OUTPATIENT
Start: 2022-04-27 | End: 2022-04-28 | Stop reason: HOSPADM

## 2022-04-27 RX ORDER — ONDANSETRON 2 MG/ML
4 INJECTION INTRAMUSCULAR; INTRAVENOUS
Status: CANCELLED
Start: 2022-04-27 | End: 2022-04-27

## 2022-04-27 RX ORDER — GLYCOPYRROLATE 0.2 MG/ML
0.2 INJECTION, SOLUTION INTRAMUSCULAR; INTRAVENOUS
Status: COMPLETED | OUTPATIENT
Start: 2022-04-27 | End: 2022-04-27

## 2022-04-27 RX ORDER — ONDANSETRON 4 MG/1
4 TABLET, ORALLY DISINTEGRATING ORAL EVERY 30 MIN PRN
Status: DISCONTINUED | OUTPATIENT
Start: 2022-04-27 | End: 2022-04-28 | Stop reason: HOSPADM

## 2022-04-27 RX ORDER — ACETAMINOPHEN 500 MG
1000 TABLET ORAL
Status: ACTIVE | OUTPATIENT
Start: 2022-04-27 | End: 2022-04-27

## 2022-04-27 RX ORDER — SODIUM CHLORIDE, SODIUM LACTATE, POTASSIUM CHLORIDE, CALCIUM CHLORIDE 600; 310; 30; 20 MG/100ML; MG/100ML; MG/100ML; MG/100ML
INJECTION, SOLUTION INTRAVENOUS CONTINUOUS
Status: DISCONTINUED | OUTPATIENT
Start: 2022-04-27 | End: 2022-04-28 | Stop reason: HOSPADM

## 2022-04-27 RX ORDER — LABETALOL HYDROCHLORIDE 5 MG/ML
10 INJECTION, SOLUTION INTRAVENOUS
Status: DISCONTINUED | OUTPATIENT
Start: 2022-04-27 | End: 2022-04-28 | Stop reason: HOSPADM

## 2022-04-27 RX ORDER — ONDANSETRON 2 MG/ML
4 INJECTION INTRAMUSCULAR; INTRAVENOUS
Status: COMPLETED | OUTPATIENT
Start: 2022-04-27 | End: 2022-04-27

## 2022-04-27 RX ORDER — LABETALOL HYDROCHLORIDE 5 MG/ML
INJECTION, SOLUTION INTRAVENOUS PRN
Status: DISCONTINUED | OUTPATIENT
Start: 2022-04-27 | End: 2022-04-27

## 2022-04-27 RX ORDER — KETOROLAC TROMETHAMINE 30 MG/ML
30 INJECTION, SOLUTION INTRAMUSCULAR; INTRAVENOUS
Status: CANCELLED
Start: 2022-04-27 | End: 2022-04-27

## 2022-04-27 RX ORDER — KETOROLAC TROMETHAMINE 30 MG/ML
30 INJECTION, SOLUTION INTRAMUSCULAR; INTRAVENOUS
Status: COMPLETED | OUTPATIENT
Start: 2022-04-27 | End: 2022-04-27

## 2022-04-27 RX ORDER — ACETAMINOPHEN 500 MG
1000 TABLET ORAL
Status: CANCELLED
Start: 2022-04-27 | End: 2022-04-27

## 2022-04-27 RX ORDER — GLYCOPYRROLATE 0.2 MG/ML
0.2 INJECTION, SOLUTION INTRAMUSCULAR; INTRAVENOUS
Status: CANCELLED
Start: 2022-04-27 | End: 2022-04-27

## 2022-04-27 RX ADMIN — GLYCOPYRROLATE 0.2 MG: 0.2 INJECTION, SOLUTION INTRAMUSCULAR; INTRAVENOUS at 08:32

## 2022-04-27 RX ADMIN — Medication 100 MG: at 08:56

## 2022-04-27 RX ADMIN — LABETALOL HYDROCHLORIDE 20 MG: 5 INJECTION, SOLUTION INTRAVENOUS at 08:54

## 2022-04-27 RX ADMIN — SODIUM CHLORIDE, POTASSIUM CHLORIDE, SODIUM LACTATE AND CALCIUM CHLORIDE 500 ML: 600; 310; 30; 20 INJECTION, SOLUTION INTRAVENOUS at 08:34

## 2022-04-27 RX ADMIN — MIDAZOLAM HYDROCHLORIDE 2 MG: 1 INJECTION, SOLUTION INTRAMUSCULAR; INTRAVENOUS at 09:04

## 2022-04-27 RX ADMIN — METHOHEXITAL SODIUM 100 MG: 500 INJECTION, POWDER, LYOPHILIZED, FOR SOLUTION INTRAMUSCULAR; INTRAVENOUS; RECTAL at 08:55

## 2022-04-27 RX ADMIN — ONDANSETRON 4 MG: 2 INJECTION INTRAMUSCULAR; INTRAVENOUS at 08:34

## 2022-04-27 RX ADMIN — KETOROLAC TROMETHAMINE 30 MG: 30 INJECTION, SOLUTION INTRAMUSCULAR at 08:34

## 2022-04-27 NOTE — ANESTHESIA PREPROCEDURE EVALUATION
Anesthesia Pre-Procedure Evaluation    Patient: Kuldeep Sequeira   MRN: [id number] : 1970        Procedure : * No procedures listed *  Electroconvulsive Therapy       Past Medical History:   Diagnosis Date     Anxiety      Depressive disorder      GERD (gastroesophageal reflux disease)       No past surgical history on file.   No Known Allergies   Social History     Tobacco Use     Smoking status: Never Smoker     Smokeless tobacco: Never Used   Substance Use Topics     Alcohol use: Not on file      Wt Readings from Last 1 Encounters:   22 83.5 kg (184 lb)        Anesthesia Evaluation            ROS/MED HX  ENT/Pulmonary:  - neg pulmonary ROS     Neurologic:  - neg neurologic ROS     Cardiovascular:  - neg cardiovascular ROS     METS/Exercise Tolerance:     Hematologic:  - neg hematologic  ROS     Musculoskeletal:  - neg musculoskeletal ROS     GI/Hepatic:     (+) GERD, Asymptomatic on medication,     Renal/Genitourinary:  - neg Renal ROS     Endo:  - neg endo ROS     Psychiatric/Substance Use:     (+) psychiatric history anxiety and depression     Infectious Disease:  - neg infectious disease ROS     Malignancy:  - neg malignancy ROS     Other:            Physical Exam    Airway  airway exam normal           Respiratory Devices and Support         Dental  no notable dental history         Cardiovascular   cardiovascular exam normal          Pulmonary   pulmonary exam normal                OUTSIDE LABS:  CBC:   Lab Results   Component Value Date    WBC 5.5 2022    WBC 5.3 2022    HGB 16.4 2022    HGB 17.7 2022    HCT 48.0 2022    HCT 50.8 2022     2022     2022     BMP:   Lab Results   Component Value Date     2022     2022    POTASSIUM 3.6 2022    POTASSIUM 4.6 2022    CHLORIDE 98 2022    CHLORIDE 103 2022    CO2 30 2022    CO2 29 2022    BUN 10 2022    BUN 16 2022    CR  0.97 2022    CR 1.08 2022    GLC 93 2022     (H) 2022     COAGS: No results found for: PTT, INR, FIBR  POC: No results found for: BGM, HCG, HCGS  HEPATIC:   Lab Results   Component Value Date    ALBUMIN 3.4 2022    PROTTOTAL 6.4 (L) 2022    ALT 33 2022    AST 22 2022    ALKPHOS 49 2022    BILITOTAL 0.8 2022     OTHER:   Lab Results   Component Value Date    RANJITH 8.5 2022    TSH 1.51 2022       Anesthesia Plan    ASA Status:  2   NPO Status:  NPO Appropriate    Anesthesia Type: General.     - Airway: Native airway   Induction: Intravenous.   Maintenance: TIVA.        Consents    Anesthesia Plan(s) and associated risks, benefits, and realistic alternatives discussed. Questions answered and patient/representative(s) expressed understanding.    - Discussed:     - Discussed with:  Patient         Postoperative Care       PONV prophylaxis: Ondansetron (or other 5HT-3)     Comments:                    Anita Partida Pre-Procedure Evaluation    Patient: Kuldeep Sequeira   MRN: [id number] : 1970        Procedure :   Electroconvulsive Therapy       Past Medical History:   Diagnosis Date     Anxiety      Depressive disorder      GERD (gastroesophageal reflux disease)       No past surgical history on file.   No Known Allergies   Social History     Tobacco Use     Smoking status: Never Smoker     Smokeless tobacco: Never Used   Substance Use Topics     Alcohol use: Not on file      Wt Readings from Last 1 Encounters:   22 83.5 kg (184 lb)        Anesthesia Evaluation   Pt has had prior anesthetic. Type: General.        ROS/MED HX  ENT/Pulmonary:  - neg pulmonary ROS     Neurologic:  - neg neurologic ROS     Cardiovascular:  - neg cardiovascular ROS     METS/Exercise Tolerance:     Hematologic:  - neg hematologic  ROS     Musculoskeletal:  - neg musculoskeletal ROS     GI/Hepatic:    (-) GERD   Renal/Genitourinary:  - neg Renal  ROS     Endo:  - neg endo ROS     Psychiatric/Substance Use:     (+) psychiatric history anxiety and depression     Infectious Disease: Comment: COVID+ 4/13/22      Malignancy:  - neg malignancy ROS     Other:            Physical Exam    Airway        Mallampati: II   TM distance: > 3 FB   Neck ROM: full   Mouth opening: > 3 cm    Respiratory Devices and Support         Dental           Cardiovascular          Rhythm and rate: regular and normal     Pulmonary           breath sounds clear to auscultation           OUTSIDE LABS:  CBC:   Lab Results   Component Value Date    WBC 5.5 03/13/2022    WBC 5.3 03/11/2022    HGB 16.4 03/13/2022    HGB 17.7 03/11/2022    HCT 48.0 03/13/2022    HCT 50.8 03/11/2022     03/13/2022     03/11/2022     BMP:   Lab Results   Component Value Date     03/13/2022     03/11/2022    POTASSIUM 3.6 03/13/2022    POTASSIUM 4.6 03/11/2022    CHLORIDE 98 04/01/2022    CHLORIDE 103 03/13/2022    CO2 30 03/13/2022    CO2 29 03/11/2022    BUN 10 03/13/2022    BUN 16 03/11/2022    CR 0.97 03/13/2022    CR 1.08 03/11/2022    GLC 93 03/13/2022     (H) 03/11/2022     COAGS: No results found for: PTT, INR, FIBR  POC: No results found for: BGM, HCG, HCGS  HEPATIC:   Lab Results   Component Value Date    ALBUMIN 3.4 03/13/2022    PROTTOTAL 6.4 (L) 03/13/2022    ALT 33 03/13/2022    AST 22 03/13/2022    ALKPHOS 49 03/13/2022    BILITOTAL 0.8 03/13/2022     OTHER:   Lab Results   Component Value Date    RANJITH 8.5 03/13/2022    TSH 1.51 03/11/2022       Anesthesia Plan    ASA Status:  2      Anesthesia Type: General.   Induction: Intravenous.   Maintenance: TIVA.        Consents    Anesthesia Plan(s) and associated risks, benefits, and realistic alternatives discussed. Questions answered and patient/representative(s) expressed understanding.    - Discussed:     - Discussed with:  Patient         Postoperative Care            Comments:                    Alena Armas MD

## 2022-04-27 NOTE — PROGRESS NOTES
Arrived in [place] at 0906 nasal trumpet in place.  0910 trumpet removed, opening eyes  0920 awake and oriented x4  0933 discharge.

## 2022-04-27 NOTE — ADDENDUM NOTE
Addendum  created 04/27/22 1136 by Alena Armas MD    Attestation recorded in Intraprocedure, Intraprocedure Attestations filed, Intraprocedure Staff edited

## 2022-04-27 NOTE — ANESTHESIA POSTPROCEDURE EVALUATION
Patient: Kuldeep Sequeira    Procedure: * No procedures listed *  Electroconvulsive Therapy    Anesthesia Type:  General    Note:  Disposition: Outpatient   Postop Pain Control: Uneventful            Sign Out: Well controlled pain   PONV: No   Neuro/Psych: Uneventful            Sign Out: Acceptable/Baseline neuro status   Airway/Respiratory: Uneventful            Sign Out: Acceptable/Baseline resp. status   CV/Hemodynamics: Uneventful            Sign Out: Acceptable CV status; No obvious hypovolemia; No obvious fluid overload   Other NRE: NONE   DID A NON-ROUTINE EVENT OCCUR? No           Last vitals:  Vitals Value Taken Time   /75 04/27/22 0930   Temp 36.4  C (97.6  F) 04/27/22 0930   Pulse 88 04/27/22 0930   Resp 18 04/27/22 0930   SpO2 94 % 04/27/22 0930       Electronically Signed By: Alena Armas MD  April 27, 2022  9:39 AM

## 2022-04-27 NOTE — PROCEDURES
Procedures    Kuldeep Sequeira is a 52 year old  year old male patient.  [id number]  @DX@    [facility], [place]   ECT Procedure Note   04/27/2022    Patient Status: Outpatient    Is this the first in a series of 12 treatments?  No   No Known Allergies    Weight:  0 lbs 0 oz         Indications for ECT:     History of good ECT response in one or more previous episodes of illness         Clinical Narrative:     This is a 52 year old male with history of depression, anxiety and insomnia.  Patient has prior history of psychiatric hospitalization on at least 2 previous occasions, but does not have past history of MI CD treatment or suicide attempt history.  Now, presenting secondary to worsening of depressive symptoms leading to overdose of uncertain intent with sleep medication.  Occurring in the setting of medication changes or depression by outpatient psychiatrist.  Admit is voluntary.     Care coordination performed in detail.  Includes, detailed review of care everywhere and epic to review electronic chart information and related mental health history to presentation.  Including, patient's ED presentation to [facility] on 3/13 due to depression and overdose.  Further review of patient's past hospitalization from November 2020.  Subsequent review of ECT documentation available for review.  Please see associated documentation for details.     In brief, patient presented to [facility] ED on 3/13 secondary to worsening of depression and subsequent overdose.  Reports worsening of depressive symptoms for at least the past 2 years with efforts of management to include medications, therapy, ECT and TMS.  Most recent efforts of management in the community through outpatient psychiatrist include initiation of fluoxetine on 2/18 at 20 mg daily with titration on 2/25 to 40 mg and further titration on 3/3 to 60 mg daily.  Continued on Seroquel.  Despite efforts of medication management in  the community, continued to experience symptoms of depression.  According to collateral as obtained from wife, [facility] assessment, patient demonstrating worsening of symptoms especially within the past week.  Isolating in behavior.  Laying in bed.  Decreased appetite with weight loss.  Expressing suicidal ideation and demonstrated behavior leading to admission.  Poor self cares.  Recommendation for inpatient psychiatric hospitalization voluntarily.     Further care coordination performed in detail.  Includes, detailed review of care everywhere.  Patient has documentation unable to be seen on epic viewer.  Specifically, documentations from past ECT for first hospitalization.  Able to review hospitalization from 11/18 through 11/19/2020.  Admitted due to depression, insomnia with suicidal ideation.  PTA medications continue to include venlafaxine 75 mg daily, quetiapine 200 mg p.o. nightly, with medication changes to include Ambien to as needed and addition of doxepin for sleep.     Further care coordination performed.  Referral for ECT initiated on 12/1/2020 by PCP.  Outpatient consult reviewed by Dr. Maher dated 12/4/2020.  Please see consult for full details.  Medically clear for ECT after providing informed consent to treat.  ECT series #1 initiated on 12/7/2020.     Further care coordination performed.  On 3/22/2021, completed second series of ECT.  Medications documented as ineffective.  Effexor XR increased to 225 mg daily and Abilify added.  Considered switch from Effexor XR to Viibryd due to sexual side effects.     Upon patient interview, patient review performed on unit 5500.  Cooperative on approach.  Evaluation effort to review events into presentation and clarify information as provided in collateral report.  Patient admit is voluntary.     Patient reports a history of depression and anxiety of greater than 20 years.  Medication management initiated at approximately age 28.  Patient has history of  multiple medication trials with or without psychotherapy.  Further history of 2 prior hospitalizations in the month of November 2020 brief duration.  Associated with depressive symptoms in the setting of suicidal ideation and insomnia.  Despite efforts of medication management, proceeded with ECT as initiated on 12/7/2020 after referral by outpatient psychiatrist.  Continue with ECT for 2 series.  Did not appear to have taper or maintenance series of ECT.  Then, initiated TMS on 4/7/2021.     Patient has been followed by Dr. Markham since initiating ECT.  Medication management has been performed in multiple.  Medications alone have been documented as ineffective.  Recent medication changes initiated in the community include start of Prozac after switch from alternate antidepressant therapy.  Initiated on 2/18 at 20 mg daily, then on 2/2540 mg daily then on 3/3 increased to 60 mg daily.       Patient presented to ED on 3/13 due to continued symptoms of depression with anxious distress.  Occurring in the setting of repeat event of inability to sleep.  Similar to prior hospitalizations.  Patient reportedly taking increasing amounts of Ambien CR with intent to sleep.  Later, making statement of not wanting to wake up.  Recommendation for inpatient psychiatric hospitalization for further assessment.     Patient denies further stressors or triggers clearly associated with presentation.  Reports taking medications as prescribed.  Symptoms of depression and anxiety continued with efforts of fluoxetine titration, leading to behavioral self-harm.  Denies plan or intent of suicide by means of Ambien CR ingestion.  Denies history of suicide attempt.  Does admit to gun access.  Agrees to removal of gun access prior to disposition.  States an appropriate crisis relapse plan.  Future oriented for family.     Past treatment reviewed in detail.  Last documented cares reviewed to include medications documented as lacking efficacy.  " Further review of ECT documented as efficacious, due to medications alone is not effective.  Initiated ECT on 12/7/2020 after last hospitalization with last treatment in March 2021.  In April 2021, started series of TMS.  Denies benefit from TMS series.  Reports beneficial response from ECT, without significant side effect aside from mild cognition impairment.     On psychiatric review of symptoms, mood is, \"depressed.\"  Denies lindsey or hypomania.  Insomnia with low energy.  Lack of energy.  Lack of enjoyment with activities of prior interest.  Impacting activities at home and work as well as outside relationships.  Intact appetite and weight.  Intact concentration.  Negative thoughts of self.  Stating, \"end of my rope.\"  Denies tearfulness.  Endorses anxiety.  Denies psychosis.         Diagnosis:     Major depression         ECT Log:     #1 4/6/22 Consent is obtained for the procedure. Starting mood is 0/10 (10 being the best). Passive death wishes. Denies intent to kill self when took extra ambien 'to sleep' and was hospitalized. Is on CBD/THC 16/1 in am and 1/1 pm for sleep.   #2 4/8/22 Did get anxious as he was receiving anesthesia but recovery was uneventful and no side effects afterwards. Mood low.   #3 4/11/22  Mood unchanged. Wife notices a bit more energy and concentration. No SI. Some headache  #4 4/13/22 Anxious. Took hydroxyzine yesterday and slept most of the day. On venlafaxine 75 mg  #5 4/20/22 Tested positive for COVID on 4/13/22. Now resuming ECT. Very anxious on the morning of.  Will treat today as last patient. On Venlafaxine 150 mg  #6 4/22/22 Continues to struggle with low mood  #7 4/25/22 Mood 5/10 . More energy, enjoying things somewhat better. No SI. No side effects. Mild headaches,.   #8 4/27/22 Mood 5/10. Was able to work a full day from home. Wife believes this is a major improvement. Sleep is good.          Pause for the Cause:     Right patient Yes   Right procedure/laterality settings: " Yes          Intra-Procedure Documentation:       ECT [facility] at [facility]: [facility]   Treatment number this series: 7   Lifetime total treatment number: 22+     Type of ECT:  Right, unilateral ultrabrief    ECT Medications:    Tylenol 1000 mg  Robinul 0.2 mg  Toradol: 30mg  Zofran: 4mg  Zyprexa Zydis: 10mg  Brevital: 100 mg  Succinyl Choline: 100 mg  Versed: 2 mg   Labetolol 20 mg pre-ECT    ECT Strip Summary:   Energy Level: Titration: 19.2 mC  115.2 ; 0.3 msec; 30 Hz; 8 sec; 800 mA  Motor Seizure Duration: 42 seconds  EEG Seizure Duration: 54 seconds    Complications: No    Time for re-orientation: 10 minutes on 4/25/22 (9 minutes on 4/13/22)    Plan:   Continue RUL UBP ECT Q MWF at 115.2 mC  COVID PCR positive on 4/13/22 (no COVID PCR testing until 7/13/22)    Hold CBD/THC on morning of ECT and evening prior. May take his Evening dose the night of ECT and morning dose the day after ECT  Monitor depression severity with clinical assessment augmented with IDS-SR every 3rd treatment    ASK PATIENT TO BRING CPAP MACHINE WITH HIM TO USE IN RECOVERY    Will likely need to discuss maintenance ECT when completing this course. Also, is a good candidate for VNS (this is his 3rd course of ECT).     Dontrell Murillo MD  Professor and Executive    Department Psychiatry and Behavioral Sciences

## 2022-04-27 NOTE — ANESTHESIA CARE TRANSFER NOTE
Patient: Kuldeep Sequeira    Procedure: * No procedures listed *  Electroconvulsive Therapy    Diagnosis: * No pre-op diagnosis entered *  Diagnosis Additional Information: No value filed.    Anesthesia Type:   General     Note:    Oropharynx: oropharynx clear of all foreign objects and spontaneously breathing  Level of Consciousness: drowsy  Oxygen Supplementation: face mask    Independent Airway: airway patency satisfactory and stable  Dentition: dentition unchanged  Vital Signs Stable: post-procedure vital signs reviewed and stable  Report to RN Given: handoff report given  Patient transferred to: PACU    Handoff Report: Identifed the Patient, Identified the Reponsible Provider, Reviewed the pertinent medical history, Discussed the surgical course, Reviewed Intra-OP anesthesia mangement and issues during anesthesia, Set expectations for post-procedure period and Allowed opportunity for questions and acknowledgement of understanding      Vitals:  Vitals Value Taken Time   BP     Temp     Pulse     Resp     SpO2         Electronically Signed By: Alena Armas MD  April 27, 2022  9:13 AM

## 2022-04-28 ENCOUNTER — TELEPHONE (OUTPATIENT)
Dept: PSYCHIATRY | Facility: CLINIC | Age: 52
End: 2022-04-28
Payer: COMMERCIAL

## 2022-04-28 NOTE — TELEPHONE ENCOUNTER
What is the concern that needs to be addressed by a nurse? Pt has a sore jaw and has been very dizzy today. He is wondering if this will affect his ECT tx tomorrow.     May a detailed message be left on voicemail? yes    Date of last office visit:     Message routed to: psych rn pool

## 2022-04-28 NOTE — TELEPHONE ENCOUNTER
Writer returned call to patient.  He states that he has noticed some jaw pain and has had some dizziness.  States that he is only noticing the dizziness when standing or moving his head to quickly.       States that he usually notices these types of symptoms the day of his ECT, but they are usually go away.      He has not taken anything for the pain.      Mood has been good, and anxiety has been high.     He is wondering if he should come to ECT tomorrow or if he should wait till Monday.

## 2022-04-28 NOTE — TELEPHONE ENCOUNTER
Writer spoke to Dr. Murillo and patient should go to ECT tomorrow.       Patient notified and he will attend ECT tomorrow.

## 2022-04-29 ENCOUNTER — HOSPITAL ENCOUNTER (OUTPATIENT)
Dept: BEHAVIORAL HEALTH | Facility: CLINIC | Age: 52
Discharge: HOME | End: 2022-04-29
Attending: PSYCHIATRY & NEUROLOGY
Payer: COMMERCIAL

## 2022-04-29 ENCOUNTER — ANESTHESIA (OUTPATIENT)
Dept: BEHAVIORAL HEALTH | Facility: CLINIC | Age: 52
End: 2022-04-29
Payer: COMMERCIAL

## 2022-04-29 ENCOUNTER — ANESTHESIA EVENT (OUTPATIENT)
Dept: BEHAVIORAL HEALTH | Facility: CLINIC | Age: 52
End: 2022-04-29
Payer: COMMERCIAL

## 2022-04-29 VITALS
TEMPERATURE: 98.1 F | OXYGEN SATURATION: 96 % | SYSTOLIC BLOOD PRESSURE: 119 MMHG | DIASTOLIC BLOOD PRESSURE: 78 MMHG | HEART RATE: 87 BPM | RESPIRATION RATE: 20 BRPM

## 2022-04-29 DIAGNOSIS — F33.2 MAJOR DEPRESSIVE DISORDER, RECURRENT EPISODE, SEVERE WITH ANXIOUS DISTRESS (H): Primary | ICD-10-CM

## 2022-04-29 PROCEDURE — 250N000011 HC RX IP 250 OP 636: Performed by: STUDENT IN AN ORGANIZED HEALTH CARE EDUCATION/TRAINING PROGRAM

## 2022-04-29 PROCEDURE — 370N000017 HC ANESTHESIA TECHNICAL FEE, PER MIN

## 2022-04-29 PROCEDURE — 90870 ELECTROCONVULSIVE THERAPY: CPT

## 2022-04-29 PROCEDURE — 250N000009 HC RX 250: Performed by: STUDENT IN AN ORGANIZED HEALTH CARE EDUCATION/TRAINING PROGRAM

## 2022-04-29 PROCEDURE — 250N000009 HC RX 250: Performed by: PSYCHIATRY & NEUROLOGY

## 2022-04-29 PROCEDURE — 90870 ELECTROCONVULSIVE THERAPY: CPT | Performed by: PSYCHIATRY & NEUROLOGY

## 2022-04-29 PROCEDURE — 258N000003 HC RX IP 258 OP 636: Performed by: PSYCHIATRY & NEUROLOGY

## 2022-04-29 PROCEDURE — 250N000011 HC RX IP 250 OP 636: Performed by: PSYCHIATRY & NEUROLOGY

## 2022-04-29 RX ORDER — GLYCOPYRROLATE 0.2 MG/ML
0.2 INJECTION, SOLUTION INTRAMUSCULAR; INTRAVENOUS
Status: COMPLETED | OUTPATIENT
Start: 2022-04-29 | End: 2022-04-29

## 2022-04-29 RX ORDER — LABETALOL HYDROCHLORIDE 5 MG/ML
INJECTION, SOLUTION INTRAVENOUS PRN
Status: DISCONTINUED | OUTPATIENT
Start: 2022-04-29 | End: 2022-04-29

## 2022-04-29 RX ORDER — OXYCODONE HYDROCHLORIDE 5 MG/1
5 TABLET ORAL EVERY 4 HOURS PRN
Status: DISCONTINUED | OUTPATIENT
Start: 2022-04-29 | End: 2022-04-29

## 2022-04-29 RX ORDER — ONDANSETRON 2 MG/ML
4 INJECTION INTRAMUSCULAR; INTRAVENOUS
Status: CANCELLED
Start: 2022-04-29 | End: 2022-04-29

## 2022-04-29 RX ORDER — KETOROLAC TROMETHAMINE 30 MG/ML
30 INJECTION, SOLUTION INTRAMUSCULAR; INTRAVENOUS
Status: COMPLETED | OUTPATIENT
Start: 2022-04-29 | End: 2022-04-29

## 2022-04-29 RX ORDER — ACETAMINOPHEN 500 MG
1000 TABLET ORAL
Status: ACTIVE | OUTPATIENT
Start: 2022-04-29 | End: 2022-04-29

## 2022-04-29 RX ORDER — ACETAMINOPHEN 500 MG
1000 TABLET ORAL
Status: CANCELLED
Start: 2022-04-29 | End: 2022-04-29

## 2022-04-29 RX ORDER — ONDANSETRON 4 MG/1
4 TABLET, ORALLY DISINTEGRATING ORAL EVERY 30 MIN PRN
Status: DISCONTINUED | OUTPATIENT
Start: 2022-04-29 | End: 2022-04-29

## 2022-04-29 RX ORDER — ONDANSETRON 2 MG/ML
4 INJECTION INTRAMUSCULAR; INTRAVENOUS EVERY 30 MIN PRN
Status: DISCONTINUED | OUTPATIENT
Start: 2022-04-29 | End: 2022-04-29

## 2022-04-29 RX ORDER — SODIUM CHLORIDE, SODIUM LACTATE, POTASSIUM CHLORIDE, CALCIUM CHLORIDE 600; 310; 30; 20 MG/100ML; MG/100ML; MG/100ML; MG/100ML
INJECTION, SOLUTION INTRAVENOUS CONTINUOUS
Status: DISCONTINUED | OUTPATIENT
Start: 2022-04-29 | End: 2022-04-29

## 2022-04-29 RX ORDER — HYDROMORPHONE HCL IN WATER/PF 6 MG/30 ML
0.2 PATIENT CONTROLLED ANALGESIA SYRINGE INTRAVENOUS EVERY 5 MIN PRN
Status: DISCONTINUED | OUTPATIENT
Start: 2022-04-29 | End: 2022-04-29

## 2022-04-29 RX ORDER — GLYCOPYRROLATE 0.2 MG/ML
0.2 INJECTION, SOLUTION INTRAMUSCULAR; INTRAVENOUS
Status: CANCELLED
Start: 2022-04-29 | End: 2022-04-29

## 2022-04-29 RX ORDER — KETOROLAC TROMETHAMINE 30 MG/ML
30 INJECTION, SOLUTION INTRAMUSCULAR; INTRAVENOUS
Status: CANCELLED
Start: 2022-04-29 | End: 2022-04-29

## 2022-04-29 RX ORDER — ONDANSETRON 2 MG/ML
4 INJECTION INTRAMUSCULAR; INTRAVENOUS
Status: COMPLETED | OUTPATIENT
Start: 2022-04-29 | End: 2022-04-29

## 2022-04-29 RX ADMIN — Medication 100 MG: at 08:21

## 2022-04-29 RX ADMIN — METHOHEXITAL SODIUM 100 MG: 500 INJECTION, POWDER, LYOPHILIZED, FOR SOLUTION INTRAMUSCULAR; INTRAVENOUS; RECTAL at 08:20

## 2022-04-29 RX ADMIN — ONDANSETRON 4 MG: 2 INJECTION INTRAMUSCULAR; INTRAVENOUS at 08:05

## 2022-04-29 RX ADMIN — SODIUM CHLORIDE, POTASSIUM CHLORIDE, SODIUM LACTATE AND CALCIUM CHLORIDE 500 ML: 600; 310; 30; 20 INJECTION, SOLUTION INTRAVENOUS at 08:05

## 2022-04-29 RX ADMIN — MIDAZOLAM HYDROCHLORIDE 2 MG: 1 INJECTION, SOLUTION INTRAMUSCULAR; INTRAVENOUS at 08:25

## 2022-04-29 RX ADMIN — LABETALOL HYDROCHLORIDE 20 MG: 5 INJECTION, SOLUTION INTRAVENOUS at 08:19

## 2022-04-29 RX ADMIN — GLYCOPYRROLATE 0.2 MG: 0.2 INJECTION, SOLUTION INTRAMUSCULAR; INTRAVENOUS at 08:04

## 2022-04-29 RX ADMIN — KETOROLAC TROMETHAMINE 30 MG: 30 INJECTION, SOLUTION INTRAMUSCULAR at 08:05

## 2022-04-29 NOTE — ANESTHESIA PREPROCEDURE EVALUATION
Anesthesia Pre-Procedure Evaluation    Patient: Kuldeep Sequeira   MRN: [id number] : 1970        Procedure : * No procedures listed *  Electroconvulsive Therapy       Past Medical History:   Diagnosis Date     Anxiety      Depressive disorder      GERD (gastroesophageal reflux disease)       No past surgical history on file.   No Known Allergies   Social History     Tobacco Use     Smoking status: Never Smoker     Smokeless tobacco: Never Used   Substance Use Topics     Alcohol use: Not on file      Wt Readings from Last 1 Encounters:   22 83.5 kg (184 lb)        Anesthesia Evaluation   Pt has had prior anesthetic. Type: General.        ROS/MED HX  ENT/Pulmonary:  - neg pulmonary ROS     Neurologic:  - neg neurologic ROS     Cardiovascular:  - neg cardiovascular ROS     METS/Exercise Tolerance:     Hematologic:  - neg hematologic  ROS     Musculoskeletal:  - neg musculoskeletal ROS     GI/Hepatic:    (-) GERD   Renal/Genitourinary:  - neg Renal ROS     Endo:  - neg endo ROS     Psychiatric/Substance Use:     (+) psychiatric history anxiety and depression     Infectious Disease: Comment: COVID+ 22      Malignancy:  - neg malignancy ROS     Other:            Physical Exam    Airway        Mallampati: I   TM distance: > 3 FB   Neck ROM: full   Mouth opening: > 3 cm    Respiratory Devices and Support         Dental  no notable dental history         Cardiovascular   cardiovascular exam normal          Pulmonary   pulmonary exam normal                OUTSIDE LABS:  CBC:   Lab Results   Component Value Date    WBC 5.5 2022    WBC 5.3 2022    HGB 16.4 2022    HGB 17.7 2022    HCT 48.0 2022    HCT 50.8 2022     2022     2022     BMP:   Lab Results   Component Value Date     2022     2022    POTASSIUM 3.6 2022    POTASSIUM 4.6 2022    CHLORIDE 98 2022    CHLORIDE 103 2022    CO2 30 2022    CO2  29 03/11/2022    BUN 10 03/13/2022    BUN 16 03/11/2022    CR 0.97 03/13/2022    CR 1.08 03/11/2022    GLC 93 03/13/2022     (H) 03/11/2022     COAGS: No results found for: PTT, INR, FIBR  POC: No results found for: BGM, HCG, HCGS  HEPATIC:   Lab Results   Component Value Date    ALBUMIN 3.4 03/13/2022    PROTTOTAL 6.4 (L) 03/13/2022    ALT 33 03/13/2022    AST 22 03/13/2022    ALKPHOS 49 03/13/2022    BILITOTAL 0.8 03/13/2022     OTHER:   Lab Results   Component Value Date    RANJITH 8.5 03/13/2022    TSH 1.51 03/11/2022       Anesthesia Plan    ASA Status:  3   NPO Status:  NPO Appropriate    Anesthesia Type: General.     - Airway: Mask Only   Induction: Intravenous.           Consents    Anesthesia Plan(s) and associated risks, benefits, and realistic alternatives discussed. Questions answered and patient/representative(s) expressed understanding.     - Discussed: Risks, Benefits and Alternatives for BOTH SEDATION and the PROCEDURE were discussed     - Discussed with:  Patient, Spouse      - Extended Intubation/Ventilatory Support Discussed: No.      - Patient is DNR/DNI Status: No    Use of blood products discussed: No .     Postoperative Care            Comments:           H&P reviewed: Unable to attach VIRTUAL H&P to encounter due to EHR limitations. Appropriate H&P reviewed. The physical exam performed by anesthesia during this surgical encounter serves as the physical portion of that virtual H&P.  Any significant changes noted within this preop evaluation.          Lalitha Arora MD

## 2022-04-29 NOTE — ANESTHESIA CARE TRANSFER NOTE
Patient: Kuldeep Sequeira    Procedure: * No procedures listed *  Electroconvulsive Therapy    Diagnosis: * No pre-op diagnosis entered *  Diagnosis Additional Information: No value filed.    Anesthesia Type:   General     Note:    Oropharynx: oropharynx clear of all foreign objects  Level of Consciousness: drowsy  Oxygen Supplementation: room air    Independent Airway: airway patency satisfactory and stable  Dentition: dentition unchanged  Vital Signs Stable: post-procedure vital signs reviewed and stable  Report to RN Given: handoff report given  Patient transferred to: PACU    Handoff Report: Identifed the Patient, Identified the Reponsible Provider, Reviewed the pertinent medical history, Discussed the surgical course, Reviewed Intra-OP anesthesia mangement and issues during anesthesia, Set expectations for post-procedure period and Allowed opportunity for questions and acknowledgement of understanding      Vitals:  Vitals Value Taken Time   /80 04/29/22 0847   Temp 36.7  C (98  F) 04/29/22 0847   Pulse 87 04/29/22 0847   Resp 22 04/29/22 0847   SpO2 94 % 04/29/22 0847       Electronically Signed By: Lalitha Arora MD  April 29, 2022  9:02 AM

## 2022-04-29 NOTE — PROCEDURES
Procedures    Kuldeep Sequeira is a 52 year old  year old male patient.  [id number]  @DX@    [facility], [place]   ECT Procedure Note   04/29/2022    Patient Status: Outpatient    Is this the first in a series of 12 treatments?  No   No Known Allergies    Weight:  0 lbs 0 oz         Indications for ECT:     History of good ECT response in one or more previous episodes of illness         Clinical Narrative:     This is a 52 year old male with history of depression, anxiety and insomnia.  Patient has prior history of psychiatric hospitalization on at least 2 previous occasions, but does not have past history of MI CD treatment or suicide attempt history.  Now, presenting secondary to worsening of depressive symptoms leading to overdose of uncertain intent with sleep medication.  Occurring in the setting of medication changes or depression by outpatient psychiatrist.  Admit is voluntary.     Care coordination performed in detail.  Includes, detailed review of care everywhere and epic to review electronic chart information and related mental health history to presentation.  Including, patient's ED presentation to [facility] on 3/13 due to depression and overdose.  Further review of patient's past hospitalization from November 2020.  Subsequent review of ECT documentation available for review.  Please see associated documentation for details.     In brief, patient presented to [facility] ED on 3/13 secondary to worsening of depression and subsequent overdose.  Reports worsening of depressive symptoms for at least the past 2 years with efforts of management to include medications, therapy, ECT and TMS.  Most recent efforts of management in the community through outpatient psychiatrist include initiation of fluoxetine on 2/18 at 20 mg daily with titration on 2/25 to 40 mg and further titration on 3/3 to 60 mg daily.  Continued on Seroquel.  Despite efforts of medication management in  the community, continued to experience symptoms of depression.  According to collateral as obtained from wife, [facility] assessment, patient demonstrating worsening of symptoms especially within the past week.  Isolating in behavior.  Laying in bed.  Decreased appetite with weight loss.  Expressing suicidal ideation and demonstrated behavior leading to admission.  Poor self cares.  Recommendation for inpatient psychiatric hospitalization voluntarily.     Further care coordination performed in detail.  Includes, detailed review of care everywhere.  Patient has documentation unable to be seen on epic viewer.  Specifically, documentations from past ECT for first hospitalization.  Able to review hospitalization from 11/18 through 11/19/2020.  Admitted due to depression, insomnia with suicidal ideation.  PTA medications continue to include venlafaxine 75 mg daily, quetiapine 200 mg p.o. nightly, with medication changes to include Ambien to as needed and addition of doxepin for sleep.     Further care coordination performed.  Referral for ECT initiated on 12/1/2020 by PCP.  Outpatient consult reviewed by Dr. Maher dated 12/4/2020.  Please see consult for full details.  Medically clear for ECT after providing informed consent to treat.  ECT series #1 initiated on 12/7/2020.     Further care coordination performed.  On 3/22/2021, completed second series of ECT.  Medications documented as ineffective.  Effexor XR increased to 225 mg daily and Abilify added.  Considered switch from Effexor XR to Viibryd due to sexual side effects.     Upon patient interview, patient review performed on unit 5500.  Cooperative on approach.  Evaluation effort to review events into presentation and clarify information as provided in collateral report.  Patient admit is voluntary.     Patient reports a history of depression and anxiety of greater than 20 years.  Medication management initiated at approximately age 28.  Patient has history of  multiple medication trials with or without psychotherapy.  Further history of 2 prior hospitalizations in the month of November 2020 brief duration.  Associated with depressive symptoms in the setting of suicidal ideation and insomnia.  Despite efforts of medication management, proceeded with ECT as initiated on 12/7/2020 after referral by outpatient psychiatrist.  Continue with ECT for 2 series.  Did not appear to have taper or maintenance series of ECT.  Then, initiated TMS on 4/7/2021.     Patient has been followed by Dr. Markham since initiating ECT.  Medication management has been performed in multiple.  Medications alone have been documented as ineffective.  Recent medication changes initiated in the community include start of Prozac after switch from alternate antidepressant therapy.  Initiated on 2/18 at 20 mg daily, then on 2/2540 mg daily then on 3/3 increased to 60 mg daily.       Patient presented to ED on 3/13 due to continued symptoms of depression with anxious distress.  Occurring in the setting of repeat event of inability to sleep.  Similar to prior hospitalizations.  Patient reportedly taking increasing amounts of Ambien CR with intent to sleep.  Later, making statement of not wanting to wake up.  Recommendation for inpatient psychiatric hospitalization for further assessment.     Patient denies further stressors or triggers clearly associated with presentation.  Reports taking medications as prescribed.  Symptoms of depression and anxiety continued with efforts of fluoxetine titration, leading to behavioral self-harm.  Denies plan or intent of suicide by means of Ambien CR ingestion.  Denies history of suicide attempt.  Does admit to gun access.  Agrees to removal of gun access prior to disposition.  States an appropriate crisis relapse plan.  Future oriented for family.     Past treatment reviewed in detail.  Last documented cares reviewed to include medications documented as lacking efficacy.  " Further review of ECT documented as efficacious, due to medications alone is not effective.  Initiated ECT on 12/7/2020 after last hospitalization with last treatment in March 2021.  In April 2021, started series of TMS.  Denies benefit from TMS series.  Reports beneficial response from ECT, without significant side effect aside from mild cognition impairment.     On psychiatric review of symptoms, mood is, \"depressed.\"  Denies lindsey or hypomania.  Insomnia with low energy.  Lack of energy.  Lack of enjoyment with activities of prior interest.  Impacting activities at home and work as well as outside relationships.  Intact appetite and weight.  Intact concentration.  Negative thoughts of self.  Stating, \"end of my rope.\"  Denies tearfulness.  Endorses anxiety.  Denies psychosis.         Diagnosis:     Major depression         ECT Log:     #1 4/6/22 Consent is obtained for the procedure. Starting mood is 0/10 (10 being the best). Passive death wishes. Denies intent to kill self when took extra ambien 'to sleep' and was hospitalized. Is on CBD/THC 16/1 in am and 1/1 pm for sleep.   #2 4/8/22 Did get anxious as he was receiving anesthesia but recovery was uneventful and no side effects afterwards. Mood low.   #3 4/11/22  Mood unchanged. Wife notices a bit more energy and concentration. No SI. Some headache  #4 4/13/22 Anxious. Took hydroxyzine yesterday and slept most of the day. On venlafaxine 75 mg  #5 4/20/22 Tested positive for COVID on 4/13/22. Now resuming ECT. Very anxious on the morning of.  Will treat today as last patient. On Venlafaxine 150 mg  #6 4/22/22 Continues to struggle with low mood  #7 4/25/22 Mood 5/10 . More energy, enjoying things somewhat better. No SI. No side effects. Mild headaches,.   #8 4/27/22 Mood 5/10. Was able to work a full day from home. Wife believes this is a major improvement. Sleep is good.   4/29/22 Experienced dizziness yesterday morning which alarmed him. \"feeling of catching " "up when moving head left to right\". Encouraged to observe and if uneventful weekend to come on Monday. Otherwise could skip. Continues to be somewhat anxious but overall mood 7/10         Pause for the Cause:     Right patient Yes   Right procedure/laterality settings: Yes          Intra-Procedure Documentation:       ECT [facility] at [facility]: 9[facility]   Treatment number this series: 8   Lifetime total treatment number: 23+     Type of ECT:  Right, unilateral ultrabrief    ECT Medications:    Tylenol 1000 mg  Robinul 0.2 mg  Toradol: 30mg  Zofran: 4mg  Zyprexa Zydis: 10mg  Brevital: 100 mg  Succinyl Choline: 100 mg  Versed: 2 mg   Labetolol 20 mg pre-ECT    ECT Strip Summary:   Energy Level: Titration: 19.2 mC  115.2 ; 0.3 msec; 30 Hz; 8 sec; 800 mA  Motor Seizure Duration: 43 seconds  EEG Seizure Duration: 62 seconds    Complications: No    Time for re-orientation: 10 minutes on 4/25/22 (9 minutes on 4/13/22)    Plan:   Continue RUL UBP ECT Q MWF at 115.2 mC  COVID PCR positive on 4/13/22 (no COVID PCR testing until 7/13/22)  Encouraged to observe and if uneventful weekend to come on Monday. Otherwise could skip and in on Wednesday 5/4/22.  Also discuss maintenance ECT once course complete    Hold CBD/THC on morning of ECT and evening prior. May take his Evening dose the night of ECT and morning dose the day after ECT  Monitor depression severity with clinical assessment augmented with IDS-SR every 3rd treatment    ASK PATIENT TO BRING CPAP MACHINE WITH HIM TO USE IN RECOVERY    Will likely need to discuss maintenance ECT when completing this course. Also, is a good candidate for VNS (this is his 3rd course of ECT).     Dontrell Murillo MD  Professor and Executive    Department Psychiatry and Behavioral Sciences      "

## 2022-04-29 NOTE — ANESTHESIA POSTPROCEDURE EVALUATION
Patient: Kuldeep Sequeira    Procedure: * No procedures listed *  Electroconvulsive Therapy    Anesthesia Type:  General    Note:  Disposition: Outpatient   Postop Pain Control: Uneventful            Sign Out: Well controlled pain   PONV: No   Neuro/Psych: Uneventful            Sign Out: Acceptable/Baseline neuro status   Airway/Respiratory: Uneventful            Sign Out: Acceptable/Baseline resp. status   CV/Hemodynamics: Uneventful            Sign Out: Acceptable CV status; No obvious hypovolemia; No obvious fluid overload   Other NRE: NONE   DID A NON-ROUTINE EVENT OCCUR? No           Last vitals:  Vitals Value Taken Time   /80 04/29/22 0847   Temp 36.7  C (98  F) 04/29/22 0847   Pulse 87 04/29/22 0847   Resp 22 04/29/22 0847   SpO2 94 % 04/29/22 0847       Electronically Signed By: Lalitha Arora MD  April 29, 2022  9:02 AM

## 2022-04-29 NOTE — PROGRESS NOTES
Patient arrived in PACU at 0832    Patient meets phase I recovery  criteria at 0847 , switched to phase II recovery at 0848.      The following medications were given in ECT:    Pain:  Toradol 30mg at 0805    Nausea  Zofran 4mg at  0805  Lactated Ringers 500 ml at  0805    Secretions and Bradycardia:  Robinul 0.2 mg at 0804    Anesthetic:   Brevital 100mg at 0820    Muscle Relaxant:    Succinylcholine  100mg at 0821    Blood Pressure:   Labetalol 20mg at 0819      Anxiety/Aggitation:      Versed 2 mg at 0825    Patient meets PACU discharge criteria at 0902.    Pt discharged from the recovery room via wheelchair to discharge room at 0908 .

## 2022-04-29 NOTE — DISCHARGE INSTRUCTIONS
ECT Discharge Instructions      During your ECT series:    Do not drive or work heavy equipment until 7 days after your last treatment.  Do not drink alcohol or use street drugs (illicit drugs) while you are having treatments.  Do not make important decisions, including legal decisions.    After your maintenance ECT treatment:    Do not drive for 24 hours after treatment.  If you will be having more than one treatment witihin a week, no driving until 7 days after your last ECT treatment.       After each treatment:    Get plenty of rest. A responsible adult must stay with your for at least 6 hours.  Avoid heavy or risky activities for 24 hours.  If you feel light-headed, sit for a few minutes before standing. Have someone help you get up.  If you have nausea (feel sick to your stomach): Drink only clear liquids such as apple juice, ginger ale, broth or 7UP, Be sure to drink plenty of liquids. Move to a normal diet as you feel able.   If you received Toradol, wait 6 hours before taking ibuprofen.  Call your doctor if:   You have a fever over 100F (37.7 C) (taken under the tongue), or a fever that last more than 24 hours.  Your IV site is red, swollen, very painful or is getting more tender.  You have nausea that gets very bad or does not improve.    If you have any symptoms after ECT, tell our staff before your next treatment.  The ECT Department can be reached at [phone].  The ECT Department is open Mondays, Wednesdays and Fridays from 7:00 AM to 2:00 PM.    To speak to a doctor, call: Dr. Dontrell Murillo: Office [phone], Fax [phone]    Today you received the following:   - Toradol 30mg today at 8am.  Toradol is an NSAID.  Do not take any NSAID's including: Ibuprofen, Naproxen, Aspirin, Advil, Motrin, Aleve, Rodrick, etc., until 6 hours after the above time (2p).  If you have any questions, contact your physician or pharmacist.    - IV Zofran 4mg at 8a for nausea.   -500mL of fluids (lactated  ringers)    Transported by: michael Barnard    Instructions for your next appointment:    *Covid-19 Testing:  You tested positive for covid on 4/13/22. You do not need to test for 90 days after a positive test for this procedure.     Please come to the [facility] and check-in with Security before your ECT appointment.  The [facility] is located right next to the Adult Emergency Room.  The address is [address] [place] [address].    After your appointment, you may be picked up at the [facility] entrance, which is located on the West side of our campus.  The address is [address] [place] [address].  [facility].

## 2022-05-02 ENCOUNTER — ANESTHESIA EVENT (OUTPATIENT)
Dept: BEHAVIORAL HEALTH | Facility: CLINIC | Age: 52
End: 2022-05-02
Payer: COMMERCIAL

## 2022-05-02 ENCOUNTER — HOSPITAL ENCOUNTER (OUTPATIENT)
Dept: BEHAVIORAL HEALTH | Facility: CLINIC | Age: 52
Discharge: HOME | End: 2022-05-02
Attending: PSYCHIATRY & NEUROLOGY
Payer: COMMERCIAL

## 2022-05-02 ENCOUNTER — ANESTHESIA (OUTPATIENT)
Dept: BEHAVIORAL HEALTH | Facility: CLINIC | Age: 52
End: 2022-05-02
Payer: COMMERCIAL

## 2022-05-02 VITALS
DIASTOLIC BLOOD PRESSURE: 94 MMHG | RESPIRATION RATE: 16 BRPM | TEMPERATURE: 99 F | HEART RATE: 82 BPM | OXYGEN SATURATION: 97 % | SYSTOLIC BLOOD PRESSURE: 130 MMHG

## 2022-05-02 DIAGNOSIS — F33.2 MAJOR DEPRESSIVE DISORDER, RECURRENT EPISODE, SEVERE WITH ANXIOUS DISTRESS (H): Primary | ICD-10-CM

## 2022-05-02 PROCEDURE — 250N000009 HC RX 250: Performed by: ANESTHESIOLOGY

## 2022-05-02 PROCEDURE — 250N000011 HC RX IP 250 OP 636: Performed by: ANESTHESIOLOGY

## 2022-05-02 PROCEDURE — 90870 ELECTROCONVULSIVE THERAPY: CPT | Performed by: PSYCHIATRY & NEUROLOGY

## 2022-05-02 PROCEDURE — 370N000017 HC ANESTHESIA TECHNICAL FEE, PER MIN

## 2022-05-02 PROCEDURE — 250N000011 HC RX IP 250 OP 636: Performed by: PSYCHIATRY & NEUROLOGY

## 2022-05-02 PROCEDURE — 258N000003 HC RX IP 258 OP 636: Performed by: PSYCHIATRY & NEUROLOGY

## 2022-05-02 PROCEDURE — 90870 ELECTROCONVULSIVE THERAPY: CPT

## 2022-05-02 PROCEDURE — 250N000009 HC RX 250: Performed by: PSYCHIATRY & NEUROLOGY

## 2022-05-02 RX ORDER — SODIUM CHLORIDE, SODIUM LACTATE, POTASSIUM CHLORIDE, CALCIUM CHLORIDE 600; 310; 30; 20 MG/100ML; MG/100ML; MG/100ML; MG/100ML
INJECTION, SOLUTION INTRAVENOUS CONTINUOUS
Status: DISCONTINUED | OUTPATIENT
Start: 2022-05-02 | End: 2022-05-03 | Stop reason: HOSPADM

## 2022-05-02 RX ORDER — OXYCODONE HYDROCHLORIDE 5 MG/1
5 TABLET ORAL EVERY 4 HOURS PRN
Status: DISCONTINUED | OUTPATIENT
Start: 2022-05-02 | End: 2022-05-03 | Stop reason: HOSPADM

## 2022-05-02 RX ORDER — FENTANYL CITRATE 50 UG/ML
25 INJECTION, SOLUTION INTRAMUSCULAR; INTRAVENOUS EVERY 5 MIN PRN
Status: DISCONTINUED | OUTPATIENT
Start: 2022-05-02 | End: 2022-05-03 | Stop reason: HOSPADM

## 2022-05-02 RX ORDER — ONDANSETRON 2 MG/ML
4 INJECTION INTRAMUSCULAR; INTRAVENOUS
Status: CANCELLED
Start: 2022-05-02 | End: 2022-05-02

## 2022-05-02 RX ORDER — ALBUTEROL SULFATE 0.83 MG/ML
2.5 SOLUTION RESPIRATORY (INHALATION) EVERY 4 HOURS PRN
Status: DISCONTINUED | OUTPATIENT
Start: 2022-05-02 | End: 2022-05-03 | Stop reason: HOSPADM

## 2022-05-02 RX ORDER — ONDANSETRON 2 MG/ML
4 INJECTION INTRAMUSCULAR; INTRAVENOUS EVERY 30 MIN PRN
Status: DISCONTINUED | OUTPATIENT
Start: 2022-05-02 | End: 2022-05-03 | Stop reason: HOSPADM

## 2022-05-02 RX ORDER — DEXAMETHASONE SODIUM PHOSPHATE 4 MG/ML
4 INJECTION, SOLUTION INTRA-ARTICULAR; INTRALESIONAL; INTRAMUSCULAR; INTRAVENOUS; SOFT TISSUE EVERY 10 MIN PRN
Status: DISCONTINUED | OUTPATIENT
Start: 2022-05-02 | End: 2022-05-03 | Stop reason: HOSPADM

## 2022-05-02 RX ORDER — GLYCOPYRROLATE 0.2 MG/ML
0.2 INJECTION, SOLUTION INTRAMUSCULAR; INTRAVENOUS
Status: CANCELLED
Start: 2022-05-02 | End: 2022-05-02

## 2022-05-02 RX ORDER — KETOROLAC TROMETHAMINE 30 MG/ML
30 INJECTION, SOLUTION INTRAMUSCULAR; INTRAVENOUS
Status: CANCELLED
Start: 2022-05-02 | End: 2022-05-02

## 2022-05-02 RX ORDER — KETOROLAC TROMETHAMINE 30 MG/ML
30 INJECTION, SOLUTION INTRAMUSCULAR; INTRAVENOUS
Status: COMPLETED | OUTPATIENT
Start: 2022-05-02 | End: 2022-05-02

## 2022-05-02 RX ORDER — LABETALOL HYDROCHLORIDE 5 MG/ML
10 INJECTION, SOLUTION INTRAVENOUS
Status: COMPLETED | OUTPATIENT
Start: 2022-05-02 | End: 2022-05-02

## 2022-05-02 RX ORDER — ACETAMINOPHEN 500 MG
1000 TABLET ORAL
Status: ACTIVE | OUTPATIENT
Start: 2022-05-02 | End: 2022-05-02

## 2022-05-02 RX ORDER — ACETAMINOPHEN 500 MG
1000 TABLET ORAL
Status: CANCELLED
Start: 2022-05-02 | End: 2022-05-02

## 2022-05-02 RX ORDER — GLYCOPYRROLATE 0.2 MG/ML
0.2 INJECTION, SOLUTION INTRAMUSCULAR; INTRAVENOUS
Status: COMPLETED | OUTPATIENT
Start: 2022-05-02 | End: 2022-05-02

## 2022-05-02 RX ORDER — ONDANSETRON 2 MG/ML
4 INJECTION INTRAMUSCULAR; INTRAVENOUS
Status: COMPLETED | OUTPATIENT
Start: 2022-05-02 | End: 2022-05-02

## 2022-05-02 RX ORDER — HYDRALAZINE HYDROCHLORIDE 20 MG/ML
2.5-5 INJECTION INTRAMUSCULAR; INTRAVENOUS EVERY 10 MIN PRN
Status: DISCONTINUED | OUTPATIENT
Start: 2022-05-02 | End: 2022-05-03 | Stop reason: HOSPADM

## 2022-05-02 RX ORDER — ONDANSETRON 4 MG/1
4 TABLET, ORALLY DISINTEGRATING ORAL EVERY 30 MIN PRN
Status: DISCONTINUED | OUTPATIENT
Start: 2022-05-02 | End: 2022-05-03 | Stop reason: HOSPADM

## 2022-05-02 RX ADMIN — Medication 100 MG: at 08:41

## 2022-05-02 RX ADMIN — ONDANSETRON 4 MG: 2 INJECTION INTRAMUSCULAR; INTRAVENOUS at 08:29

## 2022-05-02 RX ADMIN — SODIUM CHLORIDE, POTASSIUM CHLORIDE, SODIUM LACTATE AND CALCIUM CHLORIDE 500 ML: 600; 310; 30; 20 INJECTION, SOLUTION INTRAVENOUS at 08:34

## 2022-05-02 RX ADMIN — LABETALOL HYDROCHLORIDE 20 MG: 5 INJECTION, SOLUTION INTRAVENOUS at 08:41

## 2022-05-02 RX ADMIN — METHOHEXITAL SODIUM 100 MG: 500 INJECTION, POWDER, LYOPHILIZED, FOR SOLUTION INTRAMUSCULAR; INTRAVENOUS; RECTAL at 08:41

## 2022-05-02 RX ADMIN — GLYCOPYRROLATE 0.2 MG: 0.2 INJECTION, SOLUTION INTRAMUSCULAR; INTRAVENOUS at 08:24

## 2022-05-02 RX ADMIN — KETOROLAC TROMETHAMINE 30 MG: 30 INJECTION, SOLUTION INTRAMUSCULAR at 08:28

## 2022-05-02 RX ADMIN — MIDAZOLAM HYDROCHLORIDE 2 MG: 1 INJECTION, SOLUTION INTRAMUSCULAR; INTRAVENOUS at 08:47

## 2022-05-02 NOTE — DISCHARGE INSTRUCTIONS
ECT Discharge Instructions      During your ECT series:    Do not drive or work heavy equipment until 7 days after your last treatment.  Do not drink alcohol or use street drugs (illicit drugs) while you are having treatments.  Do not make important decisions, including legal decisions.    After each treatment:    Get plenty of rest. A responsible adult must stay with your for at least 6 hours.  If you have more than one treatment within one week, do not drive for 7 days after your last treatment.   If you feel light-headed, sit for a few minutes before standing. Have someone help you get up.  If you have nausea (feel sick to your stomach): Drink only clear liquids such as apple juice, ginger ale, broth or 7UP, Be sure to drink plenty of liquids. Move to a normal diet as you feel able.   If you received Toradol, wait 6 hours before taking ibuprofen.  Call your doctor if:   You have a fever over 100F (37.7 C) (taken under the tongue), or a fever that last more than 24 hours.  Your IV site is red, swollen, very painful or is getting more tender.  You have nausea that gets very bad or does not improve.    If you have any symptoms after ECT, tell our staff before your next treatment.  The ECT Department can be reached at [phone].  The ECT Department is open Mondays, Wednesdays and Fridays from 7:00 AM to 2:00 PM.    To speak to a doctor, call:  Your primary care provider or [facility] for Dr. Murillo, Dr. Villafuerte or Dr. Diaz, PHONE: [phone]; fax: [phone]    New instructions:  You have received Toradol today at 8:28 am. Toradol is an NSAID.  Do not take any NSAID's including: Ibuprofen, Naproxen Sodium, Asprin, Advil, Motrin, Aleve, Rodrick, etc., until six hours after the above time which would be 2:28 pm. If you have any questions check back with your Physician, or pharmacist.   You also received ondansetron (Zofran) 4 mg for nausea prevention.     You do not require pre-procedural Covid testing  until 7/12/22.    Next appointment Wednesday, 5/4/22 then plan to treat weekly on Fridays.      Hold CBD/THC on morning of ECT and evening prior. May take his Evening dose the night of ECT and morning dose the day after ECT  Monitor depression severity with clinical assessment augmented with IDS-SR every 3rd treatment     ASK PATIENT TO BRING CPAP MACHINE WITH HIM TO USE IN RECOVERY     Also, is a good candidate for VNS (this is his 3rd course of ECT).     Please come to the [facility] [place] and check-in with Security before your ECT appointment.  The [facility] [place] is located right next to the Adult Emergency Room.  The address is [address] [place] [address].    After your appointment, you may be picked up at the [facility] entrance, which is located at [address] [place] [address].  [facility], about 1 block North of the [facility] [place].    Transported by:   Wife, Evon

## 2022-05-02 NOTE — ANESTHESIA POSTPROCEDURE EVALUATION
Patient: Kuldeep Sequeira    Procedure: * No procedures listed *  Electroconvulsive Therapy    Anesthesia Type:  General    Note:  Disposition: Outpatient   Postop Pain Control: Uneventful            Sign Out: Well controlled pain   PONV: No   Neuro/Psych: Uneventful            Sign Out: Acceptable/Baseline neuro status   Airway/Respiratory: Uneventful            Sign Out: Acceptable/Baseline resp. status   CV/Hemodynamics: Uneventful            Sign Out: Acceptable CV status   Other NRE: NONE   DID A NON-ROUTINE EVENT OCCUR? No           Last vitals:  Vitals Value Taken Time   /88 05/02/22 0905   Temp 36.8  C (98.3  F) 05/02/22 0905   Pulse 86 05/02/22 0905   Resp 16 05/02/22 0905   SpO2 98 % 05/02/22 0905       Electronically Signed By: Jose Armando Doyle MD  May 2, 2022  9:20 AM

## 2022-05-02 NOTE — PROCEDURES
Procedures    Kuldeep Sequeira is a 52 year old  year old male patient.  [id number]  @DX@    [facility], [place]   ECT Procedure Note   05/02/2022    Patient Status: Outpatient    Is this the first in a series of 12 treatments?  No   No Known Allergies    Weight:  0 lbs 0 oz         Indications for ECT:     History of good ECT response in one or more previous episodes of illness         Clinical Narrative:     This is a 52 year old male with history of depression, anxiety and insomnia.  Patient has prior history of psychiatric hospitalization on at least 2 previous occasions, but does not have past history of MI CD treatment or suicide attempt history.  Now, presenting secondary to worsening of depressive symptoms leading to overdose of uncertain intent with sleep medication.  Occurring in the setting of medication changes or depression by outpatient psychiatrist.  Admit is voluntary.     Care coordination performed in detail.  Includes, detailed review of care everywhere and epic to review electronic chart information and related mental health history to presentation.  Including, patient's ED presentation to [facility] on 3/13 due to depression and overdose.  Further review of patient's past hospitalization from November 2020.  Subsequent review of ECT documentation available for review.  Please see associated documentation for details.     In brief, patient presented to [facility] ED on 3/13 secondary to worsening of depression and subsequent overdose.  Reports worsening of depressive symptoms for at least the past 2 years with efforts of management to include medications, therapy, ECT and TMS.  Most recent efforts of management in the community through outpatient psychiatrist include initiation of fluoxetine on 2/18 at 20 mg daily with titration on 2/25 to 40 mg and further titration on 3/3 to 60 mg daily.  Continued on Seroquel.  Despite efforts of medication management in  the community, continued to experience symptoms of depression.  According to collateral as obtained from wife, [facility] assessment, patient demonstrating worsening of symptoms especially within the past week.  Isolating in behavior.  Laying in bed.  Decreased appetite with weight loss.  Expressing suicidal ideation and demonstrated behavior leading to admission.  Poor self cares.  Recommendation for inpatient psychiatric hospitalization voluntarily.     Further care coordination performed in detail.  Includes, detailed review of care everywhere.  Patient has documentation unable to be seen on epic viewer.  Specifically, documentations from past ECT for first hospitalization.  Able to review hospitalization from 11/18 through 11/19/2020.  Admitted due to depression, insomnia with suicidal ideation.  PTA medications continue to include venlafaxine 75 mg daily, quetiapine 200 mg p.o. nightly, with medication changes to include Ambien to as needed and addition of doxepin for sleep.     Further care coordination performed.  Referral for ECT initiated on 12/1/2020 by PCP.  Outpatient consult reviewed by Dr. Maher dated 12/4/2020.  Please see consult for full details.  Medically clear for ECT after providing informed consent to treat.  ECT series #1 initiated on 12/7/2020.     Further care coordination performed.  On 3/22/2021, completed second series of ECT.  Medications documented as ineffective.  Effexor XR increased to 225 mg daily and Abilify added.  Considered switch from Effexor XR to Viibryd due to sexual side effects.     Upon patient interview, patient review performed on unit 5500.  Cooperative on approach.  Evaluation effort to review events into presentation and clarify information as provided in collateral report.  Patient admit is voluntary.     Patient reports a history of depression and anxiety of greater than 20 years.  Medication management initiated at approximately age 28.  Patient has history of  multiple medication trials with or without psychotherapy.  Further history of 2 prior hospitalizations in the month of November 2020 brief duration.  Associated with depressive symptoms in the setting of suicidal ideation and insomnia.  Despite efforts of medication management, proceeded with ECT as initiated on 12/7/2020 after referral by outpatient psychiatrist.  Continue with ECT for 2 series.  Did not appear to have taper or maintenance series of ECT.  Then, initiated TMS on 4/7/2021.     Patient has been followed by Dr. Markham since initiating ECT.  Medication management has been performed in multiple.  Medications alone have been documented as ineffective.  Recent medication changes initiated in the community include start of Prozac after switch from alternate antidepressant therapy.  Initiated on 2/18 at 20 mg daily, then on 2/2540 mg daily then on 3/3 increased to 60 mg daily.       Patient presented to ED on 3/13 due to continued symptoms of depression with anxious distress.  Occurring in the setting of repeat event of inability to sleep.  Similar to prior hospitalizations.  Patient reportedly taking increasing amounts of Ambien CR with intent to sleep.  Later, making statement of not wanting to wake up.  Recommendation for inpatient psychiatric hospitalization for further assessment.     Patient denies further stressors or triggers clearly associated with presentation.  Reports taking medications as prescribed.  Symptoms of depression and anxiety continued with efforts of fluoxetine titration, leading to behavioral self-harm.  Denies plan or intent of suicide by means of Ambien CR ingestion.  Denies history of suicide attempt.  Does admit to gun access.  Agrees to removal of gun access prior to disposition.  States an appropriate crisis relapse plan.  Future oriented for family.     Past treatment reviewed in detail.  Last documented cares reviewed to include medications documented as lacking efficacy.  " Further review of ECT documented as efficacious, due to medications alone is not effective.  Initiated ECT on 12/7/2020 after last hospitalization with last treatment in March 2021.  In April 2021, started series of TMS.  Denies benefit from TMS series.  Reports beneficial response from ECT, without significant side effect aside from mild cognition impairment.     On psychiatric review of symptoms, mood is, \"depressed.\"  Denies lindsey or hypomania.  Insomnia with low energy.  Lack of energy.  Lack of enjoyment with activities of prior interest.  Impacting activities at home and work as well as outside relationships.  Intact appetite and weight.  Intact concentration.  Negative thoughts of self.  Stating, \"end of my rope.\"  Denies tearfulness.  Endorses anxiety.  Denies psychosis.         Diagnosis:     Major depression         ECT Log:     #1 4/6/22 Consent is obtained for the procedure. Starting mood is 0/10 (10 being the best). Passive death wishes. Denies intent to kill self when took extra ambien 'to sleep' and was hospitalized. Is on CBD/THC 16/1 in am and 1/1 pm for sleep.   #2 4/8/22 Did get anxious as he was receiving anesthesia but recovery was uneventful and no side effects afterwards. Mood low.   #3 4/11/22  Mood unchanged. Wife notices a bit more energy and concentration. No SI. Some headache  #4 4/13/22 Anxious. Took hydroxyzine yesterday and slept most of the day. On venlafaxine 75 mg  #5 4/20/22 Tested positive for COVID on 4/13/22. Now resuming ECT. Very anxious on the morning of.  Will treat today as last patient. On Venlafaxine 150 mg  #6 4/22/22 Continues to struggle with low mood  #7 4/25/22 Mood 5/10 . More energy, enjoying things somewhat better. No SI. No side effects. Mild headaches,.   #8 4/27/22 Mood 5/10. Was able to work a full day from home. Wife believes this is a major improvement. Sleep is good.   4/29/22 Experienced dizziness yesterday morning which alarmed him. \"feeling of catching " "up when moving head left to right\". Encouraged to observe and if uneventful weekend to come on Monday. Otherwise could skip. Continues to be somewhat anxious but overall mood 7/10  #9 mood 10/10  Ready to transition to maintenance after wed. Struggles with pre-procedure anxiety. Finding it easier to start tasks. family noting improved motivation. Feeling inspired to return to work.  Will treat wed and if stable start M once per week         Pause for the Cause:     Right patient Yes   Right procedure/laterality settings: Yes          Intra-Procedure Documentation:       ECT [facility] at [facility]: 10[facility]   Treatment number this series: 9   Lifetime total treatment number: 24+     Type of ECT:  Right, unilateral ultrabrief    ECT Medications:    Tylenol 1000 mg  Robinul 0.2 mg  Toradol: 30mg  Zofran: 4mg  Zyprexa Zydis: 10mg  Brevital: 100 mg  Succinyl Choline: 100 mg  Versed: 2 mg   Labetolol 20 mg pre-ECT    ECT Strip Summary:   Energy Level: Titration: 19.2 mC  115.2 mC  ; 0.3 msec; 30 Hz; 8 sec; 800 mA  Motor Seizure Duration: 51 seconds  EEG Seizure Duration: 82 seconds    Complications: No    Time for re-orientation:4/5 at 10 minutes (9 minutes on 4/13/22)    Plan:   Continue RUL UBP ECT Q MWF at 115.2 mC  COVID PCR positive on 4/13/22 (no COVID PCR testing until 7/13/22)  Treat this wed then weekly on fridays    Hold CBD/THC on morning of ECT and evening prior. May take his Evening dose the night of ECT and morning dose the day after ECT  Monitor depression severity with clinical assessment augmented with IDS-SR every 3rd treatment    ASK PATIENT TO BRING CPAP MACHINE WITH HIM TO USE IN RECOVERY    Also, is a good candidate for VNS (this is his 3rd course of ECT).     Henna Villafuerte MD    "

## 2022-05-02 NOTE — ANESTHESIA PREPROCEDURE EVALUATION
Anesthesia Pre-Procedure Evaluation    Patient: Kuldeep Sequeira   MRN: [id number] : 1970        Procedure : * No procedures listed *  Electroconvulsive Therapy       Past Medical History:   Diagnosis Date     Anxiety      Depressive disorder      GERD (gastroesophageal reflux disease)       No past surgical history on file.   No Known Allergies   Social History     Tobacco Use     Smoking status: Never Smoker     Smokeless tobacco: Never Used   Substance Use Topics     Alcohol use: Not on file      Wt Readings from Last 1 Encounters:   22 83.5 kg (184 lb)        Anesthesia Evaluation   Pt has had prior anesthetic. Type: General.        ROS/MED HX  ENT/Pulmonary:  - neg pulmonary ROS     Neurologic:  - neg neurologic ROS     Cardiovascular:  - neg cardiovascular ROS     METS/Exercise Tolerance:     Hematologic:  - neg hematologic  ROS     Musculoskeletal:  - neg musculoskeletal ROS     GI/Hepatic:    (-) GERD   Renal/Genitourinary:  - neg Renal ROS     Endo:  - neg endo ROS     Psychiatric/Substance Use:     (+) psychiatric history anxiety and depression     Infectious Disease: Comment: COVID+ 22      Malignancy:  - neg malignancy ROS     Other:            Physical Exam    Airway        Mallampati: I   TM distance: > 3 FB   Neck ROM: full   Mouth opening: > 3 cm    Respiratory Devices and Support         Dental  no notable dental history         Cardiovascular   cardiovascular exam normal          Pulmonary   pulmonary exam normal                OUTSIDE LABS:  CBC:   Lab Results   Component Value Date    WBC 5.5 2022    WBC 5.3 2022    HGB 16.4 2022    HGB 17.7 2022    HCT 48.0 2022    HCT 50.8 2022     2022     2022     BMP:   Lab Results   Component Value Date     2022     2022    POTASSIUM 3.6 2022    POTASSIUM 4.6 2022    CHLORIDE 98 2022    CHLORIDE 103 2022    CO2 30 2022    CO2  29 03/11/2022    BUN 10 03/13/2022    BUN 16 03/11/2022    CR 0.97 03/13/2022    CR 1.08 03/11/2022    GLC 93 03/13/2022     (H) 03/11/2022     COAGS: No results found for: PTT, INR, FIBR  POC: No results found for: BGM, HCG, HCGS  HEPATIC:   Lab Results   Component Value Date    ALBUMIN 3.4 03/13/2022    PROTTOTAL 6.4 (L) 03/13/2022    ALT 33 03/13/2022    AST 22 03/13/2022    ALKPHOS 49 03/13/2022    BILITOTAL 0.8 03/13/2022     OTHER:   Lab Results   Component Value Date    RANJITH 8.5 03/13/2022    TSH 1.51 03/11/2022       Anesthesia Plan    ASA Status:  3   NPO Status:  NPO Appropriate    Anesthesia Type: General.     - Airway: Mask Only   Induction: Intravenous.           Consents    Anesthesia Plan(s) and associated risks, benefits, and realistic alternatives discussed. Questions answered and patient/representative(s) expressed understanding.     - Discussed: Risks, Benefits and Alternatives for BOTH SEDATION and the PROCEDURE were discussed     - Discussed with:  Patient, Spouse      - Extended Intubation/Ventilatory Support Discussed: No.      - Patient is DNR/DNI Status: No    Use of blood products discussed: No .     Postoperative Care            Comments:           H&P reviewed: Unable to attach VIRTUAL H&P to encounter due to EHR limitations. Appropriate H&P reviewed. The physical exam performed by anesthesia during this surgical encounter serves as the physical portion of that virtual H&P.  Any significant changes noted within this preop evaluation.              Jose Armando Doyle MD

## 2022-05-02 NOTE — ANESTHESIA CARE TRANSFER NOTE
Patient: Kuldeep Sequeira    Procedure: * No procedures listed *  Electroconvulsive Therapy    Diagnosis: * No pre-op diagnosis entered *  Diagnosis Additional Information: No value filed.    Anesthesia Type:   General     Note:    Oropharynx: oropharynx clear of all foreign objects  Level of Consciousness: drowsy  Oxygen Supplementation: face mask    Independent Airway: airway patency satisfactory and stable  Dentition: dentition unchanged  Vital Signs Stable: post-procedure vital signs reviewed and stable  Report to RN Given: handoff report given  Patient transferred to: PACU    Handoff Report: Identifed the Patient, Identified the Reponsible Provider, Reviewed the pertinent medical history, Discussed the surgical course, Reviewed Intra-OP anesthesia mangement and issues during anesthesia, Set expectations for post-procedure period and Allowed opportunity for questions and acknowledgement of understanding      Vitals:  Vitals Value Taken Time   /88 05/02/22 0905   Temp 36.8  C (98.3  F) 05/02/22 0905   Pulse 86 05/02/22 0905   Resp 16 05/02/22 0905   SpO2 98 % 05/02/22 0905       Electronically Signed By: Jose Armando Doyle MD  May 2, 2022  9:20 AM

## 2022-05-04 ENCOUNTER — ANESTHESIA EVENT (OUTPATIENT)
Dept: BEHAVIORAL HEALTH | Facility: CLINIC | Age: 52
End: 2022-05-04
Payer: COMMERCIAL

## 2022-05-04 ENCOUNTER — HOSPITAL ENCOUNTER (OUTPATIENT)
Dept: BEHAVIORAL HEALTH | Facility: CLINIC | Age: 52
Discharge: HOME | End: 2022-05-04
Attending: PSYCHIATRY & NEUROLOGY
Payer: COMMERCIAL

## 2022-05-04 ENCOUNTER — ANESTHESIA (OUTPATIENT)
Dept: BEHAVIORAL HEALTH | Facility: CLINIC | Age: 52
End: 2022-05-04
Payer: COMMERCIAL

## 2022-05-04 VITALS
TEMPERATURE: 98 F | DIASTOLIC BLOOD PRESSURE: 76 MMHG | OXYGEN SATURATION: 97 % | HEART RATE: 89 BPM | RESPIRATION RATE: 16 BRPM | SYSTOLIC BLOOD PRESSURE: 118 MMHG

## 2022-05-04 DIAGNOSIS — F33.2 MAJOR DEPRESSIVE DISORDER, RECURRENT EPISODE, SEVERE WITH ANXIOUS DISTRESS (H): Primary | ICD-10-CM

## 2022-05-04 DIAGNOSIS — Z11.59 ENCOUNTER FOR SCREENING FOR OTHER VIRAL DISEASES: Primary | ICD-10-CM

## 2022-05-04 PROCEDURE — 370N000017 HC ANESTHESIA TECHNICAL FEE, PER MIN

## 2022-05-04 PROCEDURE — 90870 ELECTROCONVULSIVE THERAPY: CPT | Performed by: PSYCHIATRY & NEUROLOGY

## 2022-05-04 PROCEDURE — 250N000009 HC RX 250: Performed by: STUDENT IN AN ORGANIZED HEALTH CARE EDUCATION/TRAINING PROGRAM

## 2022-05-04 PROCEDURE — 258N000003 HC RX IP 258 OP 636: Performed by: PSYCHIATRY & NEUROLOGY

## 2022-05-04 PROCEDURE — 250N000009 HC RX 250: Performed by: PSYCHIATRY & NEUROLOGY

## 2022-05-04 PROCEDURE — 250N000011 HC RX IP 250 OP 636: Performed by: STUDENT IN AN ORGANIZED HEALTH CARE EDUCATION/TRAINING PROGRAM

## 2022-05-04 PROCEDURE — 250N000011 HC RX IP 250 OP 636: Performed by: PSYCHIATRY & NEUROLOGY

## 2022-05-04 PROCEDURE — 90870 ELECTROCONVULSIVE THERAPY: CPT

## 2022-05-04 RX ORDER — KETOROLAC TROMETHAMINE 30 MG/ML
30 INJECTION, SOLUTION INTRAMUSCULAR; INTRAVENOUS
Status: COMPLETED | OUTPATIENT
Start: 2022-05-04 | End: 2022-05-04

## 2022-05-04 RX ORDER — GLYCOPYRROLATE 0.2 MG/ML
0.2 INJECTION, SOLUTION INTRAMUSCULAR; INTRAVENOUS
Status: CANCELLED
Start: 2022-05-04 | End: 2022-05-04

## 2022-05-04 RX ORDER — KETOROLAC TROMETHAMINE 30 MG/ML
30 INJECTION, SOLUTION INTRAMUSCULAR; INTRAVENOUS
Status: CANCELLED
Start: 2022-05-04 | End: 2022-05-04

## 2022-05-04 RX ORDER — ONDANSETRON 4 MG/1
4 TABLET, ORALLY DISINTEGRATING ORAL EVERY 30 MIN PRN
Status: DISCONTINUED | OUTPATIENT
Start: 2022-05-04 | End: 2022-05-05 | Stop reason: HOSPADM

## 2022-05-04 RX ORDER — ONDANSETRON 2 MG/ML
4 INJECTION INTRAMUSCULAR; INTRAVENOUS EVERY 30 MIN PRN
Status: DISCONTINUED | OUTPATIENT
Start: 2022-05-04 | End: 2022-05-05 | Stop reason: HOSPADM

## 2022-05-04 RX ORDER — ACETAMINOPHEN 325 MG/1
975 TABLET ORAL ONCE
Status: DISCONTINUED | OUTPATIENT
Start: 2022-05-04 | End: 2022-05-05 | Stop reason: HOSPADM

## 2022-05-04 RX ORDER — ACETAMINOPHEN 500 MG
1000 TABLET ORAL
Status: ACTIVE | OUTPATIENT
Start: 2022-05-04 | End: 2022-05-04

## 2022-05-04 RX ORDER — ONDANSETRON 2 MG/ML
4 INJECTION INTRAMUSCULAR; INTRAVENOUS
Status: COMPLETED | OUTPATIENT
Start: 2022-05-04 | End: 2022-05-04

## 2022-05-04 RX ORDER — NICARDIPINE HCL-0.9% SOD CHLOR 1 MG/10 ML
SYRINGE (ML) INTRAVENOUS PRN
Status: DISCONTINUED | OUTPATIENT
Start: 2022-05-04 | End: 2022-05-04

## 2022-05-04 RX ORDER — ACETAMINOPHEN 500 MG
1000 TABLET ORAL
Status: CANCELLED
Start: 2022-05-04 | End: 2022-05-04

## 2022-05-04 RX ORDER — MEPERIDINE HYDROCHLORIDE 25 MG/ML
12.5 INJECTION INTRAMUSCULAR; INTRAVENOUS; SUBCUTANEOUS
Status: DISCONTINUED | OUTPATIENT
Start: 2022-05-04 | End: 2022-05-05 | Stop reason: HOSPADM

## 2022-05-04 RX ORDER — GLYCOPYRROLATE 0.2 MG/ML
0.2 INJECTION, SOLUTION INTRAMUSCULAR; INTRAVENOUS
Status: COMPLETED | OUTPATIENT
Start: 2022-05-04 | End: 2022-05-04

## 2022-05-04 RX ORDER — ONDANSETRON 2 MG/ML
4 INJECTION INTRAMUSCULAR; INTRAVENOUS
Status: CANCELLED
Start: 2022-05-04 | End: 2022-05-04

## 2022-05-04 RX ORDER — SODIUM CHLORIDE, SODIUM LACTATE, POTASSIUM CHLORIDE, CALCIUM CHLORIDE 600; 310; 30; 20 MG/100ML; MG/100ML; MG/100ML; MG/100ML
INJECTION, SOLUTION INTRAVENOUS CONTINUOUS
Status: DISCONTINUED | OUTPATIENT
Start: 2022-05-04 | End: 2022-05-05 | Stop reason: HOSPADM

## 2022-05-04 RX ORDER — LABETALOL 20 MG/4 ML (5 MG/ML) INTRAVENOUS SYRINGE
PRN
Status: DISCONTINUED | OUTPATIENT
Start: 2022-05-04 | End: 2022-05-04

## 2022-05-04 RX ADMIN — MIDAZOLAM HYDROCHLORIDE 2 MG: 1 INJECTION, SOLUTION INTRAMUSCULAR; INTRAVENOUS at 09:43

## 2022-05-04 RX ADMIN — SODIUM CHLORIDE, POTASSIUM CHLORIDE, SODIUM LACTATE AND CALCIUM CHLORIDE 500 ML: 600; 310; 30; 20 INJECTION, SOLUTION INTRAVENOUS at 09:29

## 2022-05-04 RX ADMIN — LABETALOL 20 MG/4 ML (5 MG/ML) INTRAVENOUS SYRINGE 20 MG: at 09:36

## 2022-05-04 RX ADMIN — ONDANSETRON 4 MG: 2 INJECTION INTRAMUSCULAR; INTRAVENOUS at 09:23

## 2022-05-04 RX ADMIN — METHOHEXITAL SODIUM 100 MG: 500 INJECTION, POWDER, LYOPHILIZED, FOR SOLUTION INTRAMUSCULAR; INTRAVENOUS; RECTAL at 09:37

## 2022-05-04 RX ADMIN — NICARDIPINE HYDROCHLORIDE 500 MCG: 0.1 INJECTION, SOLUTION INTRAVENOUS at 09:38

## 2022-05-04 RX ADMIN — KETOROLAC TROMETHAMINE 30 MG: 30 INJECTION, SOLUTION INTRAMUSCULAR at 09:24

## 2022-05-04 RX ADMIN — Medication 100 MG: at 09:38

## 2022-05-04 RX ADMIN — GLYCOPYRROLATE 0.2 MG: 0.2 INJECTION, SOLUTION INTRAMUSCULAR; INTRAVENOUS at 09:22

## 2022-05-04 RX ADMIN — NICARDIPINE HYDROCHLORIDE 500 MCG: 0.1 INJECTION, SOLUTION INTRAVENOUS at 09:41

## 2022-05-04 ASSESSMENT — LIFESTYLE VARIABLES: TOBACCO_USE: 0

## 2022-05-04 NOTE — ANESTHESIA CARE TRANSFER NOTE
Patient: Kuldeep Sequeira    Procedure: * No procedures listed *  Electroconvulsive Therapy    Diagnosis: * No pre-op diagnosis entered *  Diagnosis Additional Information: No value filed.    Anesthesia Type:   General     Note:    Oropharynx: oropharynx clear of all foreign objects  Level of Consciousness: drowsy  Oxygen Supplementation: room air    Independent Airway: airway patency satisfactory and stable  Dentition: dentition unchanged  Vital Signs Stable: post-procedure vital signs reviewed and stable  Report to RN Given: handoff report given  Patient transferred to: PACU    Handoff Report: Identifed the Patient, Identified the Reponsible Provider, Reviewed the pertinent medical history, Discussed the surgical course, Reviewed Intra-OP anesthesia mangement and issues during anesthesia, Set expectations for post-procedure period and Allowed opportunity for questions and acknowledgement of understanding      Vitals:  Vitals Value Taken Time   BP     Temp     Pulse     Resp     SpO2         Electronically Signed By: Kacey Rojo MD  May 4, 2022  9:47 AM

## 2022-05-04 NOTE — DISCHARGE INSTRUCTIONS
ECT Discharge Instructions      During your ECT series:    Do not drive or work heavy equipment until 7 days after your last treatment.  Do not drink alcohol or use street drugs (illicit drugs) while you are having treatments.  Do not make important decisions, including legal decisions.    After each treatment:    Get plenty of rest. A responsible adult must stay with your for at least 6 hours.  Avoid heavy or risky activities for 24 hours while in maintenance ECT.   Do not drive for at least 24 hours after your treatment while in maintenance ECT.   If you have more than one treatment within one week, do not drive for 7 days after your last treatment.   If you feel light-headed, sit for a few minutes before standing. Have someone help you get up.  If you have nausea (feel sick to your stomach): Drink only clear liquids such as apple juice, ginger ale, broth or 7UP, Be sure to drink plenty of liquids. Move to a normal diet as you feel able.   If you received Toradol, wait 6 hours before taking ibuprofen.  Call your doctor if:   You have a fever over 100F (37.7 C) (taken under the tongue), or a fever that last more than 24 hours.  Your IV site is red, swollen, very painful or is getting more tender.  You have nausea that gets very bad or does not improve.    If you have any symptoms after ECT, tell our staff before your next treatment.  The ECT Department can be reached at [phone].  The ECT Department is open Mondays, Wednesdays and Fridays from 7:00 AM to 2:00 PM.    To speak to a doctor, call:  Your primary care provider or [facility] for Dr. Murillo, Dr. Villafuerte or Dr. Diaz, PHONE: [phone]; fax: [phone]    New instructions:  Treat weekly with next ECT 5/13/22     Hold CBD/THC on morning of ECT and evening prior. May take his Evening dose the night of ECT and morning dose the day after ECT  Monitor depression severity with clinical assessment augmented with IDS-SR every 3rd treatment     You  have received Toradol today at 9:24 am. Toradol is an NSAID.  Do not take any NSAID's including: Ibuprofen, Naproxen Sodium, Asprin, Advil, Motrin, Aleve, Rodrick, etc., until six hours after the above time which would be 3:24 pm. If you have any questions check back with your Physician, or pharmacist.   You also received ondansetron (Zofran) 4 mg at 9:24 am for nausea prevention.     You tested positive for Covid on 4/13/22 and do not require follow-up Covid testing until 7/12/22.     Please come to the [facility] and check-in with Security before your ECT appointment.  The [facility] is located right next to the Adult Emergency Room.  The address is [address] [place] [address].    After your appointment, you may be picked up at the [facility] entrance, which is located at [address] [place] [address].  [facility], about 1 block North of the [facility].    Transported by:   Wife Evon

## 2022-05-04 NOTE — ANESTHESIA PREPROCEDURE EVALUATION
Anesthesia Pre-Procedure Evaluation    Patient: Kuldeep Sequeira   MRN: [id number] : 1970        Procedure : * No procedures listed *  Electroconvulsive Therapy       Past Medical History:   Diagnosis Date     Anxiety      Depressive disorder      GERD (gastroesophageal reflux disease)       No past surgical history on file.   No Known Allergies   Social History     Tobacco Use     Smoking status: Never Smoker     Smokeless tobacco: Never Used   Substance Use Topics     Alcohol use: Not on file      Wt Readings from Last 1 Encounters:   22 83.5 kg (184 lb)        Anesthesia Evaluation   Pt has had prior anesthetic.     No history of anesthetic complications       ROS/MED HX  ENT/Pulmonary:    (-) tobacco use   Neurologic:       Cardiovascular:  - neg cardiovascular ROS     METS/Exercise Tolerance:     Hematologic:  - neg hematologic  ROS     Musculoskeletal:  - neg musculoskeletal ROS     GI/Hepatic:     (+) GERD, Asymptomatic on medication,     Renal/Genitourinary:  - neg Renal ROS     Endo:  - neg endo ROS     Psychiatric/Substance Use:     (+) psychiatric history anxiety, depression and other (comment) (insomnia)     Infectious Disease:  - neg infectious disease ROS     Malignancy:  - neg malignancy ROS     Other:            Physical Exam    Airway  airway exam normal           Respiratory Devices and Support         Dental  no notable dental history         Cardiovascular   cardiovascular exam normal          Pulmonary   pulmonary exam normal                OUTSIDE LABS:  CBC:   Lab Results   Component Value Date    WBC 5.5 2022    WBC 5.3 2022    HGB 16.4 2022    HGB 17.7 2022    HCT 48.0 2022    HCT 50.8 2022     2022     2022     BMP:   Lab Results   Component Value Date     2022     2022    POTASSIUM 3.6 2022    POTASSIUM 4.6 2022    CHLORIDE 98 2022    CHLORIDE 103 2022    CO2 30  03/13/2022    CO2 29 03/11/2022    BUN 10 03/13/2022    BUN 16 03/11/2022    CR 0.97 03/13/2022    CR 1.08 03/11/2022    GLC 93 03/13/2022     (H) 03/11/2022     COAGS: No results found for: PTT, INR, FIBR  POC: No results found for: BGM, HCG, HCGS  HEPATIC:   Lab Results   Component Value Date    ALBUMIN 3.4 03/13/2022    PROTTOTAL 6.4 (L) 03/13/2022    ALT 33 03/13/2022    AST 22 03/13/2022    ALKPHOS 49 03/13/2022    BILITOTAL 0.8 03/13/2022     OTHER:   Lab Results   Component Value Date    RANJITH 8.5 03/13/2022    TSH 1.51 03/11/2022       Anesthesia Plan    ASA Status:  1   NPO Status:  NPO Appropriate    Anesthesia Type: General.     - Airway: Mask Only   Induction: Intravenous.   Maintenance: N/A.        Consents    Anesthesia Plan(s) and associated risks, benefits, and realistic alternatives discussed. Questions answered and patient/representative(s) expressed understanding.    - Discussed:     - Discussed with:  Patient      - Extended Intubation/Ventilatory Support Discussed: No.      - Patient is DNR/DNI Status: No    Use of blood products discussed: No .     Postoperative Care            Comments:    Other Comments: ECT       H&P reviewed: Unable to attach H&P to encounter due to EHR limitations. H&P Update: appropriate H&P reviewed, patient examined. No interval changes since H&P (within 30 days).         Kacey Rojo MD

## 2022-05-04 NOTE — PROCEDURES
Procedures    Kuldeep Sequeira is a 52 year old  year old male patient.  [id number]  @DX@    [facility], [place]   ECT Procedure Note   05/04/2022    Patient Status: Outpatient    Is this the first in a series of 12 treatments?  No   No Known Allergies    Weight:  0 lbs 0 oz         Indications for ECT:     History of good ECT response in one or more previous episodes of illness         Clinical Narrative:     This is a 52 year old male with history of depression, anxiety and insomnia.  Patient has prior history of psychiatric hospitalization on at least 2 previous occasions, but does not have past history of MI CD treatment or suicide attempt history.  Now, presenting secondary to worsening of depressive symptoms leading to overdose of uncertain intent with sleep medication.  Occurring in the setting of medication changes or depression by outpatient psychiatrist.  Admit is voluntary.     Care coordination performed in detail.  Includes, detailed review of care everywhere and epic to review electronic chart information and related mental health history to presentation.  Including, patient's ED presentation to [facility] on 3/13 due to depression and overdose.  Further review of patient's past hospitalization from November 2020.  Subsequent review of ECT documentation available for review.  Please see associated documentation for details.     In brief, patient presented to [facility] ED on 3/13 secondary to worsening of depression and subsequent overdose.  Reports worsening of depressive symptoms for at least the past 2 years with efforts of management to include medications, therapy, ECT and TMS.  Most recent efforts of management in the community through outpatient psychiatrist include initiation of fluoxetine on 2/18 at 20 mg daily with titration on 2/25 to 40 mg and further titration on 3/3 to 60 mg daily.  Continued on Seroquel.  Despite efforts of medication management in  the community, continued to experience symptoms of depression.  According to collateral as obtained from wife, BEC assessment, patient demonstrating worsening of symptoms especially within the past week.  Isolating in behavior.  Laying in bed.  Decreased appetite with weight loss.  Expressing suicidal ideation and demonstrated behavior leading to admission.  Poor self cares.  Recommendation for inpatient psychiatric hospitalization voluntarily.     Further care coordination performed in detail.  Includes, detailed review of care everywhere.  Patient has documentation unable to be seen on epic viewer.  Specifically, documentations from past ECT for first hospitalization.  Able to review hospitalization from 11/18 through 11/19/2020.  Admitted due to depression, insomnia with suicidal ideation.  PTA medications continue to include venlafaxine 75 mg daily, quetiapine 200 mg p.o. nightly, with medication changes to include Ambien to as needed and addition of doxepin for sleep.     Further care coordination performed.  Referral for ECT initiated on 12/1/2020 by PCP.  Outpatient consult reviewed by Dr. Maher dated 12/4/2020.  Please see consult for full details.  Medically clear for ECT after providing informed consent to treat.  ECT series #1 initiated on 12/7/2020.     Further care coordination performed.  On 3/22/2021, completed second series of ECT.  Medications documented as ineffective.  Effexor XR increased to 225 mg daily and Abilify added.  Considered switch from Effexor XR to Viibryd due to sexual side effects.     Upon patient interview, patient review performed on unit 5500.  Cooperative on approach.  Evaluation effort to review events into presentation and clarify information as provided in collateral report.  Patient admit is voluntary.     Patient reports a history of depression and anxiety of greater than 20 years.  Medication management initiated at approximately age 28.  Patient has history of  multiple medication trials with or without psychotherapy.  Further history of 2 prior hospitalizations in the month of November 2020 brief duration.  Associated with depressive symptoms in the setting of suicidal ideation and insomnia.  Despite efforts of medication management, proceeded with ECT as initiated on 12/7/2020 after referral by outpatient psychiatrist.  Continue with ECT for 2 series.  Did not appear to have taper or maintenance series of ECT.  Then, initiated TMS on 4/7/2021.     Patient has been followed by Dr. Markham since initiating ECT.  Medication management has been performed in multiple.  Medications alone have been documented as ineffective.  Recent medication changes initiated in the community include start of Prozac after switch from alternate antidepressant therapy.  Initiated on 2/18 at 20 mg daily, then on 2/2540 mg daily then on 3/3 increased to 60 mg daily.       Patient presented to ED on 3/13 due to continued symptoms of depression with anxious distress.  Occurring in the setting of repeat event of inability to sleep.  Similar to prior hospitalizations.  Patient reportedly taking increasing amounts of Ambien CR with intent to sleep.  Later, making statement of not wanting to wake up.  Recommendation for inpatient psychiatric hospitalization for further assessment.     Patient denies further stressors or triggers clearly associated with presentation.  Reports taking medications as prescribed.  Symptoms of depression and anxiety continued with efforts of fluoxetine titration, leading to behavioral self-harm.  Denies plan or intent of suicide by means of Ambien CR ingestion.  Denies history of suicide attempt.  Does admit to gun access.  Agrees to removal of gun access prior to disposition.  States an appropriate crisis relapse plan.  Future oriented for family.     Past treatment reviewed in detail.  Last documented cares reviewed to include medications documented as lacking efficacy.  " Further review of ECT documented as efficacious, due to medications alone is not effective.  Initiated ECT on 12/7/2020 after last hospitalization with last treatment in March 2021.  In April 2021, started series of TMS.  Denies benefit from TMS series.  Reports beneficial response from ECT, without significant side effect aside from mild cognition impairment.     On psychiatric review of symptoms, mood is, \"depressed.\"  Denies lindsey or hypomania.  Insomnia with low energy.  Lack of energy.  Lack of enjoyment with activities of prior interest.  Impacting activities at home and work as well as outside relationships.  Intact appetite and weight.  Intact concentration.  Negative thoughts of self.  Stating, \"end of my rope.\"  Denies tearfulness.  Endorses anxiety.  Denies psychosis.         Diagnosis:     Major depression         ECT Log:     #1 4/6/22 Consent is obtained for the procedure. Starting mood is 0/10 (10 being the best). Passive death wishes. Denies intent to kill self when took extra ambien 'to sleep' and was hospitalized. Is on CBD/THC 16/1 in am and 1/1 pm for sleep.   #2 4/8/22 Did get anxious as he was receiving anesthesia but recovery was uneventful and no side effects afterwards. Mood low.   #3 4/11/22  Mood unchanged. Wife notices a bit more energy and concentration. No SI. Some headache  #4 4/13/22 Anxious. Took hydroxyzine yesterday and slept most of the day. On venlafaxine 75 mg  #5 4/20/22 Tested positive for COVID on 4/13/22. Now resuming ECT. Very anxious on the morning of.  Will treat today as last patient. On Venlafaxine 150 mg  #6 4/22/22 Continues to struggle with low mood  #7 4/25/22 Mood 5/10 . More energy, enjoying things somewhat better. No SI. No side effects. Mild headaches,.   #8 4/27/22 Mood 5/10. Was able to work a full day from home. Wife believes this is a major improvement. Sleep is good.   4/29/22 Experienced dizziness yesterday morning which alarmed him. \"feeling of catching " "up when moving head left to right\". Encouraged to observe and if uneventful weekend to come on Monday. Otherwise could skip. Continues to be somewhat anxious but overall mood 7/10  #9 mood 10/10  Ready to transition to maintenance after wed. Struggles with pre-procedure anxiety. Finding it easier to start tasks. family noting improved motivation. Feeling inspired to return to work.  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 Will  Complete acute course today and plan for maintenance 1xweek         Pause for the Cause:     Right patient Yes   Right procedure/laterality settings: Yes          Intra-Procedure Documentation:       ECT [facility] at [facility]: 11+   Treatment number this series: 10   Lifetime total treatment number: 25+     Type of ECT:  Right, unilateral ultrabrief    ECT Medications:    Tylenol 1000 mg  Robinul 0.2 mg  Toradol: 30mg  Zofran: 4mg  Zyprexa Zydis: 10mg  Brevital: 100 mg  Succinyl Choline: 100 mg  Versed: 2 mg   Labetolol 20 mg pre-ECT    ECT Strip Summary:   Energy Level: Titration: 19.2 mC  115.2 mC  ; 0.3 msec; 30 Hz; 8 sec; 800 mA  Motor Seizure Duration: 29 seconds  EEG Seizure Duration: 35 seconds    Complications: No    Time for re-orientation: 9  Minutes    4/5 at 10 minutes     Plan:   Return 5/13/22  COVID PCR positive on 4/13/22 (no COVID PCR testing until 7/13/22)  Treat this wed then weekly on fridays    Hold CBD/THC on morning of ECT and evening prior. May take his Evening dose the night of ECT and morning dose the day after ECT  Monitor depression severity with clinical assessment augmented with IDS-SR every 3rd treatment    ASK PATIENT TO BRING CPAP MACHINE WITH HIM TO USE IN RECOVERY    Also, is a good candidate for VNS (this is his 3rd course of ECT).     Henna Villafuerte MD    "

## 2022-05-05 NOTE — ANESTHESIA POSTPROCEDURE EVALUATION
Patient: Kuldeep Sequeira    Procedure: * Electroconvulsive Therapy    Anesthesia Type:  General    Note:  Disposition: Inpatient   Postop Pain Control: Uneventful            Sign Out: Well controlled pain   PONV: No   Neuro/Psych: Uneventful            Sign Out: Acceptable/Baseline neuro status   Airway/Respiratory: Uneventful            Sign Out: Acceptable/Baseline resp. status   CV/Hemodynamics: Uneventful            Sign Out: Acceptable CV status; No obvious hypovolemia; No obvious fluid overload   Other NRE: NONE   DID A NON-ROUTINE EVENT OCCUR? No           Last vitals:  Vitals Value Taken Time   BP     Temp     Pulse     Resp     SpO2         Electronically Signed By: Kacey Rojo MD  May 5, 2022  12:52 PM

## 2022-05-13 ENCOUNTER — HOSPITAL ENCOUNTER (OUTPATIENT)
Dept: BEHAVIORAL HEALTH | Facility: CLINIC | Age: 52
Discharge: HOME | End: 2022-05-13
Attending: PSYCHIATRY & NEUROLOGY
Payer: COMMERCIAL

## 2022-05-13 ENCOUNTER — ANESTHESIA (OUTPATIENT)
Dept: BEHAVIORAL HEALTH | Facility: CLINIC | Age: 52
End: 2022-05-13
Payer: COMMERCIAL

## 2022-05-13 ENCOUNTER — ANESTHESIA EVENT (OUTPATIENT)
Dept: BEHAVIORAL HEALTH | Facility: CLINIC | Age: 52
End: 2022-05-13
Payer: COMMERCIAL

## 2022-05-13 VITALS
HEART RATE: 90 BPM | TEMPERATURE: 98 F | SYSTOLIC BLOOD PRESSURE: 115 MMHG | RESPIRATION RATE: 16 BRPM | OXYGEN SATURATION: 97 % | DIASTOLIC BLOOD PRESSURE: 80 MMHG

## 2022-05-13 DIAGNOSIS — F33.2 MAJOR DEPRESSIVE DISORDER, RECURRENT EPISODE, SEVERE WITH ANXIOUS DISTRESS (H): Primary | ICD-10-CM

## 2022-05-13 PROCEDURE — 370N000017 HC ANESTHESIA TECHNICAL FEE, PER MIN

## 2022-05-13 PROCEDURE — 90870 ELECTROCONVULSIVE THERAPY: CPT

## 2022-05-13 PROCEDURE — 250N000011 HC RX IP 250 OP 636: Performed by: ANESTHESIOLOGY

## 2022-05-13 PROCEDURE — 250N000011 HC RX IP 250 OP 636: Performed by: PSYCHIATRY & NEUROLOGY

## 2022-05-13 PROCEDURE — 250N000009 HC RX 250: Performed by: ANESTHESIOLOGY

## 2022-05-13 PROCEDURE — 258N000003 HC RX IP 258 OP 636: Performed by: PSYCHIATRY & NEUROLOGY

## 2022-05-13 PROCEDURE — 250N000009 HC RX 250: Performed by: PSYCHIATRY & NEUROLOGY

## 2022-05-13 PROCEDURE — 90870 ELECTROCONVULSIVE THERAPY: CPT | Performed by: PSYCHIATRY & NEUROLOGY

## 2022-05-13 RX ORDER — OXYCODONE HYDROCHLORIDE 5 MG/1
5 TABLET ORAL EVERY 4 HOURS PRN
Status: DISCONTINUED | OUTPATIENT
Start: 2022-05-13 | End: 2022-05-14 | Stop reason: HOSPADM

## 2022-05-13 RX ORDER — SODIUM CHLORIDE, SODIUM LACTATE, POTASSIUM CHLORIDE, CALCIUM CHLORIDE 600; 310; 30; 20 MG/100ML; MG/100ML; MG/100ML; MG/100ML
INJECTION, SOLUTION INTRAVENOUS CONTINUOUS
Status: DISCONTINUED | OUTPATIENT
Start: 2022-05-13 | End: 2022-05-14 | Stop reason: HOSPADM

## 2022-05-13 RX ORDER — GLYCOPYRROLATE 0.2 MG/ML
0.2 INJECTION, SOLUTION INTRAMUSCULAR; INTRAVENOUS
Status: COMPLETED | OUTPATIENT
Start: 2022-05-13 | End: 2022-05-13

## 2022-05-13 RX ORDER — ONDANSETRON 2 MG/ML
4 INJECTION INTRAMUSCULAR; INTRAVENOUS
Status: COMPLETED | OUTPATIENT
Start: 2022-05-13 | End: 2022-05-13

## 2022-05-13 RX ORDER — ONDANSETRON 2 MG/ML
4 INJECTION INTRAMUSCULAR; INTRAVENOUS
Status: CANCELLED
Start: 2022-05-13 | End: 2022-05-13

## 2022-05-13 RX ORDER — GLYCOPYRROLATE 0.2 MG/ML
0.2 INJECTION, SOLUTION INTRAMUSCULAR; INTRAVENOUS
Status: CANCELLED
Start: 2022-05-13 | End: 2022-05-13

## 2022-05-13 RX ORDER — ONDANSETRON 4 MG/1
4 TABLET, ORALLY DISINTEGRATING ORAL EVERY 30 MIN PRN
Status: DISCONTINUED | OUTPATIENT
Start: 2022-05-13 | End: 2022-05-14 | Stop reason: HOSPADM

## 2022-05-13 RX ORDER — KETOROLAC TROMETHAMINE 30 MG/ML
30 INJECTION, SOLUTION INTRAMUSCULAR; INTRAVENOUS
Status: CANCELLED
Start: 2022-05-13 | End: 2022-05-13

## 2022-05-13 RX ORDER — ACETAMINOPHEN 500 MG
1000 TABLET ORAL
Status: CANCELLED
Start: 2022-05-13 | End: 2022-05-13

## 2022-05-13 RX ORDER — ONDANSETRON 2 MG/ML
4 INJECTION INTRAMUSCULAR; INTRAVENOUS EVERY 30 MIN PRN
Status: DISCONTINUED | OUTPATIENT
Start: 2022-05-13 | End: 2022-05-14 | Stop reason: HOSPADM

## 2022-05-13 RX ORDER — ACETAMINOPHEN 500 MG
1000 TABLET ORAL
Status: ACTIVE | OUTPATIENT
Start: 2022-05-13 | End: 2022-05-13

## 2022-05-13 RX ORDER — KETOROLAC TROMETHAMINE 30 MG/ML
30 INJECTION, SOLUTION INTRAMUSCULAR; INTRAVENOUS
Status: COMPLETED | OUTPATIENT
Start: 2022-05-13 | End: 2022-05-13

## 2022-05-13 RX ORDER — LABETALOL HYDROCHLORIDE 5 MG/ML
INJECTION, SOLUTION INTRAVENOUS PRN
Status: DISCONTINUED | OUTPATIENT
Start: 2022-05-13 | End: 2022-05-13

## 2022-05-13 RX ADMIN — ONDANSETRON 4 MG: 2 INJECTION INTRAMUSCULAR; INTRAVENOUS at 09:04

## 2022-05-13 RX ADMIN — Medication 100 MG: at 09:30

## 2022-05-13 RX ADMIN — KETOROLAC TROMETHAMINE 30 MG: 30 INJECTION, SOLUTION INTRAMUSCULAR at 09:03

## 2022-05-13 RX ADMIN — GLYCOPYRROLATE 0.2 MG: 0.2 INJECTION, SOLUTION INTRAMUSCULAR; INTRAVENOUS at 09:01

## 2022-05-13 RX ADMIN — SODIUM CHLORIDE, POTASSIUM CHLORIDE, SODIUM LACTATE AND CALCIUM CHLORIDE 500 ML: 600; 310; 30; 20 INJECTION, SOLUTION INTRAVENOUS at 09:15

## 2022-05-13 RX ADMIN — METHOHEXITAL SODIUM 150 MG: 500 INJECTION, POWDER, LYOPHILIZED, FOR SOLUTION INTRAMUSCULAR; INTRAVENOUS; RECTAL at 09:26

## 2022-05-13 RX ADMIN — LABETALOL HYDROCHLORIDE 20 MG: 5 INJECTION, SOLUTION INTRAVENOUS at 09:22

## 2022-05-13 RX ADMIN — MIDAZOLAM HYDROCHLORIDE 2 MG: 1 INJECTION, SOLUTION INTRAMUSCULAR; INTRAVENOUS at 09:28

## 2022-05-13 NOTE — PROCEDURES
Procedures    Kuldeep Sequeira is a 52 year old  year old male patient.  [id number]  @DX@    [facility], [place]   ECT Procedure Note   05/13/2022    Patient Status: Outpatient    Is this the first in a series of 12 treatments?  No   No Known Allergies    Weight:  0 lbs 0 oz         Indications for ECT:     History of good ECT response in one or more previous episodes of illness         Clinical Narrative:     This is a 52 year old male with history of depression, anxiety and insomnia.  Patient has prior history of psychiatric hospitalization on at least 2 previous occasions, but does not have past history of MI CD treatment or suicide attempt history.  Now, presenting secondary to worsening of depressive symptoms leading to overdose of uncertain intent with sleep medication.  Occurring in the setting of medication changes or depression by outpatient psychiatrist.  Admit is voluntary.     Care coordination performed in detail.  Includes, detailed review of care everywhere and epic to review electronic chart information and related mental health history to presentation.  Including, patient's ED presentation to [facility] on 3/13 due to depression and overdose.  Further review of patient's past hospitalization from November 2020.  Subsequent review of ECT documentation available for review.  Please see associated documentation for details.     In brief, patient presented to [facility] ED on 3/13 secondary to worsening of depression and subsequent overdose.  Reports worsening of depressive symptoms for at least the past 2 years with efforts of management to include medications, therapy, ECT and TMS.  Most recent efforts of management in the community through outpatient psychiatrist include initiation of fluoxetine on 2/18 at 20 mg daily with titration on 2/25 to 40 mg and further titration on 3/3 to 60 mg daily.  Continued on Seroquel.  Despite efforts of medication management in  the community, continued to experience symptoms of depression.  According to collateral as obtained from wife, BEC assessment, patient demonstrating worsening of symptoms especially within the past week.  Isolating in behavior.  Laying in bed.  Decreased appetite with weight loss.  Expressing suicidal ideation and demonstrated behavior leading to admission.  Poor self cares.  Recommendation for inpatient psychiatric hospitalization voluntarily.     Further care coordination performed in detail.  Includes, detailed review of care everywhere.  Patient has documentation unable to be seen on epic viewer.  Specifically, documentations from past ECT for first hospitalization.  Able to review hospitalization from 11/18 through 11/19/2020.  Admitted due to depression, insomnia with suicidal ideation.  PTA medications continue to include venlafaxine 75 mg daily, quetiapine 200 mg p.o. nightly, with medication changes to include Ambien to as needed and addition of doxepin for sleep.     Further care coordination performed.  Referral for ECT initiated on 12/1/2020 by PCP.  Outpatient consult reviewed by Dr. Maher dated 12/4/2020.  Please see consult for full details.  Medically clear for ECT after providing informed consent to treat.  ECT series #1 initiated on 12/7/2020.     Further care coordination performed.  On 3/22/2021, completed second series of ECT.  Medications documented as ineffective.  Effexor XR increased to 225 mg daily and Abilify added.  Considered switch from Effexor XR to Viibryd due to sexual side effects.     Upon patient interview, patient review performed on unit 5500.  Cooperative on approach.  Evaluation effort to review events into presentation and clarify information as provided in collateral report.  Patient admit is voluntary.     Patient reports a history of depression and anxiety of greater than 20 years.  Medication management initiated at approximately age 28.  Patient has history of  multiple medication trials with or without psychotherapy.  Further history of 2 prior hospitalizations in the month of November 2020 brief duration.  Associated with depressive symptoms in the setting of suicidal ideation and insomnia.  Despite efforts of medication management, proceeded with ECT as initiated on 12/7/2020 after referral by outpatient psychiatrist.  Continue with ECT for 2 series.  Did not appear to have taper or maintenance series of ECT.  Then, initiated TMS on 4/7/2021.     Patient has been followed by Dr. Markham since initiating ECT.  Medication management has been performed in multiple.  Medications alone have been documented as ineffective.  Recent medication changes initiated in the community include start of Prozac after switch from alternate antidepressant therapy.  Initiated on 2/18 at 20 mg daily, then on 2/2540 mg daily then on 3/3 increased to 60 mg daily.       Patient presented to ED on 3/13 due to continued symptoms of depression with anxious distress.  Occurring in the setting of repeat event of inability to sleep.  Similar to prior hospitalizations.  Patient reportedly taking increasing amounts of Ambien CR with intent to sleep.  Later, making statement of not wanting to wake up.  Recommendation for inpatient psychiatric hospitalization for further assessment.     Patient denies further stressors or triggers clearly associated with presentation.  Reports taking medications as prescribed.  Symptoms of depression and anxiety continued with efforts of fluoxetine titration, leading to behavioral self-harm.  Denies plan or intent of suicide by means of Ambien CR ingestion.  Denies history of suicide attempt.  Does admit to gun access.  Agrees to removal of gun access prior to disposition.  States an appropriate crisis relapse plan.  Future oriented for family.     Past treatment reviewed in detail.  Last documented cares reviewed to include medications documented as lacking efficacy.  " Further review of ECT documented as efficacious, due to medications alone is not effective.  Initiated ECT on 12/7/2020 after last hospitalization with last treatment in March 2021.  In April 2021, started series of TMS.  Denies benefit from TMS series.  Reports beneficial response from ECT, without significant side effect aside from mild cognition impairment.     On psychiatric review of symptoms, mood is, \"depressed.\"  Denies lindsey or hypomania.  Insomnia with low energy.  Lack of energy.  Lack of enjoyment with activities of prior interest.  Impacting activities at home and work as well as outside relationships.  Intact appetite and weight.  Intact concentration.  Negative thoughts of self.  Stating, \"end of my rope.\"  Denies tearfulness.  Endorses anxiety.  Denies psychosis.         Diagnosis:     Major depression         ECT Log:     #1 4/6/22 Consent is obtained for the procedure. Starting mood is 0/10 (10 being the best). Passive death wishes. Denies intent to kill self when took extra ambien 'to sleep' and was hospitalized. Is on CBD/THC 16/1 in am and 1/1 pm for sleep.   #2 4/8/22 Did get anxious as he was receiving anesthesia but recovery was uneventful and no side effects afterwards. Mood low.   #3 4/11/22  Mood unchanged. Wife notices a bit more energy and concentration. No SI. Some headache  #4 4/13/22 Anxious. Took hydroxyzine yesterday and slept most of the day. On venlafaxine 75 mg  #5 4/20/22 Tested positive for COVID on 4/13/22. Now resuming ECT. Very anxious on the morning of.  Will treat today as last patient. On Venlafaxine 150 mg  #6 4/22/22 Continues to struggle with low mood  #7 4/25/22 Mood 5/10 . More energy, enjoying things somewhat better. No SI. No side effects. Mild headaches,.   #8 4/27/22 Mood 5/10. Was able to work a full day from home. Wife believes this is a major improvement. Sleep is good.   4/29/22 Experienced dizziness yesterday morning which alarmed him. \"feeling of catching " "up when moving head left to right\". Encouraged to observe and if uneventful weekend to come on Monday. Otherwise could skip. Continues to be somewhat anxious but overall mood 7/10  #9 mood 10/10  Ready to transition to maintenance after wed. Struggles with pre-procedure anxiety. Finding it easier to start tasks. family noting improved motivation. Feeling inspired to return to work.  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 Will  Complete acute course today and plan for maintenance 1xweek  M1 5/13/22 Doing well. Discussed VNS and is willing to consider it. Will discuss further with Dr Shahrzad Mcdaniel for the Cause:     Right patient Yes   Right procedure/laterality settings: Yes          Intra-Procedure Documentation:       ECT number at [facility]: 12+   Treatment number this series: 11   Lifetime total treatment number: 26+     Type of ECT:  Right, unilateral ultrabrief    ECT Medications:    Tylenol 1000 mg  Robinul 0.2 mg  Toradol: 30mg  Zofran: 4mg  Zyprexa Zydis: 10mg  Brevital: 100 mg + 50 mg  Succinyl Choline: 100 mg  Versed: 2 mg   Labetolol 20 mg pre-ECT    ECT Strip Summary:   Energy Level: Titration: 19.2 mC  115.2 mC  ; 0.3 msec; 30 Hz; 8 sec; 800 mA  Motor Seizure Duration: 44 seconds  EEG Seizure Duration: 65 seconds    Complications: No    Time for re-orientation: 9  Minutes    4/5 at 10 minutes     Plan:   Return 5/20/22  COVID PCR positive on 4/13/22 (no COVID PCR testing until 7/13/22)  CONSIDER MOVING FASTER IN MAINTENANCE ECT IF DOING WELL    Hold CBD/THC on morning of ECT and evening prior. May take his Evening dose the night of ECT and morning dose the day after ECT  Monitor depression severity with clinical assessment augmented with IDS-SR every 3rd treatment    ASK PATIENT TO BRING CPAP MACHINE WITH HIM TO USE IN RECOVERY    Also, is a good candidate for VNS " (this is his 3rd course of ECT). WILL DISCUSS WITH DR TILLMAN AND CONSIDER STARTING THE PRE-AUTHORIZATION PROCESS    Dontrell Murillo MD

## 2022-05-13 NOTE — DISCHARGE INSTRUCTIONS
ECT Discharge Instructions    After each ECT treatment:    Get plenty of rest. A responsible adult must stay with your for at least 6 hours.  Avoid heavy or risky activities for 24 hours while in maintenance ECT.   Do not drive for at least 24 hours after your treatment while in maintenance ECT.   If you have more than one treatment within one week, do not drive for 7 days after your last treatment.   If you feel light-headed, sit for a few minutes before standing. Have someone help you get up.  If you have nausea (feel sick to your stomach): Drink only clear liquids such as apple juice, ginger ale, broth or 7UP, Be sure to drink plenty of liquids. Move to a normal diet as you feel able.   If you received Toradol, wait 6 hours before taking ibuprofen.  Call your doctor if:   You have a fever over 100F (37.7 C) (taken under the tongue), or a fever that last more than 24 hours.  Your IV site is red, swollen, very painful or is getting more tender.  You have nausea that gets very bad or does not improve.    If you have any symptoms after ECT, tell our staff before your next treatment.  The ECT Department can be reached at [phone].  The ECT Department is open Mondays, Wednesdays and Fridays from 7:00 AM to 2:00 PM.    To speak to a doctor, call:  Your primary care provider or [facility] for Dr. Murillo, Dr. Villafuerte or Dr. Diaz, PHONE: [phone]; fax: [phone]    New instructions:  You have received Toradol today at 9:03 am. Toradol is an NSAID.  Do not take any NSAID's including: Ibuprofen, Naproxen Sodium, Asprin, Advil, Motrin, Aleve, Rodrick, etc., until six hours after the above time which would be 3:03 pm. If you have any questions check back with your Physician, or pharmacist.   You also received ondansetron (Zofran) 4 mg at 9:03 am.    Return for next ECT on 5/20/22    COVID PCR positive on 4/13/22 (no COVID PCR testing required until 7/13/22)  CONSIDER MOVING FASTER IN MAINTENANCE ECT IF DOING  WELL     Hold CBD/THC on morning of ECT and evening prior. May take his Evening dose the night of ECT and morning dose the day after ECT     ASK PATIENT TO BRING CPAP MACHINE WITH HIM TO USE IN RECOVERY     You are a good candidate for VNS (this is his 3rd course of ECT). PATIENT WILL DISCUSS WITH DR TILLMAN AND CONSIDER STARTING THE PRE-AUTHORIZATION PROCESS      Please come to the [facility] and check-in with Security before your ECT appointment.  The [facility] is located right next to the Adult Emergency Room.  The address is [address] [place] [address].    After your appointment, you may be picked up at the [facility] entrance, which is located at [address] [place] [address], about 1 block North of the [facility].    Transported by:   Wife, Evon

## 2022-05-13 NOTE — ANESTHESIA CARE TRANSFER NOTE
Patient: Kuldeep Sequeira    Procedure: * No procedures listed *  Electroconvulsive Therapy    Diagnosis: * No pre-op diagnosis entered *  Diagnosis Additional Information: No value filed.    Anesthesia Type:   General     Note:    Oropharynx: oropharynx clear of all foreign objects  Level of Consciousness: drowsy  Oxygen Supplementation: face mask  Level of Supplemental Oxygen (L/min / FiO2): 8  Independent Airway: airway patency satisfactory and stable  Dentition: dentition unchanged  Vital Signs Stable: post-procedure vital signs reviewed and stable  Report to RN Given: handoff report given  Patient transferred to: PACU    Handoff Report: Identifed the Patient, Identified the Reponsible Provider, Reviewed the pertinent medical history, Discussed the surgical course, Reviewed Intra-OP anesthesia mangement and issues during anesthesia, Set expectations for post-procedure period and Allowed opportunity for questions and acknowledgement of understanding      Vitals:  Vitals Value Taken Time   /98    Temp     Pulse 82    Resp 17    SpO2 99        Electronically Signed By: TURNER Wallace CRNA  May 13, 2022  9:41 AM

## 2022-05-13 NOTE — ANESTHESIA PREPROCEDURE EVALUATION
Anesthesia Pre-Procedure Evaluation    Patient: Kuldeep Sequeira   MRN: [id number] : 1970        Procedure : * No procedures listed *  Electroconvulsive Therapy       Past Medical History:   Diagnosis Date     Anxiety      Depressive disorder      GERD (gastroesophageal reflux disease)       No past surgical history on file.   No Known Allergies   Social History     Tobacco Use     Smoking status: Never Smoker     Smokeless tobacco: Never Used   Substance Use Topics     Alcohol use: Not on file      Wt Readings from Last 1 Encounters:   22 83.5 kg (184 lb)        Anesthesia Evaluation            ROS/MED HX  ENT/Pulmonary:       Neurologic:       Cardiovascular:       METS/Exercise Tolerance:     Hematologic:       Musculoskeletal:       GI/Hepatic:     (+) GERD,     Renal/Genitourinary:       Endo:       Psychiatric/Substance Use:       Infectious Disease:       Malignancy:       Other:            Physical Exam    Airway        Mallampati: II   TM distance: > 3 FB   Neck ROM: full   Mouth opening: > 3 cm    Respiratory Devices and Support         Dental  no notable dental history         Cardiovascular   cardiovascular exam normal          Pulmonary   pulmonary exam normal                OUTSIDE LABS:  CBC:   Lab Results   Component Value Date    WBC 5.5 2022    WBC 5.3 2022    HGB 16.4 2022    HGB 17.7 2022    HCT 48.0 2022    HCT 50.8 2022     2022     2022     BMP:   Lab Results   Component Value Date     2022     2022    POTASSIUM 3.6 2022    POTASSIUM 4.6 2022    CHLORIDE 98 2022    CHLORIDE 103 2022    CO2 30 2022    CO2 29 2022    BUN 10 2022    BUN 16 2022    CR 0.97 2022    CR 1.08 2022    GLC 93 2022     (H) 2022     COAGS: No results found for: PTT, INR, FIBR  POC: No results found for: BGM, HCG, HCGS  HEPATIC:   Lab Results    Component Value Date    ALBUMIN 3.4 03/13/2022    PROTTOTAL 6.4 (L) 03/13/2022    ALT 33 03/13/2022    AST 22 03/13/2022    ALKPHOS 49 03/13/2022    BILITOTAL 0.8 03/13/2022     OTHER:   Lab Results   Component Value Date    RANJITH 8.5 03/13/2022    TSH 1.51 03/11/2022       Anesthesia Plan    ASA Status:  2      Anesthesia Type: General.              Consents            Postoperative Care            Comments:                Chauncey Hill DO

## 2022-05-20 ENCOUNTER — ANESTHESIA EVENT (OUTPATIENT)
Dept: BEHAVIORAL HEALTH | Facility: CLINIC | Age: 52
End: 2022-05-20
Payer: COMMERCIAL

## 2022-05-20 ENCOUNTER — HOSPITAL ENCOUNTER (OUTPATIENT)
Dept: BEHAVIORAL HEALTH | Facility: CLINIC | Age: 52
Discharge: HOME | End: 2022-05-20
Attending: PSYCHIATRY & NEUROLOGY
Payer: COMMERCIAL

## 2022-05-20 ENCOUNTER — ANESTHESIA (OUTPATIENT)
Dept: BEHAVIORAL HEALTH | Facility: CLINIC | Age: 52
End: 2022-05-20
Payer: COMMERCIAL

## 2022-05-20 VITALS
DIASTOLIC BLOOD PRESSURE: 86 MMHG | SYSTOLIC BLOOD PRESSURE: 126 MMHG | OXYGEN SATURATION: 93 % | HEART RATE: 93 BPM | TEMPERATURE: 98.6 F | RESPIRATION RATE: 22 BRPM

## 2022-05-20 DIAGNOSIS — F33.2 MAJOR DEPRESSIVE DISORDER, RECURRENT EPISODE, SEVERE WITH ANXIOUS DISTRESS (H): Primary | ICD-10-CM

## 2022-05-20 PROCEDURE — 250N000009 HC RX 250: Performed by: STUDENT IN AN ORGANIZED HEALTH CARE EDUCATION/TRAINING PROGRAM

## 2022-05-20 PROCEDURE — 250N000013 HC RX MED GY IP 250 OP 250 PS 637: Performed by: PSYCHIATRY & NEUROLOGY

## 2022-05-20 PROCEDURE — 250N000009 HC RX 250: Performed by: PSYCHIATRY & NEUROLOGY

## 2022-05-20 PROCEDURE — 250N000011 HC RX IP 250 OP 636: Performed by: PSYCHIATRY & NEUROLOGY

## 2022-05-20 PROCEDURE — 370N000017 HC ANESTHESIA TECHNICAL FEE, PER MIN

## 2022-05-20 PROCEDURE — 90870 ELECTROCONVULSIVE THERAPY: CPT | Performed by: PSYCHIATRY & NEUROLOGY

## 2022-05-20 PROCEDURE — 90870 ELECTROCONVULSIVE THERAPY: CPT

## 2022-05-20 PROCEDURE — 250N000011 HC RX IP 250 OP 636: Performed by: STUDENT IN AN ORGANIZED HEALTH CARE EDUCATION/TRAINING PROGRAM

## 2022-05-20 PROCEDURE — 258N000003 HC RX IP 258 OP 636: Performed by: PSYCHIATRY & NEUROLOGY

## 2022-05-20 RX ORDER — ACETAMINOPHEN 500 MG
1000 TABLET ORAL
Status: CANCELLED
Start: 2022-05-20 | End: 2022-05-20

## 2022-05-20 RX ORDER — LABETALOL HYDROCHLORIDE 5 MG/ML
INJECTION, SOLUTION INTRAVENOUS PRN
Status: DISCONTINUED | OUTPATIENT
Start: 2022-05-20 | End: 2022-05-20

## 2022-05-20 RX ORDER — LABETALOL HYDROCHLORIDE 5 MG/ML
10 INJECTION, SOLUTION INTRAVENOUS
Status: DISCONTINUED | OUTPATIENT
Start: 2022-05-20 | End: 2022-05-21 | Stop reason: HOSPADM

## 2022-05-20 RX ORDER — MEPERIDINE HYDROCHLORIDE 25 MG/ML
12.5 INJECTION INTRAMUSCULAR; INTRAVENOUS; SUBCUTANEOUS
Status: DISCONTINUED | OUTPATIENT
Start: 2022-05-20 | End: 2022-05-21 | Stop reason: HOSPADM

## 2022-05-20 RX ORDER — ONDANSETRON 4 MG/1
4 TABLET, ORALLY DISINTEGRATING ORAL EVERY 30 MIN PRN
Status: DISCONTINUED | OUTPATIENT
Start: 2022-05-20 | End: 2022-05-21 | Stop reason: HOSPADM

## 2022-05-20 RX ORDER — ACETAMINOPHEN 500 MG
1000 TABLET ORAL
Status: ACTIVE | OUTPATIENT
Start: 2022-05-20 | End: 2022-05-20

## 2022-05-20 RX ORDER — NICARDIPINE HCL-0.9% SOD CHLOR 1 MG/10 ML
SYRINGE (ML) INTRAVENOUS PRN
Status: DISCONTINUED | OUTPATIENT
Start: 2022-05-20 | End: 2022-05-20

## 2022-05-20 RX ORDER — HYDRALAZINE HYDROCHLORIDE 20 MG/ML
2.5-5 INJECTION INTRAMUSCULAR; INTRAVENOUS EVERY 10 MIN PRN
Status: DISCONTINUED | OUTPATIENT
Start: 2022-05-20 | End: 2022-05-21 | Stop reason: HOSPADM

## 2022-05-20 RX ORDER — KETOROLAC TROMETHAMINE 30 MG/ML
30 INJECTION, SOLUTION INTRAMUSCULAR; INTRAVENOUS
Status: CANCELLED
Start: 2022-05-20 | End: 2022-05-20

## 2022-05-20 RX ORDER — SODIUM CHLORIDE, SODIUM LACTATE, POTASSIUM CHLORIDE, CALCIUM CHLORIDE 600; 310; 30; 20 MG/100ML; MG/100ML; MG/100ML; MG/100ML
INJECTION, SOLUTION INTRAVENOUS CONTINUOUS
Status: DISCONTINUED | OUTPATIENT
Start: 2022-05-20 | End: 2022-05-21 | Stop reason: HOSPADM

## 2022-05-20 RX ORDER — OLANZAPINE 5 MG/1
10 TABLET, ORALLY DISINTEGRATING ORAL ONCE
Status: COMPLETED | OUTPATIENT
Start: 2022-05-20 | End: 2022-05-20

## 2022-05-20 RX ORDER — GLYCOPYRROLATE 0.2 MG/ML
0.2 INJECTION, SOLUTION INTRAMUSCULAR; INTRAVENOUS
Status: COMPLETED | OUTPATIENT
Start: 2022-05-20 | End: 2022-05-20

## 2022-05-20 RX ORDER — ONDANSETRON 2 MG/ML
4 INJECTION INTRAMUSCULAR; INTRAVENOUS
Status: COMPLETED | OUTPATIENT
Start: 2022-05-20 | End: 2022-05-20

## 2022-05-20 RX ORDER — KETOROLAC TROMETHAMINE 30 MG/ML
30 INJECTION, SOLUTION INTRAMUSCULAR; INTRAVENOUS
Status: COMPLETED | OUTPATIENT
Start: 2022-05-20 | End: 2022-05-20

## 2022-05-20 RX ORDER — ONDANSETRON 2 MG/ML
4 INJECTION INTRAMUSCULAR; INTRAVENOUS
Status: CANCELLED
Start: 2022-05-20 | End: 2022-05-20

## 2022-05-20 RX ORDER — GLYCOPYRROLATE 0.2 MG/ML
0.2 INJECTION, SOLUTION INTRAMUSCULAR; INTRAVENOUS
Status: CANCELLED
Start: 2022-05-20 | End: 2022-05-20

## 2022-05-20 RX ORDER — ONDANSETRON 2 MG/ML
4 INJECTION INTRAMUSCULAR; INTRAVENOUS EVERY 30 MIN PRN
Status: DISCONTINUED | OUTPATIENT
Start: 2022-05-20 | End: 2022-05-21 | Stop reason: HOSPADM

## 2022-05-20 RX ADMIN — KETOROLAC TROMETHAMINE 30 MG: 30 INJECTION, SOLUTION INTRAMUSCULAR; INTRAVENOUS at 08:36

## 2022-05-20 RX ADMIN — NICARDIPINE HYDROCHLORIDE 500 MCG: 0.1 INJECTION, SOLUTION INTRAVENOUS at 08:53

## 2022-05-20 RX ADMIN — SUCCINYLCHOLINE CHLORIDE 100 MG: 20 INJECTION, SOLUTION INTRAMUSCULAR; INTRAVENOUS; PARENTERAL at 08:52

## 2022-05-20 RX ADMIN — GLYCOPYRROLATE 0.2 MG: 0.2 INJECTION, SOLUTION INTRAMUSCULAR; INTRAVENOUS at 08:35

## 2022-05-20 RX ADMIN — METHOHEXITAL SODIUM 150 MG: 500 INJECTION, POWDER, LYOPHILIZED, FOR SOLUTION INTRAMUSCULAR; INTRAVENOUS; RECTAL at 08:51

## 2022-05-20 RX ADMIN — MIDAZOLAM HYDROCHLORIDE 2 MG: 1 INJECTION, SOLUTION INTRAMUSCULAR; INTRAVENOUS at 08:56

## 2022-05-20 RX ADMIN — SODIUM CHLORIDE, POTASSIUM CHLORIDE, SODIUM LACTATE AND CALCIUM CHLORIDE 500 ML: 600; 310; 30; 20 INJECTION, SOLUTION INTRAVENOUS at 08:36

## 2022-05-20 RX ADMIN — LABETALOL HYDROCHLORIDE 20 MG: 5 INJECTION, SOLUTION INTRAVENOUS at 08:51

## 2022-05-20 RX ADMIN — ONDANSETRON 4 MG: 2 INJECTION INTRAMUSCULAR; INTRAVENOUS at 08:35

## 2022-05-20 RX ADMIN — NICARDIPINE HYDROCHLORIDE 500 MCG: 0.1 INJECTION, SOLUTION INTRAVENOUS at 08:56

## 2022-05-20 RX ADMIN — OLANZAPINE 10 MG: 5 TABLET, ORALLY DISINTEGRATING ORAL at 08:10

## 2022-05-20 ASSESSMENT — LIFESTYLE VARIABLES: TOBACCO_USE: 0

## 2022-05-20 NOTE — DISCHARGE INSTRUCTIONS
ECT Discharge Instructions      During your ECT series:    Do not drive or work heavy equipment until 7 days after your last treatment.  Do not drink alcohol or use street drugs (illicit drugs) while you are having treatments.  Do not make important decisions, including legal decisions.    After your maintenance ECT treatment:    Do not drive for 24 hours after treatment.  If you will be having more than one treatment witihin a week, no driving until 7 days after your last ECT treatment.       After each treatment:    Get plenty of rest. A responsible adult must stay with your for at least 6 hours.  Avoid heavy or risky activities for 24 hours.  If you feel light-headed, sit for a few minutes before standing. Have someone help you get up.  If you have nausea (feel sick to your stomach): Drink only clear liquids such as apple juice, ginger ale, broth or 7UP, Be sure to drink plenty of liquids. Move to a normal diet as you feel able.   If you received Toradol, wait 6 hours before taking ibuprofen.  Call your doctor if:   You have a fever over 100F (37.7 C) (taken under the tongue), or a fever that last more than 24 hours.  Your IV site is red, swollen, very painful or is getting more tender.  You have nausea that gets very bad or does not improve.    If you have any symptoms after ECT, tell our staff before your next treatment.  The ECT Department can be reached at [phone].  The ECT Department is open Mondays, Wednesdays and Fridays from 7:00 AM to 2:00 PM.    Your next ECT treatment will be:     To speak to a doctor, call:    Today you received the following:    You have received Toradol today at 0900. Toradol is an NSAID.  Do not take any NSAID's including: Ibuprofen, Naproxen Sodium, Asprin, Advil, Motrin, Aleve, Rodrick, etc., until six hours after the above time which would be 3pm. If you have any questions check back with your Physician, or pharmacist.            Please come to the [facility] entrance and  check-in with Security before your ECT appointment.  The [facility] entrance is located right next to the Adult Emergency Room.  The address is [address] [place] [address].    After your appointment, you may be picked up at the [facility] entrance, which is located on the West side of our campus.  The address is [address] [place] [address].  [facility].

## 2022-05-20 NOTE — ANESTHESIA POSTPROCEDURE EVALUATION
Patient: Kuldeep Sequeira    Procedure: * No procedures listed *  Electroconvulsive Therapy    Anesthesia Type:  General    Note:  Disposition: Outpatient   Postop Pain Control: Uneventful            Sign Out: Well controlled pain   PONV: No   Neuro/Psych: Uneventful            Sign Out: Acceptable/Baseline neuro status   Airway/Respiratory: Uneventful            Sign Out: Acceptable/Baseline resp. status   CV/Hemodynamics: Uneventful            Sign Out: Acceptable CV status; No obvious hypovolemia; No obvious fluid overload   Other NRE: NONE   DID A NON-ROUTINE EVENT OCCUR? No           Last vitals:  Vitals:    05/20/22 0901 05/20/22 0911 05/20/22 0925   BP: (!) 150/91 122/84 126/86   Pulse: 93 92 93   Resp: 22 18 22   Temp: 37.2  C (99  F) 37.1  C (98.7  F) 37  C (98.6  F)   SpO2: 98% 94% 93%       Electronically Signed By: Kacey Rojo MD  May 20, 2022  9:54 AM

## 2022-05-20 NOTE — PROCEDURES
Procedures    Kuldeep Sequeira is a 52 year old  year old male patient.  [id number]  @DX@    [facility], [place]   ECT Procedure Note   05/20/2022    Patient Status: Outpatient    Is this the first in a series of 12 treatments?  No   No Known Allergies    Weight:  0 lbs 0 oz         Indications for ECT:     History of good ECT response in one or more previous episodes of illness         Clinical Narrative:     This is a 52 year old male with history of depression, anxiety and insomnia.  Patient has prior history of psychiatric hospitalization on at least 2 previous occasions, but does not have past history of MI CD treatment or suicide attempt history.  Now, presenting secondary to worsening of depressive symptoms leading to overdose of uncertain intent with sleep medication.  Occurring in the setting of medication changes or depression by outpatient psychiatrist.  Admit is voluntary.     Care coordination performed in detail.  Includes, detailed review of care everywhere and epic to review electronic chart information and related mental health history to presentation.  Including, patient's ED presentation to [facility] on 3/13 due to depression and overdose.  Further review of patient's past hospitalization from November 2020.  Subsequent review of ECT documentation available for review.  Please see associated documentation for details.     In brief, patient presented to [facility] ED on 3/13 secondary to worsening of depression and subsequent overdose.  Reports worsening of depressive symptoms for at least the past 2 years with efforts of management to include medications, therapy, ECT and TMS.  Most recent efforts of management in the community through outpatient psychiatrist include initiation of fluoxetine on 2/18 at 20 mg daily with titration on 2/25 to 40 mg and further titration on 3/3 to 60 mg daily.  Continued on Seroquel.  Despite efforts of medication management in  the community, continued to experience symptoms of depression.  According to collateral as obtained from wife, [facility]EC assessment, patient demonstrating worsening of symptoms especially within the past week.  Isolating in behavior.  Laying in bed.  Decreased appetite with weight loss.  Expressing suicidal ideation and demonstrated behavior leading to admission.  Poor self cares.  Recommendation for inpatient psychiatric hospitalization voluntarily.     Further care coordination performed in detail.  Includes, detailed review of care everywhere.  Patient has documentation unable to be seen on epic viewer.  Specifically, documentations from past ECT for first hospitalization.  Able to review hospitalization from 11/18 through 11/19/2020.  Admitted due to depression, insomnia with suicidal ideation.  PTA medications continue to include venlafaxine 75 mg daily, quetiapine 200 mg p.o. nightly, with medication changes to include Ambien to as needed and addition of doxepin for sleep.     Further care coordination performed.  Referral for ECT initiated on 12/1/2020 by PCP.  Outpatient consult reviewed by Dr. Maher dated 12/4/2020.  Please see consult for full details.  Medically clear for ECT after providing informed consent to treat.  ECT series #1 initiated on 12/7/2020.     Further care coordination performed.  On 3/22/2021, completed second series of ECT.  Medications documented as ineffective.  Effexor XR increased to 225 mg daily and Abilify added.  Considered switch from Effexor XR to Viibryd due to sexual side effects.     Upon patient interview, patient review performed on unit 5500.  Cooperative on approach.  Evaluation effort to review events into presentation and clarify information as provided in collateral report.  Patient admit is voluntary.     Patient reports a history of depression and anxiety of greater than 20 years.  Medication management initiated at approximately age 28.  Patient has history of  multiple medication trials with or without psychotherapy.  Further history of 2 prior hospitalizations in the month of November 2020 brief duration.  Associated with depressive symptoms in the setting of suicidal ideation and insomnia.  Despite efforts of medication management, proceeded with ECT as initiated on 12/7/2020 after referral by outpatient psychiatrist.  Continue with ECT for 2 series.  Did not appear to have taper or maintenance series of ECT.  Then, initiated TMS on 4/7/2021.     Patient has been followed by Dr. Markham since initiating ECT.  Medication management has been performed in multiple.  Medications alone have been documented as ineffective.  Recent medication changes initiated in the community include start of Prozac after switch from alternate antidepressant therapy.  Initiated on 2/18 at 20 mg daily, then on 2/2540 mg daily then on 3/3 increased to 60 mg daily.       Patient presented to ED on 3/13 due to continued symptoms of depression with anxious distress.  Occurring in the setting of repeat event of inability to sleep.  Similar to prior hospitalizations.  Patient reportedly taking increasing amounts of Ambien CR with intent to sleep.  Later, making statement of not wanting to wake up.  Recommendation for inpatient psychiatric hospitalization for further assessment.     Patient denies further stressors or triggers clearly associated with presentation.  Reports taking medications as prescribed.  Symptoms of depression and anxiety continued with efforts of fluoxetine titration, leading to behavioral self-harm.  Denies plan or intent of suicide by means of Ambien CR ingestion.  Denies history of suicide attempt.  Does admit to gun access.  Agrees to removal of gun access prior to disposition.  States an appropriate crisis relapse plan.  Future oriented for family.     Past treatment reviewed in detail.  Last documented cares reviewed to include medications documented as lacking efficacy.  " Further review of ECT documented as efficacious, due to medications alone is not effective.  Initiated ECT on 12/7/2020 after last hospitalization with last treatment in March 2021.  In April 2021, started series of TMS.  Denies benefit from TMS series.  Reports beneficial response from ECT, without significant side effect aside from mild cognition impairment.     On psychiatric review of symptoms, mood is, \"depressed.\"  Denies lindsey or hypomania.  Insomnia with low energy.  Lack of energy.  Lack of enjoyment with activities of prior interest.  Impacting activities at home and work as well as outside relationships.  Intact appetite and weight.  Intact concentration.  Negative thoughts of self.  Stating, \"end of my rope.\"  Denies tearfulness.  Endorses anxiety.  Denies psychosis.         Diagnosis:     Major depression         ECT Log:     #1 4/6/22 Consent is obtained for the procedure. Starting mood is 0/10 (10 being the best). Passive death wishes. Denies intent to kill self when took extra ambien 'to sleep' and was hospitalized. Is on CBD/THC 16/1 in am and 1/1 pm for sleep.   #2 4/8/22 Did get anxious as he was receiving anesthesia but recovery was uneventful and no side effects afterwards. Mood low.   #3 4/11/22  Mood unchanged. Wife notices a bit more energy and concentration. No SI. Some headache  #4 4/13/22 Anxious. Took hydroxyzine yesterday and slept most of the day. On venlafaxine 75 mg  #5 4/20/22 Tested positive for COVID on 4/13/22. Now resuming ECT. Very anxious on the morning of.  Will treat today as last patient. On Venlafaxine 150 mg  #6 4/22/22 Continues to struggle with low mood  #7 4/25/22 Mood 5/10 . More energy, enjoying things somewhat better. No SI. No side effects. Mild headaches,.   #8 4/27/22 Mood 5/10. Was able to work a full day from home. Wife believes this is a major improvement. Sleep is good.   4/29/22 Experienced dizziness yesterday morning which alarmed him. \"feeling of catching " "up when moving head left to right\". Encouraged to observe and if uneventful weekend to come on Monday. Otherwise could skip. Continues to be somewhat anxious but overall mood 7/10  #9 mood 10/10  Ready to transition to maintenance after wed. Struggles with pre-procedure anxiety. Finding it easier to start tasks. family noting improved motivation. Feeling inspired to return to work.  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 Will  Complete acute course today and plan for maintenance 1xweek  M1 5/13/22 Doing well. Discussed VNS and is willing to consider it. Will discuss further with Dr Shahrzad HOYOS 2 5/20/22 mood 10/10  Returned to work and participating in family events like his daughters GL 2ours game. Some pre-procedure anxiety wants to space 2 wks       Pause for the Cause:     Right patient Yes   Right procedure/laterality settings: Yes          Intra-Procedure Documentation:       ECT number at [facility]: 13+   Treatment number this series: 12   Lifetime total treatment number: 27+     Type of ECT:  Right, unilateral ultrabrief    ECT Medications:    Tylenol 1000 mg  Robinul 0.2 mg  Toradol: 30mg  Zofran: 4mg  Zyprexa Zydis: 10mg  Brevital: 150 mg  Succinyl Choline: 100 mg  Versed: 2 mg   Labetolol 20 mg pre-ECT    ECT Strip Summary:   Energy Level: Titration: 19.2 mC  115.2 mC  ; 0.3 msec; 30 Hz; 8 sec; 800 mA  Motor Seizure Duration: 29 seconds  EEG Seizure Duration:40seconds    Complications: No        Plan:   Return 6/03/22  COVID PCR positive on 4/13/22 (no COVID PCR testing until 7/13/22)      Hold CBD/THC on morning of ECT and evening prior. May take his Evening dose the night of ECT and morning dose the day after ECT  Monitor depression severity with clinical assessment augmented with IDS-SR every 3rd treatment    ASK PATIENT TO BRING CPAP MACHINE WITH HIM TO USE IN " RECOVERY    Also, is a good candidate for VNS (this is his 3rd course of ECT). WILL DISCUSS WITH DR TILLMAN AND CONSIDER STARTING THE PRE-AUTHORIZATION PROCESS    Henna Villafuerte MD

## 2022-05-20 NOTE — PROCEDURES
Procedure/Surgery Information   [facility]     Bedside Procedure Note  Date of Service (when I performed the procedure): 02/12/2021    Kuldeep Sequeira is a 50 year old male patient.  1. Severe recurrent major depression without psychotic features (H)      No past medical history on file.  Temp: 98.1  F (36.7  C) Temp src: Temporal BP: 110/71 Pulse: 71   Resp: 16 SpO2: 98 % O2 Device: None (Room air)      Procedures     Wm Markham     [facility] [place]   ECT Procedure Note   02/12/2021    Kuldeep Sequeira [id number]   50 year old 1970     Patient Status: Outpatient    Is this the first in a series of 12 treatments?  No    History and Physical: Reviewed in medical record    Consent: Informed consent was signed by: patient    Date Consent Signed: 2/1/21     No Known Allergies    Weight:  0 lbs 0 oz    Patient Preparations: (none)    Wt Readings from Last 5 Encounters:   11/19/20 78.4 kg (172 lb 14.4 oz)       /71   Pulse 71   Temp 98.1  F (36.7  C) (Temporal)   Resp 16   SpO2 98%          Indications for ECT:   Medications ineffective         Clinical Narrative:   Patient has a long history of depression and is continuing ECT for treatment-resistant depression.  He's doing a 2nd series of ECT now.  NPO after 2400.  Alert and Oriented x 3.  No problems with the last ECT.     Effexor dose was increased recently 1/27/21 from 150mg/d to 225 mg/d.  He's eating better. Doing some cooking.   Too anxious to work.    He's still depressed.          Diagnosis:   Major depression         Pause for the Cause:     Right patient Yes   Right procedure/laterality settings: Yes          Intra-Procedure Documentation:     ECT #: 6 of 12   Treatment number this series: 19   Total treatment number: 19     Type of ECT:   BILATERAL since treatment #6 (instead of Right, unilateral standard)     ECT Medications:      Robinul: 0.2mg  Zyprexa: 10mg  Tylenol:  1000mg  Toradol: 30mg  Zofran: 4mg  Lactated Ringers: 1/2 liter  Brevital: 100mg  Caffeine: 500mg  Succinyl Choline: 100mg  Versed: 2mg for h/o agitation (give at 50 seconds)      ECT Strip Summary:   Energy Level: 30 percent   Motor Seizure Duration:  45 seconds  EEG Seizure Duration:   107 seconds     Complications: none (but had brief asystole 2/10/21 which resolved without intervention)    Plan:   COVID test 2/12/21 and again on 2/17/21.    Next ECT 2/15/21.  Also plan ECT 2/19/21.  If he has a good weekend and is feeling well, he can call and cancel Monday's treatment.    Increased Effexor XR from 150mg to 225mg daily.  (Extra 75mg caps sent to his pharmacy) on 1/27/21  On 2/3/21 added Vistaril 25 to 50mg po tid prn anxiety. (send to [facility] in [place])  On 2/10/21 added Klonopin 0.5mg po tid prn anxiety.    Can work on non-ECT days if he feels up to it but cannot drive.      Meds per Monica Stevens, CNS    Wm Markham MD   Orthopedic

## 2022-05-20 NOTE — ANESTHESIA POSTPROCEDURE EVALUATION
Patient: Kuldeep Sequeira    Procedure: * No procedures listed *  Electroconvulsive Therapy    Anesthesia Type:  General    Note:  Disposition: Outpatient   Postop Pain Control: Uneventful            Sign Out: Well controlled pain   PONV: No   Neuro/Psych: Uneventful            Sign Out: Acceptable/Baseline neuro status   Airway/Respiratory: Uneventful            Sign Out: Acceptable/Baseline resp. status   CV/Hemodynamics: Uneventful            Sign Out: Acceptable CV status; No obvious hypovolemia; No obvious fluid overload   Other NRE: NONE   DID A NON-ROUTINE EVENT OCCUR? No           Last vitals:  Vitals:    05/20/22 0814   BP: (!) 149/92   Pulse: 95   Resp: 16   Temp: 37.1  C (98.8  F)   SpO2: 98%       Electronically Signed By: Kacey Rojo MD  May 20, 2022  9:04 AM

## 2022-05-20 NOTE — ANESTHESIA CARE TRANSFER NOTE
Patient: Kuldeep Sequeira    Procedure: * No procedures listed *  Electroconvulsive Therapy    Diagnosis: * No pre-op diagnosis entered *  Diagnosis Additional Information: No value filed.    Anesthesia Type:   General     Note:    Oropharynx: oropharynx clear of all foreign objects  Level of Consciousness: drowsy  Oxygen Supplementation: room air    Independent Airway: airway patency satisfactory and stable  Dentition: dentition unchanged  Vital Signs Stable: post-procedure vital signs reviewed and stable  Report to RN Given: handoff report given  Patient transferred to: PACU    Handoff Report: Identifed the Patient, Identified the Reponsible Provider, Reviewed the pertinent medical history, Discussed the surgical course, Reviewed Intra-OP anesthesia mangement and issues during anesthesia, Set expectations for post-procedure period and Allowed opportunity for questions and acknowledgement of understanding      Vitals:  Vitals Value Taken Time   BP     Temp     Pulse     Resp     SpO2         Electronically Signed By: Kacey Rojo MD  May 20, 2022  9:54 AM

## 2022-05-20 NOTE — ANESTHESIA PREPROCEDURE EVALUATION
Anesthesia Pre-Procedure Evaluation    Patient: Kuldeep Sequeira   MRN: [id number] : 1970        Procedure : * No procedures listed *  Electroconvulsive Therapy       Past Medical History:   Diagnosis Date     Anxiety      Depressive disorder      GERD (gastroesophageal reflux disease)       No past surgical history on file.   No Known Allergies   Social History     Tobacco Use     Smoking status: Never Smoker     Smokeless tobacco: Never Used   Substance Use Topics     Alcohol use: Not on file      Wt Readings from Last 1 Encounters:   22 83.5 kg (184 lb)        Anesthesia Evaluation            ROS/MED HX  ENT/Pulmonary:    (-) tobacco use   Neurologic:       Cardiovascular:  - neg cardiovascular ROS     METS/Exercise Tolerance:     Hematologic:       Musculoskeletal:       GI/Hepatic:     (+) GERD, Asymptomatic on medication,     Renal/Genitourinary:  - neg Renal ROS     Endo:  - neg endo ROS     Psychiatric/Substance Use:     (+) psychiatric history depression and anxiety     Infectious Disease:  - neg infectious disease ROS     Malignancy:  - neg malignancy ROS     Other:            Physical Exam    Airway  airway exam normal           Respiratory Devices and Support         Dental  no notable dental history         Cardiovascular   cardiovascular exam normal          Pulmonary   pulmonary exam normal                OUTSIDE LABS:  CBC:   Lab Results   Component Value Date    WBC 5.5 2022    WBC 5.3 2022    HGB 16.4 2022    HGB 17.7 2022    HCT 48.0 2022    HCT 50.8 2022     2022     2022     BMP:   Lab Results   Component Value Date     2022     2022    POTASSIUM 3.6 2022    POTASSIUM 4.6 2022    CHLORIDE 98 2022    CHLORIDE 103 2022    CO2 30 2022    CO2 29 2022    BUN 10 2022    BUN 16 2022    CR 0.97 2022    CR 1.08 2022    GLC 93 2022      (H) 03/11/2022     COAGS: No results found for: PTT, INR, FIBR  POC: No results found for: BGM, HCG, HCGS  HEPATIC:   Lab Results   Component Value Date    ALBUMIN 3.4 03/13/2022    PROTTOTAL 6.4 (L) 03/13/2022    ALT 33 03/13/2022    AST 22 03/13/2022    ALKPHOS 49 03/13/2022    BILITOTAL 0.8 03/13/2022     OTHER:   Lab Results   Component Value Date    RANJITH 8.5 03/13/2022    TSH 1.51 03/11/2022       Anesthesia Plan    ASA Status:  1   NPO Status:  NPO Appropriate    Anesthesia Type: General.     - Airway: Native airway   Induction: Intravenous.   Maintenance: N/A.        Consents    Anesthesia Plan(s) and associated risks, benefits, and realistic alternatives discussed. Questions answered and patient/representative(s) expressed understanding.    - Discussed:     - Discussed with:  Patient      - Extended Intubation/Ventilatory Support Discussed: No.      - Patient is DNR/DNI Status: No    Use of blood products discussed: No .     Postoperative Care    Pain management: Oral pain medications.   PONV prophylaxis: Ondansetron (or other 5HT-3)     Comments:    Other Comments: Patient here for ECT       H&P reviewed: Unable to attach H&P to encounter due to EHR limitations. H&P Update: appropriate H&P reviewed, patient examined. No interval changes since H&P (within 30 days).         Kacey Rojo MD

## 2022-06-02 ENCOUNTER — TELEPHONE (OUTPATIENT)
Dept: BEHAVIORAL HEALTH | Facility: CLINIC | Age: 52
End: 2022-06-02
Payer: COMMERCIAL

## 2022-06-02 NOTE — TELEPHONE ENCOUNTER
"Patient's wife,  Evon,  calling to cancel Kuldeep's ECT appointment for Friday Mary 3, 2022.  Wife states that \"Kuledep is doing great, \" and he just wants to wait a little longer for his next ECT appointment.  They will see how he is doing over the weekend and call the ECT department on Monday to schedule another appointment.  Evon also left a message at the [facility].    "

## 2022-06-26 ENCOUNTER — HEALTH MAINTENANCE LETTER (OUTPATIENT)
Age: 52
End: 2022-06-26

## 2022-10-13 NOTE — PROGRESS NOTES
Patient meets phase I recovery criteria, switched to phase II recovery at  0942.  
Pt discharged from recovery room via wheelchair to discharge room at this time, 1000.  
Attending
No

## 2022-12-26 ENCOUNTER — HEALTH MAINTENANCE LETTER (OUTPATIENT)
Age: 52
End: 2022-12-26

## 2023-07-09 ENCOUNTER — HEALTH MAINTENANCE LETTER (OUTPATIENT)
Age: 53
End: 2023-07-09

## 2023-12-29 NOTE — IP AVS SNAPSHOT
[facility]  [address] [place] [address]  Phone: [phone]                                    After Visit Summary   12/18/2020    Kuldeep Sequeira    MRN: [id number]           After Visit Summary Signature Page    I have received my discharge instructions, and my questions have been answered. I have discussed any challenges I see with this plan with the nurse or doctor.    ..........................................................................................................................................  Patient/Patient Representative Signature      ..........................................................................................................................................  Patient Representative Print Name and Relationship to Patient    ..................................................               ................................................  Date                                   Time    ..........................................................................................................................................  Reviewed by Signature/Title    ...................................................              ..............................................  Date                                               Time          22EPIC Rev 08/18       
8

## 2024-09-01 ENCOUNTER — HEALTH MAINTENANCE LETTER (OUTPATIENT)
Age: 54
End: 2024-09-01

## (undated) RX ORDER — KETOROLAC TROMETHAMINE 30 MG/ML
INJECTION, SOLUTION INTRAMUSCULAR; INTRAVENOUS
Status: DISPENSED
Start: 2022-04-29

## (undated) RX ORDER — GLYCOPYRROLATE 0.2 MG/ML
INJECTION INTRAMUSCULAR; INTRAVENOUS
Status: DISPENSED
Start: 2022-04-20

## (undated) RX ORDER — HALOPERIDOL 5 MG/ML
INJECTION INTRAMUSCULAR
Status: DISPENSED
Start: 2021-02-03

## (undated) RX ORDER — ONDANSETRON 2 MG/ML
INJECTION INTRAMUSCULAR; INTRAVENOUS
Status: DISPENSED
Start: 2022-04-20

## (undated) RX ORDER — ONDANSETRON 2 MG/ML
INJECTION INTRAMUSCULAR; INTRAVENOUS
Status: DISPENSED
Start: 2020-12-14

## (undated) RX ORDER — ONDANSETRON 2 MG/ML
INJECTION INTRAMUSCULAR; INTRAVENOUS
Status: DISPENSED
Start: 2022-05-20

## (undated) RX ORDER — CAFFEINE AND SODIUM BENZOATE 125 MG/ML
INJECTION, SOLUTION INTRAMUSCULAR; INTRAVENOUS
Status: DISPENSED
Start: 2021-02-12

## (undated) RX ORDER — GLYCOPYRROLATE 0.2 MG/ML
INJECTION INTRAMUSCULAR; INTRAVENOUS
Status: DISPENSED
Start: 2022-04-25

## (undated) RX ORDER — KETOROLAC TROMETHAMINE 30 MG/ML
INJECTION, SOLUTION INTRAMUSCULAR; INTRAVENOUS
Status: DISPENSED
Start: 2021-03-22

## (undated) RX ORDER — ONDANSETRON 2 MG/ML
INJECTION INTRAMUSCULAR; INTRAVENOUS
Status: DISPENSED
Start: 2022-05-02

## (undated) RX ORDER — ACETAMINOPHEN 500 MG
TABLET ORAL
Status: DISPENSED
Start: 2021-01-27

## (undated) RX ORDER — LABETALOL HYDROCHLORIDE 5 MG/ML
INJECTION, SOLUTION INTRAVENOUS
Status: DISPENSED
Start: 2022-04-25

## (undated) RX ORDER — HALOPERIDOL 5 MG/ML
INJECTION INTRAMUSCULAR
Status: DISPENSED
Start: 2020-12-14

## (undated) RX ORDER — ACETAMINOPHEN 500 MG
TABLET ORAL
Status: DISPENSED
Start: 2020-12-28

## (undated) RX ORDER — KETOROLAC TROMETHAMINE 30 MG/ML
INJECTION, SOLUTION INTRAMUSCULAR; INTRAVENOUS
Status: DISPENSED
Start: 2020-12-30

## (undated) RX ORDER — LABETALOL HYDROCHLORIDE 5 MG/ML
INJECTION, SOLUTION INTRAVENOUS
Status: DISPENSED
Start: 2022-05-02

## (undated) RX ORDER — ONDANSETRON 2 MG/ML
INJECTION INTRAMUSCULAR; INTRAVENOUS
Status: DISPENSED
Start: 2021-01-27

## (undated) RX ORDER — HALOPERIDOL 5 MG/ML
INJECTION INTRAMUSCULAR
Status: DISPENSED
Start: 2020-12-07

## (undated) RX ORDER — LABETALOL HYDROCHLORIDE 5 MG/ML
INJECTION, SOLUTION INTRAVENOUS
Status: DISPENSED
Start: 2022-04-27

## (undated) RX ORDER — ONDANSETRON 2 MG/ML
INJECTION INTRAMUSCULAR; INTRAVENOUS
Status: DISPENSED
Start: 2021-02-10

## (undated) RX ORDER — ACETAMINOPHEN 500 MG
TABLET ORAL
Status: DISPENSED
Start: 2020-12-09

## (undated) RX ORDER — GLYCOPYRROLATE 0.2 MG/ML
INJECTION INTRAMUSCULAR; INTRAVENOUS
Status: DISPENSED
Start: 2022-04-29

## (undated) RX ORDER — ONDANSETRON 2 MG/ML
INJECTION INTRAMUSCULAR; INTRAVENOUS
Status: DISPENSED
Start: 2021-02-15

## (undated) RX ORDER — KETOROLAC TROMETHAMINE 30 MG/ML
INJECTION, SOLUTION INTRAMUSCULAR; INTRAVENOUS
Status: DISPENSED
Start: 2020-12-21

## (undated) RX ORDER — GLYCOPYRROLATE 0.2 MG/ML
INJECTION INTRAMUSCULAR; INTRAVENOUS
Status: DISPENSED
Start: 2022-04-06

## (undated) RX ORDER — NICARDIPINE HCL-0.9% SOD CHLOR 1 MG/10 ML
SYRINGE (ML) INTRAVENOUS
Status: DISPENSED
Start: 2022-05-20

## (undated) RX ORDER — KETOROLAC TROMETHAMINE 30 MG/ML
INJECTION, SOLUTION INTRAMUSCULAR; INTRAVENOUS
Status: DISPENSED
Start: 2020-12-23

## (undated) RX ORDER — LABETALOL HYDROCHLORIDE 5 MG/ML
INJECTION, SOLUTION INTRAVENOUS
Status: DISPENSED
Start: 2022-04-13

## (undated) RX ORDER — ONDANSETRON 2 MG/ML
INJECTION INTRAMUSCULAR; INTRAVENOUS
Status: DISPENSED
Start: 2021-02-01

## (undated) RX ORDER — KETOROLAC TROMETHAMINE 30 MG/ML
INJECTION, SOLUTION INTRAMUSCULAR; INTRAVENOUS
Status: DISPENSED
Start: 2021-02-12

## (undated) RX ORDER — ONDANSETRON 2 MG/ML
INJECTION INTRAMUSCULAR; INTRAVENOUS
Status: DISPENSED
Start: 2020-12-16

## (undated) RX ORDER — KETOROLAC TROMETHAMINE 30 MG/ML
INJECTION, SOLUTION INTRAMUSCULAR; INTRAVENOUS
Status: DISPENSED
Start: 2022-05-20

## (undated) RX ORDER — ACETAMINOPHEN 500 MG
TABLET ORAL
Status: DISPENSED
Start: 2020-12-16

## (undated) RX ORDER — ONDANSETRON 2 MG/ML
INJECTION INTRAMUSCULAR; INTRAVENOUS
Status: DISPENSED
Start: 2022-04-25

## (undated) RX ORDER — CAFFEINE AND SODIUM BENZOATE 125 MG/ML
INJECTION, SOLUTION INTRAMUSCULAR; INTRAVENOUS
Status: DISPENSED
Start: 2021-01-29

## (undated) RX ORDER — GLYCOPYRROLATE 0.2 MG/ML
INJECTION INTRAMUSCULAR; INTRAVENOUS
Status: DISPENSED
Start: 2021-02-15

## (undated) RX ORDER — KETOROLAC TROMETHAMINE 30 MG/ML
INJECTION, SOLUTION INTRAMUSCULAR; INTRAVENOUS
Status: DISPENSED
Start: 2021-01-29

## (undated) RX ORDER — BENZTROPINE MESYLATE 1 MG/ML
INJECTION, SOLUTION INTRAMUSCULAR; INTRAVENOUS
Status: DISPENSED
Start: 2020-12-07

## (undated) RX ORDER — ONDANSETRON 2 MG/ML
INJECTION INTRAMUSCULAR; INTRAVENOUS
Status: DISPENSED
Start: 2021-01-29

## (undated) RX ORDER — ONDANSETRON 2 MG/ML
INJECTION INTRAMUSCULAR; INTRAVENOUS
Status: DISPENSED
Start: 2022-04-11

## (undated) RX ORDER — ESMOLOL HYDROCHLORIDE 10 MG/ML
INJECTION INTRAVENOUS
Status: DISPENSED
Start: 2022-04-20

## (undated) RX ORDER — ACETAMINOPHEN 500 MG
TABLET ORAL
Status: DISPENSED
Start: 2020-12-14

## (undated) RX ORDER — CAFFEINE AND SODIUM BENZOATE 125 MG/ML
INJECTION, SOLUTION INTRAMUSCULAR; INTRAVENOUS
Status: DISPENSED
Start: 2021-03-22

## (undated) RX ORDER — ONDANSETRON 2 MG/ML
INJECTION INTRAMUSCULAR; INTRAVENOUS
Status: DISPENSED
Start: 2021-02-05

## (undated) RX ORDER — KETOROLAC TROMETHAMINE 30 MG/ML
INJECTION, SOLUTION INTRAMUSCULAR; INTRAVENOUS
Status: DISPENSED
Start: 2020-12-09

## (undated) RX ORDER — KETOROLAC TROMETHAMINE 30 MG/ML
INJECTION, SOLUTION INTRAMUSCULAR; INTRAVENOUS
Status: DISPENSED
Start: 2020-12-18

## (undated) RX ORDER — GLYCOPYRROLATE 0.2 MG/ML
INJECTION INTRAMUSCULAR; INTRAVENOUS
Status: DISPENSED
Start: 2021-03-22

## (undated) RX ORDER — ONDANSETRON 2 MG/ML
INJECTION INTRAMUSCULAR; INTRAVENOUS
Status: DISPENSED
Start: 2022-04-22

## (undated) RX ORDER — ONDANSETRON 2 MG/ML
INJECTION INTRAMUSCULAR; INTRAVENOUS
Status: DISPENSED
Start: 2021-02-08

## (undated) RX ORDER — LABETALOL HYDROCHLORIDE 5 MG/ML
INJECTION, SOLUTION INTRAVENOUS
Status: DISPENSED
Start: 2022-05-13

## (undated) RX ORDER — CAFFEINE AND SODIUM BENZOATE 125 MG/ML
INJECTION, SOLUTION INTRAMUSCULAR; INTRAVENOUS
Status: DISPENSED
Start: 2021-02-19

## (undated) RX ORDER — CAFFEINE AND SODIUM BENZOATE 125 MG/ML
INJECTION, SOLUTION INTRAMUSCULAR; INTRAVENOUS
Status: DISPENSED
Start: 2021-02-03

## (undated) RX ORDER — CAFFEINE AND SODIUM BENZOATE 125 MG/ML
INJECTION, SOLUTION INTRAMUSCULAR; INTRAVENOUS
Status: DISPENSED
Start: 2021-02-15

## (undated) RX ORDER — HALOPERIDOL 5 MG/ML
INJECTION INTRAMUSCULAR
Status: DISPENSED
Start: 2021-01-29

## (undated) RX ORDER — KETOROLAC TROMETHAMINE 30 MG/ML
INJECTION, SOLUTION INTRAMUSCULAR; INTRAVENOUS
Status: DISPENSED
Start: 2021-02-03

## (undated) RX ORDER — ONDANSETRON 2 MG/ML
INJECTION INTRAMUSCULAR; INTRAVENOUS
Status: DISPENSED
Start: 2020-12-11

## (undated) RX ORDER — CAFFEINE AND SODIUM BENZOATE 125 MG/ML
INJECTION, SOLUTION INTRAMUSCULAR; INTRAVENOUS
Status: DISPENSED
Start: 2021-02-10

## (undated) RX ORDER — ONDANSETRON 2 MG/ML
INJECTION INTRAMUSCULAR; INTRAVENOUS
Status: DISPENSED
Start: 2020-12-28

## (undated) RX ORDER — LABETALOL HYDROCHLORIDE 5 MG/ML
INJECTION, SOLUTION INTRAVENOUS
Status: DISPENSED
Start: 2022-04-29

## (undated) RX ORDER — HALOPERIDOL 5 MG/ML
INJECTION INTRAMUSCULAR
Status: DISPENSED
Start: 2020-12-28

## (undated) RX ORDER — HALOPERIDOL 5 MG/ML
INJECTION INTRAMUSCULAR
Status: DISPENSED
Start: 2020-12-23

## (undated) RX ORDER — KETOROLAC TROMETHAMINE 30 MG/ML
INJECTION, SOLUTION INTRAMUSCULAR; INTRAVENOUS
Status: DISPENSED
Start: 2021-02-10

## (undated) RX ORDER — KETOROLAC TROMETHAMINE 30 MG/ML
INJECTION, SOLUTION INTRAMUSCULAR; INTRAVENOUS
Status: DISPENSED
Start: 2022-04-25

## (undated) RX ORDER — KETOROLAC TROMETHAMINE 30 MG/ML
INJECTION, SOLUTION INTRAMUSCULAR; INTRAVENOUS
Status: DISPENSED
Start: 2021-01-22

## (undated) RX ORDER — GLYCOPYRROLATE 0.2 MG/ML
INJECTION INTRAMUSCULAR; INTRAVENOUS
Status: DISPENSED
Start: 2021-02-12

## (undated) RX ORDER — CAFFEINE AND SODIUM BENZOATE 125 MG/ML
INJECTION, SOLUTION INTRAMUSCULAR; INTRAVENOUS
Status: DISPENSED
Start: 2020-12-28

## (undated) RX ORDER — ONDANSETRON 2 MG/ML
INJECTION INTRAMUSCULAR; INTRAVENOUS
Status: DISPENSED
Start: 2022-04-06

## (undated) RX ORDER — KETOROLAC TROMETHAMINE 30 MG/ML
INJECTION, SOLUTION INTRAMUSCULAR; INTRAVENOUS
Status: DISPENSED
Start: 2022-05-04

## (undated) RX ORDER — GLYCOPYRROLATE 0.2 MG/ML
INJECTION INTRAMUSCULAR; INTRAVENOUS
Status: DISPENSED
Start: 2022-04-08

## (undated) RX ORDER — GLYCOPYRROLATE 0.2 MG/ML
INJECTION INTRAMUSCULAR; INTRAVENOUS
Status: DISPENSED
Start: 2022-05-04

## (undated) RX ORDER — KETOROLAC TROMETHAMINE 30 MG/ML
INJECTION, SOLUTION INTRAMUSCULAR; INTRAVENOUS
Status: DISPENSED
Start: 2021-02-08

## (undated) RX ORDER — KETOROLAC TROMETHAMINE 30 MG/ML
INJECTION, SOLUTION INTRAMUSCULAR; INTRAVENOUS
Status: DISPENSED
Start: 2022-05-02

## (undated) RX ORDER — LABETALOL HYDROCHLORIDE 5 MG/ML
INJECTION, SOLUTION INTRAVENOUS
Status: DISPENSED
Start: 2022-05-04

## (undated) RX ORDER — HALOPERIDOL 5 MG/ML
INJECTION INTRAMUSCULAR
Status: DISPENSED
Start: 2021-01-22

## (undated) RX ORDER — HALOPERIDOL 5 MG/ML
INJECTION INTRAMUSCULAR
Status: DISPENSED
Start: 2020-12-30

## (undated) RX ORDER — ONDANSETRON 2 MG/ML
INJECTION INTRAMUSCULAR; INTRAVENOUS
Status: DISPENSED
Start: 2021-02-26

## (undated) RX ORDER — GLYCOPYRROLATE 0.2 MG/ML
INJECTION INTRAMUSCULAR; INTRAVENOUS
Status: DISPENSED
Start: 2021-02-19

## (undated) RX ORDER — GLYCOPYRROLATE 0.2 MG/ML
INJECTION INTRAMUSCULAR; INTRAVENOUS
Status: DISPENSED
Start: 2022-04-11

## (undated) RX ORDER — LABETALOL HYDROCHLORIDE 5 MG/ML
INJECTION, SOLUTION INTRAVENOUS
Status: DISPENSED
Start: 2022-04-11

## (undated) RX ORDER — ONDANSETRON 2 MG/ML
INJECTION INTRAMUSCULAR; INTRAVENOUS
Status: DISPENSED
Start: 2021-01-22

## (undated) RX ORDER — KETOROLAC TROMETHAMINE 30 MG/ML
INJECTION, SOLUTION INTRAMUSCULAR; INTRAVENOUS
Status: DISPENSED
Start: 2020-12-28

## (undated) RX ORDER — GLYCOPYRROLATE 0.2 MG/ML
INJECTION INTRAMUSCULAR; INTRAVENOUS
Status: DISPENSED
Start: 2022-05-02

## (undated) RX ORDER — KETOROLAC TROMETHAMINE 30 MG/ML
INJECTION, SOLUTION INTRAMUSCULAR; INTRAVENOUS
Status: DISPENSED
Start: 2020-12-11

## (undated) RX ORDER — HALOPERIDOL 5 MG/ML
INJECTION INTRAMUSCULAR
Status: DISPENSED
Start: 2021-02-08

## (undated) RX ORDER — HALOPERIDOL 5 MG/ML
INJECTION INTRAMUSCULAR
Status: DISPENSED
Start: 2020-12-09

## (undated) RX ORDER — ONDANSETRON 2 MG/ML
INJECTION INTRAMUSCULAR; INTRAVENOUS
Status: DISPENSED
Start: 2021-02-19

## (undated) RX ORDER — KETOROLAC TROMETHAMINE 30 MG/ML
INJECTION, SOLUTION INTRAMUSCULAR; INTRAVENOUS
Status: DISPENSED
Start: 2021-02-26

## (undated) RX ORDER — ONDANSETRON 2 MG/ML
INJECTION INTRAMUSCULAR; INTRAVENOUS
Status: DISPENSED
Start: 2022-05-04

## (undated) RX ORDER — ACETAMINOPHEN 500 MG
TABLET ORAL
Status: DISPENSED
Start: 2020-12-21

## (undated) RX ORDER — KETOROLAC TROMETHAMINE 30 MG/ML
INJECTION, SOLUTION INTRAMUSCULAR; INTRAVENOUS
Status: DISPENSED
Start: 2021-03-05

## (undated) RX ORDER — CAFFEINE AND SODIUM BENZOATE 125 MG/ML
INJECTION, SOLUTION INTRAMUSCULAR; INTRAVENOUS
Status: DISPENSED
Start: 2021-03-05

## (undated) RX ORDER — KETOROLAC TROMETHAMINE 30 MG/ML
INJECTION, SOLUTION INTRAMUSCULAR; INTRAVENOUS
Status: DISPENSED
Start: 2022-04-27

## (undated) RX ORDER — LABETALOL HYDROCHLORIDE 5 MG/ML
INJECTION, SOLUTION INTRAVENOUS
Status: DISPENSED
Start: 2022-04-22

## (undated) RX ORDER — ONDANSETRON 2 MG/ML
INJECTION INTRAMUSCULAR; INTRAVENOUS
Status: DISPENSED
Start: 2022-05-13

## (undated) RX ORDER — GLYCOPYRROLATE 0.2 MG/ML
INJECTION INTRAMUSCULAR; INTRAVENOUS
Status: DISPENSED
Start: 2022-05-13

## (undated) RX ORDER — HALOPERIDOL 5 MG/ML
INJECTION INTRAMUSCULAR
Status: DISPENSED
Start: 2021-01-27

## (undated) RX ORDER — KETOROLAC TROMETHAMINE 30 MG/ML
INJECTION, SOLUTION INTRAMUSCULAR; INTRAVENOUS
Status: DISPENSED
Start: 2022-04-08

## (undated) RX ORDER — HALOPERIDOL 5 MG/ML
INJECTION INTRAMUSCULAR
Status: DISPENSED
Start: 2021-02-05

## (undated) RX ORDER — ONDANSETRON 2 MG/ML
INJECTION INTRAMUSCULAR; INTRAVENOUS
Status: DISPENSED
Start: 2021-02-03

## (undated) RX ORDER — GLYCOPYRROLATE 0.2 MG/ML
INJECTION INTRAMUSCULAR; INTRAVENOUS
Status: DISPENSED
Start: 2021-03-05

## (undated) RX ORDER — ONDANSETRON 2 MG/ML
INJECTION INTRAMUSCULAR; INTRAVENOUS
Status: DISPENSED
Start: 2022-04-08

## (undated) RX ORDER — GLYCOPYRROLATE 0.2 MG/ML
INJECTION INTRAMUSCULAR; INTRAVENOUS
Status: DISPENSED
Start: 2022-05-20

## (undated) RX ORDER — HALOPERIDOL 5 MG/ML
INJECTION INTRAMUSCULAR
Status: DISPENSED
Start: 2020-12-18

## (undated) RX ORDER — ONDANSETRON 2 MG/ML
INJECTION INTRAMUSCULAR; INTRAVENOUS
Status: DISPENSED
Start: 2020-12-21

## (undated) RX ORDER — KETOROLAC TROMETHAMINE 30 MG/ML
INJECTION, SOLUTION INTRAMUSCULAR; INTRAVENOUS
Status: DISPENSED
Start: 2022-04-13

## (undated) RX ORDER — HALOPERIDOL 5 MG/ML
INJECTION INTRAMUSCULAR
Status: DISPENSED
Start: 2021-02-01

## (undated) RX ORDER — KETOROLAC TROMETHAMINE 30 MG/ML
INJECTION, SOLUTION INTRAMUSCULAR; INTRAVENOUS
Status: DISPENSED
Start: 2022-04-22

## (undated) RX ORDER — KETOROLAC TROMETHAMINE 30 MG/ML
INJECTION, SOLUTION INTRAMUSCULAR; INTRAVENOUS
Status: DISPENSED
Start: 2022-04-11

## (undated) RX ORDER — ONDANSETRON 2 MG/ML
INJECTION INTRAMUSCULAR; INTRAVENOUS
Status: DISPENSED
Start: 2022-04-27

## (undated) RX ORDER — ONDANSETRON 2 MG/ML
INJECTION INTRAMUSCULAR; INTRAVENOUS
Status: DISPENSED
Start: 2020-12-30

## (undated) RX ORDER — KETOROLAC TROMETHAMINE 30 MG/ML
INJECTION, SOLUTION INTRAMUSCULAR; INTRAVENOUS
Status: DISPENSED
Start: 2020-12-16

## (undated) RX ORDER — CAFFEINE AND SODIUM BENZOATE 125 MG/ML
INJECTION, SOLUTION INTRAMUSCULAR; INTRAVENOUS
Status: DISPENSED
Start: 2022-04-06

## (undated) RX ORDER — KETOROLAC TROMETHAMINE 30 MG/ML
INJECTION, SOLUTION INTRAMUSCULAR; INTRAVENOUS
Status: DISPENSED
Start: 2022-04-20

## (undated) RX ORDER — KETOROLAC TROMETHAMINE 30 MG/ML
INJECTION, SOLUTION INTRAMUSCULAR; INTRAVENOUS
Status: DISPENSED
Start: 2022-05-13

## (undated) RX ORDER — KETOROLAC TROMETHAMINE 30 MG/ML
INJECTION, SOLUTION INTRAMUSCULAR; INTRAVENOUS
Status: DISPENSED
Start: 2021-02-01

## (undated) RX ORDER — OLANZAPINE 5 MG/1
TABLET, ORALLY DISINTEGRATING ORAL
Status: DISPENSED
Start: 2022-04-20

## (undated) RX ORDER — CAFFEINE AND SODIUM BENZOATE 125 MG/ML
INJECTION, SOLUTION INTRAMUSCULAR; INTRAVENOUS
Status: DISPENSED
Start: 2020-12-23

## (undated) RX ORDER — ONDANSETRON 2 MG/ML
INJECTION INTRAMUSCULAR; INTRAVENOUS
Status: DISPENSED
Start: 2020-12-09

## (undated) RX ORDER — GLYCOPYRROLATE 0.2 MG/ML
INJECTION INTRAMUSCULAR; INTRAVENOUS
Status: DISPENSED
Start: 2022-04-13

## (undated) RX ORDER — ACETAMINOPHEN 500 MG
TABLET ORAL
Status: DISPENSED
Start: 2020-12-11

## (undated) RX ORDER — HALOPERIDOL 5 MG/ML
INJECTION INTRAMUSCULAR
Status: DISPENSED
Start: 2020-12-16

## (undated) RX ORDER — KETOROLAC TROMETHAMINE 30 MG/ML
INJECTION, SOLUTION INTRAMUSCULAR; INTRAVENOUS
Status: DISPENSED
Start: 2021-02-19

## (undated) RX ORDER — ACETAMINOPHEN 500 MG
TABLET ORAL
Status: DISPENSED
Start: 2021-01-22

## (undated) RX ORDER — NICARDIPINE HCL-0.9% SOD CHLOR 1 MG/10 ML
SYRINGE (ML) INTRAVENOUS
Status: DISPENSED
Start: 2022-05-04

## (undated) RX ORDER — HALOPERIDOL 5 MG/ML
INJECTION INTRAMUSCULAR
Status: DISPENSED
Start: 2020-12-21

## (undated) RX ORDER — KETOROLAC TROMETHAMINE 30 MG/ML
INJECTION, SOLUTION INTRAMUSCULAR; INTRAVENOUS
Status: DISPENSED
Start: 2021-01-27

## (undated) RX ORDER — ACETAMINOPHEN 500 MG
TABLET ORAL
Status: DISPENSED
Start: 2020-12-30

## (undated) RX ORDER — ACETAMINOPHEN 500 MG
TABLET ORAL
Status: DISPENSED
Start: 2020-12-23

## (undated) RX ORDER — KETOROLAC TROMETHAMINE 30 MG/ML
INJECTION, SOLUTION INTRAMUSCULAR; INTRAVENOUS
Status: DISPENSED
Start: 2021-02-05

## (undated) RX ORDER — KETOROLAC TROMETHAMINE 30 MG/ML
INJECTION, SOLUTION INTRAMUSCULAR; INTRAVENOUS
Status: DISPENSED
Start: 2020-12-14

## (undated) RX ORDER — ONDANSETRON 2 MG/ML
INJECTION INTRAMUSCULAR; INTRAVENOUS
Status: DISPENSED
Start: 2021-03-05

## (undated) RX ORDER — ONDANSETRON 2 MG/ML
INJECTION INTRAMUSCULAR; INTRAVENOUS
Status: DISPENSED
Start: 2022-04-13

## (undated) RX ORDER — GLYCOPYRROLATE 0.2 MG/ML
INJECTION INTRAMUSCULAR; INTRAVENOUS
Status: DISPENSED
Start: 2021-02-26

## (undated) RX ORDER — GLYCOPYRROLATE 0.2 MG/ML
INJECTION INTRAMUSCULAR; INTRAVENOUS
Status: DISPENSED
Start: 2022-04-22

## (undated) RX ORDER — OLANZAPINE 5 MG/1
TABLET, ORALLY DISINTEGRATING ORAL
Status: DISPENSED
Start: 2022-05-20

## (undated) RX ORDER — ONDANSETRON 2 MG/ML
INJECTION INTRAMUSCULAR; INTRAVENOUS
Status: DISPENSED
Start: 2020-12-18

## (undated) RX ORDER — ONDANSETRON 2 MG/ML
INJECTION INTRAMUSCULAR; INTRAVENOUS
Status: DISPENSED
Start: 2021-02-12

## (undated) RX ORDER — CAFFEINE AND SODIUM BENZOATE 125 MG/ML
INJECTION, SOLUTION INTRAMUSCULAR; INTRAVENOUS
Status: DISPENSED
Start: 2020-12-30

## (undated) RX ORDER — HALOPERIDOL 5 MG/ML
INJECTION INTRAMUSCULAR
Status: DISPENSED
Start: 2020-12-11

## (undated) RX ORDER — CAFFEINE AND SODIUM BENZOATE 125 MG/ML
INJECTION, SOLUTION INTRAMUSCULAR; INTRAVENOUS
Status: DISPENSED
Start: 2021-02-26

## (undated) RX ORDER — CAFFEINE AND SODIUM BENZOATE 125 MG/ML
INJECTION, SOLUTION INTRAMUSCULAR; INTRAVENOUS
Status: DISPENSED
Start: 2021-02-01

## (undated) RX ORDER — KETOROLAC TROMETHAMINE 30 MG/ML
INJECTION, SOLUTION INTRAMUSCULAR; INTRAVENOUS
Status: DISPENSED
Start: 2022-04-06

## (undated) RX ORDER — NICARDIPINE HCL-0.9% SOD CHLOR 1 MG/10 ML
SYRINGE (ML) INTRAVENOUS
Status: DISPENSED
Start: 2022-04-20

## (undated) RX ORDER — CAFFEINE AND SODIUM BENZOATE 125 MG/ML
INJECTION, SOLUTION INTRAMUSCULAR; INTRAVENOUS
Status: DISPENSED
Start: 2021-01-27

## (undated) RX ORDER — ONDANSETRON 2 MG/ML
INJECTION INTRAMUSCULAR; INTRAVENOUS
Status: DISPENSED
Start: 2020-12-23

## (undated) RX ORDER — KETOROLAC TROMETHAMINE 30 MG/ML
INJECTION, SOLUTION INTRAMUSCULAR; INTRAVENOUS
Status: DISPENSED
Start: 2021-02-15

## (undated) RX ORDER — GLYCOPYRROLATE 0.2 MG/ML
INJECTION INTRAMUSCULAR; INTRAVENOUS
Status: DISPENSED
Start: 2022-04-27

## (undated) RX ORDER — CAFFEINE AND SODIUM BENZOATE 125 MG/ML
INJECTION, SOLUTION INTRAMUSCULAR; INTRAVENOUS
Status: DISPENSED
Start: 2021-01-22

## (undated) RX ORDER — ONDANSETRON 2 MG/ML
INJECTION INTRAMUSCULAR; INTRAVENOUS
Status: DISPENSED
Start: 2022-04-29

## (undated) RX ORDER — CAFFEINE AND SODIUM BENZOATE 125 MG/ML
INJECTION, SOLUTION INTRAMUSCULAR; INTRAVENOUS
Status: DISPENSED
Start: 2021-02-08

## (undated) RX ORDER — HALOPERIDOL 5 MG/ML
INJECTION INTRAMUSCULAR
Status: DISPENSED
Start: 2021-02-10

## (undated) RX ORDER — ACETAMINOPHEN 500 MG
TABLET ORAL
Status: DISPENSED
Start: 2020-12-18

## (undated) RX ORDER — ONDANSETRON 2 MG/ML
INJECTION INTRAMUSCULAR; INTRAVENOUS
Status: DISPENSED
Start: 2021-03-22